# Patient Record
Sex: FEMALE | Race: BLACK OR AFRICAN AMERICAN | NOT HISPANIC OR LATINO | Employment: FULL TIME | URBAN - METROPOLITAN AREA
[De-identification: names, ages, dates, MRNs, and addresses within clinical notes are randomized per-mention and may not be internally consistent; named-entity substitution may affect disease eponyms.]

---

## 2018-02-22 ENCOUNTER — HOSPITAL ENCOUNTER (EMERGENCY)
Facility: OTHER | Age: 63
Discharge: ANOTHER HOSPITAL | End: 2018-02-23
Attending: INTERNAL MEDICINE
Payer: COMMERCIAL

## 2018-02-22 DIAGNOSIS — I48.91 ATRIAL FIBRILLATION WITH RAPID VENTRICULAR RESPONSE: Primary | ICD-10-CM

## 2018-02-22 DIAGNOSIS — R00.0 TACHYCARDIA: ICD-10-CM

## 2018-02-22 DIAGNOSIS — R06.02 SHORTNESS OF BREATH: ICD-10-CM

## 2018-02-22 LAB
ALBUMIN SERPL-MCNC: 4 G/DL (ref 3.3–5.5)
ALP SERPL-CCNC: 283 U/L (ref 42–141)
BILIRUB SERPL-MCNC: 5.2 MG/DL (ref 0.2–1.6)
BUN SERPL-MCNC: 35 MG/DL (ref 7–22)
CALCIUM SERPL-MCNC: 9.6 MG/DL (ref 8–10.3)
CHLORIDE SERPL-SCNC: 100 MMOL/L (ref 98–108)
CREAT SERPL-MCNC: 0.9 MG/DL (ref 0.6–1.2)
GLUCOSE SERPL-MCNC: 162 MG/DL (ref 73–118)
POC ALT (SGPT): 266 U/L (ref 10–47)
POC AST (SGOT): 268 U/L (ref 11–38)
POC B-TYPE NATRIURETIC PEPTIDE: 710 PG/ML (ref 0–100)
POC CARDIAC TROPONIN I: 0.02 NG/ML
POC D-DI: 1530 NG/ML (ref 0–450)
POC PTINR: 1.4 (ref 0.9–1.2)
POC PTWBT: 16.9 SEC (ref 9.7–14.3)
POC TCO2: 21 MMOL/L (ref 18–33)
POTASSIUM BLD-SCNC: 4.3 MMOL/L (ref 3.6–5.1)
PROTEIN, POC: 6.6 G/DL (ref 6.4–8.1)
SAMPLE: ABNORMAL
SAMPLE: NORMAL
SODIUM BLD-SCNC: 140 MMOL/L (ref 128–145)

## 2018-02-22 PROCEDURE — 93005 ELECTROCARDIOGRAM TRACING: CPT

## 2018-02-22 PROCEDURE — 96372 THER/PROPH/DIAG INJ SC/IM: CPT

## 2018-02-22 PROCEDURE — 93010 ELECTROCARDIOGRAM REPORT: CPT | Mod: 76,,, | Performed by: INTERNAL MEDICINE

## 2018-02-22 PROCEDURE — 85610 PROTHROMBIN TIME: CPT

## 2018-02-22 PROCEDURE — 84484 ASSAY OF TROPONIN QUANT: CPT

## 2018-02-22 PROCEDURE — 96361 HYDRATE IV INFUSION ADD-ON: CPT

## 2018-02-22 PROCEDURE — 96376 TX/PRO/DX INJ SAME DRUG ADON: CPT

## 2018-02-22 PROCEDURE — 63600175 PHARM REV CODE 636 W HCPCS: Performed by: INTERNAL MEDICINE

## 2018-02-22 PROCEDURE — 85379 FIBRIN DEGRADATION QUANT: CPT

## 2018-02-22 PROCEDURE — 96375 TX/PRO/DX INJ NEW DRUG ADDON: CPT

## 2018-02-22 PROCEDURE — 83880 ASSAY OF NATRIURETIC PEPTIDE: CPT

## 2018-02-22 PROCEDURE — 99285 EMERGENCY DEPT VISIT HI MDM: CPT | Mod: 25

## 2018-02-22 PROCEDURE — 25000003 PHARM REV CODE 250: Performed by: INTERNAL MEDICINE

## 2018-02-22 PROCEDURE — 96374 THER/PROPH/DIAG INJ IV PUSH: CPT

## 2018-02-22 PROCEDURE — 25500020 PHARM REV CODE 255: Performed by: INTERNAL MEDICINE

## 2018-02-22 PROCEDURE — 85025 COMPLETE CBC W/AUTO DIFF WBC: CPT

## 2018-02-22 PROCEDURE — 80053 COMPREHEN METABOLIC PANEL: CPT

## 2018-02-22 RX ORDER — DILTIAZEM HYDROCHLORIDE 30 MG/1
35 TABLET, FILM COATED ORAL
Status: DISCONTINUED | OUTPATIENT
Start: 2018-02-22 | End: 2018-02-22

## 2018-02-22 RX ORDER — SODIUM CHLORIDE 9 MG/ML
1000 INJECTION, SOLUTION INTRAVENOUS ONCE
Status: COMPLETED | OUTPATIENT
Start: 2018-02-22 | End: 2018-02-22

## 2018-02-22 RX ORDER — ASPIRIN 325 MG
325 TABLET ORAL
Status: COMPLETED | OUTPATIENT
Start: 2018-02-22 | End: 2018-02-22

## 2018-02-22 RX ORDER — DILTIAZEM HYDROCHLORIDE 5 MG/ML
25 INJECTION INTRAVENOUS
Status: COMPLETED | OUTPATIENT
Start: 2018-02-22 | End: 2018-02-22

## 2018-02-22 RX ORDER — DILTIAZEM HYDROCHLORIDE 5 MG/ML
INJECTION INTRAVENOUS
Status: DISCONTINUED
Start: 2018-02-22 | End: 2018-02-23 | Stop reason: HOSPADM

## 2018-02-22 RX ORDER — DILTIAZEM HCL 1 MG/ML
5 INJECTION, SOLUTION INTRAVENOUS
Status: DISCONTINUED | OUTPATIENT
Start: 2018-02-22 | End: 2018-02-23

## 2018-02-22 RX ORDER — ENOXAPARIN SODIUM 100 MG/ML
100 INJECTION SUBCUTANEOUS
Status: COMPLETED | OUTPATIENT
Start: 2018-02-22 | End: 2018-02-22

## 2018-02-22 RX ORDER — ONDANSETRON 2 MG/ML
8 INJECTION INTRAMUSCULAR; INTRAVENOUS
Status: COMPLETED | OUTPATIENT
Start: 2018-02-22 | End: 2018-02-22

## 2018-02-22 RX ORDER — DILTIAZEM HYDROCHLORIDE 5 MG/ML
0.35 INJECTION INTRAVENOUS
Status: COMPLETED | OUTPATIENT
Start: 2018-02-22 | End: 2018-02-22

## 2018-02-22 RX ADMIN — ASPIRIN 325 MG ORAL TABLET 325 MG: 325 PILL ORAL at 07:02

## 2018-02-22 RX ADMIN — DILTIAZEM HYDROCHLORIDE 34 MG: 5 INJECTION INTRAVENOUS at 09:02

## 2018-02-22 RX ADMIN — IOHEXOL 70 ML: 350 INJECTION, SOLUTION INTRAVENOUS at 08:02

## 2018-02-22 RX ADMIN — ENOXAPARIN SODIUM 100 MG: 100 INJECTION SUBCUTANEOUS at 09:02

## 2018-02-22 RX ADMIN — SODIUM CHLORIDE 1000 ML: 0.9 INJECTION, SOLUTION INTRAVENOUS at 07:02

## 2018-02-22 RX ADMIN — ONDANSETRON 8 MG: 2 INJECTION INTRAMUSCULAR; INTRAVENOUS at 07:02

## 2018-02-22 RX ADMIN — DILTIAZEM HYDROCHLORIDE 25 MG: 5 INJECTION INTRAVENOUS at 07:02

## 2018-02-23 ENCOUNTER — ANESTHESIA (OUTPATIENT)
Dept: CARDIOLOGY | Facility: HOSPITAL | Age: 63
DRG: 286 | End: 2018-02-23
Payer: COMMERCIAL

## 2018-02-23 ENCOUNTER — HOSPITAL ENCOUNTER (INPATIENT)
Facility: HOSPITAL | Age: 63
LOS: 3 days | Discharge: HOME | DRG: 286 | End: 2018-02-26
Attending: HOSPITALIST | Admitting: HOSPITALIST
Payer: COMMERCIAL

## 2018-02-23 ENCOUNTER — ANESTHESIA EVENT (OUTPATIENT)
Dept: CARDIOLOGY | Facility: HOSPITAL | Age: 63
DRG: 286 | End: 2018-02-23
Payer: COMMERCIAL

## 2018-02-23 ENCOUNTER — SURGERY (OUTPATIENT)
Age: 63
End: 2018-02-23

## 2018-02-23 VITALS
HEIGHT: 67 IN | WEIGHT: 215 LBS | SYSTOLIC BLOOD PRESSURE: 110 MMHG | HEART RATE: 130 BPM | OXYGEN SATURATION: 96 % | TEMPERATURE: 98 F | DIASTOLIC BLOOD PRESSURE: 91 MMHG | BODY MASS INDEX: 33.74 KG/M2 | RESPIRATION RATE: 16 BRPM

## 2018-02-23 DIAGNOSIS — I48.91 A-FIB: ICD-10-CM

## 2018-02-23 DIAGNOSIS — I48.91 ATRIAL FIBRILLATION WITH RAPID VENTRICULAR RESPONSE: ICD-10-CM

## 2018-02-23 DIAGNOSIS — I48.91 ATRIAL FIBRILLATION WITH RVR: ICD-10-CM

## 2018-02-23 DIAGNOSIS — I50.21 ACUTE SYSTOLIC HEART FAILURE: Primary | ICD-10-CM

## 2018-02-23 DIAGNOSIS — R07.9 CHEST PAIN: ICD-10-CM

## 2018-02-23 LAB
ALBUMIN SERPL BCP-MCNC: 3.7 G/DL
ALP SERPL-CCNC: 354 U/L
ALT SERPL W/O P-5'-P-CCNC: 308 U/L
ANION GAP SERPL CALC-SCNC: 15 MMOL/L
AORTIC VALVE REGURGITATION: ABNORMAL
AST SERPL-CCNC: 270 U/L
BACTERIA #/AREA URNS HPF: ABNORMAL /HPF
BASOPHILS # BLD AUTO: 0.02 K/UL
BASOPHILS NFR BLD: 0.2 %
BILIRUB SERPL-MCNC: 5.9 MG/DL
BILIRUB UR QL STRIP: NEGATIVE
BUN SERPL-MCNC: 38 MG/DL
CALCIUM SERPL-MCNC: 9.6 MG/DL
CHLORIDE SERPL-SCNC: 102 MMOL/L
CHOLEST SERPL-MCNC: 132 MG/DL
CHOLEST/HDLC SERPL: 3.9 {RATIO}
CLARITY UR: CLEAR
CO2 SERPL-SCNC: 19 MMOL/L
COLOR UR: ABNORMAL
CREAT SERPL-MCNC: 1.2 MG/DL
CRP SERPL-MCNC: 9.02 MG/L
DIFFERENTIAL METHOD: ABNORMAL
EOSINOPHIL # BLD AUTO: 0 K/UL
EOSINOPHIL NFR BLD: 0.1 %
ERYTHROCYTE [DISTWIDTH] IN BLOOD BY AUTOMATED COUNT: 16.9 %
ERYTHROCYTE [SEDIMENTATION RATE] IN BLOOD BY WESTERGREN METHOD: 1 MM/HR
EST. GFR  (AFRICAN AMERICAN): 56 ML/MIN/1.73 M^2
EST. GFR  (NON AFRICAN AMERICAN): 49 ML/MIN/1.73 M^2
ESTIMATED AVG GLUCOSE: 120 MG/DL
ESTIMATED PA SYSTOLIC PRESSURE: 29.42
GLOBAL PERICARDIAL EFFUSION: ABNORMAL
GLUCOSE SERPL-MCNC: 184 MG/DL
GLUCOSE UR QL STRIP: NEGATIVE
HBA1C MFR BLD HPLC: 5.8 %
HCT VFR BLD AUTO: 51.7 %
HDLC SERPL-MCNC: 34 MG/DL
HDLC SERPL: 25.8 %
HGB BLD-MCNC: 16.5 G/DL
HGB UR QL STRIP: NEGATIVE
INR PPP: 1.8
KETONES UR QL STRIP: NEGATIVE
LDLC SERPL CALC-MCNC: 84.4 MG/DL
LEUKOCYTE ESTERASE UR QL STRIP: ABNORMAL
LYMPHOCYTES # BLD AUTO: 1.6 K/UL
LYMPHOCYTES NFR BLD: 12.9 %
MAGNESIUM SERPL-MCNC: 2.5 MG/DL
MCH RBC QN AUTO: 26.1 PG
MCHC RBC AUTO-ENTMCNC: 31.9 G/DL
MCV RBC AUTO: 82 FL
MICROSCOPIC COMMENT: ABNORMAL
MITRAL VALVE REGURGITATION: ABNORMAL
MITRAL VALVE REGURGITATION: ABNORMAL
MONOCYTES # BLD AUTO: 0.9 K/UL
MONOCYTES NFR BLD: 7.7 %
NEUTROPHILS # BLD AUTO: 9.3 K/UL
NEUTROPHILS NFR BLD: 77.8 %
NITRITE UR QL STRIP: NEGATIVE
NONHDLC SERPL-MCNC: 98 MG/DL
PH UR STRIP: 5 [PH] (ref 5–8)
PHOSPHATE SERPL-MCNC: 4.9 MG/DL
PLATELET # BLD AUTO: 185 K/UL
PMV BLD AUTO: 12.9 FL
POCT GLUCOSE: 186 MG/DL (ref 70–110)
POTASSIUM SERPL-SCNC: 4.8 MMOL/L
PROT SERPL-MCNC: 6.8 G/DL
PROT UR QL STRIP: NEGATIVE
PROTHROMBIN TIME: 19 SEC
RBC # BLD AUTO: 6.33 M/UL
RETIRED EF AND QEF - SEE NOTES: 20 (ref 55–65)
RETIRED EF AND QEF - SEE NOTES: 20 (ref 55–65)
SODIUM SERPL-SCNC: 136 MMOL/L
SP GR UR STRIP: >1.06 (ref 1–1.03)
SQUAMOUS #/AREA URNS HPF: 2 /HPF
T3FREE SERPL-MCNC: 1.6 PG/ML
T4 FREE SERPL-MCNC: 1.26 NG/DL
TRICUSPID VALVE REGURGITATION: ABNORMAL
TRIGL SERPL-MCNC: 68 MG/DL
TROPONIN I SERPL DL<=0.01 NG/ML-MCNC: 0.02 NG/ML
TROPONIN I SERPL DL<=0.01 NG/ML-MCNC: 0.03 NG/ML
TROPONIN I SERPL DL<=0.01 NG/ML-MCNC: 0.04 NG/ML
TSH SERPL DL<=0.005 MIU/L-ACNC: 0.82 UIU/ML
URN SPEC COLLECT METH UR: ABNORMAL
UROBILINOGEN UR STRIP-ACNC: ABNORMAL EU/DL
WBC # BLD AUTO: 12 K/UL
WBC #/AREA URNS HPF: 7 /HPF (ref 0–5)
YEAST URNS QL MICRO: ABNORMAL

## 2018-02-23 PROCEDURE — 25000003 PHARM REV CODE 250: Performed by: INTERNAL MEDICINE

## 2018-02-23 PROCEDURE — 27000221 HC OXYGEN, UP TO 24 HOURS

## 2018-02-23 PROCEDURE — 84443 ASSAY THYROID STIM HORMONE: CPT

## 2018-02-23 PROCEDURE — 80053 COMPREHEN METABOLIC PANEL: CPT

## 2018-02-23 PROCEDURE — 99291 CRITICAL CARE FIRST HOUR: CPT | Mod: ,,, | Performed by: INTERNAL MEDICINE

## 2018-02-23 PROCEDURE — 63600175 PHARM REV CODE 636 W HCPCS: Performed by: INTERNAL MEDICINE

## 2018-02-23 PROCEDURE — 84100 ASSAY OF PHOSPHORUS: CPT

## 2018-02-23 PROCEDURE — 92960 CARDIOVERSION ELECTRIC EXT: CPT | Mod: ,,, | Performed by: INTERNAL MEDICINE

## 2018-02-23 PROCEDURE — 93312 ECHO TRANSESOPHAGEAL: CPT

## 2018-02-23 PROCEDURE — D9220A PRA ANESTHESIA: Mod: 59,CRNA,, | Performed by: NURSE ANESTHETIST, CERTIFIED REGISTERED

## 2018-02-23 PROCEDURE — 93312 ECHO TRANSESOPHAGEAL: CPT | Mod: 26,,, | Performed by: INTERNAL MEDICINE

## 2018-02-23 PROCEDURE — 83036 HEMOGLOBIN GLYCOSYLATED A1C: CPT

## 2018-02-23 PROCEDURE — 83735 ASSAY OF MAGNESIUM: CPT

## 2018-02-23 PROCEDURE — 63600175 PHARM REV CODE 636 W HCPCS: Performed by: HOSPITALIST

## 2018-02-23 PROCEDURE — 86141 C-REACTIVE PROTEIN HS: CPT

## 2018-02-23 PROCEDURE — 37000008 HC ANESTHESIA 1ST 15 MINUTES: Performed by: INTERNAL MEDICINE

## 2018-02-23 PROCEDURE — 85610 PROTHROMBIN TIME: CPT

## 2018-02-23 PROCEDURE — 93306 TTE W/DOPPLER COMPLETE: CPT | Mod: 26,,, | Performed by: INTERNAL MEDICINE

## 2018-02-23 PROCEDURE — 93005 ELECTROCARDIOGRAM TRACING: CPT

## 2018-02-23 PROCEDURE — 27200139 CARDIOVERSION (DCCV)

## 2018-02-23 PROCEDURE — 84481 FREE ASSAY (FT-3): CPT

## 2018-02-23 PROCEDURE — 20000000 HC ICU ROOM

## 2018-02-23 PROCEDURE — 80061 LIPID PANEL: CPT

## 2018-02-23 PROCEDURE — 81000 URINALYSIS NONAUTO W/SCOPE: CPT

## 2018-02-23 PROCEDURE — D9220A PRA ANESTHESIA: Mod: 59,ANES,, | Performed by: ANESTHESIOLOGY

## 2018-02-23 PROCEDURE — 93320 DOPPLER ECHO COMPLETE: CPT | Mod: 26,,, | Performed by: INTERNAL MEDICINE

## 2018-02-23 PROCEDURE — 25000003 PHARM REV CODE 250: Performed by: HOSPITALIST

## 2018-02-23 PROCEDURE — A4216 STERILE WATER/SALINE, 10 ML: HCPCS | Performed by: HOSPITALIST

## 2018-02-23 PROCEDURE — 84439 ASSAY OF FREE THYROXINE: CPT

## 2018-02-23 PROCEDURE — 84484 ASSAY OF TROPONIN QUANT: CPT | Mod: 91

## 2018-02-23 PROCEDURE — 36415 COLL VENOUS BLD VENIPUNCTURE: CPT

## 2018-02-23 PROCEDURE — 93306 TTE W/DOPPLER COMPLETE: CPT

## 2018-02-23 PROCEDURE — 85025 COMPLETE CBC W/AUTO DIFF WBC: CPT

## 2018-02-23 PROCEDURE — 37000009 HC ANESTHESIA EA ADD 15 MINS: Performed by: INTERNAL MEDICINE

## 2018-02-23 PROCEDURE — 85651 RBC SED RATE NONAUTOMATED: CPT

## 2018-02-23 RX ORDER — ACETAMINOPHEN 325 MG/1
650 TABLET ORAL EVERY 6 HOURS PRN
Status: DISCONTINUED | OUTPATIENT
Start: 2018-02-23 | End: 2018-02-26 | Stop reason: HOSPADM

## 2018-02-23 RX ORDER — ASPIRIN 325 MG
325 TABLET ORAL DAILY
Status: DISCONTINUED | OUTPATIENT
Start: 2018-02-23 | End: 2018-02-23

## 2018-02-23 RX ORDER — SODIUM CHLORIDE 0.9 % (FLUSH) 0.9 %
3 SYRINGE (ML) INJECTION EVERY 8 HOURS
Status: DISCONTINUED | OUTPATIENT
Start: 2018-02-23 | End: 2018-02-26 | Stop reason: HOSPADM

## 2018-02-23 RX ORDER — FUROSEMIDE 10 MG/ML
40 INJECTION INTRAMUSCULAR; INTRAVENOUS ONCE
Status: COMPLETED | OUTPATIENT
Start: 2018-02-23 | End: 2018-02-23

## 2018-02-23 RX ORDER — CARVEDILOL 3.12 MG/1
3.12 TABLET ORAL 2 TIMES DAILY
Status: DISCONTINUED | OUTPATIENT
Start: 2018-02-23 | End: 2018-02-26 | Stop reason: HOSPADM

## 2018-02-23 RX ORDER — RAMELTEON 8 MG/1
8 TABLET ORAL NIGHTLY PRN
Status: DISCONTINUED | OUTPATIENT
Start: 2018-02-23 | End: 2018-02-26 | Stop reason: HOSPADM

## 2018-02-23 RX ORDER — ENOXAPARIN SODIUM 100 MG/ML
1 INJECTION SUBCUTANEOUS
Status: DISCONTINUED | OUTPATIENT
Start: 2018-02-23 | End: 2018-02-24

## 2018-02-23 RX ORDER — AMOXICILLIN 250 MG
1 CAPSULE ORAL 2 TIMES DAILY
Status: DISCONTINUED | OUTPATIENT
Start: 2018-02-23 | End: 2018-02-26 | Stop reason: HOSPADM

## 2018-02-23 RX ORDER — PROMETHAZINE HYDROCHLORIDE 25 MG/1
25 SUPPOSITORY RECTAL EVERY 6 HOURS PRN
Status: DISCONTINUED | OUTPATIENT
Start: 2018-02-23 | End: 2018-02-26 | Stop reason: HOSPADM

## 2018-02-23 RX ORDER — NITROGLYCERIN 0.4 MG/1
0.4 TABLET SUBLINGUAL EVERY 5 MIN PRN
Status: DISCONTINUED | OUTPATIENT
Start: 2018-02-23 | End: 2018-02-26 | Stop reason: HOSPADM

## 2018-02-23 RX ORDER — LORAZEPAM 2 MG/ML
0.5 INJECTION INTRAMUSCULAR ONCE
Status: COMPLETED | OUTPATIENT
Start: 2018-02-23 | End: 2018-02-23

## 2018-02-23 RX ORDER — DILTIAZEM HYDROCHLORIDE 5 MG/ML
0.25 INJECTION INTRAVENOUS ONCE
Status: DISCONTINUED | OUTPATIENT
Start: 2018-02-23 | End: 2018-02-23

## 2018-02-23 RX ORDER — SODIUM CHLORIDE 9 MG/ML
INJECTION, SOLUTION INTRAVENOUS CONTINUOUS
Status: DISCONTINUED | OUTPATIENT
Start: 2018-02-23 | End: 2018-02-23

## 2018-02-23 RX ORDER — ONDANSETRON 2 MG/ML
8 INJECTION INTRAMUSCULAR; INTRAVENOUS EVERY 8 HOURS PRN
Status: DISCONTINUED | OUTPATIENT
Start: 2018-02-23 | End: 2018-02-26 | Stop reason: HOSPADM

## 2018-02-23 RX ORDER — ASPIRIN 81 MG/1
81 TABLET ORAL DAILY
Status: DISCONTINUED | OUTPATIENT
Start: 2018-02-24 | End: 2018-02-26 | Stop reason: HOSPADM

## 2018-02-23 RX ORDER — BISACODYL 10 MG
10 SUPPOSITORY, RECTAL RECTAL DAILY PRN
Status: DISCONTINUED | OUTPATIENT
Start: 2018-02-23 | End: 2018-02-26 | Stop reason: HOSPADM

## 2018-02-23 RX ORDER — POLYETHYLENE GLYCOL 3350 17 G/17G
17 POWDER, FOR SOLUTION ORAL DAILY
Status: DISCONTINUED | OUTPATIENT
Start: 2018-02-23 | End: 2018-02-26 | Stop reason: HOSPADM

## 2018-02-23 RX ORDER — DEXTROMETHORPHAN POLISTIREX 30 MG/5 ML
1 SUSPENSION, EXTENDED RELEASE 12 HR ORAL DAILY PRN
Status: DISCONTINUED | OUTPATIENT
Start: 2018-02-23 | End: 2018-02-26 | Stop reason: HOSPADM

## 2018-02-23 RX ADMIN — DOCUSATE SODIUM AND SENNOSIDES 1 TABLET: 8.6; 5 TABLET, FILM COATED ORAL at 08:02

## 2018-02-23 RX ADMIN — AMIODARONE HYDROCHLORIDE 150 MG: 1.5 INJECTION, SOLUTION INTRAVENOUS at 12:02

## 2018-02-23 RX ADMIN — ONDANSETRON HYDROCHLORIDE 8 MG: 2 INJECTION, SOLUTION INTRAMUSCULAR; INTRAVENOUS at 11:02

## 2018-02-23 RX ADMIN — AMIODARONE HYDROCHLORIDE 1 MG/MIN: 1.8 INJECTION, SOLUTION INTRAVENOUS at 12:02

## 2018-02-23 RX ADMIN — CARVEDILOL 3.12 MG: 3.12 TABLET, FILM COATED ORAL at 08:02

## 2018-02-23 RX ADMIN — Medication 3 ML: at 02:02

## 2018-02-23 RX ADMIN — FUROSEMIDE 40 MG: 10 INJECTION, SOLUTION INTRAMUSCULAR; INTRAVENOUS at 04:02

## 2018-02-23 RX ADMIN — AMIODARONE HYDROCHLORIDE 0.5 MG/MIN: 50 INJECTION, SOLUTION INTRAVENOUS at 04:02

## 2018-02-23 RX ADMIN — ENOXAPARIN SODIUM 100 MG: 100 INJECTION SUBCUTANEOUS at 05:02

## 2018-02-23 RX ADMIN — Medication 3 ML: at 06:02

## 2018-02-23 RX ADMIN — Medication 3 ML: at 10:02

## 2018-02-23 RX ADMIN — SODIUM CHLORIDE: 0.9 INJECTION, SOLUTION INTRAVENOUS at 06:02

## 2018-02-23 RX ADMIN — RAMELTEON 8 MG: 8 TABLET, FILM COATED ORAL at 10:02

## 2018-02-23 RX ADMIN — LORAZEPAM 0.5 MG: 2 INJECTION, SOLUTION INTRAMUSCULAR; INTRAVENOUS at 06:02

## 2018-02-23 RX ADMIN — AMIODARONE HYDROCHLORIDE 1 MG/MIN: 50 INJECTION, SOLUTION INTRAVENOUS at 04:02

## 2018-02-23 NOTE — H&P
[facility] Hospital Medicine  History & Physical    Patient Name: Yaneli Steele  MRN: [id number]  Admission Date: 02/23/2018  Attending Physician: Devante Kang MD, MPH      [facility]    CC:     Transfer for evaluation of atrial fibrillation with rapid ventricular response.    HISTORY OF PRESENT ILLNESS:     Yaneli Steele is a 62 y.o. female that (in part)  has no past medical history on file. Presents to [facility] as a transfer patient from the [facility] Emergency Department for evaluation of atrial fibrillation with rapid ventricular response.  Patient initially presented complaining of shortness of breath of one-week duration.  Subacute onset with progressive worsening.  Constant duration.  Daily frequency.  Located in his chest/lungs.  No radiation.  She denies history of previous arrhythmia.  No fever or chills.  No known thyroid disease.  She does not use illicit drugs or drink alcohol to excess.  Positive for palpitations but denies chest pain.    In the emergency department EKG, cardiac enzymes, chest x-ray, and routine laboratory studies were obtained.  This confirmed she had a atrial fibrillation with RVR.  She was given a bolus dose of Cardizem that did not appear to be effective and therefore she was started on a Cardizem infusion.  Thereafter she was started on an amiodarone infusion after loading dose was given.    Hospital medicine has been asked to admit for further evaluation and treatment.       REVIEW OF SYSTEMS:     -- Constitutional: No fever or chills.  -- Eyes: No visual changes, diplopia, pain, tearing, blind spots, or discharge.   -- Ears, nose, mouth, throat, and face: No congestion, sore throat, epistaxis, d/c, bleeding gums, neck stiffness masses, or dental issues.  -- Respiratory: Shortness of breath.  No cough, hemoptysis, stridor, wheezing, or night sweats.  -- Cardiovascular: As above in  history of present illness.   -- Gastrointestinal: No vomiting, abdominal pain, hematemesis, melena, dyspepsia, or change in bowel habits.  -- Genitourinary: No hematuria, dysuria, frequency, urgency, nocturia, polyuria, stones, or incontinence.  -- Integument/breast: No rash, pruritis, pigmentation changes, dryness, or changes in hair  -- Hematologic/lymphatic: No easy bruising or lymphadenopathy.   -- Musculoskeletal: No acute arthralgias, acute myalgias, joint swelling, acute limitations of ROM, or acute muscular weakness.  -- Neurological: No seizures, headaches, incoordination, paraesthesias, ataxia, vertigo, or tremors.  -- Behavioral/Psych: No auditory or visual hallucinations, depression, or suicidal/homicidal ideations.  -- Endocrine: No heat or cold intolerance, polydipsia, or unintentional weight gain / loss.  -- Allergy/Immunologic: No recurrent infections or adverse reaction to food, insects, or difficulty breathing.        PAST MEDICAL / SURGICAL HISTORY:   History reviewed. No pertinent past medical history.  History reviewed. No pertinent surgical history.      FAMILY HISTORY:     Family history cardiovascular disease.      SOCIAL HISTORY:     Social History   Substance Use Topics    Smoking status: Not on file    Smokeless tobacco: Not on file    Alcohol use Not on file     Social History     Social History    Marital status: Single     Spouse name: N/A    Number of children: N/A    Years of education: N/A     Social History Main Topics    Smoking status: None    Smokeless tobacco: None    Alcohol use None    Drug use: Unknown    Sexual activity: Not Asked     Other Topics Concern    None     Social History Narrative    None         ALLERGIES:       Review of patient's allergies indicates:  No Known Allergies      HEALTH SCREENING:     Influenza vaccine not up-to-date for this season.  Prevnar 13 pneumonia vaccine = no evidence of previous vaccination found in the medical  record      HOME MEDICATIONS:     Mucinex as needed    HOSPITAL MEDICATIONS:     Scheduled Meds:   Continuous Infusions:   PRN Meds:       PHYSICAL EXAM:     Wt Readings from Last 1 Encounters:   02/22/18 1840 97.5 kg (215 lb)     There is no height or weight on file to calculate BMI.  Vitals:    02/23/18 0200 02/23/18 0215   BP: 123/84 115/87   Pulse: (!) 135 (!) 140   Resp: (!) 32 (!) 35   Temp:  97.5 °F (36.4 °C)   TempSrc:  Oral   SpO2:  100%          -- General appearance: well developed. appears stated age   -- Head: normocephalic, atraumatic   -- Eyes: conjunctivae clear. Extraocular muscles intact  -- Nose: Nares normal. Septum midline.   -- Mouth/Throat: lips, mucosa, and tongue normal. no throat erythema.   -- Neck: supple, symmetrical, trachea midline, no JVD and thyroid not grossly enlarged, appears symmetric  -- Lungs: Increased respiratory rate.  Begin full sentences.  clear to auscultation bilaterally. normal respiratory effort. No use of accessory muscles.   -- Chest wall: no tenderness. equal bilateral chest rise   -- Heart: Rapid irregularly irregular rate and rhythm. S1, S2 normal.  no click, rub or gallop   -- Abdomen: soft, non-tender, non-distended, non-tympanic; bowel sounds normal; no masses  -- Extremities: no cyanosis, clubbing or edema.   -- Pulses: 2+ and symmetric   -- Skin: color normal, texture normal, turgor normal. No rashes or lesions.   -- Neurologic: Normal strength and tone. No focal numbness or weakness. CNII-XII intact. Aniceto coma scale: eyes open spontaneously-4, oriented & converses-5, obeys commands-6.      LABORATORY STUDIES:     Recent Results (from the past 36 hour(s))   POCT CMP    Collection Time: 02/22/18  7:35 PM   Result Value Ref Range    Albumin, POC 4.0 3.3 - 5.5 g/dL    Alkaline Phosphatase,  (H) 42 - 141 U/L    ALT (SGPT),  (H) 10 - 47 U/L    AST (SGOT),  (H) 11 - 38 U/L    POC BUN 35 (H) 7 - 22 mg/dL    Calcium, POC 9.6 8.0 - 10.3 mg/dL     POC Chloride 100 98 - 108 mmol/L    POC Creatinine 0.9 0.6 - 1.2 mg/dL    POC Glucose 162 (H) 73 - 118 mg/dL    POC Potassium 4.3 3.6 - 5.1 mmol/L    POC Sodium 140 128 - 145 mmol/L    Bilirubin 5.2 (H) 0.2 - 1.6 mg/dL    POC TCO2 21 18 - 33 mmol/L    Protein 6.6 6.4 - 8.1 g/dL   Troponin ISTAT    Collection Time: 02/22/18  7:40 PM   Result Value Ref Range    POC Cardiac Troponin I 0.02 <0.09 ng/mL    Sample DANIELA    ISTAT PROCEDURE    Collection Time: 02/22/18  7:48 PM   Result Value Ref Range    POC PTWBT 16.9 (H) 9.7 - 14.3 sec    POC PTINR 1.4 (H) 0.9 - 1.2    Sample UNK    POCT D Dimer    Collection Time: 02/22/18  8:25 PM   Result Value Ref Range    POC D-DI 1530 (H) 0.0 - 450.0 ng/mL   POCT B-type natriuretic peptide (BNP)    Collection Time: 02/22/18  8:27 PM   Result Value Ref Range    POC B-Type Natriuretic Peptide 710 (H) 0.0 - 100.0 pg/mL           IMAGING:     Chest x-ray PA and lateral  Findings suggesting mild congestive change, no convincing large focal consolidation..      CONSULTS:     IP CONSULT TO CARDIOLOGY       ASSESSMENT & PLAN:     Primary Diagnosis:  <principal problem not specified>    Active Hospital Problems    Diagnosis  POA    Atrial fibrillation with RVR [I48.91]  Yes     Priority: 1 - High      Resolved Hospital Problems    Diagnosis Date Resolved POA   No resolved problems to display.       Atrial fibrillation with rapid ventricular response; new onset  · Currently on amiodarone infusion   · Cardizem as needed for rate control  · Monitor on telemetry  · Maintain magnesium around 2.0  · Maintain potassium around 4.0  · Cardiology consult for further evaluation and recommendations  _______________________________________  · CHF - unknown  · Hypertension - no  · Age 65-74 - no  · Age >74 - no  · Diabetes - no  · Prior stroke / TIA / Thromboembolism - no  · Vascular Disease - no      [id number] Calculator    [id number] score = 0 to 1; unable to calculate until 2-D echocardiogram  is complete    Oral anticoagulation is strongly advised with a score of greater than 1.        VTE Risk Mitigation         Ordered     Medium Risk of VTE  Once      02/23/18 0239     Place ALISSA hose  Until discontinued      02/23/18 0239     Place sequential compression device  Until discontinued      02/23/18 0239            Adult PRN medications available   DVT prophylaxis given       DISPOSITION:     Will admit to the Hospital Medicine service for further evaluation and treatment.    Chart reviewed and updated where applicable.    High Risk Conditions:  Patient has a condition that poses threat to life and bodily function: Atrial fibrillation with rapid ventricular response      ===============================================================    Devante Kang MD, MPH  Department of Hospital Medicine   [facility]  [phone] pg  (7pm - 6am)          This note is dictated using Dragon Medical 360 voice recognition software.  There are word recognition mistakes that are occasionally missed on review.

## 2018-02-23 NOTE — TRANSFER OF CARE
"Anesthesia Transfer of Care Note    Patient: Yaneli Steele    Procedure(s) Performed: Procedure(s) (LRB):  Transesophageal Echocardiogram (JUNIOR), dccv (N/A)    Transport from OR: Transported from OR on 100% O2 by closed face mask with adequate spontaneous ventilation    Post pain: adequate analgesia    Post assessment: no apparent anesthetic complications    Post vital signs: stable    Level of consciousness: awake    Nausea/Vomiting: no nausea/vomiting    Complications: none    Transfer of care protocol was followed      Last vitals:   Visit Vitals  /78   Pulse (!) 122   Temp 36.7 °C (98 °F) (Axillary)   Resp (!) 23   Ht 5' 7" (1.702 m)   Wt 97.3 kg (214 lb 8.1 oz)   SpO2 (!) 93%   Breastfeeding? No   BMI 33.60 kg/m²     "

## 2018-02-23 NOTE — SUBJECTIVE & OBJECTIVE
History reviewed. No pertinent past medical history.    History reviewed. No pertinent surgical history.    Review of patient's allergies indicates:  No Known Allergies    Current Facility-Administered Medications on File Prior to Encounter   Medication    [COMPLETED] 0.9%  NaCl infusion    [COMPLETED] amiodarone in dextrose 150 mg/100 mL (1.5 mg/mL) loading dose 150 mg    [COMPLETED] aspirin tablet 325 mg    [COMPLETED] diltiaZEM injection 25 mg    [COMPLETED] diltiaZEM injection 34 mg    [COMPLETED] enoxaparin injection 100 mg    [COMPLETED] omnipaque 350 iohexol 70 mL    [COMPLETED] ondansetron injection 8 mg    [DISCONTINUED] amiodarone 360 mg/200 mL (1.8 mg/mL) infusion    [DISCONTINUED] amiodarone 360 mg/200 mL (1.8 mg/mL) infusion    [DISCONTINUED] diltiaZEM 125 mg in D5W 125 mL infusion    [DISCONTINUED] diltiaZEM tablet 30 mg     No current outpatient prescriptions on file prior to encounter.     Family History     None        Social History Main Topics    Smoking status: Former Smoker    Smokeless tobacco: Not on file      Comment: Pt states she quit more than 30 years ago    Alcohol use No    Drug use: No    Sexual activity: Not on file     Review of Systems   Constitution: Negative.   HENT: Negative.    Eyes: Negative.    Cardiovascular: Positive for chest pain, dyspnea on exertion, irregular heartbeat and palpitations. Negative for leg swelling, near-syncope, orthopnea, paroxysmal nocturnal dyspnea and syncope.   Respiratory: Positive for shortness of breath.    Skin: Negative.    Musculoskeletal: Negative.    Gastrointestinal: Negative for abdominal pain, constipation and diarrhea.   Genitourinary: Negative for dysuria.   Neurological: Negative.    Psychiatric/Behavioral: Negative.      Objective:     Vital Signs (Most Recent):  Temp: 97.5 °F (36.4 °C) (02/23/18 0215)  Pulse: (!) 125 (02/23/18 0700)  Resp: (!) 33 (02/23/18 0700)  BP: 101/75 (02/23/18 0642)  SpO2: 100 % (02/23/18 0700)  Vital Signs (24h Range):  Temp:  [97.5 °F (36.4 °C)-98.5 °F (36.9 °C)] 97.5 °F (36.4 °C)  Pulse:  [] 125  Resp:  [10-35] 33  SpO2:  [83 %-100 %] 100 %  BP: (101-173)/(69-94) 101/75     Weight: 97.3 kg (214 lb 8.1 oz)  Body mass index is 33.6 kg/m².    SpO2: 100 %  O2 Device (Oxygen Therapy): nasal cannula      Intake/Output Summary (Last 24 hours) at 02/23/18 0854  Last data filed at 02/23/18 0600   Gross per 24 hour   Intake            64.38 ml   Output              250 ml   Net          -185.62 ml       Lines/Drains/Airways     Peripheral Intravenous Line                 Peripheral IV - Single Lumen 02/22/18 1928 Right Hand less than 1 day         Peripheral IV - Single Lumen 02/23/18 0230 Left Hand less than 1 day                Physical Exam   Constitutional: She is oriented to person, place, and time. She appears well-developed and well-nourished.   HENT:   Head: Normocephalic and atraumatic.   Eyes: Conjunctivae and EOM are normal. Pupils are equal, round, and reactive to light.   Neck: Normal range of motion. Neck supple. No thyromegaly present.   Cardiovascular: An irregularly irregular rhythm present. Tachycardia present.    No murmur heard.  Pulmonary/Chest: Effort normal and breath sounds normal. No respiratory distress.   Abdominal: Soft. Bowel sounds are normal.   Musculoskeletal: She exhibits edema.   Neurological: She is alert and oriented to person, place, and time.   Skin: Skin is warm and dry.   Psychiatric: She has a normal mood and affect. Her behavior is normal.       Significant Labs:   CMP   Recent Labs  Lab 02/23/18  0322      K 4.8      CO2 19*   *   BUN 38*   CREATININE 1.2   CALCIUM 9.6   PROT 6.8   ALBUMIN 3.7   BILITOT 5.9*   ALKPHOS 354*   *   *   ANIONGAP 15   ESTGFRAFRICA 56*   EGFRNONAA 49*   , CBC No results for input(s): WBC, HGB, HCT, PLT in the last 48 hours., INR No results for input(s): INR, PROTIME in the last 48 hours., Lipid Panel    Recent Labs  Lab 02/23/18  0322   CHOL 132   HDL 34*   LDLCALC 84.4   TRIG 68   CHOLHDL 25.8    and Troponin   Recent Labs  Lab 02/23/18  0322   TROPONINI 0.017       Significant Imaging: EKG: A. fib with RVR

## 2018-02-23 NOTE — PLAN OF CARE
Problem: Patient Care Overview  Goal: Plan of Care Review  Outcome: Ongoing (interventions implemented as appropriate)  Cardioversion done today, Afib to NSR. Continues on Amiodarone drip at 0.5mg/hr. NS discontinued. No falls, injuries or skin breakdown.

## 2018-02-23 NOTE — HPI
Yaneli Steele is a 62 y.o. female that (in part)  has no past medical history on file. Presents to [facility] as a transfer patient from the [facility] Emergency Department for evaluation of atrial fibrillation with rapid ventricular response.  Patient initially presented complaining of shortness of breath of one-week duration.  Subacute onset with progressive worsening.  Constant duration.  Daily frequency.  Located in his chest/lungs.  No radiation.  She denies history of previous arrhythmia.  No fever or chills.  No known thyroid disease.  She does not use illicit drugs or drink alcohol to excess.  Positive for palpitations but denies chest pain.    In the emergency department EKG, cardiac enzymes, chest x-ray, and routine laboratory studies were obtained.  This confirmed she had a atrial fibrillation with RVR.  She was given a bolus dose of Cardizem that did not appear to be effective and therefore she was started on a Cardizem infusion.  Thereafter she was started on an amiodarone infusion after loading dose was given.    Hospital medicine has been asked to admit for further evaluation and treatment.

## 2018-02-23 NOTE — ED NOTES
Pt care assumed per [facility], pt aaox3, rr even unlabored skin wdp cap refill <2 sec. Vs stable with hr of 130 on amiodarone drip infusing to lac 18g with nrti noted to site. Pt stable for transport

## 2018-02-23 NOTE — CARE UPDATE
Interval Progress Note    I agree with my colleague's assessment and plan at time of admission. I personally saw Ms Steele today. BP stable but with AFib with RVR, somnolent, pulm edema and LE edema. Labwork concerning for congestive hepatopathy and pre-renal STU. Cardiology consulted. Failed diltiazem and amiodarone. Had cold extremities and drowsy. Cardioverted with good response. 2D Echo with severely depressed LVEF of 20% with mod valve insuff of mitral valve and tricuspid valve. Trop not significantly elevated to suspect ACS. LDL 84. No evidence of diabetes. No chest pain. I will obtain CBC and INR now to eval if anticoagulation is possible now. Diurese now and repeat CMP in AM. Appreciate further cards input. Continue ICU care.

## 2018-02-23 NOTE — ED PROVIDER NOTES
"Encounter Date: 2/22/2018       History     Chief Complaint   Patient presents with    Shortness of Breath     Pt states," I have been short of breath on and off for one week with dry cough."     62-year-old female presents to the emergency department complaining of shortness of breath ×1 week.  She denies chest pain, nausea/vomiting, fevers/chills.  She states she has never experienced this type of shortness of breath previously.      The history is provided by the patient. No  was used.   Shortness of Breath   This is a new problem. The average episode lasts 1 week. The problem occurs intermittently.The problem has not changed since onset.Pertinent negatives include no fever, no cough, no wheezing and no chest pain. It is unknown what precipitated the problem. She has had no prior hospitalizations.     Review of patient's allergies indicates:  No Known Allergies  No past medical history on file.  No past surgical history on file.  No family history on file.  Social History   Substance Use Topics    Smoking status: Not on file    Smokeless tobacco: Not on file    Alcohol use Not on file     Review of Systems   Constitutional: Negative for fever.   Respiratory: Positive for shortness of breath. Negative for apnea, cough, chest tightness, wheezing and stridor.    Cardiovascular: Negative for chest pain.   All other systems reviewed and are negative.      Physical Exam     Initial Vitals [02/22/18 1840]   BP Pulse Resp Temp SpO2   134/86 72 18 98.5 °F (36.9 °C) (!) 89 %      MAP       102         Physical Exam    Nursing note and vitals reviewed.  Constitutional: She appears well-developed and well-nourished. No distress.   HENT:   Head: Normocephalic and atraumatic.   Right Ear: External ear normal.   Eyes: EOM are normal.   Neck: Normal range of motion.   Cardiovascular: Normal rate and regular rhythm.   Pulmonary/Chest: Breath sounds normal. No respiratory distress.   Abdominal: Soft. Bowel " sounds are normal.   Musculoskeletal: Normal range of motion.   Neurological: She is alert. She has normal strength.   Psychiatric: She has a normal mood and affect.         ED Course   Critical Care  Date/Time: 2/22/2018 11:26 PM  Performed by: SHEEBA REDDING.  Authorized by: SHEEBA REDDING   Direct patient critical care time: 20 minutes  Ordering / reviewing critical care time: 10 minutes  Documentation critical care time: 15 minutes  Consulting other physicians critical care time: 10 minutes  Total critical care time (exclusive of procedural time) : 55 minutes  Critical care was necessary to treat or prevent imminent or life-threatening deterioration of the following conditions: cardiac failure and circulatory failure.  Critical care was time spent personally by me on the following activities: evaluation of patient's response to treatment, obtaining history from patient or surrogate, ordering and review of laboratory studies, re-evaluation of patient's condition, ordering and review of radiographic studies and examination of patient.  Subsequent provider of critical care: I assumed direction of critical care for this patient from another provider of my specialty.        Labs Reviewed   POCT CMP - Abnormal; Notable for the following:        Result Value    Alkaline Phosphatase,  (*)     ALT (SGPT),  (*)     AST (SGOT),  (*)     POC BUN 35 (*)     POC Glucose 162 (*)     Bilirubin 5.2 (*)     All other components within normal limits   ISTAT PROCEDURE - Abnormal; Notable for the following:     POC PTWBT 16.9 (*)     POC PTINR 1.4 (*)     All other components within normal limits   POCT D DIMER - Abnormal; Notable for the following:     POC D-DI 1530 (*)     All other components within normal limits   POCT B-TYPE NATRIURETIC PEPTIDE (BNP) - Abnormal; Notable for the following:     POC B-Type Natriuretic Peptide 710 (*)     All other components within normal limits   TROPONIN ISTAT   POCT  CBC   POCT CMP   POCT PROTIME-INR   POCT TROPONIN   POCT D DIMER   POCT B-TYPE NATRIURETIC PEPTIDE (BNP)             Medical Decision Making:   Initial Assessment:   62-year-old female presents to the emergency department complaining of shortness of breath ×1 week.  She denies chest pain, nausea/vomiting, fevers/chills.  She states she has never experienced this type of shortness of breath previously.  Differential Diagnosis:   Atrial fibrillation  Pulmonary embolus  Pneumonia  Myocardial infarction  COPD  Asthma    ED Management:  EKG revealed atrial fibrillation with rapid ventricular response.  CBC was within normal limits and CMP revealed elevated transaminases, glucose and BUN.  D-dimer was elevated as well as BNP.  CT of the chest with PE protocol was ordered and revealed no signs of pulmonary embolus.  Patient received 2 intravenous doses of Cardizem as well as normal saline bolus and rate decreased but never averaged less than 100 bpm.  Blood pressure has been stable throughout ER stay.  Transfer center was called after Cardizem drip was ordered.  Spoke with Dr. Kang accepts the patient to [facility] and recommends amiodarone.  Will start amiodarone prior to transfer and use Cardizem as needed for rate control.                      Clinical Impression:   The primary encounter diagnosis was Atrial fibrillation with rapid ventricular response. Diagnoses of Tachycardia and Shortness of breath were also pertinent to this visit.    Disposition:   Disposition: Transferred  Condition: Stable                        Mikhail Samayoa MD  02/22/18 0169

## 2018-02-23 NOTE — PROGRESS NOTES
"0200  - Pt arrived to ICU room 261 from [place] ED via EMS.  Pt transferred to ICU bed independently, placed on monitors.  Full assessment completed.  Pt AAOx4.  Denies chest pain and SOB.  Afib per monitor with -140s.  BP stable.  Amiodarone infusing at 1mg/min, started at [facility] at 0015 - to infuse at 1mg/min for 6 hours until 0630, then to infuse at 0.5mg/min.  Tolerating room air without distress.  Oriented to ICU routine, questions answered.  Dr. Kang notified of pt arrival.     0615 - Pt restless, complaining of feeling anxious "can't sleep".  Dr. Kang paged.      0630 - Dr. Kang at bedside to assess patient.  New orders received.   "

## 2018-02-23 NOTE — HPI
62-year-old female presents to the emergency department complaining of shortness of breath ×1 week.  She denies chest pain, nausea/vomiting, fevers/chills.  She states she has never experienced this type of shortness of breath previously.    Patient is had no previous cardiac issues.  She denies any previous testing.  She presented with A. fib with RVR was placed on amiodarone drip in the intensive care unit.  She currently feels a little bit better on the drip with some better rate control.  She still refractory with heart rate in the 130s.  She is still feeling a little shortness of breath and notices the palpitations.  She denies any PND, orthopnea and has had stable lower extremity edema.  She says this been going on for about 10 days now.  She feels like it was initially precipitated by upper respiratory infection.  She denies any dizziness, presyncope or syncope.

## 2018-02-23 NOTE — ASSESSMENT & PLAN NOTE
Currently refractory to Cardizem and amiodarone drip  Plan for JUNIOR/DC cardioversion  Will assess need for OAC when workup complete    **Risks/benefits of the procedure were d/w the patient including throat irritation, aspiration, etc.  Patient understands and wishes to proceed.  Consent was placed on the chart.

## 2018-02-23 NOTE — ED NOTES
Patient has verified the spelling of their name and  on armband    LOC: The patient is awake, alert, and aware of environment with an appropriate affect, the patient is oriented x 4 and speaking appropriately.     APPEARANCE: Patient appears uncomfortable     RESPIRATORY: +SOB. Airway is open and patent, respirations are spontaneous, patient has a normal effort and rate, no accessory muscle use noted, bilateral breath sounds clear.     CARDIAC:  No chest pain discomfort. Patient heart rate noted to fluctuate from 70s to 140s.      SKIN: The skin is warm and dry, color consistent with ethnicity, patient has normal skin turgor and moist mucus membranes, skin intact, no breakdown or bruising noted.     GASTROINTESTINAL: +nausea    COMPLAINT: Patient presented to the ED for intermittent shortness of breath x 1 week.

## 2018-02-23 NOTE — CONSULTS
[facility]  Cardiology  Consult Note    Patient Name: Yaneli Steele  MRN: [id number]  Admission Date: 2/23/2018  Hospital Length of Stay: 0 days  Code Status: Full Code   Attending Provider: Jennifer Casanova MD   Consulting Provider: Juan Schaefer MD  Primary Care Physician: Provider [employer]  Principal Problem:<principal problem not specified>    Patient information was obtained from patient, past medical records and ER records.     Inpatient consult to Cardiology  Consult performed by: JUAN SCHAEFER  Consult ordered by: SARA WANG  Reason for consult: A. fib with RVR        Subjective:     Chief Complaint:  Shortness of breath     HPI:   62-year-old female presents to the emergency department complaining of shortness of breath ×1 week.  She denies chest pain, nausea/vomiting, fevers/chills.  She states she has never experienced this type of shortness of breath previously.    Patient is had no previous cardiac issues.  She denies any previous testing.  She presented with A. fib with RVR was placed on amiodarone drip in the intensive care unit.  She currently feels a little bit better on the drip with some better rate control.  She still refractory with heart rate in the 130s.  She is still feeling a little shortness of breath and notices the palpitations.  She denies any PND, orthopnea and has had stable lower extremity edema.  She says this been going on for about 10 days now.  She feels like it was initially precipitated by upper respiratory infection.  She denies any dizziness, presyncope or syncope.    History reviewed. No pertinent past medical history.    History reviewed. No pertinent surgical history.    Review of patient's allergies indicates:  No Known Allergies    Current Facility-Administered Medications on File Prior to Encounter   Medication    [COMPLETED] 0.9%  NaCl infusion    [COMPLETED] amiodarone in dextrose 150 mg/100 mL (1.5 mg/mL) loading dose 150  mg    [COMPLETED] aspirin tablet 325 mg    [COMPLETED] diltiaZEM injection 25 mg    [COMPLETED] diltiaZEM injection 34 mg    [COMPLETED] enoxaparin injection 100 mg    [COMPLETED] omnipaque 350 iohexol 70 mL    [COMPLETED] ondansetron injection 8 mg    [DISCONTINUED] amiodarone 360 mg/200 mL (1.8 mg/mL) infusion    [DISCONTINUED] amiodarone 360 mg/200 mL (1.8 mg/mL) infusion    [DISCONTINUED] diltiaZEM 125 mg in D5W 125 mL infusion    [DISCONTINUED] diltiaZEM tablet 30 mg     No current outpatient prescriptions on file prior to encounter.     Family History     None        Social History Main Topics    Smoking status: Former Smoker    Smokeless tobacco: Not on file      Comment: Pt states she quit more than 30 years ago    Alcohol use No    Drug use: No    Sexual activity: Not on file     Review of Systems   Constitution: Negative.   HENT: Negative.    Eyes: Negative.    Cardiovascular: Positive for chest pain, dyspnea on exertion, irregular heartbeat and palpitations. Negative for leg swelling, near-syncope, orthopnea, paroxysmal nocturnal dyspnea and syncope.   Respiratory: Positive for shortness of breath.    Skin: Negative.    Musculoskeletal: Negative.    Gastrointestinal: Negative for abdominal pain, constipation and diarrhea.   Genitourinary: Negative for dysuria.   Neurological: Negative.    Psychiatric/Behavioral: Negative.      Objective:     Vital Signs (Most Recent):  Temp: 97.5 °F (36.4 °C) (02/23/18 0215)  Pulse: (!) 125 (02/23/18 0700)  Resp: (!) 33 (02/23/18 0700)  BP: 101/75 (02/23/18 0642)  SpO2: 100 % (02/23/18 0700) Vital Signs (24h Range):  Temp:  [97.5 °F (36.4 °C)-98.5 °F (36.9 °C)] 97.5 °F (36.4 °C)  Pulse:  [] 125  Resp:  [10-35] 33  SpO2:  [83 %-100 %] 100 %  BP: (101-173)/(69-94) 101/75     Weight: 97.3 kg (214 lb 8.1 oz)  Body mass index is 33.6 kg/m².    SpO2: 100 %  O2 Device (Oxygen Therapy): nasal cannula      Intake/Output Summary (Last 24 hours) at 02/23/18  0854  Last data filed at 02/23/18 0600   Gross per 24 hour   Intake            64.38 ml   Output              250 ml   Net          -185.62 ml       Lines/Drains/Airways     Peripheral Intravenous Line                 Peripheral IV - Single Lumen 02/22/18 1928 Right Hand less than 1 day         Peripheral IV - Single Lumen 02/23/18 0230 Left Hand less than 1 day                Physical Exam   Constitutional: She is oriented to person, place, and time. She appears well-developed and well-nourished.   HENT:   Head: Normocephalic and atraumatic.   Eyes: Conjunctivae and EOM are normal. Pupils are equal, round, and reactive to light.   Neck: Normal range of motion. Neck supple. No thyromegaly present.   Cardiovascular: An irregularly irregular rhythm present. Tachycardia present.    No murmur heard.  Pulmonary/Chest: Effort normal and breath sounds normal. No respiratory distress.   Abdominal: Soft. Bowel sounds are normal.   Musculoskeletal: She exhibits edema.   Neurological: She is alert and oriented to person, place, and time.   Skin: Skin is warm and dry.   Psychiatric: She has a normal mood and affect. Her behavior is normal.       Significant Labs:   CMP   Recent Labs  Lab 02/23/18  0322      K 4.8      CO2 19*   *   BUN 38*   CREATININE 1.2   CALCIUM 9.6   PROT 6.8   ALBUMIN 3.7   BILITOT 5.9*   ALKPHOS 354*   *   *   ANIONGAP 15   ESTGFRAFRICA 56*   EGFRNONAA 49*   , CBC No results for input(s): WBC, HGB, HCT, PLT in the last 48 hours., INR No results for input(s): INR, PROTIME in the last 48 hours., Lipid Panel   Recent Labs  Lab 02/23/18  0322   CHOL 132   HDL 34*   LDLCALC 84.4   TRIG 68   CHOLHDL 25.8    and Troponin   Recent Labs  Lab 02/23/18  0322   TROPONINI 0.017       Significant Imaging: EKG: A. fib with RVR    Assessment and Plan:     Atrial fibrillation with RVR    Currently refractory to Cardizem and amiodarone drip  Plan for JUNIOR/DC cardioversion  Will assess need  for OAC when workup complete    **Risks/benefits of the procedure were d/w the patient including throat irritation, aspiration, etc.  Patient understands and wishes to proceed.  Consent was placed on the chart.            VTE Risk Mitigation         Ordered     Medium Risk of VTE  Once      02/23/18 0239     Place ALISSA hose  Until discontinued      02/23/18 0239     Place sequential compression device  Until discontinued      02/23/18 0239        *Total ICU time spent on case 35 minutes    Thank you for your consult. I will follow-up with patient. Please contact us if you have any additional questions.    Juan Rosas MD  Cardiology   [facility]

## 2018-02-23 NOTE — PROCEDURES
JUNIOR/DC cardioversion:    Indication: Refractory A. fib with RVR    Procedure detail:  After informed consent was obtained patient was anesthetized using propofol by anesthesia.  Intubation was performed without much difficulty.  Patient however had issues with anesthesia in quickly had decrease in oxygen saturation.  Quickly images were taken verifying no left atrial appendage clot.  There was significant LV dysfunction and mitral regurgitation.  A 200 J shock was delivered with restoration of normal sinus rhythm.    Recommendations:  -Continue amiodarone infusion until a.m.  -Add carvedilol  -We'll need right and left heart catheterization once stable likely on Monday  -Continue optimize GDMT for presumed nonischemic cardiomyopathy  -We'll use Lovenox until post catheterization would likely will need to initiate OAC

## 2018-02-23 NOTE — ANESTHESIA PREPROCEDURE EVALUATION
02/23/2018  Yaneli Steele is a 62 y.o., female.    Anesthesia Evaluation    I have reviewed the Patient Summary Reports.     I have reviewed the Medications.     Review of Systems  Anesthesia Hx:  No problems with previous Anesthesia   Denies Personal Hx of Anesthesia complications.   Hematology/Oncology:  Hematology Normal   Oncology Normal     EENT/Dental:EENT/Dental Normal   Cardiovascular:  Cardiovascular Normal Exercise tolerance: good     Pulmonary:  Pulmonary Normal    Renal/:  Renal/ Normal     Hepatic/GI:  Hepatic/GI Normal    Musculoskeletal:  Musculoskeletal Normal    Neurological:  Neurology Normal    Endocrine:  Endocrine Normal    Dermatological:  Skin Normal    Psych:  Psychiatric Normal           Physical Exam  General:  Well nourished    Airway/Jaw/Neck:  Airway Findings: Mouth Opening: Normal Tongue: Normal  Mallampati: II      Dental:  Dental Findings: In tact   Chest/Lungs:  Chest/Lungs Clear    Heart/Vascular:  Heart Findings: Normal Heart murmur: negative       Mental Status:  Mental Status Findings:  Cooperative, Alert and Oriented         Anesthesia Plan  Type of Anesthesia, risks & benefits discussed:  Anesthesia Type:  general, MAC, regional  Patient's Preference:   Intra-op Monitoring Plan: standard ASA monitors  Intra-op Monitoring Plan Comments:   Post Op Pain Control Plan: multimodal analgesia  Post Op Pain Control Plan Comments:   Induction:   IV  Beta Blocker:  Patient is not currently on a Beta-Blocker (No further documentation required).       Informed Consent: Patient understands risks and agrees with Anesthesia plan.  Questions answered. Anesthesia consent signed with patient.  ASA Score: 3  emergent   Day of Surgery Review of History & Physical:            Ready For Surgery From Anesthesia Perspective.

## 2018-02-24 PROBLEM — I36.1 NON-RHEUMATIC TRICUSPID VALVE INSUFFICIENCY: Status: ACTIVE | Noted: 2018-02-24

## 2018-02-24 PROBLEM — N17.9 AKI (ACUTE KIDNEY INJURY): Status: ACTIVE | Noted: 2018-02-24

## 2018-02-24 PROBLEM — K72.00: Status: ACTIVE | Noted: 2018-02-24

## 2018-02-24 PROBLEM — I50.21 ACUTE SYSTOLIC HEART FAILURE: Status: ACTIVE | Noted: 2018-02-24

## 2018-02-24 PROBLEM — J81.0 ACUTE PULMONARY EDEMA: Status: ACTIVE | Noted: 2018-02-24

## 2018-02-24 PROBLEM — R79.1 ELEVATED INR: Status: ACTIVE | Noted: 2018-02-24

## 2018-02-24 PROBLEM — I34.0 NON-RHEUMATIC MITRAL REGURGITATION: Status: ACTIVE | Noted: 2018-02-24

## 2018-02-24 LAB
ALBUMIN SERPL BCP-MCNC: 3.4 G/DL
ALBUMIN SERPL BCP-MCNC: 3.5 G/DL
ALP SERPL-CCNC: 342 U/L
ALP SERPL-CCNC: 356 U/L
ALT SERPL W/O P-5'-P-CCNC: 902 U/L
ALT SERPL W/O P-5'-P-CCNC: 922 U/L
ANION GAP SERPL CALC-SCNC: 13 MMOL/L
ANION GAP SERPL CALC-SCNC: 9 MMOL/L
AST SERPL-CCNC: 1127 U/L
AST SERPL-CCNC: 1387 U/L
BASOPHILS # BLD AUTO: 0.01 K/UL
BASOPHILS NFR BLD: 0.1 %
BILIRUB SERPL-MCNC: 5.2 MG/DL
BILIRUB SERPL-MCNC: 5.7 MG/DL
BUN SERPL-MCNC: 52 MG/DL
BUN SERPL-MCNC: 53 MG/DL
CALCIUM SERPL-MCNC: 8.6 MG/DL
CALCIUM SERPL-MCNC: 8.9 MG/DL
CHLORIDE SERPL-SCNC: 101 MMOL/L
CHLORIDE SERPL-SCNC: 98 MMOL/L
CO2 SERPL-SCNC: 19 MMOL/L
CO2 SERPL-SCNC: 24 MMOL/L
CREAT SERPL-MCNC: 1.6 MG/DL
CREAT SERPL-MCNC: 1.8 MG/DL
DIFFERENTIAL METHOD: ABNORMAL
EOSINOPHIL # BLD AUTO: 0 K/UL
EOSINOPHIL NFR BLD: 0 %
ERYTHROCYTE [DISTWIDTH] IN BLOOD BY AUTOMATED COUNT: 15.3 %
EST. GFR  (AFRICAN AMERICAN): 34 ML/MIN/1.73 M^2
EST. GFR  (AFRICAN AMERICAN): 40 ML/MIN/1.73 M^2
EST. GFR  (NON AFRICAN AMERICAN): 30 ML/MIN/1.73 M^2
EST. GFR  (NON AFRICAN AMERICAN): 34 ML/MIN/1.73 M^2
GLUCOSE SERPL-MCNC: 173 MG/DL
GLUCOSE SERPL-MCNC: 181 MG/DL
HCT VFR BLD AUTO: 46.6 %
HGB BLD-MCNC: 15.5 G/DL
INR PPP: 1.8
LYMPHOCYTES # BLD AUTO: 2.5 K/UL
LYMPHOCYTES NFR BLD: 19.6 %
MAGNESIUM SERPL-MCNC: 2.3 MG/DL
MCH RBC QN AUTO: 27 PG
MCHC RBC AUTO-ENTMCNC: 33.3 G/DL
MCV RBC AUTO: 81 FL
MONOCYTES # BLD AUTO: 1 K/UL
MONOCYTES NFR BLD: 7.9 %
NEUTROPHILS # BLD AUTO: 9.4 K/UL
NEUTROPHILS NFR BLD: 73.3 %
PLATELET # BLD AUTO: 146 K/UL
PMV BLD AUTO: 12.7 FL
POTASSIUM SERPL-SCNC: 4.4 MMOL/L
POTASSIUM SERPL-SCNC: 4.9 MMOL/L
PROT SERPL-MCNC: 6.3 G/DL
PROT SERPL-MCNC: 6.3 G/DL
PROTHROMBIN TIME: 18.9 SEC
RBC # BLD AUTO: 5.74 M/UL
SODIUM SERPL-SCNC: 131 MMOL/L
SODIUM SERPL-SCNC: 133 MMOL/L
WBC # BLD AUTO: 12.98 K/UL

## 2018-02-24 PROCEDURE — 85025 COMPLETE CBC W/AUTO DIFF WBC: CPT

## 2018-02-24 PROCEDURE — 63600175 PHARM REV CODE 636 W HCPCS: Performed by: INTERNAL MEDICINE

## 2018-02-24 PROCEDURE — 21400001 HC TELEMETRY ROOM

## 2018-02-24 PROCEDURE — 36415 COLL VENOUS BLD VENIPUNCTURE: CPT

## 2018-02-24 PROCEDURE — 94761 N-INVAS EAR/PLS OXIMETRY MLT: CPT

## 2018-02-24 PROCEDURE — 25000003 PHARM REV CODE 250: Performed by: INTERNAL MEDICINE

## 2018-02-24 PROCEDURE — A4216 STERILE WATER/SALINE, 10 ML: HCPCS | Performed by: HOSPITALIST

## 2018-02-24 PROCEDURE — 25000003 PHARM REV CODE 250: Performed by: HOSPITALIST

## 2018-02-24 PROCEDURE — 80053 COMPREHEN METABOLIC PANEL: CPT | Mod: 91

## 2018-02-24 PROCEDURE — 83735 ASSAY OF MAGNESIUM: CPT

## 2018-02-24 PROCEDURE — 27000221 HC OXYGEN, UP TO 24 HOURS

## 2018-02-24 PROCEDURE — 99232 SBSQ HOSP IP/OBS MODERATE 35: CPT | Mod: ,,, | Performed by: INTERNAL MEDICINE

## 2018-02-24 PROCEDURE — 85610 PROTHROMBIN TIME: CPT

## 2018-02-24 RX ORDER — ENOXAPARIN SODIUM 100 MG/ML
1 INJECTION SUBCUTANEOUS
Status: DISCONTINUED | OUTPATIENT
Start: 2018-02-25 | End: 2018-02-26

## 2018-02-24 RX ORDER — AMIODARONE HYDROCHLORIDE 200 MG/1
200 TABLET ORAL 2 TIMES DAILY
Status: DISCONTINUED | OUTPATIENT
Start: 2018-02-24 | End: 2018-02-26 | Stop reason: HOSPADM

## 2018-02-24 RX ADMIN — ASPIRIN 81 MG: 81 TABLET, COATED ORAL at 08:02

## 2018-02-24 RX ADMIN — AMIODARONE HYDROCHLORIDE 200 MG: 200 TABLET ORAL at 08:02

## 2018-02-24 RX ADMIN — POLYETHYLENE GLYCOL 3350 17 G: 17 POWDER, FOR SOLUTION ORAL at 08:02

## 2018-02-24 RX ADMIN — DOCUSATE SODIUM AND SENNOSIDES 1 TABLET: 8.6; 5 TABLET, FILM COATED ORAL at 08:02

## 2018-02-24 RX ADMIN — ENOXAPARIN SODIUM 100 MG: 100 INJECTION SUBCUTANEOUS at 05:02

## 2018-02-24 RX ADMIN — Medication 3 ML: at 10:02

## 2018-02-24 RX ADMIN — CARVEDILOL 3.12 MG: 3.12 TABLET, FILM COATED ORAL at 08:02

## 2018-02-24 RX ADMIN — Medication 3 ML: at 05:02

## 2018-02-24 NOTE — PROGRESS NOTES
[facility] Hospital Medicine  Progress Note    Patient Name: Yaneli Steele  MRN: [id number]  Patient Class: IP- Inpatient   Admission Date: 2/23/2018  Length of Stay: 1 days  Attending Physician: Jennifer Casanova MD  Primary Care Provider: Provider [employer]        Subjective:     Principal Problem:<principal problem not specified>    HPI:  Yaneli Steele is a 62 y.o. female that (in part)  has no past medical history on file. Presents to [facility] as a transfer patient from the [facility] Emergency Department for evaluation of atrial fibrillation with rapid ventricular response.  Patient initially presented complaining of shortness of breath of one-week duration.  Subacute onset with progressive worsening.  Constant duration.  Daily frequency.  Located in his chest/lungs.  No radiation.  She denies history of previous arrhythmia.  No fever or chills.  No known thyroid disease.  She does not use illicit drugs or drink alcohol to excess.  Positive for palpitations but denies chest pain.    In the emergency department EKG, cardiac enzymes, chest x-ray, and routine laboratory studies were obtained.  This confirmed she had a atrial fibrillation with RVR.  She was given a bolus dose of Cardizem that did not appear to be effective and therefore she was started on a Cardizem infusion.  Thereafter she was started on an amiodarone infusion after loading dose was given.    Hospital medicine has been asked to admit for further evaluation and treatment.     Hospital Course:  Ms Steele presented with new onset atrial fibrillation with RVR. Did not respond to diltiazem or amiodarone infusion, and had physical signs of hemodynamic instability. Admitted to ICU. Cardiology consulted ASA for cardioversion given instability. Converted to NSR after cardioversion and hemodynamics improved. Instructed by cardiologist to remain on amiodarone infusion post  cardioversion. Labwork significant for elevated TBil (5.9), AP (354) and liver enzymes (AST//308) with evidence of acute dysfunction with elevated INR of 1.8. Also increased Cr of 1.2 but baseline unknown. LE edema present as well as pulm edema. These findings were concerning for heart failure. A 2D Echo confirmed this showing a LVEF 20% with mod-severe mitral and tricuspid valves regurgitation. Liver enzymes and Cr worsened despite dose of IV lasix. Amiodarone discontinued.     Interval History: AST/ALT significantly increased but TBil and INR remained unchanged. Amiodarone stopped this AM due to this. In NSR. Cr increased to 1.8 post furosemide dose yesterday. Has good UOP. Patient feels better and has no complaints.     Review of Systems   Constitutional: Negative.    Respiratory: Negative.    Cardiovascular: Positive for leg swelling. Negative for chest pain and palpitations.   Gastrointestinal: Negative.    Genitourinary: Negative.    Musculoskeletal: Negative.    Skin: Negative.    Neurological: Negative.    Hematological: Negative.    Psychiatric/Behavioral: Negative.      Objective:     Vital Signs (Most Recent):  Temp: 97.9 °F (36.6 °C) (02/24/18 0800)  Pulse: 63 (02/24/18 1300)  Resp: 18 (02/24/18 1300)  BP: 130/87 (02/24/18 1300)  SpO2: 100 % (02/24/18 1300) Vital Signs (24h Range):  Temp:  [97.5 °F (36.4 °C)-97.9 °F (36.6 °C)] 97.9 °F (36.6 °C)  Pulse:  [] 63  Resp:  [6-41] 18  SpO2:  [52 %-100 %] 100 %  BP: ()/() 130/87     Weight: 97.3 kg (214 lb 8.1 oz)  Body mass index is 33.6 kg/m².    Intake/Output Summary (Last 24 hours) at 02/24/18 1333  Last data filed at 02/24/18 0600   Gross per 24 hour   Intake            508.9 ml   Output             1050 ml   Net           -541.1 ml      Physical Exam   Constitutional: She appears well-developed. No distress.   Neck: Normal range of motion.   Cardiovascular: Normal rate, regular rhythm and intact distal pulses.    Murmur  heard.  Pulmonary/Chest: Effort normal and breath sounds normal. She has no wheezes. She has no rales.   Abdominal: Soft. Bowel sounds are normal.   Musculoskeletal: Normal range of motion. She exhibits edema (trace).   Neurological: She is alert.   Skin: Skin is warm and dry. Capillary refill takes less than 2 seconds. She is not diaphoretic.   Psychiatric: She has a normal mood and affect. Her behavior is normal. Judgment and thought content normal.   Nursing note and vitals reviewed.      Significant Labs: All pertinent labs within the past 24 hours have been reviewed.    Significant Imaging: I have reviewed all pertinent imaging results/findings within the past 24 hours.  I have reviewed and interpreted all pertinent imaging results/findings within the past 24 hours.    Assessment/Plan:      Atrial fibrillation with RVR    New onset. Initially failed diltiazem and amiodarone, and had signs of hemodynamic instability. S/p cardioversion with good response. Continued on amiodarone infusion afterwards but discontinued this AM due to worsening liver enzymes. I will discuss alternative treatments with cardiology. Is on carvedilol.           Acute systolic heart failure    This is a new diagnosis. Patient had symptoms suggestive of heart failure for about a week or so. No chest pain. Trop not significantly elevated but with T wave inversion in V5-V6, and less pronounced in II,III,AVF. No prior EKGs to compare. BP control, ASA, BB. Hold statin due to elevated liver enzymes plus LDL 84 which is not far from target. On full dose anticoagulation (renally dosed) for AFib. Hold ACE-I due to STU. Plan for [facility] once renal function better          Acute pulmonary edema    Not severe. Responded well to lasix however this caused worsening renal function. Hold off on diuresis. Will need maintenance lasix+spironolactone when renal function stable.           Acute hepatitis, noninfective    Concern for congestive hepatopathy 2/2  heart failure. With evidence of acute liver dysfunction given increased TBil and INR of 1.8. No abdominal pain and no warning signs on exam to suggest acute inflammation. Will obtain hepatitis panel. Will discuss use of amiodarone with cardiology as this can further affect liver function.           STU (acute kidney injury)    Hold diuretics. Strict I/Os. Repeat CMP          Elevated INR    2/2 liver dysfunction          Non-rheumatic tricuspid valve insufficiency    Seen on ECHO          Non-rheumatic mitral regurgitation    Seen on ECHO            VTE Risk Mitigation         Ordered     enoxaparin injection 100 mg  Every 24 hours (non-standard times)     Route:  Subcutaneous        02/24/18 1034     Medium Risk of VTE  Once      02/23/18 0239     Place ALISSA hose  Until discontinued      02/23/18 0239     Place sequential compression device  Until discontinued      02/23/18 0239        CMP this afternoon. Discuss use of amio with cardiology. Step down?    Critical care time spent on the evaluation and treatment of severe organ dysfunction, review of pertinent labs and imaging studies, discussions with consulting providers and discussions with patient/family: > 45 minutes.    Jennifer Schwartz MD  Department of Hospital Medicine   [facility]

## 2018-02-24 NOTE — ASSESSMENT & PLAN NOTE
Concern for congestive hepatopathy 2/2 heart failure. With evidence of acute liver dysfunction given increased TBil and INR of 1.8. No abdominal pain and no warning signs on exam to suggest acute inflammation. Will obtain hepatitis panel. Will discuss use of amiodarone with cardiology as this can further affect liver function.

## 2018-02-24 NOTE — ASSESSMENT & PLAN NOTE
Status post JUNIOR/DC cardioversion in normal sinus rhythm  Continue low-dose amiodarone and carvedilol  Okay to DC amiodarone if LFTs continue to worsen  Cover with Lovenox until post catheterization  We'll need to add dOAC post-catheter

## 2018-02-24 NOTE — SUBJECTIVE & OBJECTIVE
Interval History: She feels fine without any significant chest pain or shortness of breath    Telemetry: Normal sinus rhythm    Review of Systems   All other systems reviewed and are negative.    Objective:     Vital Signs (Most Recent):  Temp: 98 °F (36.7 °C) (02/24/18 1500)  Pulse: 64 (02/24/18 1545)  Resp: (!) 24 (02/24/18 1545)  BP: 106/80 (02/24/18 1545)  SpO2: 100 % (02/24/18 1545) Vital Signs (24h Range):  Temp:  [97.2 °F (36.2 °C)-98 °F (36.7 °C)] 98 °F (36.7 °C)  Pulse:  [60-73] 64  Resp:  [6-41] 24  SpO2:  [52 %-100 %] 100 %  BP: ()/() 106/80     Weight: 97.3 kg (214 lb 8.1 oz)  Body mass index is 33.6 kg/m².     SpO2: 100 %  O2 Device (Oxygen Therapy): nasal cannula      Intake/Output Summary (Last 24 hours) at 02/24/18 1714  Last data filed at 02/24/18 1500   Gross per 24 hour   Intake            217.1 ml   Output             1052 ml   Net           -834.9 ml       Lines/Drains/Airways     Peripheral Intravenous Line                 Peripheral IV - Single Lumen 02/22/18 1928 Right Hand 1 day         Peripheral IV - Single Lumen 02/23/18 0230 Left Hand 1 day                Physical Exam   Constitutional: She is oriented to person, place, and time. No distress.   Cardiovascular: Normal rate and regular rhythm.    Pulmonary/Chest: Effort normal and breath sounds normal.   Musculoskeletal: She exhibits no edema.   Neurological: She is alert and oriented to person, place, and time.       Significant Labs:   BMP:   Recent Labs  Lab 02/23/18  0322 02/24/18  0230 02/24/18  1409   * 181* 173*    133* 131*   K 4.8 4.9 4.4    101 98   CO2 19* 19* 24   BUN 38* 53* 52*   CREATININE 1.2 1.8* 1.6*   CALCIUM 9.6 8.9 8.6*   MG 2.5 2.3  --     and CBC   Recent Labs  Lab 02/23/18  1629 02/24/18  0657   WBC 12.00 12.98*   HGB 16.5* 15.5   HCT 51.7* 46.6    146*       Significant Imaging: Echocardiogram:   2D echo with color flow doppler:   Results for orders placed or performed during  the hospital encounter of 02/23/18   2D echo with color flow doppler   Result Value Ref Range    EF 20 (A) 55 - 65    Mitral Valve Regurgitation MODERATE TO SEVERE (A)     Aortic Valve Regurgitation TRIVIAL TO MILD     Est. PA Systolic Pressure 29.42     Pericardial Effusion NONE     Tricuspid Valve Regurgitation MODERATE TO SEVERE (A)

## 2018-02-24 NOTE — ASSESSMENT & PLAN NOTE
Increased LFTs likely consistent with congestive hepatopathy  Continue follow markers but currently trending down by last check  Hepatitis panel pending  Okay to continue low-dose amiodarone likely will need to wean quickly

## 2018-02-24 NOTE — HOSPITAL COURSE
2-23: Status post JUNIOR/DC cardioversion with normal sinus rhythm, echo shows severely depressed EF  2-24: no acut events, maintaining nsr transferred to tele

## 2018-02-24 NOTE — HOSPITAL COURSE
"Ms Steele presented with new onset atrial fibrillation with RVR. Did not respond to diltiazem or amiodarone infusion, and had physical signs of hemodynamic instability. Admitted to ICU. Cardiology consulted and patient underwent cardioversion given instability. Converted to NSR after cardioversion and hemodynamics improved. Instructed by cardiologist to remain on amiodarone infusion post cardioversion. Labwork significant for elevated TBil (5.9), AP (354) and liver enzymes (AST//308) with evidence of acute dysfunction with elevated INR of 1.8.  Also increased Cr of 1.2 but baseline unknown. LE edema present as well as pulm edema. These findings were concerning for heart failure. A 2D Echo confirmed this showing a LVEF 20% with mod-severe mitral and tricuspid valves regurgitation. Liver enzymes and Cr worsened probably secondary to cardiac dysfunction.  LFT's and Creat improved after better control of HR and improved hemodynamic stability.  Cardiology performed R/LHC.  This showed "no significant coronary artery disease.  Severe LV dysfunction consistent with nonischemic cardiomyopathy.  Mildly elevated left and right sided filling pressures.  There is mild pulmonary hypertension.  There is severe MR 3-4+".  Patient will be discharged on ASA, Amiodarone, B blocker and Lasix.  Will hold ACEI for now secondary to renal function, but she may be able to tolerate it in future.  She is also being started on Xarelto.  Oral anticoagulation discussed with patient.  Patient understands risks and benefits and all questions answered.  Patient will be discharged home to follow up with Cardiology.  "

## 2018-02-24 NOTE — PLAN OF CARE
Problem: Patient Care Overview  Goal: Plan of Care Review  Outcome: Ongoing (interventions implemented as appropriate)  Pt AAOx4.  VSS.  Remained in NSR throughout night.  Amiodarone discontinued this AM per MD due to elevated liver enzymes.  Pt denies chest pain, abdominal pain, SOB.  Up to bedside commode to void without difficulty.  No skin breakdown or injury observed, safety precautions maintained.

## 2018-02-24 NOTE — SUBJECTIVE & OBJECTIVE
Interval History: AST/ALT significantly increased but TBil and INR remained unchanged. Amiodarone stopped this AM due to this. In NSR. Cr increased to 1.8 post furosemide dose yesterday. Has good UOP. Patient feels better and has no complaints.     Review of Systems   Constitutional: Negative.    Respiratory: Negative.    Cardiovascular: Positive for leg swelling. Negative for chest pain and palpitations.   Gastrointestinal: Negative.    Genitourinary: Negative.    Musculoskeletal: Negative.    Skin: Negative.    Neurological: Negative.    Hematological: Negative.    Psychiatric/Behavioral: Negative.      Objective:     Vital Signs (Most Recent):  Temp: 97.9 °F (36.6 °C) (02/24/18 0800)  Pulse: 63 (02/24/18 1300)  Resp: 18 (02/24/18 1300)  BP: 130/87 (02/24/18 1300)  SpO2: 100 % (02/24/18 1300) Vital Signs (24h Range):  Temp:  [97.5 °F (36.4 °C)-97.9 °F (36.6 °C)] 97.9 °F (36.6 °C)  Pulse:  [] 63  Resp:  [6-41] 18  SpO2:  [52 %-100 %] 100 %  BP: ()/() 130/87     Weight: 97.3 kg (214 lb 8.1 oz)  Body mass index is 33.6 kg/m².    Intake/Output Summary (Last 24 hours) at 02/24/18 1333  Last data filed at 02/24/18 0600   Gross per 24 hour   Intake            508.9 ml   Output             1050 ml   Net           -541.1 ml      Physical Exam   Constitutional: She appears well-developed. No distress.   Neck: Normal range of motion.   Cardiovascular: Normal rate, regular rhythm and intact distal pulses.    Murmur heard.  Pulmonary/Chest: Effort normal and breath sounds normal. She has no wheezes. She has no rales.   Abdominal: Soft. Bowel sounds are normal.   Musculoskeletal: Normal range of motion. She exhibits edema (trace).   Neurological: She is alert.   Skin: Skin is warm and dry. Capillary refill takes less than 2 seconds. She is not diaphoretic.   Psychiatric: She has a normal mood and affect. Her behavior is normal. Judgment and thought content normal.   Nursing note and vitals  reviewed.      Significant Labs: All pertinent labs within the past 24 hours have been reviewed.    Significant Imaging: I have reviewed all pertinent imaging results/findings within the past 24 hours.  I have reviewed and interpreted all pertinent imaging results/findings within the past 24 hours.

## 2018-02-24 NOTE — PROGRESS NOTES
[facility]  Cardiology  Progress Note    Patient Name: Yaneli Steele  MRN: [id number]  Admission Date: 2/23/2018  Hospital Length of Stay: 1 days  Code Status: Full Code   Attending Physician: Jennifer Casanova MD   Primary Care Physician: Provider Notinsystem  Expected Discharge Date:   Principal Problem:Atrial fibrillation with RVR    Subjective:     Hospital Course:   2-23: Status post JUNIOR/DC cardioversion with normal sinus rhythm, echo shows severely depressed EF    Interval History: She feels fine without any significant chest pain or shortness of breath    Telemetry: Normal sinus rhythm    Review of Systems   All other systems reviewed and are negative.    Objective:     Vital Signs (Most Recent):  Temp: 98 °F (36.7 °C) (02/24/18 1500)  Pulse: 64 (02/24/18 1545)  Resp: (!) 24 (02/24/18 1545)  BP: 106/80 (02/24/18 1545)  SpO2: 100 % (02/24/18 1545) Vital Signs (24h Range):  Temp:  [97.2 °F (36.2 °C)-98 °F (36.7 °C)] 98 °F (36.7 °C)  Pulse:  [60-73] 64  Resp:  [6-41] 24  SpO2:  [52 %-100 %] 100 %  BP: ()/() 106/80     Weight: 97.3 kg (214 lb 8.1 oz)  Body mass index is 33.6 kg/m².     SpO2: 100 %  O2 Device (Oxygen Therapy): nasal cannula      Intake/Output Summary (Last 24 hours) at 02/24/18 1714  Last data filed at 02/24/18 1500   Gross per 24 hour   Intake            217.1 ml   Output             1052 ml   Net           -834.9 ml       Lines/Drains/Airways     Peripheral Intravenous Line                 Peripheral IV - Single Lumen 02/22/18 1928 Right Hand 1 day         Peripheral IV - Single Lumen 02/23/18 0230 Left Hand 1 day                Physical Exam   Constitutional: She is oriented to person, place, and time. No distress.   Cardiovascular: Normal rate and regular rhythm.    Pulmonary/Chest: Effort normal and breath sounds normal.   Musculoskeletal: She exhibits no edema.   Neurological: She is alert and oriented to person, place, and time.       Significant Labs:    BMP:   Recent Labs  Lab 02/23/18  0322 02/24/18  0230 02/24/18  1409   * 181* 173*    133* 131*   K 4.8 4.9 4.4    101 98   CO2 19* 19* 24   BUN 38* 53* 52*   CREATININE 1.2 1.8* 1.6*   CALCIUM 9.6 8.9 8.6*   MG 2.5 2.3  --     and CBC   Recent Labs  Lab 02/23/18  1629 02/24/18  0657   WBC 12.00 12.98*   HGB 16.5* 15.5   HCT 51.7* 46.6    146*       Significant Imaging: Echocardiogram:   2D echo with color flow doppler:   Results for orders placed or performed during the hospital encounter of 02/23/18   2D echo with color flow doppler   Result Value Ref Range    EF 20 (A) 55 - 65    Mitral Valve Regurgitation MODERATE TO SEVERE (A)     Aortic Valve Regurgitation TRIVIAL TO MILD     Est. PA Systolic Pressure 29.42     Pericardial Effusion NONE     Tricuspid Valve Regurgitation MODERATE TO SEVERE (A)      Assessment and Plan:     Brief HPI:    * Atrial fibrillation with RVR    Status post JUNIOR/DC cardioversion in normal sinus rhythm  Continue low-dose amiodarone and carvedilol  Okay to DC amiodarone if LFTs continue to worsen  Cover with Lovenox until post catheterization  We'll need to add dOAC post-catheter        Acute systolic heart failure    Newly diagnosed  No signs of volume overload  Adjusted medicines for GDMT  Plan for right and left heart catheterization on Monday        STU (acute kidney injury)             Elevated INR    Increased LFTs likely consistent with congestive hepatopathy  Continue follow markers but currently trending down by last check  Hepatitis panel pending  Okay to continue low-dose amiodarone likely will need to wean quickly            VTE Risk Mitigation         Ordered     enoxaparin injection 100 mg  Every 24 hours (non-standard times)     Route:  Subcutaneous        02/24/18 1034     Medium Risk of VTE  Once      02/23/18 0239     Place ALISSA hose  Until discontinued      02/23/18 0239     Place sequential compression device  Until discontinued      02/23/18  0239          Juan Rosas MD  Cardiology  [facility]

## 2018-02-24 NOTE — PLAN OF CARE
02/24/18 1651   Discharge Assessment   Assessment Type Discharge Planning Assessment   Confirmed/corrected address and phone number on facesheet? Yes   Assessment information obtained from? Patient   Expected Length of Stay (days) 3   Communicated expected length of stay with patient/caregiver yes   Prior to hospitilization cognitive status: Alert/Oriented   Prior to hospitalization functional status: Independent   Current cognitive status: Alert/Oriented   Current Functional Status: Needs Assistance   Lives With child(ginger), adult   Able to Return to Prior Arrangements yes   Is patient able to care for self after discharge? Yes   Who are your caregiver(s) and their phone number(s)? n/a son able to help at home   Patient's perception of discharge disposition home or selfcare   Readmission Within The Last 30 Days no previous admission in last 30 days   Patient currently being followed by outpatient case management? No   Patient currently receives any other outside agency services? No   Equipment Currently Used at Home none   Do you have any problems affording any of your prescribed medications? No   Is the patient taking medications as prescribed? yes   Does the patient have transportation home? Yes   Transportation Available family or friend will provide   Does the patient receive services at the [facility]? No   Discharge Plan A Home with family   Discharge Plan B (tbd)   Patient/Family In Agreement With Plan yes

## 2018-02-24 NOTE — PROGRESS NOTES
0415 - MD Kang notified of increase in liver enzymes this AM (AST 1387, ).  MD updated on pt status including successful cardioversion to NSR yesterday with amiodarone drip to continue to infuse overnight until the AM.  MD verbalizes understanding, states to continue amiodarone infusion as cardiology plans to discontinue this AM.     0615 - MD Schwartz on unit and notified of pt's increase in liver enzymes with amiodarone continued.  New orders received to discontinue amiodarone.

## 2018-02-24 NOTE — ASSESSMENT & PLAN NOTE
New onset. Initially failed diltiazem and amiodarone, and had signs of hemodynamic instability. S/p cardioversion with good response. Continued on amiodarone infusion afterwards but discontinued this AM due to worsening liver enzymes. I will discuss alternative treatments with cardiology. Is on carvedilol.

## 2018-02-24 NOTE — ASSESSMENT & PLAN NOTE
Not severe. Responded well to lasix however this caused worsening renal function. Hold off on diuresis. Will need maintenance lasix+spironolactone when renal function stable.

## 2018-02-24 NOTE — ASSESSMENT & PLAN NOTE
Newly diagnosed  No signs of volume overload  Adjusted medicines for GDMT  Plan for right and left heart catheterization on Monday

## 2018-02-24 NOTE — ASSESSMENT & PLAN NOTE
This is a new diagnosis. Patient had symptoms suggestive of heart failure for about a week or so. No chest pain. Trop not significantly elevated but with T wave inversion in V5-V6, and less pronounced in II,III,AVF. No prior EKGs to compare. BP control, ASA, BB. Hold statin due to elevated liver enzymes plus LDL 84 which is not far from target. On full dose anticoagulation (renally dosed) for AFib. Hold ACE-I due to STU. Plan for [facility] once renal function better

## 2018-02-25 LAB
ALBUMIN SERPL BCP-MCNC: 3.2 G/DL
ALP SERPL-CCNC: 328 U/L
ALT SERPL W/O P-5'-P-CCNC: 777 U/L
ANION GAP SERPL CALC-SCNC: 8 MMOL/L
ANISOCYTOSIS BLD QL SMEAR: SLIGHT
APTT BLDCRRT: 31.6 SEC
AST SERPL-CCNC: 694 U/L
BASOPHILS # BLD AUTO: 0.01 K/UL
BASOPHILS NFR BLD: 0.1 %
BILIRUB SERPL-MCNC: 4 MG/DL
BUN SERPL-MCNC: 42 MG/DL
CALCIUM SERPL-MCNC: 8.5 MG/DL
CHLORIDE SERPL-SCNC: 99 MMOL/L
CO2 SERPL-SCNC: 25 MMOL/L
CREAT SERPL-MCNC: 1.5 MG/DL
DIFFERENTIAL METHOD: ABNORMAL
EOSINOPHIL # BLD AUTO: 0 K/UL
EOSINOPHIL NFR BLD: 0.1 %
ERYTHROCYTE [DISTWIDTH] IN BLOOD BY AUTOMATED COUNT: 15.4 %
EST. GFR  (AFRICAN AMERICAN): 43 ML/MIN/1.73 M^2
EST. GFR  (NON AFRICAN AMERICAN): 37 ML/MIN/1.73 M^2
GLUCOSE SERPL-MCNC: 148 MG/DL
HCT VFR BLD AUTO: 47 %
HGB BLD-MCNC: 15.8 G/DL
INR PPP: 1.4
LYMPHOCYTES # BLD AUTO: 2.2 K/UL
LYMPHOCYTES NFR BLD: 25.2 %
MAGNESIUM SERPL-MCNC: 2.5 MG/DL
MCH RBC QN AUTO: 27.5 PG
MCHC RBC AUTO-ENTMCNC: 33.6 G/DL
MCV RBC AUTO: 82 FL
MONOCYTES # BLD AUTO: 0.8 K/UL
MONOCYTES NFR BLD: 9.4 %
NEUTROPHILS # BLD AUTO: 5.6 K/UL
NEUTROPHILS NFR BLD: 65.7 %
PLATELET # BLD AUTO: 136 K/UL
PMV BLD AUTO: 12.5 FL
POLYCHROMASIA BLD QL SMEAR: ABNORMAL
POTASSIUM SERPL-SCNC: 4.5 MMOL/L
PROT SERPL-MCNC: 5.9 G/DL
PROTHROMBIN TIME: 14.9 SEC
RBC # BLD AUTO: 5.74 M/UL
ROULEAUX BLD QL SMEAR: PRESENT
SODIUM SERPL-SCNC: 132 MMOL/L
WBC # BLD AUTO: 8.64 K/UL

## 2018-02-25 PROCEDURE — 63600175 PHARM REV CODE 636 W HCPCS: Performed by: INTERNAL MEDICINE

## 2018-02-25 PROCEDURE — 36415 COLL VENOUS BLD VENIPUNCTURE: CPT

## 2018-02-25 PROCEDURE — 85025 COMPLETE CBC W/AUTO DIFF WBC: CPT

## 2018-02-25 PROCEDURE — 80053 COMPREHEN METABOLIC PANEL: CPT

## 2018-02-25 PROCEDURE — 83735 ASSAY OF MAGNESIUM: CPT

## 2018-02-25 PROCEDURE — 80074 ACUTE HEPATITIS PANEL: CPT

## 2018-02-25 PROCEDURE — 21400001 HC TELEMETRY ROOM

## 2018-02-25 PROCEDURE — 85730 THROMBOPLASTIN TIME PARTIAL: CPT

## 2018-02-25 PROCEDURE — 99232 SBSQ HOSP IP/OBS MODERATE 35: CPT | Mod: ,,, | Performed by: INTERNAL MEDICINE

## 2018-02-25 PROCEDURE — 25000003 PHARM REV CODE 250: Performed by: HOSPITALIST

## 2018-02-25 PROCEDURE — A4216 STERILE WATER/SALINE, 10 ML: HCPCS | Performed by: HOSPITALIST

## 2018-02-25 PROCEDURE — 25000003 PHARM REV CODE 250: Performed by: INTERNAL MEDICINE

## 2018-02-25 PROCEDURE — 85610 PROTHROMBIN TIME: CPT

## 2018-02-25 RX ADMIN — ENOXAPARIN SODIUM 100 MG: 100 INJECTION SUBCUTANEOUS at 05:02

## 2018-02-25 RX ADMIN — Medication 3 ML: at 05:02

## 2018-02-25 RX ADMIN — CARVEDILOL 3.12 MG: 3.12 TABLET, FILM COATED ORAL at 09:02

## 2018-02-25 RX ADMIN — DOCUSATE SODIUM AND SENNOSIDES 1 TABLET: 8.6; 5 TABLET, FILM COATED ORAL at 09:02

## 2018-02-25 RX ADMIN — ASPIRIN 81 MG: 81 TABLET, COATED ORAL at 09:02

## 2018-02-25 RX ADMIN — POLYETHYLENE GLYCOL 3350 17 G: 17 POWDER, FOR SOLUTION ORAL at 09:02

## 2018-02-25 RX ADMIN — AMIODARONE HYDROCHLORIDE 200 MG: 200 TABLET ORAL at 09:02

## 2018-02-25 RX ADMIN — Medication 3 ML: at 09:02

## 2018-02-25 NOTE — PROGRESS NOTES
[facility] Hospital Medicine  Progress Note    Patient Name: Yaneli Steele  MRN: [id number]  Patient Class: IP- Inpatient   Admission Date: 2/23/2018  Length of Stay: 2 days  Attending Physician: Wero Aguila MD  Primary Care Provider: Provider Notinsystem        Subjective:     Principal Problem:Atrial fibrillation with RVR    HPI:  Yaneli Steele is a 62 y.o. female that (in part)  has no past medical history on file. Presents to [facility] as a transfer patient from the [facility] Emergency Department for evaluation of atrial fibrillation with rapid ventricular response.  Patient initially presented complaining of shortness of breath of one-week duration.  Subacute onset with progressive worsening.  Constant duration.  Daily frequency.  Located in his chest/lungs.  No radiation.  She denies history of previous arrhythmia.  No fever or chills.  No known thyroid disease.  She does not use illicit drugs or drink alcohol to excess.  Positive for palpitations but denies chest pain.    In the emergency department EKG, cardiac enzymes, chest x-ray, and routine laboratory studies were obtained.  This confirmed she had a atrial fibrillation with RVR.  She was given a bolus dose of Cardizem that did not appear to be effective and therefore she was started on a Cardizem infusion.  Thereafter she was started on an amiodarone infusion after loading dose was given.    Hospital medicine has been asked to admit for further evaluation and treatment.     Hospital Course:  Ms Steele presented with new onset atrial fibrillation with RVR. Did not respond to diltiazem or amiodarone infusion, and had physical signs of hemodynamic instability. Admitted to ICU. Cardiology consulted ASA for cardioversion given instability. Converted to NSR after cardioversion and hemodynamics improved. Instructed by cardiologist to remain on amiodarone infusion post cardioversion.  Labwork significant for elevated TBil (5.9), AP (354) and liver enzymes (AST//308) with evidence of acute dysfunction with elevated INR of 1.8. Also increased Cr of 1.2 but baseline unknown. LE edema present as well as pulm edema. These findings were concerning for heart failure. A 2D Echo confirmed this showing a LVEF 20% with mod-severe mitral and tricuspid valves regurgitation. Liver enzymes and Cr worsened despite dose of IV lasix.  Liver enzymes now improving.    Interval History: No new complaints.    Review of Systems   HENT: Negative for ear discharge and ear pain.    Eyes: Negative for pain and itching.   Endocrine: Negative for polyphagia and polyuria.   Neurological: Negative for seizures and syncope.     Objective:     Vital Signs (Most Recent):  Temp: 97.8 °F (36.6 °C) (02/25/18 1205)  Pulse: 61 (02/25/18 1205)  Resp: 18 (02/25/18 1205)  BP: 106/68 (02/25/18 1205)  SpO2: 98 % (02/25/18 1205) Vital Signs (24h Range):  Temp:  [97.4 °F (36.3 °C)-98.7 °F (37.1 °C)] 97.8 °F (36.6 °C)  Pulse:  [56-66] 61  Resp:  [10-37] 18  SpO2:  [96 %-100 %] 98 %  BP: (100-144)/(66-90) 106/68     Weight: 103.7 kg (228 lb 9.9 oz)  Body mass index is 35.81 kg/m².    Intake/Output Summary (Last 24 hours) at 02/25/18 1535  Last data filed at 02/25/18 0000   Gross per 24 hour   Intake                0 ml   Output              350 ml   Net             -350 ml      Physical Exam   Constitutional: She appears well-developed. No distress.   Neck: Normal range of motion.   Cardiovascular: Normal rate, regular rhythm and intact distal pulses.    Murmur heard.  Pulmonary/Chest: Effort normal and breath sounds normal. She has no wheezes. She has no rales.   Abdominal: Soft. Bowel sounds are normal.   Musculoskeletal: Normal range of motion. She exhibits edema (trace).   Neurological: She is alert.   Skin: Skin is warm and dry. Capillary refill takes less than 2 seconds. She is not diaphoretic.   Psychiatric: She has a normal mood  and affect. Her behavior is normal. Judgment and thought content normal.   Nursing note and vitals reviewed.      Significant Labs:   CBC:   Recent Labs  Lab 02/23/18  1629 02/24/18  0657 02/25/18  0450   WBC 12.00 12.98* 8.64   HGB 16.5* 15.5 15.8   HCT 51.7* 46.6 47.0    146* 136*     CMP:   Recent Labs  Lab 02/24/18  0230 02/24/18  1409 02/25/18  0449   * 131* 132*   K 4.9 4.4 4.5    98 99   CO2 19* 24 25   * 173* 148*   BUN 53* 52* 42*   CREATININE 1.8* 1.6* 1.5*   CALCIUM 8.9 8.6* 8.5*   PROT 6.3 6.3 5.9*   ALBUMIN 3.4* 3.5 3.2*   BILITOT 5.7* 5.2* 4.0*   ALKPHOS 342* 356* 328*   AST 1,387* 1,127* 694*   * 902* 777*   ANIONGAP 13 9 8   EGFRNONAA 30* 34* 37*       Significant Imaging: I have reviewed all pertinent imaging results/findings within the past 24 hours.    Assessment/Plan:      * Atrial fibrillation with RVR    New onset.   Initially failed diltiazem and amiodarone and had signs of hemodynamic instability.   S/p cardioversion with good response. Continued on amiodarone infusion afterwards but discontinued due to worsening liver enzymes.   On Coreg.  LFT's improving and now on PO Amiodarone.    Plan for R/LHC tomorrow.          Elevated INR    2/2 liver dysfunction          STU (acute kidney injury)    Hold diuretics.  Continue to monitor.          Non-rheumatic tricuspid valve insufficiency    Seen on ECHO          Non-rheumatic mitral regurgitation    Seen on ECHO          Acute hepatitis, noninfective    Concern for congestive hepatopathy 2/2 heart failure.   With evidence of acute liver dysfunction given increased TBil and INR of 1.8. No abdominal pain and no warning signs on exam to suggest acute inflammation.   Improving LFT's.  Continue to monitor.          Acute pulmonary edema    Not severe.   Responded well to lasix however this caused worsening renal function.   Hold off on diuresis. Will need maintenance lasix+spironolactone when renal function stable.            Acute systolic heart failure    This is a new diagnosis.   Patient had symptoms suggestive of heart failure for about a week or so. No chest pain. Trop not significantly elevated but with T wave inversion in V5-V6, and less pronounced in II,III,AVF. No prior EKGs to compare.   BP control, ASA, BB.  Hold statin due to elevated liver enzymes plus LDL 84 which is not far from target. On full dose anticoagulation (renally dosed) for AFib. Hold ACE-I due to STU.             VTE Risk Mitigation         Ordered     enoxaparin injection 100 mg  Every 24 hours (non-standard times)     Route:  Subcutaneous        02/24/18 1034     Medium Risk of VTE  Once      02/23/18 0239     Place ALISSA hose  Until discontinued      02/23/18 0239     Place sequential compression device  Until discontinued      02/23/18 0239              Wero Aguila MD  Department of Hospital Medicine   [facility]

## 2018-02-25 NOTE — ASSESSMENT & PLAN NOTE
This is a new diagnosis.   Patient had symptoms suggestive of heart failure for about a week or so. No chest pain. Trop not significantly elevated but with T wave inversion in V5-V6, and less pronounced in II,III,AVF. No prior EKGs to compare.   BP control, ASA, BB.  Hold statin due to elevated liver enzymes plus LDL 84 which is not far from target. On full dose anticoagulation (renally dosed) for AFib. Hold ACE-I due to STU.

## 2018-02-25 NOTE — SUBJECTIVE & OBJECTIVE
Interval History: No new complaints.    Review of Systems   HENT: Negative for ear discharge and ear pain.    Eyes: Negative for pain and itching.   Endocrine: Negative for polyphagia and polyuria.   Neurological: Negative for seizures and syncope.     Objective:     Vital Signs (Most Recent):  Temp: 97.8 °F (36.6 °C) (02/25/18 1205)  Pulse: 61 (02/25/18 1205)  Resp: 18 (02/25/18 1205)  BP: 106/68 (02/25/18 1205)  SpO2: 98 % (02/25/18 1205) Vital Signs (24h Range):  Temp:  [97.4 °F (36.3 °C)-98.7 °F (37.1 °C)] 97.8 °F (36.6 °C)  Pulse:  [56-66] 61  Resp:  [10-37] 18  SpO2:  [96 %-100 %] 98 %  BP: (100-144)/(66-90) 106/68     Weight: 103.7 kg (228 lb 9.9 oz)  Body mass index is 35.81 kg/m².    Intake/Output Summary (Last 24 hours) at 02/25/18 1535  Last data filed at 02/25/18 0000   Gross per 24 hour   Intake                0 ml   Output              350 ml   Net             -350 ml      Physical Exam   Constitutional: She appears well-developed. No distress.   Neck: Normal range of motion.   Cardiovascular: Normal rate, regular rhythm and intact distal pulses.    Murmur heard.  Pulmonary/Chest: Effort normal and breath sounds normal. She has no wheezes. She has no rales.   Abdominal: Soft. Bowel sounds are normal.   Musculoskeletal: Normal range of motion. She exhibits edema (trace).   Neurological: She is alert.   Skin: Skin is warm and dry. Capillary refill takes less than 2 seconds. She is not diaphoretic.   Psychiatric: She has a normal mood and affect. Her behavior is normal. Judgment and thought content normal.   Nursing note and vitals reviewed.      Significant Labs:   CBC:   Recent Labs  Lab 02/23/18  1629 02/24/18  0657 02/25/18  0450   WBC 12.00 12.98* 8.64   HGB 16.5* 15.5 15.8   HCT 51.7* 46.6 47.0    146* 136*     CMP:   Recent Labs  Lab 02/24/18  0230 02/24/18  1409 02/25/18  0449   * 131* 132*   K 4.9 4.4 4.5    98 99   CO2 19* 24 25   * 173* 148*   BUN 53* 52* 42*    CREATININE 1.8* 1.6* 1.5*   CALCIUM 8.9 8.6* 8.5*   PROT 6.3 6.3 5.9*   ALBUMIN 3.4* 3.5 3.2*   BILITOT 5.7* 5.2* 4.0*   ALKPHOS 342* 356* 328*   AST 1,387* 1,127* 694*   * 902* 777*   ANIONGAP 13 9 8   EGFRNONAA 30* 34* 37*       Significant Imaging: I have reviewed all pertinent imaging results/findings within the past 24 hours.

## 2018-02-25 NOTE — PROGRESS NOTES
[facility]  Cardiology  Progress Note    Patient Name: Yaneli Steele  MRN: [id number]  Admission Date: 2/23/2018  Hospital Length of Stay: 2 days  Code Status: Full Code   Attending Physician: Wero Aguila MD   Primary Care Physician: Provider Notinsystem  Expected Discharge Date:   Principal Problem:Atrial fibrillation with RVR    Subjective:     Hospital Course:   2-23: Status post JUNIOR/DC cardioversion with normal sinus rhythm, echo shows severely depressed EF  2-24: no acut events, maintaining nsr transferred to tele    Interval History: No cp or sob    Tele; nsr    Review of Systems   All other systems reviewed and are negative.    Objective:     Vital Signs (Most Recent):  Temp: 97.6 °F (36.4 °C) (02/25/18 0802)  Pulse: (!) 59 (02/25/18 0802)  Resp: 18 (02/25/18 0802)  BP: 108/66 (02/25/18 0802)  SpO2: 96 % (02/25/18 0802) Vital Signs (24h Range):  Temp:  [97.2 °F (36.2 °C)-98.7 °F (37.1 °C)] 97.6 °F (36.4 °C)  Pulse:  [56-66] 59  Resp:  [10-37] 18  SpO2:  [60 %-100 %] 96 %  BP: (100-144)/(66-90) 108/66     Weight: 103.7 kg (228 lb 9.9 oz)  Body mass index is 35.81 kg/m².     SpO2: 96 %  O2 Device (Oxygen Therapy): room air      Intake/Output Summary (Last 24 hours) at 02/25/18 1051  Last data filed at 02/25/18 0000   Gross per 24 hour   Intake                0 ml   Output              351 ml   Net             -351 ml       Lines/Drains/Airways     Peripheral Intravenous Line                 Peripheral IV - Single Lumen 02/22/18 1928 Right Hand 2 days         Peripheral IV - Single Lumen 02/23/18 0230 Left Hand 2 days                Physical Exam   Constitutional: She is oriented to person, place, and time. No distress.   Cardiovascular: Normal rate and regular rhythm.    Pulmonary/Chest: Effort normal and breath sounds normal.   Musculoskeletal: She exhibits no edema.   Neurological: She is alert and oriented to person, place, and time.       Significant Labs:   BMP:   Recent  Labs  Lab 02/24/18  0230 02/24/18  1409 02/25/18  0449   * 173* 148*   * 131* 132*   K 4.9 4.4 4.5    98 99   CO2 19* 24 25   BUN 53* 52* 42*   CREATININE 1.8* 1.6* 1.5*   CALCIUM 8.9 8.6* 8.5*   MG 2.3  --  2.5    and CBC   Recent Labs  Lab 02/23/18  1629 02/24/18  0657 02/25/18  0450   WBC 12.00 12.98* 8.64   HGB 16.5* 15.5 15.8   HCT 51.7* 46.6 47.0    146* 136*       Significant Imaging: Echocardiogram:   2D echo with color flow doppler:   Results for orders placed or performed during the hospital encounter of 02/23/18   2D echo with color flow doppler   Result Value Ref Range    EF 20 (A) 55 - 65    Mitral Valve Regurgitation MODERATE TO SEVERE (A)     Aortic Valve Regurgitation TRIVIAL TO MILD     Est. PA Systolic Pressure 29.42     Pericardial Effusion NONE     Tricuspid Valve Regurgitation MODERATE TO SEVERE (A)      Assessment and Plan:     Brief HPI:     * Atrial fibrillation with RVR    Status post JUNIOR/DC cardioversion in normal sinus rhythm  Continue low-dose amiodarone and carvedilol  Okay to DC amiodarone if LFTs continue to worsen  Cover with Lovenox until post catheterization  We'll need to add dOAC post-catheter        Acute systolic heart failure    Newly diagnosed  No signs of volume overload  Adjusted medicines for GDMT  Plan for right and left heart catheterization on Monday    **Risks/benefits of the procedure were d/w the patient including bleeding, infection, death, mi, arrhythmia, kidney failure, stroke, etc.  Patient understands and consent was placed on the chart.        STU (acute kidney injury)             Elevated INR    Increased LFTs likely consistent with congestive hepatopathy  Continue follow markers but currently trending down by last check  Hepatitis panel pending  Okay to continue low-dose amiodarone likely will need to wean quickly            VTE Risk Mitigation         Ordered     enoxaparin injection 100 mg  Every 24 hours (non-standard times)      Route:  Subcutaneous        02/24/18 1034     Medium Risk of VTE  Once      02/23/18 0239     Place ALISSA hose  Until discontinued      02/23/18 0239     Place sequential compression device  Until discontinued      02/23/18 0239          Juan Rosas MD  Cardiology  [facility]

## 2018-02-25 NOTE — PROVIDER TRANSFER
ICU transfer note    Ms Steele presented with new onset AFib and new Dx of HF. No PMHx. Did not respond to diltiazem or amio and became hemodynamically unstable, therefore cardioverted with good response. Liver enzymes up likely from congestive hepatopathy but had worsened some after amiodarone infusion. Cards ok with continuing amio PO as long as liver enzymes show downtrend, which they are although still elevated. Plan for heart cath on Monday if Cr continues to improve. Patient is stable and NC. Has good appetite and ambulates independently. Her and her family are very nice. Needs PCP and f/u with Cards as outpatient.

## 2018-02-25 NOTE — SUBJECTIVE & OBJECTIVE
Interval History: No cp or sob    Tele; nsr    Review of Systems   All other systems reviewed and are negative.    Objective:     Vital Signs (Most Recent):  Temp: 97.6 °F (36.4 °C) (02/25/18 0802)  Pulse: (!) 59 (02/25/18 0802)  Resp: 18 (02/25/18 0802)  BP: 108/66 (02/25/18 0802)  SpO2: 96 % (02/25/18 0802) Vital Signs (24h Range):  Temp:  [97.2 °F (36.2 °C)-98.7 °F (37.1 °C)] 97.6 °F (36.4 °C)  Pulse:  [56-66] 59  Resp:  [10-37] 18  SpO2:  [60 %-100 %] 96 %  BP: (100-144)/(66-90) 108/66     Weight: 103.7 kg (228 lb 9.9 oz)  Body mass index is 35.81 kg/m².     SpO2: 96 %  O2 Device (Oxygen Therapy): room air      Intake/Output Summary (Last 24 hours) at 02/25/18 1051  Last data filed at 02/25/18 0000   Gross per 24 hour   Intake                0 ml   Output              351 ml   Net             -351 ml       Lines/Drains/Airways     Peripheral Intravenous Line                 Peripheral IV - Single Lumen 02/22/18 1928 Right Hand 2 days         Peripheral IV - Single Lumen 02/23/18 0230 Left Hand 2 days                Physical Exam   Constitutional: She is oriented to person, place, and time. No distress.   Cardiovascular: Normal rate and regular rhythm.    Pulmonary/Chest: Effort normal and breath sounds normal.   Musculoskeletal: She exhibits no edema.   Neurological: She is alert and oriented to person, place, and time.       Significant Labs:   BMP:   Recent Labs  Lab 02/24/18  0230 02/24/18  1409 02/25/18  0449   * 173* 148*   * 131* 132*   K 4.9 4.4 4.5    98 99   CO2 19* 24 25   BUN 53* 52* 42*   CREATININE 1.8* 1.6* 1.5*   CALCIUM 8.9 8.6* 8.5*   MG 2.3  --  2.5    and CBC   Recent Labs  Lab 02/23/18  1629 02/24/18  0657 02/25/18  0450   WBC 12.00 12.98* 8.64   HGB 16.5* 15.5 15.8   HCT 51.7* 46.6 47.0    146* 136*       Significant Imaging: Echocardiogram:   2D echo with color flow doppler:   Results for orders placed or performed during the hospital encounter of 02/23/18   2D  echo with color flow doppler   Result Value Ref Range    EF 20 (A) 55 - 65    Mitral Valve Regurgitation MODERATE TO SEVERE (A)     Aortic Valve Regurgitation TRIVIAL TO MILD     Est. PA Systolic Pressure 29.42     Pericardial Effusion NONE     Tricuspid Valve Regurgitation MODERATE TO SEVERE (A)

## 2018-02-25 NOTE — ASSESSMENT & PLAN NOTE
Concern for congestive hepatopathy 2/2 heart failure.   With evidence of acute liver dysfunction given increased TBil and INR of 1.8. No abdominal pain and no warning signs on exam to suggest acute inflammation.   Improving LFT's.  Continue to monitor.

## 2018-02-25 NOTE — ASSESSMENT & PLAN NOTE
New onset.   Initially failed diltiazem and amiodarone and had signs of hemodynamic instability.   S/p cardioversion with good response. Continued on amiodarone infusion afterwards but discontinued due to worsening liver enzymes.   On Coreg.  LFT's improving and now on PO Amiodarone.    Plan for R/LHC tomorrow.

## 2018-02-25 NOTE — PLAN OF CARE
Problem: Patient Care Overview  Goal: Plan of Care Review  Outcome: Ongoing (interventions implemented as appropriate)  PO amiodarone started this am. Ambulated to BR x2 with assistance, tolerated well. 2LNC. VSS. No complaints of pain or sob. Visitors at bedside this pm. Possible heart cath on Monday per Dr Rosas. No falls, injuries or skin breakdown.

## 2018-02-26 VITALS
BODY MASS INDEX: 30.25 KG/M2 | HEART RATE: 63 BPM | TEMPERATURE: 98 F | RESPIRATION RATE: 18 BRPM | DIASTOLIC BLOOD PRESSURE: 82 MMHG | SYSTOLIC BLOOD PRESSURE: 112 MMHG | HEIGHT: 71 IN | OXYGEN SATURATION: 99 % | WEIGHT: 216.06 LBS

## 2018-02-26 PROBLEM — R79.1 ELEVATED INR: Status: RESOLVED | Noted: 2018-02-24 | Resolved: 2018-02-26

## 2018-02-26 PROBLEM — J81.0 ACUTE PULMONARY EDEMA: Status: RESOLVED | Noted: 2018-02-24 | Resolved: 2018-02-26

## 2018-02-26 PROBLEM — K72.00: Status: RESOLVED | Noted: 2018-02-24 | Resolved: 2018-02-26

## 2018-02-26 PROBLEM — N17.9 AKI (ACUTE KIDNEY INJURY): Status: RESOLVED | Noted: 2018-02-24 | Resolved: 2018-02-26

## 2018-02-26 LAB
ALBUMIN SERPL BCP-MCNC: 3.2 G/DL
ALP SERPL-CCNC: 328 U/L
ALT SERPL W/O P-5'-P-CCNC: 580 U/L
ANION GAP SERPL CALC-SCNC: 6 MMOL/L
AST SERPL-CCNC: 336 U/L
BILIRUB SERPL-MCNC: 3.3 MG/DL
BUN SERPL-MCNC: 26 MG/DL
CALCIUM SERPL-MCNC: 9.3 MG/DL
CHLORIDE SERPL-SCNC: 100 MMOL/L
CO2 SERPL-SCNC: 31 MMOL/L
CREAT SERPL-MCNC: 1.1 MG/DL
EST. GFR  (AFRICAN AMERICAN): >60 ML/MIN/1.73 M^2
EST. GFR  (NON AFRICAN AMERICAN): 54 ML/MIN/1.73 M^2
GLUCOSE SERPL-MCNC: 130 MG/DL
HAV IGM SERPL QL IA: NEGATIVE
HBV CORE IGM SERPL QL IA: NEGATIVE
HBV SURFACE AG SERPL QL IA: NEGATIVE
HCV AB SERPL QL IA: NEGATIVE
INR PPP: 1.2
POTASSIUM SERPL-SCNC: 4.9 MMOL/L
PROT SERPL-MCNC: 6.1 G/DL
PROTHROMBIN TIME: 12.2 SEC
SODIUM SERPL-SCNC: 137 MMOL/L

## 2018-02-26 PROCEDURE — 63600175 PHARM REV CODE 636 W HCPCS

## 2018-02-26 PROCEDURE — 63600175 PHARM REV CODE 636 W HCPCS: Performed by: HOSPITALIST

## 2018-02-26 PROCEDURE — 99152 MOD SED SAME PHYS/QHP 5/>YRS: CPT

## 2018-02-26 PROCEDURE — B2111ZZ FLUOROSCOPY OF MULTIPLE CORONARY ARTERIES USING LOW OSMOLAR CONTRAST: ICD-10-PCS | Performed by: INTERNAL MEDICINE

## 2018-02-26 PROCEDURE — G0269 OCCLUSIVE DEVICE IN VEIN ART: HCPCS

## 2018-02-26 PROCEDURE — 90686 IIV4 VACC NO PRSV 0.5 ML IM: CPT | Performed by: HOSPITALIST

## 2018-02-26 PROCEDURE — 25000003 PHARM REV CODE 250: Performed by: INTERNAL MEDICINE

## 2018-02-26 PROCEDURE — 80053 COMPREHEN METABOLIC PANEL: CPT

## 2018-02-26 PROCEDURE — 94761 N-INVAS EAR/PLS OXIMETRY MLT: CPT

## 2018-02-26 PROCEDURE — 90471 IMMUNIZATION ADMIN: CPT | Performed by: HOSPITALIST

## 2018-02-26 PROCEDURE — 4A033B3 MEASUREMENT OF ARTERIAL PRESSURE, PULMONARY, PERCUTANEOUS APPROACH: ICD-10-PCS | Performed by: INTERNAL MEDICINE

## 2018-02-26 PROCEDURE — 25000003 PHARM REV CODE 250

## 2018-02-26 PROCEDURE — 4A023N8 MEASUREMENT OF CARDIAC SAMPLING AND PRESSURE, BILATERAL, PERCUTANEOUS APPROACH: ICD-10-PCS | Performed by: INTERNAL MEDICINE

## 2018-02-26 PROCEDURE — 36415 COLL VENOUS BLD VENIPUNCTURE: CPT

## 2018-02-26 PROCEDURE — 25000003 PHARM REV CODE 250: Performed by: HOSPITALIST

## 2018-02-26 PROCEDURE — A4216 STERILE WATER/SALINE, 10 ML: HCPCS

## 2018-02-26 PROCEDURE — 3E0234Z INTRODUCTION OF SERUM, TOXOID AND VACCINE INTO MUSCLE, PERCUTANEOUS APPROACH: ICD-10-PCS | Performed by: HOSPITALIST

## 2018-02-26 PROCEDURE — 4A0239Z MEASUREMENT OF CARDIAC OUTPUT, PERCUTANEOUS APPROACH: ICD-10-PCS | Performed by: INTERNAL MEDICINE

## 2018-02-26 PROCEDURE — 85610 PROTHROMBIN TIME: CPT

## 2018-02-26 PROCEDURE — A4216 STERILE WATER/SALINE, 10 ML: HCPCS | Performed by: HOSPITALIST

## 2018-02-26 PROCEDURE — B2151ZZ FLUOROSCOPY OF LEFT HEART USING LOW OSMOLAR CONTRAST: ICD-10-PCS | Performed by: INTERNAL MEDICINE

## 2018-02-26 PROCEDURE — 93460 R&L HRT ART/VENTRICLE ANGIO: CPT | Mod: 26,,, | Performed by: INTERNAL MEDICINE

## 2018-02-26 PROCEDURE — 99152 MOD SED SAME PHYS/QHP 5/>YRS: CPT | Mod: ,,, | Performed by: INTERNAL MEDICINE

## 2018-02-26 PROCEDURE — 5A2204Z RESTORATION OF CARDIAC RHYTHM, SINGLE: ICD-10-PCS | Performed by: INTERNAL MEDICINE

## 2018-02-26 RX ORDER — CARVEDILOL 3.12 MG/1
3.12 TABLET ORAL 2 TIMES DAILY
Qty: 60 TABLET | Refills: 11 | Status: SHIPPED | OUTPATIENT
Start: 2018-02-26 | End: 2018-03-06 | Stop reason: SDUPTHER

## 2018-02-26 RX ORDER — FUROSEMIDE 40 MG/1
40 TABLET ORAL DAILY
Status: DISCONTINUED | OUTPATIENT
Start: 2018-02-26 | End: 2018-02-26 | Stop reason: HOSPADM

## 2018-02-26 RX ORDER — FUROSEMIDE 40 MG/1
40 TABLET ORAL DAILY
Qty: 30 TABLET | Refills: 11 | Status: SHIPPED | OUTPATIENT
Start: 2018-02-27 | End: 2022-08-03 | Stop reason: SDUPTHER

## 2018-02-26 RX ORDER — ASPIRIN 81 MG/1
81 TABLET ORAL DAILY
Refills: 0 | COMMUNITY
Start: 2018-02-27 | End: 2018-03-06

## 2018-02-26 RX ORDER — AMIODARONE HYDROCHLORIDE 200 MG/1
200 TABLET ORAL 2 TIMES DAILY
Qty: 60 TABLET | Refills: 11 | Status: SHIPPED | OUTPATIENT
Start: 2018-02-26 | End: 2018-07-27

## 2018-02-26 RX ADMIN — INFLUENZA A VIRUS A/MICHIGAN/45/2015 X-275 (H1N1) ANTIGEN (FORMALDEHYDE INACTIVATED), INFLUENZA A VIRUS A/HONG KONG/4801/2014 X-263B (H3N2) ANTIGEN (FORMALDEHYDE INACTIVATED), INFLUENZA B VIRUS B/PHUKET/3073/2013 ANTIGEN (FORMALDEHYDE INACTIVATED), AND INFLUENZA B VIRUS B/BRISBANE/60/2008 ANTIGEN (FORMALDEHYDE INACTIVATED) 0.5 ML: 15; 15; 15; 15 INJECTION, SUSPENSION INTRAMUSCULAR at 03:02

## 2018-02-26 RX ADMIN — AMIODARONE HYDROCHLORIDE 200 MG: 200 TABLET ORAL at 08:02

## 2018-02-26 RX ADMIN — Medication 3 ML: at 08:02

## 2018-02-26 RX ADMIN — CARVEDILOL 3.12 MG: 3.12 TABLET, FILM COATED ORAL at 08:02

## 2018-02-26 RX ADMIN — POLYETHYLENE GLYCOL 3350 17 G: 17 POWDER, FOR SOLUTION ORAL at 08:02

## 2018-02-26 RX ADMIN — ASPIRIN 81 MG: 81 TABLET, COATED ORAL at 08:02

## 2018-02-26 RX ADMIN — DOCUSATE SODIUM AND SENNOSIDES 1 TABLET: 8.6; 5 TABLET, FILM COATED ORAL at 08:02

## 2018-02-26 NOTE — PROGRESS NOTES
Note that patient awake, alert and fully oriented;  Denies pain; Declined Br;  To cath lab;  Will continue to monitor;

## 2018-02-26 NOTE — PLAN OF CARE
Problem: Fall Risk (Adult)  Goal: Identify Related Risk Factors and Signs and Symptoms  Related risk factors and signs and symptoms are identified upon initiation of Human Response Clinical Practice Guideline (CPG)    02/26/18 1213   Fall Risk   Related Risk Factors (Fall Risk) fear of falling;gait/mobility problems;history of falls;environment unfamiliar     Goal: Absence of Falls  Patient will demonstrate the desired outcomes by discharge/transition of care.    02/26/18 1213   Fall Risk (Adult)   Absence of Falls making progress toward outcome

## 2018-02-26 NOTE — PROGRESS NOTES
Note that patient awake,alert and filly oriented;   Lying flat with VS every 30 minutes at this time;   Denies pain;   Freq urination via bedpan;  Will continue to f/u;

## 2018-02-26 NOTE — PLAN OF CARE
02/26/18 1546   Final Note   Assessment Type Final Discharge Note   Discharge Disposition Home   What phone number can be called within the next 1-3 days to see how you are doing after discharge? (Listed in chart)   Hospital Follow Up  Appt(s) scheduled? Yes   Discharge plans and expectations educations in teach back method with documentation complete? Yes   Right Care Referral Info   Post Acute Recommendation No Care

## 2018-02-26 NOTE — BRIEF OP NOTE
[facility]  Brief Operative Note    SUMMARY     Surgery Date: 2/23/2018     Surgeon(s) and Role:     * Juan Rosas MD - Primary    Assisting Surgeon: None    Pre-op Diagnosis:  Atrial fibrillation with RVR [I48.91]    Post-op Diagnosis:  Post-Op Diagnosis Codes:     * Atrial fibrillation with RVR [I48.91]    Procedure(s) (LRB):  Transesophageal Echocardiogram (JUNIOR), dccv (N/A)    Anesthesia: Monitor Anesthesia Care    Description of Procedure: Right and left heart catheterization    Description of the findings of the procedure: No significant coronary artery disease.  Severe LV dysfunction consistent with nonischemic cardiomyopathy.  Mildly elevated left and right sided filling pressures.  There is mild pulmonary hypertension.  There is severe MR 3-4+    Recommendation:  -Lasix IV ×1  -Continue maintenance Lasix 40 daily  -Continue optimizing meds for GDMT  -Okay for DC later today and follow-up with me in one week  -Initiate dOAC    Estimated Blood Loss: Less than 50 cc         Specimens:   Specimen (12h ago through future)    None

## 2018-02-26 NOTE — PROGRESS NOTES
Peparation for discharge:   Note that patient is awake, alert and fully oriented;  Patient post cath lab procedure to right groin; There is no bleeding to gauze coving right groin; Patient denies pain to right groin area;  Patient up to BR x 1 to urinate;  Denied dizziness/weakness;    Note right and left IV's removed; Telemetry monitor removed as well;  No bleeding observed from IV sites;   Patient/family aware of discharge and are in agreement with same;  reviewed discharge plan with patient/family;    I discussed discharge plan including medication and appointment with patient/family;  They voiced an understanding of same;  Hospital transport will be contacted for w/c to parking garage;   Will continue to f/u until departure from hospital;

## 2018-02-26 NOTE — DISCHARGE INSTRUCTIONS
Discharge Instructions for Cardiomyopathy  Cardiomyopathy means that your heart is not working as it normally should. This condition can make it more difficult to do things that may have been easy for you in the past. But with proper treatment and some lifestyle changes, you and your healthcare provider can help your heart do its job.  Home care  Work hard to remove the salt from your diet. Here are tips:  · Limit canned, dried, packaged, and fast foods.  · Dont add salt to your food at the table.  · Season foods with herbs instead of salt when you cook.  · When you eat out, ask that the  not add any salt to your dish.  · Don't eat fried or greasy foods.  · Be careful of bottled beverages. They can contain a lot of salt.  Also check the labels of over-the-counter medicines and supplements. They may be high in sodium. Ask your pharmacist or provider if you need help finding a low-salt product.  Be as active as you can. Ask your healthcare provider how to get started:  · Simple activities such as walking or gardening can help.  · Find activities you enjoy and make them a priority.  · Cardiac rehabilitation programs can help you reach your activity goals. You exercise while staff closely watches the stress on your heart. These programs may be covered by insurance.  Other tips for home care:  · Limit how much fluid you have each day. Your healthcare provider will tell you how much is safe.  · Break the smoking habit. Enroll in a stop-smoking program to improve your chances of success. Join smoking cessation support groups or ask your healthcare provider about nicotine replacement products.  · Take your medicines exactly as directed. Dont skip doses. Dont stop taking your medicines without talking to your healthcare provider first.  · Some over-the-counter medicines and herbal supplements can increase your heart rate or blood pressure. This can put extra stress on your heart. Check with your pharmacist to see if  products are heart-safe and won't interact with other medicines you take.  · Visit your healthcare provider regularly. Mention any problems with your treatment plan. Together you can find a plan that works for you.  · Weigh yourself at the same time each day. The best time is in the morning after you wake up and after urinating. Wear the same clothing each time. Keep a written record of your daily weight.  · Limit how much alcohol you drink. Too much alcohol isn't good for the heart. Healthcare providers advise no more than 1 drink per day for women and 2 drinks per day for men.  Follow-up care  Make a follow-up appointment as directed by our staff.     When to call your healthcare provider  Call your healthcare provider right away if you have any of the following:  · You gain more than 2 pounds in 1 day, more than 5 pounds in 1 week, or whatever weight gain you were told to report by your healthcare provider  · New or increased chest pain that doesn't get better with medicine  · New or increased shortness of breath or coughing  · Weakness in the muscles of your face, arms, or legs  · Trouble speaking  · Rapid pulse or pounding heartbeat  · Fainting, or feeling dizzy or lightheaded  · New or increased swelling in your hands, feet, or ankles   Date Last Reviewed: 2/1/2017  © 3031-8684 [employer]. [address] [place] [address], [place] [address]. All rights reserved. This information is not intended as a substitute for professional medical care. Always follow your healthcare professional's instructions.      Discharge Instructions for Atrial Fibrillation  You have been diagnosed with an abnormal heart rhythm called atrial fibrillation. With this condition, your hearts 2 upper chambers quiver rather than squeeze the blood out in a normal pattern. This leads to an irregular and sometimes rapid heartbeat. Some people will develop associated symptoms such as a flip-flopping heartbeat, chest pain, lightheadedness,  or shortness of breath. Other people may have no symptoms at all. Atrial fibrillation is serious because it affects the hearts ability to fill with blood as it should. Blood clots may form. This increases the risk for stroke. Untreated atrial fibrillation can also lead to heart failure. Atrial fibrillation can be controlled. With treatment, most people with atrial fibrillation lead normal lives.  Treatment options  Recommended treatment for atrial fibrillation depends on your age, symptoms, how long you have had atrial fibrillation, and other factors. You will have a complete evaluation to find out if you have any abnormalities that caused your heart to go into atrial fibrillation. This might be blocked heart arteries or a thyroid problem. Your doctor will assess your particular case and discuss choices with you.  Treatment choices may include:  · Treating an underlying disorder that puts you at risk for atrial fibrillation. For example, correcting an abnormal thyroid or electrolyte problem, or treating a blocked heart artery.  · Restoring a normal heart rhythm with an electrical shock (cardioversion) or with an antiarrhythmic medicine (chemical cardioversion)  · Using medicine to control your heart rate in atrial fibrillation.  · Preventing the risk for blood clot and stroke using blood-thinning medicines. Your doctor will tell you what he or she recommends. Choices may include aspirin, clopidogrel, warfarin, dabigatran, rivaroxaban, apixaban, and edoxaban.  · Doing catheter ablation or a surgical maze procedure. These use different methods to destroy certain areas of heart tissue. This interrupts the electrical signals causing atrial fibrillation. One of these procedures may be a choice when medicines do not work, or as an alternative to long-term medicine.  · Other treatment choices may be recommended for you by your doctor.  Managing risk factors for stroke and preventing heart failure are important parts of  any treatment plan for atrial fibrillation.  Home care  · Take your medicines exactly as directed. Dont skip doses.  · Work with your doctor to find the right medicines and doses for you.  · Learn to take your own pulse. Keep a record of your results. Ask your doctor which pulse rates mean that you need medical attention. Slowing your pulse is often the goal of treatment. Ask your doctor if its OK for you to use an automatic machine to check your pulse at home. Sometimes these machines dont count the pulse correctly when you have atrial fibrillation.  · Limit your intake of coffee, tea, cola, and other beverages with caffeine. Talk with your doctor about whether you should eliminate caffeine.  · Avoid over-the-counter medicines that have caffeine in them.  · Let your doctor know what medicines you take, including prescription and over-the-counter medicines, as well as any supplements. They interfere with some medicines given for atrial fibrillation.  · Ask your doctor about whether you can drink alcohol. Some people need to avoid alcohol to better treat atrial fibrillation. If you are taking blood-thinner medicines, alcohol may interfere with them by increasing their effect.  · Never take stimulants such as amphetamines or cocaine. These drugs can speed up your heart rate and trigger atrial fibrillation.  Follow-up care  Follow up with your doctor, or as advised.     When should I call my healthcare provider  Call your healthcare provider right away if you have any of the following:  · Weakness  · Dizziness  · Fainting  · Fatigue  · Shortness of breath  · Chest pain with increased activity  · A change in the usual regularity of your heartbeat, or an unusually fast heartbeat   Date Last Reviewed: 4/23/2016  © 7915-4579 [employer]. [address] [place] [address], [place] [address]. All rights reserved. This information is not intended as a substitute for professional medical care. Always follow your  healthcare professional's instructions.

## 2018-02-26 NOTE — PROGRESS NOTES
"EDUCATION:  TN provided with educational information on Atrial Fibrillation.  Information reviewed and placed in :My Healthcare Packet" to be brought home for pt to use as resource after discharge.  Information included:  signs and symptoms to look for and call the doctor if experiencing, and symptoms that may indicate a medical emergency: CALL 911.    Reminded pt things she will be responsible for to manage healthcare at home: getting Rx filled, attending follow up appointments, and taking medication as prescribed were discussed.   Teach back method used.  All questions answered.  Patient verbalized understanding of all information.      TN informed floor nurse, Meghan, care management is complete and can proceed with discharge teaching.    "

## 2018-02-26 NOTE — PROGRESS NOTES
TN contacted Dr. Rosas's office at [phone]; spoke with Eugenia to schedule cardiology f/u; appt scheduled for 03/06/18 at 9:40 AM.

## 2018-02-26 NOTE — PROGRESS NOTES
Follow-up Information     Juan Rosas MD On 3/6/2018.    Specialties:  INTERVENTIONAL CARDIOLOGY, Cardiology  Why:  Outpatient Services Cardiology Follow-Up Tuesday at 9:40 AM  Contact information:  [address] [id number] [phone]                 PLEASE BRING TO ALL FOLLOW UP APPOINTMENTS:   A COPY YOUR DISCHARGE INSTRUCTIONS, Any new MEDICINES YOU ARE CURRENTLY TAKING IN THEIR ORIGINAL BOTTLES  And IDENTIFICATION AND INSURANCE CARD     **PLEASE ARRIVE 15 MINUTES AHEAD OF SCHEDULED APPOINTMENT TIME   ++PLEASE CALL 24 HOURS IN ADVANCE IF YOU MUST RESCHEDULE YOUR APPOINTMENT DAY AND/OR TIME     [facility]    WRITTEN HEALTHCARE AND DISCHARGE INFORMATION                        Help at Home           [phone]  After discharge for assistance [facility] On Call Nurse Care Line 24/7  Assistance    Things You are responsible For To Manage Your Care At Home:  1.    Getting your prescriptions filled   2.    Taking your medications as directed, DO NOT MISS ANY DOSES!  3.    Going to your follow-up doctor appointment. This is important because it  allow the doctor to monitor your progress and determine if  any changes need to made to your treatment plan.     Thank you for choosing [facility] for your care.  Please answer any calls you may receive from [facility] we want to continue to support you as you manage your healthcare needs. [facility] is happy to have the opportunity to serve you.     Sincerely,  Your [facility] Team,  Ruthie SOOD; -1992

## 2018-02-26 NOTE — DISCHARGE SUMMARY
[facility] Hospital Medicine  Discharge Summary      Patient Name: Yaneli Steele  MRN: [id number]  Admission Date: 2/23/2018  Hospital Length of Stay: 3 days  Discharge Date and Time:  02/26/2018 1:37 PM  Attending Physician: Wero Aguila MD   Discharging Provider: Wero Aguila MD  Primary Care Provider: [facility]      HPI:   Yaneli Steele is a 62 y.o. female that (in part)  has no past medical history on file. Presents to [facility] as a transfer patient from the [facility] Emergency Department for evaluation of atrial fibrillation with rapid ventricular response.  Patient initially presented complaining of shortness of breath of one-week duration.  Subacute onset with progressive worsening.  Constant duration.  Daily frequency.  Located in his chest/lungs.  No radiation.  She denies history of previous arrhythmia.  No fever or chills.  No known thyroid disease.  She does not use illicit drugs or drink alcohol to excess.  Positive for palpitations but denies chest pain.    In the emergency department EKG, cardiac enzymes, chest x-ray, and routine laboratory studies were obtained.  This confirmed she had a atrial fibrillation with RVR.  She was given a bolus dose of Cardizem that did not appear to be effective and therefore she was started on a Cardizem infusion.  Thereafter she was started on an amiodarone infusion after loading dose was given.    Hospital medicine has been asked to admit for further evaluation and treatment.     Procedure(s) (LRB):  Transesophageal Echocardiogram (JUNIOR), dccv (N/A)      Hospital Course:   Ms Steele presented with new onset atrial fibrillation with RVR. Did not respond to diltiazem or amiodarone infusion, and had physical signs of hemodynamic instability. Admitted to ICU. Cardiology consulted and patient underwent cardioversion given instability. Converted to NSR after cardioversion and  "hemodynamics improved. Instructed by cardiologist to remain on amiodarone infusion post cardioversion. Labwork significant for elevated TBil (5.9), AP (354) and liver enzymes (AST//308) with evidence of acute dysfunction with elevated INR of 1.8.  Also increased Cr of 1.2 but baseline unknown. LE edema present as well as pulm edema. These findings were concerning for heart failure. A 2D Echo confirmed this showing a LVEF 20% with mod-severe mitral and tricuspid valves regurgitation. Liver enzymes and Cr worsened probably secondary to cardiac dysfunction.  LFT's and Creat improved after better control of HR and improved hemodynamic stability.  Cardiology performed R/LHC.  This showed "no significant coronary artery disease.  Severe LV dysfunction consistent with nonischemic cardiomyopathy.  Mildly elevated left and right sided filling pressures.  There is mild pulmonary hypertension.  There is severe MR 3-4+".  Patient will be discharged on ASA, Amiodarone, B blocker and Lasix.  Will hold ACEI for now secondary to renal function, but she may be able to tolerate it in future.  She is also being started on Xarelto.  Oral anticoagulation discussed with patient.  Patient understands risks and benefits and all questions answered.  Patient will be discharged home to follow up with Cardiology.     Consults:   Consults         Status Ordering Provider     Inpatient consult to Cardiology  Once     Provider:  Sara Issa MD    Completed SARA WANG          No new Assessment & Plan notes have been filed under this hospital service since the last note was generated.  Service: Hospital Medicine    Final Active Diagnoses:    Diagnosis Date Noted POA    PRINCIPAL PROBLEM:  Atrial fibrillation with RVR [I48.91] 02/23/2018 Yes    Acute systolic heart failure [I50.21] 02/24/2018 Yes    Non-rheumatic mitral regurgitation [I34.0] 02/24/2018 Yes    Non-rheumatic tricuspid valve insufficiency [I36.1] 02/24/2018 " Yes      Problems Resolved During this Admission:    Diagnosis Date Noted Date Resolved POA    Acute pulmonary edema [J81.0] 02/24/2018 02/26/2018 Yes    Acute hepatitis, noninfective [K72.00] 02/24/2018 02/26/2018 Yes    STU (acute kidney injury) [N17.9] 02/24/2018 02/26/2018 Yes    Elevated INR [R79.1] 02/24/2018 02/26/2018 Yes       Discharged Condition: stable    Disposition: Home or Self Care    Follow Up:  Follow-up Information     Juan Rosas MD On 3/6/2018.    Specialties:  INTERVENTIONAL CARDIOLOGY, Cardiology  Why:  Outpatient Services Cardiology Follow-Up Tuesday at 9:40 AM  Contact information:  [address] [place], [place] [id number] [phone]                 Patient Instructions:     Diet Cardiac     Activity as tolerated     Notify your health care provider if you experience any of the following:  temperature >100.4     Notify your health care provider if you experience any of the following:  persistent nausea and vomiting or diarrhea     Notify your health care provider if you experience any of the following:  difficulty breathing or increased cough     Notify your health care provider if you experience any of the following:  persistent dizziness, light-headedness, or visual disturbances     Notify your health care provider if you experience any of the following:  increased confusion or weakness           Pending Diagnostic Studies:     None         Medications:  Reconciled Home Medications:   Current Discharge Medication List      START taking these medications    Details   amiodarone (PACERONE) 200 MG Tab Take 1 tablet (200 mg total) by mouth 2 (two) times daily.  Qty: 60 tablet, Refills: 11      aspirin (ECOTRIN) 81 MG EC tablet Take 1 tablet (81 mg total) by mouth once daily.  Refills: 0      carvedilol (COREG) 3.125 MG tablet Take 1 tablet (3.125 mg total) by mouth 2 (two) times daily.  Qty: 60 tablet, Refills: 11      furosemide (LASIX) 40 MG tablet Take 1 tablet (40 mg  total) by mouth once daily.  Qty: 30 tablet, Refills: 11      rivaroxaban (XARELTO) 20 mg Tab Take 1 tablet (20 mg total) by mouth daily with dinner or evening meal.  Qty: 30 tablet, Refills: 11             Indwelling Lines/Drains at time of discharge:   Lines/Drains/Airways          No matching active lines, drains, or airways          Time spent on the discharge of patient: >30 minutes  Patient was seen and examined on the date of discharge and determined to be suitable for discharge.         Wero Aguila MD  Department of Hospital Medicine  [facility]

## 2018-02-27 NOTE — PHYSICIAN QUERY
PT Name: Yaneli Steele  MR #: [id number]    Physician Query Form - Atrial Fibrillation Specificity     CDS/: Felisha Scott RN, CCDS            Contact information: [email or web address]     This form is a permanent document in the medical record.     Query Date: February 27, 2018    By submitting this query, we are merely seeking further clarification of documentation. Please utilize your independent clinical judgment when addressing the question(s) below.    The medical record contains the following:   Indicators     Supporting Clinical Findings Location in Medical Record   X Atrial Fibrillation Transfer for evaluation of atrial fibrillation with rapid ventricular response. New onset 2/23 h/p    EKG results     X Medication started on a Cardizem infusion.  Thereafter she was started on an amiodarone infusion after loading dose was given.    2/23 h/p   X Treatment cardioversion 2/23 procedure note   X Other  Afib per monitor with -140s.  2/23 nurse note       Provider, please further specify the Atrial Fibrillation diagnosis.    [  ] Paroxysmal  [  ] Other (please specify): ____________________________  [ x ] Clinically Undetermined    Please document in your progress notes daily for the duration of treatment until resolved, and include in your discharge summary.

## 2018-02-27 NOTE — ANESTHESIA POSTPROCEDURE EVALUATION
"Anesthesia Post Evaluation    Patient: Yaneli Steele    Procedure(s) Performed: Procedure(s) (LRB):  Transesophageal Echocardiogram (JUNIOR), dccv (N/A)    Final Anesthesia Type: general  Patient location during evaluation: PACU  Patient participation: Yes- Able to Participate  Level of consciousness: awake and alert and oriented  Post-procedure vital signs: reviewed and stable  Pain management: adequate  Airway patency: patent  PONV status at discharge: No PONV  Anesthetic complications: no      Cardiovascular status: blood pressure returned to baseline and hemodynamically stable  Respiratory status: unassisted and spontaneous ventilation  Hydration status: euvolemic  Follow-up not needed.        Visit Vitals  /82 (BP Location: Right arm, Patient Position: Lying)   Pulse 63   Temp 36.6 °C (97.8 °F) (Oral)   Resp 18   Ht 5' 10.55" (1.792 m)   Wt 98 kg (216 lb 0.8 oz)   SpO2 99%   Breastfeeding? No   BMI 30.52 kg/m²       Pain/Brigette Score: Pain Assessment Performed: Yes (2/26/2018  2:00 PM)  Presence of Pain: denies (2/26/2018  2:00 PM)      "

## 2018-02-27 NOTE — PHYSICIAN QUERY
"PT Name: Yaneli Steele  MR #: [id number]     Physician Query Form - Documentation Clarification      CDS/: Felisha Scott RN, CCDS             Contact information: [email or web address]    This form is a permanent document in the medical record.     Query Date: February 27, 2018    By submitting this query, we are merely seeking further clarification of documentation. Please utilize your independent clinical judgment when addressing the question(s) below.    The Medical record reflects the following:    Supporting Clinical Findings Location in Medical Record      Cardiology performed R/LHC.  This showed "no significant coronary artery disease.  Severe LV dysfunction consistent with nonischemic cardiomyopathy.  Mildly elevated left and right sided filling pressures       2/26 d/c summary    2/26 brief op note   CONCLUSIONS     1 - Severely depressed left ventricular systolic function (EF 20-25%).     2 - Moderate left atrial enlargement.     3 - Severely depressed right ventricular systolic function .     4 - Moderate to severe mitral regurgitation.       2/23 echo                                                                            Doctor, Please specify diagnosis or diagnoses associated with above clinical findings.  Please further specify cardiomyopathy.    Provider Use Only    (    )  Dilated    (    )  Congestive    (    )  Other, plesae specify_________________                                                                                                                           [ x ] Clinically undetermined            "

## 2018-02-27 NOTE — PHYSICIAN QUERY
PT Name: Yaneli Steele  MR #: [id number]     Physician Query Form - Documentation Clarification      CDS/: Felisha Scott RN, CCDS              Contact information: [email or web address]    This form is a permanent document in the medical record.     Query Date: February 27, 2018    By submitting this query, we are merely seeking further clarification of documentation. Please utilize your independent clinical judgment when addressing the question(s) below.    The Medical record reflects the following:    Supporting Clinical Findings Location in Medical Record      No significant coronary artery disease.  Severe LV dysfunction consistent with nonischemic cardiomyopathy.  Mildly elevated left and right sided filling pressures.  There is mild pulmonary hypertension.  There is severe MR 3-4+    Acute systolic HF   2/26 brief op note              2/25 prog note                                                                                      Doctor, Please specify diagnosis or diagnoses associated with above clinical findings.    Provider Use Only    Please specify the type of Pulmonary Hypertension:    [     ] Primary pulmonary hypertension (Group 1)  [     ] Other Secondary pulmonary hypertension  (Group 5)  [     ] Secondary pulmonary arterial hypertension due to: _______________  [     ] Pulmonary hypertension due to Left  heart disease (Group 2)  [  x   ] Pulmonary hypertension, unspecified   [     ] Other: ___________________                                                                                                                   [  ] Clinically undetermined

## 2018-02-28 ENCOUNTER — PATIENT OUTREACH (OUTPATIENT)
Dept: ADMINISTRATIVE | Facility: CLINIC | Age: 63
End: 2018-02-28

## 2018-02-28 NOTE — PATIENT INSTRUCTIONS
Discharge Instructions for Atrial Fibrillation  You have been diagnosed with an abnormal heart rhythm called atrial fibrillation. With this condition, your hearts 2 upper chambers quiver rather than squeeze the blood out in a normal pattern. This leads to an irregular and sometimes rapid heartbeat. Some people will develop associated symptoms such as a flip-flopping heartbeat, chest pain, lightheadedness, or shortness of breath. Other people may have no symptoms at all. Atrial fibrillation is serious because it affects the hearts ability to fill with blood as it should. Blood clots may form. This increases the risk for stroke. Untreated atrial fibrillation can also lead to heart failure. Atrial fibrillation can be controlled. With treatment, most people with atrial fibrillation lead normal lives.  Treatment options  Recommended treatment for atrial fibrillation depends on your age, symptoms, how long you have had atrial fibrillation, and other factors. You will have a complete evaluation to find out if you have any abnormalities that caused your heart to go into atrial fibrillation. This might be blocked heart arteries or a thyroid problem. Your doctor will assess your particular case and discuss choices with you.  Treatment choices may include:  · Treating an underlying disorder that puts you at risk for atrial fibrillation. For example, correcting an abnormal thyroid or electrolyte problem, or treating a blocked heart artery.  · Restoring a normal heart rhythm with an electrical shock (cardioversion) or with an antiarrhythmic medicine (chemical cardioversion)  · Using medicine to control your heart rate in atrial fibrillation.  · Preventing the risk for blood clot and stroke using blood-thinning medicines. Your doctor will tell you what he or she recommends. Choices may include aspirin, clopidogrel, warfarin, dabigatran, rivaroxaban, apixaban, and edoxaban.  · Doing catheter ablation or a surgical maze  procedure. These use different methods to destroy certain areas of heart tissue. This interrupts the electrical signals causing atrial fibrillation. One of these procedures may be a choice when medicines do not work, or as an alternative to long-term medicine.  · Other treatment choices may be recommended for you by your doctor.  Managing risk factors for stroke and preventing heart failure are important parts of any treatment plan for atrial fibrillation.  Home care  · Take your medicines exactly as directed. Dont skip doses.  · Work with your doctor to find the right medicines and doses for you.  · Learn to take your own pulse. Keep a record of your results. Ask your doctor which pulse rates mean that you need medical attention. Slowing your pulse is often the goal of treatment. Ask your doctor if its OK for you to use an automatic machine to check your pulse at home. Sometimes these machines dont count the pulse correctly when you have atrial fibrillation.  · Limit your intake of coffee, tea, cola, and other beverages with caffeine. Talk with your doctor about whether you should eliminate caffeine.  · Avoid over-the-counter medicines that have caffeine in them.  · Let your doctor know what medicines you take, including prescription and over-the-counter medicines, as well as any supplements. They interfere with some medicines given for atrial fibrillation.  · Ask your doctor about whether you can drink alcohol. Some people need to avoid alcohol to better treat atrial fibrillation. If you are taking blood-thinner medicines, alcohol may interfere with them by increasing their effect.  · Never take stimulants such as amphetamines or cocaine. These drugs can speed up your heart rate and trigger atrial fibrillation.  Follow-up care  Follow up with your doctor, or as advised.     When should I call my healthcare provider  Call your healthcare provider right away if you have any of the  following:  · Weakness  · Dizziness  · Fainting  · Fatigue  · Shortness of breath  · Chest pain with increased activity  · A change in the usual regularity of your heartbeat, or an unusually fast heartbeat   Date Last Reviewed: 4/23/2016  © 3983-0464 [employer]. [address] [place] [address], [place] [address]. All rights reserved. This information is not intended as a substitute for professional medical care. Always follow your healthcare professional's instructions.

## 2018-02-28 NOTE — PROGRESS NOTES
[phone] [facility]  [phone] Not available  [phone] [facility]  C3 nurse attempted to contact patient. No answer. The following message was left for the patient to return the call:  Good afternoon I am a nurse calling on behalf of [facility] from the [facility].  This is a Transitional Care Call for Yanelidavid Steele. When you have a moment please contact us at [phone] or [phone] Monday through Friday, between the hours of 8 am to 4 pm. We look forward to speaking with you. On behalf of [facility] have a nice day. Does not have an [facility] PCP, C3 nurse with continued efforts to contact patient.

## 2018-03-06 ENCOUNTER — OFFICE VISIT (OUTPATIENT)
Dept: CARDIOLOGY | Facility: CLINIC | Age: 63
End: 2018-03-06
Payer: COMMERCIAL

## 2018-03-06 VITALS
RESPIRATION RATE: 16 BRPM | SYSTOLIC BLOOD PRESSURE: 136 MMHG | DIASTOLIC BLOOD PRESSURE: 98 MMHG | WEIGHT: 216.69 LBS | HEART RATE: 78 BPM | OXYGEN SATURATION: 98 % | BODY MASS INDEX: 30.61 KG/M2

## 2018-03-06 DIAGNOSIS — I48.91 ATRIAL FIBRILLATION WITH RVR: ICD-10-CM

## 2018-03-06 DIAGNOSIS — I50.21 ACUTE SYSTOLIC CONGESTIVE HEART FAILURE: Primary | ICD-10-CM

## 2018-03-06 DIAGNOSIS — E78.5 DYSLIPIDEMIA: ICD-10-CM

## 2018-03-06 DIAGNOSIS — I10 ESSENTIAL HYPERTENSION: ICD-10-CM

## 2018-03-06 DIAGNOSIS — I34.0 NON-RHEUMATIC MITRAL REGURGITATION: ICD-10-CM

## 2018-03-06 PROCEDURE — 99214 OFFICE O/P EST MOD 30 MIN: CPT | Mod: S$GLB,,, | Performed by: INTERNAL MEDICINE

## 2018-03-06 PROCEDURE — 3075F SYST BP GE 130 - 139MM HG: CPT | Mod: S$GLB,,, | Performed by: INTERNAL MEDICINE

## 2018-03-06 PROCEDURE — 99999 PR PBB SHADOW E&M-EST. PATIENT-LVL III: CPT | Mod: PBBFAC,,, | Performed by: INTERNAL MEDICINE

## 2018-03-06 PROCEDURE — 3080F DIAST BP >= 90 MM HG: CPT | Mod: S$GLB,,, | Performed by: INTERNAL MEDICINE

## 2018-03-06 RX ORDER — CARVEDILOL 3.12 MG/1
6.25 TABLET ORAL 2 TIMES DAILY
Qty: 120 TABLET | Refills: 11
Start: 2018-03-06 | End: 2018-03-19 | Stop reason: SDUPTHER

## 2018-03-06 NOTE — PROGRESS NOTES
Subjective:    Patient ID:  Yaneli Steele is a 62 y.o. female who presents for follow-up of No chief complaint on file.      HPI  Patient is here for follow-up of newly diagnosed systolic heart failure.  She denies any issues since discharge.  She mainly just feels a little run down from the hospitalization as well as the adjustment of medications.  She does have a little bit of peripheral swelling but better with elevation.  She denies any chest pain but does get shortness breath on heavier activity relieved with rest.  She denies any PND, orthopnea and stable lower edema as above.  She denies any dizziness, presyncope or syncope.    Review of Systems   Constitution: Negative.   HENT: Negative.    Eyes: Negative.    Cardiovascular: Positive for dyspnea on exertion and leg swelling. Negative for chest pain, irregular heartbeat, near-syncope, orthopnea, palpitations, paroxysmal nocturnal dyspnea and syncope.   Respiratory: Negative for shortness of breath.    Skin: Negative.    Musculoskeletal: Negative.    Gastrointestinal: Negative for abdominal pain, constipation and diarrhea.   Genitourinary: Negative for dysuria.   Neurological: Negative.    Psychiatric/Behavioral: Negative.         Objective:    Physical Exam   Constitutional: She is oriented to person, place, and time. She appears well-developed and well-nourished.   HENT:   Head: Normocephalic and atraumatic.   Eyes: Conjunctivae and EOM are normal. Pupils are equal, round, and reactive to light.   Neck: Normal range of motion. Neck supple. No thyromegaly present.   Cardiovascular: Normal rate and regular rhythm.    No murmur heard.  Pulmonary/Chest: Effort normal and breath sounds normal. No respiratory distress.   Abdominal: Soft. Bowel sounds are normal.   Musculoskeletal: She exhibits edema.   Neurological: She is alert and oriented to person, place, and time.   Skin: Skin is warm and dry.   Psychiatric: She has a normal mood and affect. Her  behavior is normal.       echo:  CONCLUSIONS     1 - Severely depressed left ventricular systolic function (EF 20-25%).     2 - Moderate left atrial enlargement.     3 - Severely depressed right ventricular systolic function .     4 - Moderate to severe mitral regurgitation.     R?LHC:  B. Summary/Post-Operative Diagnosis       Normal coronary arteries.    Systolic dysfunction.    Severe mitral regurgitation.    Mildly elevated right and left Filling Pressures.    Mild Pulmonary Hypertension.      Assessment:       1. Acute systolic congestive heart failure    2. Atrial fibrillation with RVR    3. Non-rheumatic mitral regurgitation    4. Essential hypertension    5. Dyslipidemia         Plan:       -Continue med therapy, increase carvedilol to 6.25 twice a day  -Continue maintenance Lasix, sodium restriction  -No ASA, on Xarelto for OAC  -Wean amiodarone next visit    Return to clinic in one month

## 2018-03-19 RX ORDER — CARVEDILOL 3.12 MG/1
6.25 TABLET ORAL 2 TIMES DAILY
Qty: 120 TABLET | Refills: 11 | Status: SHIPPED | OUTPATIENT
Start: 2018-03-19 | End: 2019-03-06 | Stop reason: SDUPTHER

## 2018-03-19 NOTE — TELEPHONE ENCOUNTER
----- Message from Kailyn Lao sent at 3/19/2018  8:39 AM CDT -----  Contact: self  carvedilol (COREG) 3.125 MG tablet    Pt states she was advised to double the medication listed above and requests to get a new prescription sent in to her pharmacy. She can be reached at [phone]. Thank you!

## 2018-04-18 ENCOUNTER — OFFICE VISIT (OUTPATIENT)
Dept: CARDIOLOGY | Facility: CLINIC | Age: 63
End: 2018-04-18
Payer: COMMERCIAL

## 2018-04-18 VITALS
RESPIRATION RATE: 16 BRPM | DIASTOLIC BLOOD PRESSURE: 86 MMHG | HEART RATE: 58 BPM | OXYGEN SATURATION: 99 % | WEIGHT: 215.63 LBS | BODY MASS INDEX: 30.46 KG/M2 | SYSTOLIC BLOOD PRESSURE: 142 MMHG

## 2018-04-18 DIAGNOSIS — E78.5 DYSLIPIDEMIA: ICD-10-CM

## 2018-04-18 DIAGNOSIS — I48.91 ATRIAL FIBRILLATION WITH RVR: ICD-10-CM

## 2018-04-18 DIAGNOSIS — I50.21 ACUTE SYSTOLIC CONGESTIVE HEART FAILURE: Primary | ICD-10-CM

## 2018-04-18 DIAGNOSIS — I34.0 NON-RHEUMATIC MITRAL REGURGITATION: ICD-10-CM

## 2018-04-18 DIAGNOSIS — I10 ESSENTIAL HYPERTENSION: ICD-10-CM

## 2018-04-18 PROCEDURE — 3077F SYST BP >= 140 MM HG: CPT | Mod: CPTII,S$GLB,, | Performed by: INTERNAL MEDICINE

## 2018-04-18 PROCEDURE — 3079F DIAST BP 80-89 MM HG: CPT | Mod: CPTII,S$GLB,, | Performed by: INTERNAL MEDICINE

## 2018-04-18 PROCEDURE — 99999 PR PBB SHADOW E&M-EST. PATIENT-LVL III: CPT | Mod: PBBFAC,,, | Performed by: INTERNAL MEDICINE

## 2018-04-18 PROCEDURE — 99214 OFFICE O/P EST MOD 30 MIN: CPT | Mod: S$GLB,,, | Performed by: INTERNAL MEDICINE

## 2018-04-18 NOTE — PROGRESS NOTES
Subjective:    Patient ID:  Yaneli Steele is a 62 y.o. female who presents for follow-up of Follow-up      HPI   release history:  Patient is here for follow-up of newly diagnosed systolic heart failure.  She denies any issues since discharge.  She mainly just feels a little run down from the hospitalization as well as the adjustment of medications.  She does have a little bit of peripheral swelling but better with elevation.  She denies any chest pain but does get shortness breath on heavier activity relieved with rest.  She denies any PND, orthopnea and stable lower edema as above.  She denies any dizziness, presyncope or syncope.    Today:  Here for follow-up of systolic heart failure mitral regurgitation.  She looks better and feels better.  She's been exercising without any issues.  She denies any chest pain, shortness of breath or palpitations.  She denies any PND, orthopnea or lower edema.  She's not expressing dizziness, presyncope or syncope.      Review of Systems   Constitution: Negative.   HENT: Negative.    Eyes: Negative.    Cardiovascular: Negative for chest pain, dyspnea on exertion, irregular heartbeat, leg swelling, near-syncope, orthopnea, palpitations, paroxysmal nocturnal dyspnea and syncope.   Respiratory: Negative for shortness of breath.    Skin: Negative.    Musculoskeletal: Negative.    Gastrointestinal: Negative for abdominal pain, constipation and diarrhea.   Genitourinary: Negative for dysuria.   Neurological: Negative.    Psychiatric/Behavioral: Negative.         Objective:    Physical Exam   Constitutional: She is oriented to person, place, and time. She appears well-developed and well-nourished.   HENT:   Head: Normocephalic and atraumatic.   Eyes: Conjunctivae and EOM are normal. Pupils are equal, round, and reactive to light.   Neck: Normal range of motion. Neck supple. No thyromegaly present.   Cardiovascular: Normal rate and regular rhythm.    No murmur  heard.  Pulmonary/Chest: Effort normal and breath sounds normal. No respiratory distress.   Abdominal: Soft. Bowel sounds are normal.   Musculoskeletal: She exhibits no edema.   Neurological: She is alert and oriented to person, place, and time.   Skin: Skin is warm and dry.   Psychiatric: She has a normal mood and affect. Her behavior is normal.       echo:  CONCLUSIONS     1 - Severely depressed left ventricular systolic function (EF 20-25%).     2 - Moderate left atrial enlargement.     3 - Severely depressed right ventricular systolic function .     4 - Moderate to severe mitral regurgitation.     R?LHC:  B. Summary/Post-Operative Diagnosis       Normal coronary arteries.    Systolic dysfunction.    Severe mitral regurgitation.    Mildly elevated right and left Filling Pressures.    Mild Pulmonary Hypertension.      Assessment:       1. Acute systolic congestive heart failure    2. Atrial fibrillation with RVR    3. Non-rheumatic mitral regurgitation    4. Essential hypertension    5. Dyslipidemia         Plan:       -Continue med therapy  -Continue maintenance Lasix, sodium restriction  -No ASA, on Xarelto for OAC  -Wean amiodarone 200 daily than likely DC at next visit  -Check echocardiogram prior to next visit    Return to clinic in 3 mos

## 2018-07-18 ENCOUNTER — HOSPITAL ENCOUNTER (OUTPATIENT)
Dept: CARDIOLOGY | Facility: HOSPITAL | Age: 63
Discharge: HOME | End: 2018-07-18
Attending: INTERNAL MEDICINE
Payer: COMMERCIAL

## 2018-07-18 DIAGNOSIS — I50.21 ACUTE SYSTOLIC CONGESTIVE HEART FAILURE: ICD-10-CM

## 2018-07-18 LAB
DIASTOLIC DYSFUNCTION: YES
ESTIMATED PA SYSTOLIC PRESSURE: 42.34
GLOBAL PERICARDIAL EFFUSION: ABNORMAL
MITRAL VALVE MOBILITY: NORMAL
MITRAL VALVE REGURGITATION: ABNORMAL
RETIRED EF AND QEF - SEE NOTES: 55 (ref 55–65)
TRICUSPID VALVE REGURGITATION: ABNORMAL

## 2018-07-18 PROCEDURE — 93306 TTE W/DOPPLER COMPLETE: CPT | Mod: 26,,, | Performed by: INTERNAL MEDICINE

## 2018-07-18 PROCEDURE — 93306 TTE W/DOPPLER COMPLETE: CPT

## 2018-07-27 ENCOUNTER — OFFICE VISIT (OUTPATIENT)
Dept: CARDIOLOGY | Facility: CLINIC | Age: 63
End: 2018-07-27
Payer: COMMERCIAL

## 2018-07-27 VITALS
DIASTOLIC BLOOD PRESSURE: 94 MMHG | BODY MASS INDEX: 28.65 KG/M2 | RESPIRATION RATE: 20 BRPM | WEIGHT: 202.81 LBS | OXYGEN SATURATION: 96 % | HEART RATE: 61 BPM | SYSTOLIC BLOOD PRESSURE: 136 MMHG

## 2018-07-27 DIAGNOSIS — I10 ESSENTIAL HYPERTENSION: ICD-10-CM

## 2018-07-27 DIAGNOSIS — I34.0 NON-RHEUMATIC MITRAL REGURGITATION: ICD-10-CM

## 2018-07-27 DIAGNOSIS — I50.21 ACUTE SYSTOLIC CONGESTIVE HEART FAILURE: Primary | ICD-10-CM

## 2018-07-27 DIAGNOSIS — E78.5 DYSLIPIDEMIA: ICD-10-CM

## 2018-07-27 PROCEDURE — 3080F DIAST BP >= 90 MM HG: CPT | Mod: CPTII,S$GLB,, | Performed by: INTERNAL MEDICINE

## 2018-07-27 PROCEDURE — 99999 PR PBB SHADOW E&M-EST. PATIENT-LVL III: CPT | Mod: PBBFAC,,, | Performed by: INTERNAL MEDICINE

## 2018-07-27 PROCEDURE — 99214 OFFICE O/P EST MOD 30 MIN: CPT | Mod: S$GLB,,, | Performed by: INTERNAL MEDICINE

## 2018-07-27 PROCEDURE — 3075F SYST BP GE 130 - 139MM HG: CPT | Mod: CPTII,S$GLB,, | Performed by: INTERNAL MEDICINE

## 2018-07-27 PROCEDURE — 3008F BODY MASS INDEX DOCD: CPT | Mod: CPTII,S$GLB,, | Performed by: INTERNAL MEDICINE

## 2018-07-27 RX ORDER — LISINOPRIL 10 MG/1
10 TABLET ORAL DAILY
Qty: 30 TABLET | Refills: 3 | Status: SHIPPED | OUTPATIENT
Start: 2018-07-27 | End: 2018-08-13

## 2018-07-27 NOTE — PROGRESS NOTES
Subjective:    Patient ID:  Yaneli Steele is a 62 y.o. female who presents for follow-up of Follow-up      HPI   previous history:  Here for follow-up of systolic heart failure mitral regurgitation.  She looks better and feels better.  She's been exercising without any issues.  She denies any chest pain, shortness of breath or palpitations.  She denies any PND, orthopnea or lower edema.  She's not expressing dizziness, presyncope or syncope.    Today:  Here for follow-up of systolic heart failure mitral regurgitation.  She is looking good and feeling well.  She continued exercising.  She denies any chest pain, shortness breath or palpitations.  She denies any PND, orthopnea or lower edema.  She denies any dizziness, presyncope or syncope.  She had repeat echocardiogram showed normalization of LV function.      Review of Systems   Constitution: Negative.   HENT: Negative.    Eyes: Negative.    Cardiovascular: Negative for chest pain, dyspnea on exertion, irregular heartbeat, leg swelling, near-syncope, orthopnea, palpitations, paroxysmal nocturnal dyspnea and syncope.   Respiratory: Negative for shortness of breath.    Skin: Negative.    Musculoskeletal: Negative.    Gastrointestinal: Negative for abdominal pain, constipation and diarrhea.   Genitourinary: Negative for dysuria.   Neurological: Negative.    Psychiatric/Behavioral: Negative.         Objective:    Physical Exam   Constitutional: She is oriented to person, place, and time. She appears well-developed and well-nourished.   HENT:   Head: Normocephalic and atraumatic.   Eyes: Conjunctivae and EOM are normal. Pupils are equal, round, and reactive to light.   Neck: Normal range of motion. Neck supple. No thyromegaly present.   Cardiovascular: Normal rate and regular rhythm.    No murmur heard.  Pulmonary/Chest: Effort normal and breath sounds normal. No respiratory distress.   Abdominal: Soft. Bowel sounds are normal.   Musculoskeletal: She exhibits no  edema.   Neurological: She is alert and oriented to person, place, and time.   Skin: Skin is warm and dry.   Psychiatric: She has a normal mood and affect. Her behavior is normal.       echo:  CONCLUSIONS     1 - Severely depressed left ventricular systolic function (EF 20-25%).     2 - Moderate left atrial enlargement.     3 - Severely depressed right ventricular systolic function .     4 - Moderate to severe mitral regurgitation.     R/LHC:  B. Summary/Post-Operative Diagnosis       Normal coronary arteries.    Systolic dysfunction.    Severe mitral regurgitation.    Mildly elevated right and left Filling Pressures.    Mild Pulmonary Hypertension.    Echo: 7-18  CONCLUSIONS     1 - Normal left ventricular systolic function (EF 55-60%).     2 - No wall motion abnormalities.     3 - Upper limit of normal left ventricular enlargement.     4 - Impaired LV relaxation, elevated LAP (grade 2 diastolic dysfunction).     5 - Mild to moderate mitral regurgitation.     6 - Mild tricuspid regurgitation.     7 - Pulmonary hypertension. The estimated PA systolic pressure is 42 mmHg.     Assessment:       1. Acute systolic congestive heart failure    2. Non-rheumatic mitral regurgitation    3. Essential hypertension    4. Dyslipidemia         Plan:       -Continue med therapy  -EF normalized  -Add low-dose ACE inhibitor, check BMP in 2 weeks  -Continue maintenance Lasix, sodium restriction  -No ASA, on Xarelto for OAC  -DC amiodarone    Return to clinic in 3 mos

## 2018-08-08 ENCOUNTER — LAB VISIT (OUTPATIENT)
Dept: LAB | Facility: HOSPITAL | Age: 63
End: 2018-08-08
Attending: INTERNAL MEDICINE
Payer: COMMERCIAL

## 2018-08-08 DIAGNOSIS — I50.21 ACUTE SYSTOLIC CONGESTIVE HEART FAILURE: ICD-10-CM

## 2018-08-08 LAB
ANION GAP SERPL CALC-SCNC: 8 MMOL/L
BUN SERPL-MCNC: 15 MG/DL
CALCIUM SERPL-MCNC: 9.8 MG/DL
CHLORIDE SERPL-SCNC: 102 MMOL/L
CO2 SERPL-SCNC: 31 MMOL/L
CREAT SERPL-MCNC: 0.9 MG/DL
EST. GFR  (AFRICAN AMERICAN): >60 ML/MIN/1.73 M^2
EST. GFR  (NON AFRICAN AMERICAN): >60 ML/MIN/1.73 M^2
GLUCOSE SERPL-MCNC: 101 MG/DL
POTASSIUM SERPL-SCNC: 3.9 MMOL/L
SODIUM SERPL-SCNC: 141 MMOL/L

## 2018-08-08 PROCEDURE — 36415 COLL VENOUS BLD VENIPUNCTURE: CPT

## 2018-08-08 PROCEDURE — 80048 BASIC METABOLIC PNL TOTAL CA: CPT

## 2018-08-13 ENCOUNTER — TELEPHONE (OUTPATIENT)
Dept: CARDIOLOGY | Facility: CLINIC | Age: 63
End: 2018-08-13

## 2018-08-13 DIAGNOSIS — I10 ESSENTIAL HYPERTENSION: Primary | ICD-10-CM

## 2018-08-15 DIAGNOSIS — I50.21 ACUTE SYSTOLIC CONGESTIVE HEART FAILURE: Primary | ICD-10-CM

## 2018-08-15 RX ORDER — LOSARTAN POTASSIUM 25 MG/1
25 TABLET ORAL DAILY
Qty: 30 TABLET | Refills: 3 | Status: SHIPPED | OUTPATIENT
Start: 2018-08-15 | End: 2022-08-03 | Stop reason: SDUPTHER

## 2018-10-24 ENCOUNTER — OFFICE VISIT (OUTPATIENT)
Dept: CARDIOLOGY | Facility: CLINIC | Age: 63
End: 2018-10-24
Payer: COMMERCIAL

## 2018-10-24 VITALS
DIASTOLIC BLOOD PRESSURE: 80 MMHG | RESPIRATION RATE: 20 BRPM | WEIGHT: 202.81 LBS | OXYGEN SATURATION: 90 % | SYSTOLIC BLOOD PRESSURE: 146 MMHG | BODY MASS INDEX: 28.65 KG/M2 | HEART RATE: 54 BPM

## 2018-10-24 DIAGNOSIS — E78.5 DYSLIPIDEMIA: ICD-10-CM

## 2018-10-24 DIAGNOSIS — I10 ESSENTIAL HYPERTENSION: ICD-10-CM

## 2018-10-24 DIAGNOSIS — I48.0 PAROXYSMAL ATRIAL FIBRILLATION: ICD-10-CM

## 2018-10-24 DIAGNOSIS — I50.21 ACUTE SYSTOLIC CONGESTIVE HEART FAILURE: Primary | ICD-10-CM

## 2018-10-24 DIAGNOSIS — I34.0 NON-RHEUMATIC MITRAL REGURGITATION: ICD-10-CM

## 2018-10-24 DIAGNOSIS — I50.21 ACUTE SYSTOLIC CHF (CONGESTIVE HEART FAILURE): ICD-10-CM

## 2018-10-24 PROCEDURE — 93000 ELECTROCARDIOGRAM COMPLETE: CPT | Mod: S$GLB,,, | Performed by: INTERNAL MEDICINE

## 2018-10-24 PROCEDURE — 3008F BODY MASS INDEX DOCD: CPT | Mod: CPTII,S$GLB,, | Performed by: INTERNAL MEDICINE

## 2018-10-24 PROCEDURE — 99999 PR PBB SHADOW E&M-EST. PATIENT-LVL III: CPT | Mod: PBBFAC,,, | Performed by: INTERNAL MEDICINE

## 2018-10-24 PROCEDURE — 3077F SYST BP >= 140 MM HG: CPT | Mod: CPTII,S$GLB,, | Performed by: INTERNAL MEDICINE

## 2018-10-24 PROCEDURE — 3079F DIAST BP 80-89 MM HG: CPT | Mod: CPTII,S$GLB,, | Performed by: INTERNAL MEDICINE

## 2018-10-24 PROCEDURE — 99214 OFFICE O/P EST MOD 30 MIN: CPT | Mod: S$GLB,,, | Performed by: INTERNAL MEDICINE

## 2018-10-24 NOTE — PROGRESS NOTES
Subjective:    Patient ID:  Yaneli Steele is a 63 y.o. female who presents for follow-up of Follow-up (3 mo)      HPI   previous history:  Here for follow-up of systolic heart failure mitral regurgitation.  She is looking good and feeling well.  She continued exercising.  She denies any chest pain, shortness breath or palpitations.  She denies any PND, orthopnea or lower edema.  She denies any dizziness, presyncope or syncope.  She had repeat echocardiogram showed normalization of LV function.    Today:  Here for follow-up of systolic heart failure and mitral regurgitation.          Review of Systems   Constitution: Negative.   HENT: Negative.    Eyes: Negative.    Cardiovascular: Negative for chest pain, dyspnea on exertion, irregular heartbeat, leg swelling, near-syncope, orthopnea, palpitations, paroxysmal nocturnal dyspnea and syncope.   Respiratory: Negative for shortness of breath.    Skin: Negative.    Musculoskeletal: Negative.    Gastrointestinal: Negative for abdominal pain, constipation and diarrhea.   Genitourinary: Negative for dysuria.   Neurological: Negative.    Psychiatric/Behavioral: Negative.         Objective:    Physical Exam   Constitutional: She is oriented to person, place, and time. She appears well-developed and well-nourished.   HENT:   Head: Normocephalic and atraumatic.   Eyes: Conjunctivae and EOM are normal. Pupils are equal, round, and reactive to light.   Neck: Normal range of motion. Neck supple. No thyromegaly present.   Cardiovascular: Normal rate and regular rhythm.   No murmur heard.  Pulmonary/Chest: Effort normal and breath sounds normal. No respiratory distress.   Abdominal: Soft. Bowel sounds are normal.   Musculoskeletal: She exhibits no edema.   Neurological: She is alert and oriented to person, place, and time.   Skin: Skin is warm and dry.   Psychiatric: She has a normal mood and affect. Her behavior is normal.       echo:  2-18  CONCLUSIONS     1 - Severely  depressed left ventricular systolic function (EF 20-25%).     2 - Moderate left atrial enlargement.     3 - Severely depressed right ventricular systolic function .     4 - Moderate to severe mitral regurgitation.     R/LHC:  B. Summary/Post-Operative Diagnosis       Normal coronary arteries.    Systolic dysfunction.    Severe mitral regurgitation.    Mildly elevated right and left Filling Pressures.    Mild Pulmonary Hypertension.    Echo: 7-18  CONCLUSIONS     1 - Normal left ventricular systolic function (EF 55-60%).     2 - No wall motion abnormalities.     3 - Upper limit of normal left ventricular enlargement.     4 - Impaired LV relaxation, elevated LAP (grade 2 diastolic dysfunction).     5 - Mild to moderate mitral regurgitation.     6 - Mild tricuspid regurgitation.     7 - Pulmonary hypertension. The estimated PA systolic pressure is 42 mmHg.     EKG today shows     Assessment:       1. Acute systolic congestive heart failure    2. Essential hypertension    3. Non-rheumatic mitral regurgitation    4. Dyslipidemia    5. Paroxysmal atrial fibrillation         Plan:       -Continue med therapy  -follow SBP and titrate medicines as needed (* currently conservative with no symptoms and normalization of EF as above)  -Continue maintenance Lasix, sodium restriction  -consider weaning diuretic at next visit  -No ASA, on Xarelto for OAC      Return to clinic in 6 mos

## 2019-03-11 RX ORDER — CARVEDILOL 3.12 MG/1
TABLET ORAL
Qty: 120 TABLET | Refills: 0 | Status: SHIPPED | OUTPATIENT
Start: 2019-03-11 | End: 2019-04-08 | Stop reason: SDUPTHER

## 2019-04-08 RX ORDER — CARVEDILOL 3.12 MG/1
TABLET ORAL
Qty: 120 TABLET | Refills: 0 | Status: SHIPPED | OUTPATIENT
Start: 2019-04-08 | End: 2019-05-11 | Stop reason: SDUPTHER

## 2019-05-13 RX ORDER — CARVEDILOL 3.12 MG/1
TABLET ORAL
Qty: 120 TABLET | Refills: 0 | Status: SHIPPED | OUTPATIENT
Start: 2019-05-13 | End: 2019-06-10 | Stop reason: SDUPTHER

## 2019-06-10 RX ORDER — CARVEDILOL 3.12 MG/1
TABLET ORAL
Qty: 120 TABLET | Refills: 0 | Status: ON HOLD | OUTPATIENT
Start: 2019-06-10 | End: 2022-07-20 | Stop reason: HOSPADM

## 2019-07-09 RX ORDER — CARVEDILOL 3.12 MG/1
TABLET ORAL
Qty: 120 TABLET | Refills: 0 | OUTPATIENT
Start: 2019-07-09

## 2020-12-22 NOTE — ASSESSMENT & PLAN NOTE
Not severe.   Responded well to lasix however this caused worsening renal function.   Hold off on diuresis. Will need maintenance lasix+spironolactone when renal function stable.      PUSHPA KANG is a 53M who was admitted for acute hypoxic respiratory failure due to COVID-19 PNA now presents with debility.    #AHRF 2/2 COVID-19 PNA  - s/p intubation 11/15-30.  - s/p decadron x10d. Not a candidate for Remdesivir due to elevated LFTs  - s/p azithromycin, CTX   - Monitor SpO2 on RA, oxygen supplementation via nasal cannula PRN  - Start comprehensive rehab program of PT/OT/SLP - 3 hours a day, 5 days a week    #aspiration PNA  - s/p zosyn x5d (last 12/9)  - aspiration precautions    #HTN  - new onset during hospitalization  - cont amlodipine 10mg qd  - monitor BP    #Acute renal failure  - Cr 2.22 -> 4.43 on admission to [facility].   - 1/76   - Monitor CMP  - Avoid nephrotoxins  - Strict I/Os  -urine lytes     #elevated LFTs  - likely 2/2 COVID infection--resolved  - monitor CMP  - avoid hepatotoxins    Pain  - Tylenol PRN    GI / Bowel  - Senna qHS     / Bladder  - Currently patient voids:      [ ] independent      [ ] external collection device (condom cath)      [ ] Indwelling isaacs catheter      [ ] Intermittent catheterization  - Continue bladder scans Q**** hours with straight cath for >***cc.  - Toileting schedule every 4 hours    Skin / Pressure injury  - Skin assessment on admission performed [  ] :   - Monitor Incisions:    - Turn q2 hours in bed while awake, air mattress  - nursing to monitor skin qShift  - soft heel protectors  - skin barrier cream PRN    Diet/Dysphagia:  - Diet Consistency: dysphagia 2 - mech soft and thin liquids  - Aspiration Precautions  - SLP consult for swallow function evaluation and treatment  - Nutrition consult for protein calorie malnutrition    DVT prophylaxis:   - full dose lovenox 70mg subq BID      Outpatient Follow-up:  Carmen Chau)  Medicine  Dept Director  [address] [place] [address]  Phone: [phone]  Fax: [phone]      Code Status/Emergency Contact:  Yg Kang (brother) [phone]

## 2022-07-19 ENCOUNTER — HOSPITAL ENCOUNTER (OUTPATIENT)
Facility: HOSPITAL | Age: 67
Discharge: HOME | End: 2022-07-20
Attending: EMERGENCY MEDICINE | Admitting: INTERNAL MEDICINE
Payer: COMMERCIAL

## 2022-07-19 DIAGNOSIS — R07.9 CHEST PAIN: ICD-10-CM

## 2022-07-19 DIAGNOSIS — R00.2 PALPITATIONS: ICD-10-CM

## 2022-07-19 DIAGNOSIS — I50.22 CHRONIC SYSTOLIC HEART FAILURE: ICD-10-CM

## 2022-07-19 DIAGNOSIS — I48.0 PAROXYSMAL ATRIAL FIBRILLATION: Primary | Chronic | ICD-10-CM

## 2022-07-19 PROBLEM — I10 ESSENTIAL (PRIMARY) HYPERTENSION: Status: ACTIVE | Noted: 2022-07-19

## 2022-07-19 PROBLEM — I48.91 ATRIAL FIBRILLATION WITH RAPID VENTRICULAR RESPONSE: Status: RESOLVED | Noted: 2018-02-22 | Resolved: 2022-07-19

## 2022-07-19 PROBLEM — I48.91 ATRIAL FIBRILLATION WITH RVR: Status: RESOLVED | Noted: 2018-02-23 | Resolved: 2022-07-19

## 2022-07-19 LAB
ALBUMIN SERPL-MCNC: 4.5 G/DL (ref 3.3–5.5)
ALP SERPL-CCNC: 121 U/L (ref 42–141)
ASCENDING AORTA: 3.49 CM
AV INDEX (PROSTH): 0.64
AV MEAN GRADIENT: 2 MMHG
AV PEAK GRADIENT: 4 MMHG
AV VALVE AREA: 2.06 CM2
AV VELOCITY RATIO: 0.65
BILIRUB SERPL-MCNC: 1.3 MG/DL (ref 0.2–1.6)
BSA FOR ECHO PROCEDURE: 2.08 M2
BUN SERPL-MCNC: 9 MG/DL (ref 7–22)
CALCIUM SERPL-MCNC: 10.4 MG/DL (ref 8–10.3)
CHLORIDE SERPL-SCNC: 98 MMOL/L (ref 98–108)
CREAT SERPL-MCNC: 0.9 MG/DL (ref 0.6–1.2)
CTP QC/QA: YES
CV ECHO LV RWT: 0.43 CM
DOP CALC AO PEAK VEL: 0.98 M/S
DOP CALC AO VTI: 16.85 CM
DOP CALC LVOT AREA: 3.2 CM2
DOP CALC LVOT DIAMETER: 2.03 CM
DOP CALC LVOT PEAK VEL: 0.64 M/S
DOP CALC LVOT STROKE VOLUME: 34.65 CM3
DOP CALCLVOT PEAK VEL VTI: 10.71 CM
ECHO LV POSTERIOR WALL: 1.04 CM (ref 0.6–1.1)
EJECTION FRACTION: 25 %
FRACTIONAL SHORTENING: 22 % (ref 28–44)
GLUCOSE SERPL-MCNC: 130 MG/DL (ref 73–118)
INTERVENTRICULAR SEPTUM: 1.21 CM (ref 0.6–1.1)
IVRT: 94.2 MSEC
LA MAJOR: 6 CM
LA MINOR: 5.75 CM
LA WIDTH: 4.78 CM
LEFT ATRIUM SIZE: 4.41 CM
LEFT ATRIUM VOLUME INDEX: 51.8 ML/M2
LEFT ATRIUM VOLUME: 105.22 CM3
LEFT INTERNAL DIMENSION IN SYSTOLE: 3.78 CM (ref 2.1–4)
LEFT VENTRICLE DIASTOLIC VOLUME INDEX: 54.14 ML/M2
LEFT VENTRICLE DIASTOLIC VOLUME: 109.9 ML
LEFT VENTRICLE MASS INDEX: 100 G/M2
LEFT VENTRICLE SYSTOLIC VOLUME INDEX: 30 ML/M2
LEFT VENTRICLE SYSTOLIC VOLUME: 60.99 ML
LEFT VENTRICULAR INTERNAL DIMENSION IN DIASTOLE: 4.85 CM (ref 3.5–6)
LEFT VENTRICULAR MASS: 203.46 G
MV PEAK E VEL: 1.76 M/S
PISA TR MAX VEL: 2.75 M/S
POC ALT (SGPT): 19 U/L (ref 10–47)
POC AST (SGOT): 33 U/L (ref 11–38)
POC CARDIAC TROPONIN I: 0.01 NG/ML
POC PTINR: 1 (ref 0.9–1.2)
POC PTWBT: 11.8 SEC (ref 9.7–14.3)
POC TCO2: 29 MMOL/L (ref 18–33)
POTASSIUM BLD-SCNC: 3.8 MMOL/L (ref 3.6–5.1)
PROTEIN, POC: 8.6 G/DL (ref 6.4–8.1)
PV PEAK VELOCITY: 0.65 CM/S
RA MAJOR: 5.29 CM
RA PRESSURE: 15 MMHG
RA WIDTH: 4.05 CM
RIGHT VENTRICULAR END-DIASTOLIC DIMENSION: 4.73 CM
RV TISSUE DOPPLER FREE WALL SYSTOLIC VELOCITY 1 (APICAL 4 CHAMBER VIEW): 7.43 CM/S
SAMPLE: NORMAL
SAMPLE: NORMAL
SARS-COV-2 RDRP RESP QL NAA+PROBE: NEGATIVE
SODIUM BLD-SCNC: 143 MMOL/L (ref 128–145)
STJ: 2.01 CM
TR MAX PG: 30 MMHG
TRICUSPID ANNULAR PLANE SYSTOLIC EXCURSION: 1.44 CM
TV REST PULMONARY ARTERY PRESSURE: 45 MMHG

## 2022-07-19 PROCEDURE — 80053 COMPREHEN METABOLIC PANEL: CPT | Mod: ER

## 2022-07-19 PROCEDURE — 93005 ELECTROCARDIOGRAM TRACING: CPT | Mod: ER

## 2022-07-19 PROCEDURE — 99285 EMERGENCY DEPT VISIT HI MDM: CPT | Mod: 25,ER

## 2022-07-19 PROCEDURE — 93010 ELECTROCARDIOGRAM REPORT: CPT | Mod: ,,, | Performed by: INTERNAL MEDICINE

## 2022-07-19 PROCEDURE — G0378 HOSPITAL OBSERVATION PER HR: HCPCS

## 2022-07-19 PROCEDURE — 25000003 PHARM REV CODE 250: Performed by: HOSPITALIST

## 2022-07-19 PROCEDURE — U0002 COVID-19 LAB TEST NON-CDC: HCPCS | Mod: ER | Performed by: EMERGENCY MEDICINE

## 2022-07-19 PROCEDURE — 84484 ASSAY OF TROPONIN QUANT: CPT | Mod: ER

## 2022-07-19 PROCEDURE — 85610 PROTHROMBIN TIME: CPT | Mod: ER

## 2022-07-19 PROCEDURE — 25000003 PHARM REV CODE 250: Mod: ER | Performed by: EMERGENCY MEDICINE

## 2022-07-19 PROCEDURE — 93010 EKG 12-LEAD: ICD-10-PCS | Mod: ,,, | Performed by: INTERNAL MEDICINE

## 2022-07-19 PROCEDURE — 85025 COMPLETE CBC W/AUTO DIFF WBC: CPT | Mod: ER

## 2022-07-19 PROCEDURE — 96374 THER/PROPH/DIAG INJ IV PUSH: CPT | Mod: ER

## 2022-07-19 RX ORDER — IBUPROFEN 200 MG
16 TABLET ORAL
Status: DISCONTINUED | OUTPATIENT
Start: 2022-07-19 | End: 2022-07-20 | Stop reason: HOSPADM

## 2022-07-19 RX ORDER — PROCHLORPERAZINE EDISYLATE 5 MG/ML
5 INJECTION INTRAMUSCULAR; INTRAVENOUS EVERY 6 HOURS PRN
Status: DISCONTINUED | OUTPATIENT
Start: 2022-07-19 | End: 2022-07-20 | Stop reason: HOSPADM

## 2022-07-19 RX ORDER — DILTIAZEM HYDROCHLORIDE 5 MG/ML
10 INJECTION INTRAVENOUS
Status: COMPLETED | OUTPATIENT
Start: 2022-07-19 | End: 2022-07-19

## 2022-07-19 RX ORDER — POLYETHYLENE GLYCOL 3350 17 G/17G
17 POWDER, FOR SOLUTION ORAL DAILY
Status: DISCONTINUED | OUTPATIENT
Start: 2022-07-19 | End: 2022-07-20 | Stop reason: HOSPADM

## 2022-07-19 RX ORDER — TALC
6 POWDER (GRAM) TOPICAL NIGHTLY PRN
Status: DISCONTINUED | OUTPATIENT
Start: 2022-07-19 | End: 2022-07-20 | Stop reason: HOSPADM

## 2022-07-19 RX ORDER — SODIUM CHLORIDE 0.9 % (FLUSH) 0.9 %
10 SYRINGE (ML) INJECTION EVERY 8 HOURS PRN
Status: DISCONTINUED | OUTPATIENT
Start: 2022-07-19 | End: 2022-07-20 | Stop reason: HOSPADM

## 2022-07-19 RX ORDER — IBUPROFEN 200 MG
24 TABLET ORAL
Status: DISCONTINUED | OUTPATIENT
Start: 2022-07-19 | End: 2022-07-20 | Stop reason: HOSPADM

## 2022-07-19 RX ORDER — ONDANSETRON 2 MG/ML
4 INJECTION INTRAMUSCULAR; INTRAVENOUS EVERY 8 HOURS PRN
Status: DISCONTINUED | OUTPATIENT
Start: 2022-07-19 | End: 2022-07-20 | Stop reason: HOSPADM

## 2022-07-19 RX ORDER — ASPIRIN 325 MG
325 TABLET ORAL
Status: COMPLETED | OUTPATIENT
Start: 2022-07-19 | End: 2022-07-19

## 2022-07-19 RX ORDER — NALOXONE HCL 0.4 MG/ML
0.02 VIAL (ML) INJECTION
Status: DISCONTINUED | OUTPATIENT
Start: 2022-07-19 | End: 2022-07-20 | Stop reason: HOSPADM

## 2022-07-19 RX ORDER — IPRATROPIUM BROMIDE AND ALBUTEROL SULFATE 2.5; .5 MG/3ML; MG/3ML
3 SOLUTION RESPIRATORY (INHALATION) EVERY 4 HOURS PRN
Status: DISCONTINUED | OUTPATIENT
Start: 2022-07-19 | End: 2022-07-20 | Stop reason: HOSPADM

## 2022-07-19 RX ORDER — ACETAMINOPHEN 325 MG/1
650 TABLET ORAL EVERY 6 HOURS PRN
Status: DISCONTINUED | OUTPATIENT
Start: 2022-07-19 | End: 2022-07-20 | Stop reason: HOSPADM

## 2022-07-19 RX ORDER — CARVEDILOL 6.25 MG/1
6.25 TABLET ORAL 2 TIMES DAILY WITH MEALS
Status: DISCONTINUED | OUTPATIENT
Start: 2022-07-19 | End: 2022-07-20 | Stop reason: HOSPADM

## 2022-07-19 RX ORDER — SIMETHICONE 80 MG
1 TABLET,CHEWABLE ORAL 4 TIMES DAILY PRN
Status: DISCONTINUED | OUTPATIENT
Start: 2022-07-19 | End: 2022-07-20 | Stop reason: HOSPADM

## 2022-07-19 RX ORDER — MAG HYDROX/ALUMINUM HYD/SIMETH 200-200-20
30 SUSPENSION, ORAL (FINAL DOSE FORM) ORAL 4 TIMES DAILY PRN
Status: DISCONTINUED | OUTPATIENT
Start: 2022-07-19 | End: 2022-07-20 | Stop reason: HOSPADM

## 2022-07-19 RX ORDER — GLUCAGON 1 MG
1 KIT INJECTION
Status: DISCONTINUED | OUTPATIENT
Start: 2022-07-19 | End: 2022-07-20 | Stop reason: HOSPADM

## 2022-07-19 RX ORDER — LOSARTAN POTASSIUM 25 MG/1
25 TABLET ORAL DAILY
Status: DISCONTINUED | OUTPATIENT
Start: 2022-07-19 | End: 2022-07-20 | Stop reason: HOSPADM

## 2022-07-19 RX ORDER — METOPROLOL TARTRATE 25 MG/1
25 TABLET, FILM COATED ORAL
Status: COMPLETED | OUTPATIENT
Start: 2022-07-19 | End: 2022-07-19

## 2022-07-19 RX ADMIN — LOSARTAN POTASSIUM 25 MG: 25 TABLET, FILM COATED ORAL at 06:07

## 2022-07-19 RX ADMIN — CARVEDILOL 6.25 MG: 6.25 TABLET, FILM COATED ORAL at 06:07

## 2022-07-19 RX ADMIN — DILTIAZEM HYDROCHLORIDE 10 MG: 5 INJECTION INTRAVENOUS at 11:07

## 2022-07-19 RX ADMIN — ASPIRIN 325 MG ORAL TABLET 325 MG: 325 PILL ORAL at 11:07

## 2022-07-19 RX ADMIN — METOPROLOL TARTRATE 25 MG: 25 TABLET, FILM COATED ORAL at 11:07

## 2022-07-19 RX ADMIN — RIVAROXABAN 20 MG: 20 TABLET, FILM COATED ORAL at 06:07

## 2022-07-19 NOTE — ED NOTES
Handoff report given to INDIGO Guajardo at [facility] 3rd floor. Pt transferred at this time.

## 2022-07-19 NOTE — ED NOTES
Attempted handoff report to INDIGO Guajardo at 3rd floor [facility]. RN unable to take report at this time but will call this RN back.

## 2022-07-19 NOTE — ED PROVIDER NOTES
Encounter Date: 7/19/2022    SCRIBE #1 NOTE: I, Katrin Barksdale, am scribing for, and in the presence of,  Low Cruz MD. I have scribed the following portions of the note - Other sections scribed: HPI; ROS; PE.       History     Chief Complaint   Patient presents with    Palpitations     Pt was at work and started to feel light headed and dizzy with palpitations      Yaneli Steele is a 66 y.o. female with no known medical Hx who presents to the ED for chief complaint of palpitations and feeling anxious onset today. Patient reports she is not currently on any daily medications and notes she was medically cleared and taken off her medications on 2019. She denies shortness of breath, chest pain, fever, chills, or abdominal pain. No further complints at this time.      The history is provided by the patient. No  was used.     Review of patient's allergies indicates:  No Known Allergies  No past medical history on file.  No past surgical history on file.  No family history on file.  Social History     Tobacco Use    Smoking status: Former Smoker    Tobacco comment: Pt states she quit more than 30 years ago   Substance Use Topics    Alcohol use: No    Drug use: No     Review of Systems   Constitutional: Negative for chills and fever.   HENT: Negative for congestion and ear pain.    Eyes: Negative for pain and visual disturbance.   Respiratory: Negative for cough and shortness of breath.    Cardiovascular: Positive for palpitations. Negative for chest pain and leg swelling.   Gastrointestinal: Negative for abdominal pain, diarrhea, nausea and vomiting.   Genitourinary: Negative for dysuria and hematuria.   Musculoskeletal: Negative for arthralgias, back pain and myalgias.   Skin: Negative for color change, pallor, rash and wound.   Allergic/Immunologic: Negative for environmental allergies, food allergies and immunocompromised state.   Neurological: Negative for dizziness, weakness,  light-headedness and headaches.   Hematological: Negative for adenopathy. Does not bruise/bleed easily.   Psychiatric/Behavioral: Negative for agitation, behavioral problems and confusion. The patient is nervous/anxious.    All other systems reviewed and are negative.      Physical Exam     Initial Vitals [07/19/22 1106]   BP Pulse Resp Temp SpO2   (!) 154/110 (!) 150 20 98.3 °F (36.8 °C) 99 %      MAP       --         Physical Exam    Nursing note and vitals reviewed.  Constitutional: She appears well-developed and well-nourished. She is not diaphoretic. No distress.   HENT:   Head: Normocephalic and atraumatic.   Mouth/Throat: Oropharynx is clear and moist. No oropharyngeal exudate.   Eyes: Conjunctivae and EOM are normal. Pupils are equal, round, and reactive to light.   Neck: Neck supple.   Normal range of motion.  Cardiovascular: Normal rate, normal heart sounds and intact distal pulses. An irregularly irregular rhythm present.  Exam reveals no gallop and no friction rub.    No murmur heard.  Pulmonary/Chest: Breath sounds normal. No stridor. No respiratory distress. She has no wheezes. She has no rhonchi. She has no rales. She exhibits no tenderness.   Abdominal: Abdomen is soft. Bowel sounds are normal. She exhibits no distension and no mass. There is no abdominal tenderness. There is no rebound and no guarding.   Musculoskeletal:         General: No tenderness or edema. Normal range of motion.      Cervical back: Normal range of motion and neck supple.     Neurological: She is alert and oriented to person, place, and time.   No focal deficits on gross exam   Skin: Skin is warm and dry.   Psychiatric: She has a normal mood and affect. Her behavior is normal. Thought content normal.         ED Course   Procedures  Labs Reviewed   POCT CMP - Abnormal; Notable for the following components:       Result Value    Calcium, POC 10.4 (*)     POC Glucose 130 (*)     Protein, POC 8.6 (*)     All other components within  normal limits   TROPONIN ISTAT   POCT CBC   SARS-COV-2 RDRP GENE    Narrative:     This test utilizes isothermal nucleic acid amplification   technology to detect the SARS-CoV-2 RdRp nucleic acid segment.   The analytical sensitivity (limit of detection) is 125 genome   equivalents/mL.   A POSITIVE result implies infection with the SARS-CoV-2 virus;   the patient is presumed to be contagious.     A NEGATIVE result means that SARS-CoV-2 nucleic acids are not   present above the limit of detection. A NEGATIVE result should be   treated as presumptive. It does not rule out the possibility of   COVID-19 and should not be the sole basis for treatment decisions.   If COVID-19 is strongly suspected based on clinical and exposure   history, re-testing using an alternate molecular assay should be   considered.   This test is only for use under the Food and Drug   Administration s Emergency Use Authorization (EUA).   Commercial kits are provided by [employer].   Performance characteristics of the EUA have been independently   verified by [facility] Department of   Pathology and Laboratory Medicine.   _________________________________________________________________   The authorized Fact Sheet for Healthcare Providers and the authorized Fact   Sheet for Patients of the ID NOW COVID-19 are available on the FDA   website:     https://www.fda.gov/media/491769/download  https://www.fda.gov/media/156549/download          POCT CMP   POCT PROTIME-INR   POCT TROPONIN   ISTAT PROCEDURE     EKG Readings: (Independently Interpreted)   Initial Reading: No STEMI. Previous EKG Date: 10/24/2018. Rhythm: Atrial Fibrillation. Heart Rate: 158. Ectopy: No Ectopy. ST Segments: Normal ST Segments. T Waves: Normal. Axis: Normal.   Other EKG Interpretations: 2nd EKG (post rate control):  Atrial fibrillation rate 105. No STEMI.         Imaging Results          X-Ray Chest AP Portable (Final result)  Result time 07/19/22 11:36:43     Final result by Louis Barahona III, MD (07/19/22 11:36:43)                 Impression:      No acute process seen.      Electronically signed by: Louis Barahona MD  Date:    07/19/2022  Time:    11:36             Narrative:    EXAMINATION:  XR CHEST AP PORTABLE    CLINICAL HISTORY:  palpitations;    FINDINGS:  Chest one view AP portable.    Heart size is normal.  Lungs are clear.  The bones show nothing unusual.                                 Medications   aspirin tablet 325 mg (325 mg Oral Given 7/19/22 1120)   diltiaZEM injection 10 mg (10 mg Intravenous Given 7/19/22 1122)   metoprolol tartrate (LOPRESSOR) tablet 25 mg (25 mg Oral Given 7/19/22 1145)     Medical Decision Making:   History:   Old Medical Records: I decided to obtain old medical records.  Initial Assessment:   Yaneli Steele is a 66 y.o. female with no known medical Hx who presents to the ED for chief complaint of palpitations and feeling anxious onset today.  Differential Diagnosis:   Includes not limited to new onset atrial fibrillation with RVR versus ACS versus CHF versus metabolic derangement.  Independently Interpreted Test(s):   I have ordered and independently interpreted X-rays - see prior notes.  I have ordered and independently interpreted EKG Reading(s) - see prior notes  Clinical Tests:   Lab Tests: Ordered and Reviewed  Radiological Study: Ordered and Reviewed  Medical Tests: Ordered and Reviewed  ED Management:  Patient given 10 mg diltiazem with good rate control with follow-up EKG that showed 105 beats per minute with atrial fibrillation.  Patient given oral metoprolol 25 mg due to the oral medication availability at this facility.  Discussed case with Cardiology, Dr. Issa, regarding the patient presentation who states that the patient can have Cardiology consult it once admitted.  Will reach out to Hospital Medicine for admission at this time.  Patient updated on results and plan of care.  Patient verbalized  understanding agrees plan of care.    11:56 AM 7/19/2022  Amy Cruz MD    Update note:  Discussed case with Hospital Medicine, Dr. Shukri Vizcaino, regarding patient and discussion with Cardiology.  Hospital Medicine will admit this time.  Will await EMS transport.    12:04 PM 7/19/2022  Amy Cruz MD    Update note:  With reassessment, patient remains rate controlled.  Patient without any complaints.  EMS has arrived for transport.    2:13 PM 7/19/2022  Amy Cruz MD    DISCLAIMER: This note was prepared with ???? voice recognition transcription software. Garbled syntax, mangled pronouns, and other bizarre constructions may be attributed to that software system             Scribe Attestation:   Scribe #1: I performed the above scribed service and the documentation accurately describes the services I performed. I attest to the accuracy of the note.               Scribe attestation: I, Amy Cruz MD, personally performed the services described in this documentation.  All medical record entries made by the scribe were at my direction and in my presence.  I have reviewed the chart and agree that the record reflects my personal performance and is accurate and complete.    Clinical Impression:   Final diagnoses:  [R07.9] Chest pain  [R00.2] Palpitations          ED Disposition Condition    Observation               Amy Cruz MD  07/19/22 2268

## 2022-07-19 NOTE — HPI
66 y.o. female with HTN, chronic systolic heart failure, and paroxysmal a fib presents with a complaint of palpitations.  Acute onset today while at work, associated with lightheadedness, no known exacerbating or alleviating factors, no attempted self treatment.  Denies fever, chills, cough, SOB, chest pain, orthopnea, PND, syncope, n/v/d, abdominal pain, or dysuria.  In the ED, was found to be in A fib with RVR, received IV diltiazem and PO BBl with good rate control.

## 2022-07-19 NOTE — ED NOTES
Handoff report given to EMS team. Pt in NAD, respirations even and unlabored at this time, A&O x4.

## 2022-07-20 VITALS
WEIGHT: 199.94 LBS | HEART RATE: 93 BPM | SYSTOLIC BLOOD PRESSURE: 166 MMHG | OXYGEN SATURATION: 100 % | DIASTOLIC BLOOD PRESSURE: 99 MMHG | TEMPERATURE: 98 F | HEIGHT: 68 IN | BODY MASS INDEX: 30.3 KG/M2 | RESPIRATION RATE: 18 BRPM

## 2022-07-20 PROCEDURE — 25000003 PHARM REV CODE 250: Performed by: HOSPITALIST

## 2022-07-20 PROCEDURE — G0378 HOSPITAL OBSERVATION PER HR: HCPCS

## 2022-07-20 PROCEDURE — 94761 N-INVAS EAR/PLS OXIMETRY MLT: CPT

## 2022-07-20 RX ORDER — CARVEDILOL 6.25 MG/1
6.25 TABLET ORAL 2 TIMES DAILY WITH MEALS
Qty: 60 TABLET | Refills: 0 | Status: SHIPPED | OUTPATIENT
Start: 2022-07-20 | End: 2022-08-03 | Stop reason: SDUPTHER

## 2022-07-20 RX ADMIN — LOSARTAN POTASSIUM 25 MG: 25 TABLET, FILM COATED ORAL at 08:07

## 2022-07-20 RX ADMIN — CARVEDILOL 6.25 MG: 6.25 TABLET, FILM COATED ORAL at 08:07

## 2022-07-20 RX ADMIN — POLYETHYLENE GLYCOL 3350 17 G: 17 POWDER, FOR SOLUTION ORAL at 08:07

## 2022-07-20 NOTE — ASSESSMENT & PLAN NOTE
No evidence of acute decompensation or fluid overload, check echo, continue home regimen, I/O's, daily weights.

## 2022-07-20 NOTE — NURSING
Pt lying in bed awake and alert. No complaints at this time. Bed locked in lowest position. Call light within reach.

## 2022-07-20 NOTE — ASSESSMENT & PLAN NOTE
RVR on presentation, rate controlled after ED treatment, monitor on tele, check echo, resume BBl and OAC.  Patient with Paroxysmal (<7 days) atrial fibrillation which is controlled currently with Beta Blocker. Patient is currently in atrial fibrillation.[id number] Score: 2. Anticoagulation indicated. Anticoagulation done with rivaroxaban.

## 2022-07-20 NOTE — PLAN OF CARE
07/20/22 0813   Final Note   Assessment Type Final Discharge Note   Anticipated Discharge Disposition Home   Hospital Resources/Appts/Education Provided Appointments scheduled and added to AVS   Post-Acute Status   Post-Acute Authorization Other   Other Status No Post-Acute Service Needs   Pts nurse Isela notified that the pt can d/c from CM standpoint

## 2022-07-20 NOTE — SUBJECTIVE & OBJECTIVE
No past medical history on file.    No past surgical history on file.    Review of patient's allergies indicates:   Allergen Reactions    Penicillins Shortness Of Breath       No current facility-administered medications on file prior to encounter.     Current Outpatient Medications on File Prior to Encounter   Medication Sig    carvedilol (COREG) 3.125 MG tablet TAKE 2 TABLETS(6.25 MG) BY MOUTH TWICE DAILY    furosemide (LASIX) 40 MG tablet Take 1 tablet (40 mg total) by mouth once daily.    losartan (COZAAR) 25 MG tablet Take 1 tablet (25 mg total) by mouth once daily.    rivaroxaban (XARELTO) 20 mg Tab Take 1 tablet (20 mg total) by mouth daily with dinner or evening meal.     Family History    None       Tobacco Use    Smoking status: Former Smoker    Smokeless tobacco: Not on file    Tobacco comment: Pt states she quit more than 30 years ago   Substance and Sexual Activity    Alcohol use: No    Drug use: No    Sexual activity: Not on file     Review of Systems   Constitutional:  Negative for chills, fatigue and fever.   Eyes:  Negative for photophobia and visual disturbance.   Respiratory:  Negative for cough and shortness of breath.    Cardiovascular:  Positive for palpitations. Negative for chest pain and leg swelling.   Gastrointestinal:  Negative for abdominal pain, diarrhea, nausea and vomiting.   Genitourinary:  Negative for dysuria, frequency and urgency.   Skin:  Negative for pallor, rash and wound.   Neurological:  Positive for light-headedness. Negative for headaches.   Psychiatric/Behavioral:  Negative for confusion and decreased concentration.    Objective:     Vital Signs (Most Recent):  Temp: 98.4 °F (36.9 °C) (07/19/22 1550)  Pulse: 85 (07/19/22 1550)  Resp: 18 (07/19/22 1550)  BP: (!) 147/96 (07/19/22 1550)  SpO2: 100 % (07/19/22 1550)   Vital Signs (24h Range):  Temp:  [98.3 °F (36.8 °C)-98.4 °F (36.9 °C)] 98.4 °F (36.9 °C)  Pulse:  [] 85  Resp:  [14-20] 18  SpO2:  [96 %-100 %] 100 %  BP:  (147-189)/() 147/96     Weight: 90.7 kg (199 lb 15.3 oz)  Body mass index is 30.86 kg/m².    Physical Exam  Vitals and nursing note reviewed.   Constitutional:       General: She is not in acute distress.     Appearance: She is well-developed.   HENT:      Head: Normocephalic and atraumatic.      Right Ear: External ear normal.      Left Ear: External ear normal.      Nose: Nose normal.   Eyes:      Conjunctiva/sclera: Conjunctivae normal.      Pupils: Pupils are equal, round, and reactive to light.   Cardiovascular:      Rate and Rhythm: Normal rate and regular rhythm.   Pulmonary:      Effort: Pulmonary effort is normal. No respiratory distress.      Breath sounds: Normal breath sounds. No wheezing.   Abdominal:      General: Bowel sounds are normal. There is no distension.      Palpations: Abdomen is soft.      Tenderness: There is no abdominal tenderness.      Comments: No palpable hepatomegaly or splenomegaly    Musculoskeletal:         General: No tenderness. Normal range of motion.      Cervical back: Normal range of motion and neck supple.   Skin:     General: Skin is warm and dry.   Neurological:      Mental Status: She is alert and oriented to person, place, and time.   Psychiatric:         Thought Content: Thought content normal.         CRANIAL NERVES     CN III, IV, VI   Pupils are equal, round, and reactive to light.     Significant Labs: All pertinent labs within the past 24 hours have been reviewed.    Significant Imaging: I have reviewed all pertinent imaging results/findings within the past 24 hours.

## 2022-07-20 NOTE — NURSING
Report received from Bennett . Pt awake and alert . Sitting up in bed using her Lap Top. Denies pain or discomfort at this time. Bed down SR up x 2 HOB . Call bell in reach.

## 2022-07-20 NOTE — PROGRESS NOTES
WRITTEN HEALTHCARE DISCHARGE INFORMATION      Things that YOU are RESPONSIBLE for to Manage Your Care At Home:     1. Getting your prescriptions filled.  2. Taking you medications as directed. DO NOT MISS ANY DOSES!  3. Going to your follow-up doctor appointments. This is important because it allows the doctor to monitor your progress and to determine if any changes need to be made to your treatment plan.     If you are unable to make your follow up appointments, please call the number listed and reschedule this appointment.      ____________HELP AT HOME____________________     Experiencing any SIGNS or SYMPTOMS: YOU CAN     Schedule a same day appopintment with your Primary Care Doctor or  you can call [facility] On [facility] Line for 24/7 assistance at [phone]     If you are experience any signs or symptoms that have become severe, Call 911 and come to your nearest Emergency Room.     Thank you for choosing [facility] and allowing us to care for you.   From your care management team:      You should receive a call from [facility] within 48-72 hours to help manage your care after discharge. Please try to make sure that you answer your phone for this important phone call.       Follow-up Information     Devante Issa MD Follow up on 8/30/2022.    Specialties: Cardiology, Interventional Cardiology  Why: at 2:20pm-- you have been added to the waitlist should a sooner appointment become available you will be contacted  Contact information:  [address] [facility] [address]  [address]  [facility] [address]  [phone]

## 2022-07-20 NOTE — PLAN OF CARE
07/20/22 0812   Discharge Planning   Assessment Type Discharge Planning Brief Assessment   Resource/Environmental Concerns none   Support Systems Children;Family members   Equipment Currently Used at Home none   Current Living Arrangements home/apartment/condo   Patient/Family Anticipates Transition to home   Patient/Family Anticipated Services at Transition none   DME Needed Upon Discharge  none   Discharge Plan A Home  (with follow up appointment)     [employer] [facility] [id number] - [place] - [address] [place], [place] [address] [address] [address]  Phone: [phone] Fax: [phone]

## 2022-07-20 NOTE — H&P
[facility] Medicine  History & Physical    Patient Name: Yaneli Steele  MRN: [id number]  Patient Class: OP- Observation  Admission Date: 7/19/2022  Attending Physician: Fito Nicole MD   Primary Care Provider: Primary Doctor No         Patient information was obtained from patient, past medical records and ER records.     Subjective:     Principal Problem:Atrial fibrillation with rapid ventricular response    Chief Complaint:   Chief Complaint   Patient presents with    Palpitations     Pt was at work and started to feel light headed and dizzy with palpitations         HPI: 66 y.o. female with HTN, chronic systolic heart failure, and paroxysmal a fib presents with a complaint of palpitations.  Acute onset today while at work, associated with lightheadedness, no known exacerbating or alleviating factors, no attempted self treatment.  Denies fever, chills, cough, SOB, chest pain, orthopnea, PND, syncope, n/v/d, abdominal pain, or dysuria.  In the ED, was found to be in A fib with RVR, received IV diltiazem and PO BBl with good rate control.      Past medical history:  Essential Hypertension  Chronic Systolic Heart Failure  Paroxysmal Atrial Fibrillation     No past surgical history on file.    Review of patient's allergies indicates:   Allergen Reactions    Penicillins Shortness Of Breath       No current facility-administered medications on file prior to encounter.     Current Outpatient Medications on File Prior to Encounter   Medication Sig    carvedilol (COREG) 3.125 MG tablet TAKE 2 TABLETS(6.25 MG) BY MOUTH TWICE DAILY    furosemide (LASIX) 40 MG tablet Take 1 tablet (40 mg total) by mouth once daily.    losartan (COZAAR) 25 MG tablet Take 1 tablet (25 mg total) by mouth once daily.    rivaroxaban (XARELTO) 20 mg Tab Take 1 tablet (20 mg total) by mouth daily with dinner or evening meal.     Family History    Mother - No known problems  Father - No known problems       Tobacco Use     Smoking status: Former Smoker    Smokeless tobacco: Not on file    Tobacco comment: Pt states she quit more than 30 years ago   Substance and Sexual Activity    Alcohol use: No    Drug use: No    Sexual activity: Not on file     Review of Systems   Constitutional:  Negative for chills, fatigue and fever.   Eyes:  Negative for photophobia and visual disturbance.   Respiratory:  Negative for cough and shortness of breath.    Cardiovascular:  Positive for palpitations. Negative for chest pain and leg swelling.   Gastrointestinal:  Negative for abdominal pain, diarrhea, nausea and vomiting.   Genitourinary:  Negative for dysuria, frequency and urgency.   Skin:  Negative for pallor, rash and wound.   Neurological:  Positive for light-headedness. Negative for headaches.   Psychiatric/Behavioral:  Negative for confusion and decreased concentration.    Objective:     Vital Signs (Most Recent):  Temp: 98.4 °F (36.9 °C) (07/19/22 1550)  Pulse: 85 (07/19/22 1550)  Resp: 18 (07/19/22 1550)  BP: (!) 147/96 (07/19/22 1550)  SpO2: 100 % (07/19/22 1550)   Vital Signs (24h Range):  Temp:  [98.3 °F (36.8 °C)-98.4 °F (36.9 °C)] 98.4 °F (36.9 °C)  Pulse:  [] 85  Resp:  [14-20] 18  SpO2:  [96 %-100 %] 100 %  BP: (147-189)/() 147/96     Weight: 90.7 kg (199 lb 15.3 oz)  Body mass index is 30.86 kg/m².    Physical Exam  Vitals and nursing note reviewed.   Constitutional:       General: She is not in acute distress.     Appearance: She is well-developed.   HENT:      Head: Normocephalic and atraumatic.      Right Ear: External ear normal.      Left Ear: External ear normal.      Nose: Nose normal.   Eyes:      Conjunctiva/sclera: Conjunctivae normal.      Pupils: Pupils are equal, round, and reactive to light.   Cardiovascular:      Rate and Rhythm: Normal rate and regular rhythm.   Pulmonary:      Effort: Pulmonary effort is normal. No respiratory distress.      Breath sounds: Normal breath sounds. No wheezing.    Abdominal:      General: Bowel sounds are normal. There is no distension.      Palpations: Abdomen is soft.      Tenderness: There is no abdominal tenderness.      Comments: No palpable hepatomegaly or splenomegaly    Musculoskeletal:         General: No tenderness. Normal range of motion.      Cervical back: Normal range of motion and neck supple.   Skin:     General: Skin is warm and dry.   Neurological:      Mental Status: She is alert and oriented to person, place, and time.   Psychiatric:         Thought Content: Thought content normal.         CRANIAL NERVES     CN III, IV, VI   Pupils are equal, round, and reactive to light.     Significant Labs: All pertinent labs within the past 24 hours have been reviewed.    Significant Imaging: I have reviewed all pertinent imaging results/findings within the past 24 hours.    Assessment/Plan:     * Atrial fibrillation with rapid ventricular response  RVR on presentation, rate controlled after ED treatment, monitor on tele, check echo, resume BBl and OAC.  Patient with Paroxysmal (<7 days) atrial fibrillation which is controlled currently with Beta Blocker. Patient is currently in atrial fibrillation.[id number] Score: 2. Anticoagulation indicated. Anticoagulation done with rivaroxaban.    Paroxysmal atrial fibrillation  As above.      Chronic systolic heart failure  No evidence of acute decompensation or fluid overload, check echo, continue home regimen, I/O's, daily weights.      VTE Risk Mitigation (From admission, onward)         Ordered     rivaroxaban tablet 20 mg  With dinner         07/19/22 1455     IP VTE HIGH RISK PATIENT  Once         07/19/22 1455     Place sequential compression device  Until discontinued         07/19/22 1455     Reason for No Pharmacological VTE Prophylaxis  Once        Question:  Reasons:  Answer:  Already adequately anticoagulated on oral Anticoagulants    07/19/22 1455               As clarification, on 07/19/2022, patient should be  placed in hospital observation services under my care in collaboration with MD Kameron Perkins Jr., APRN, [facility]  Hospitalist - Department of Hospital Medicine  [facility] [address] [place] [id number]  Office #: [phone]; Pager #: [phone]

## 2022-07-22 NOTE — DISCHARGE SUMMARY
[facility] Medicine  Discharge Summary      Patient Name: Yaneli Steele  MRN: [id number]  Patient Class: OP- Observation  Admission Date: 7/19/2022  Hospital Length of Stay: 0 days  Discharge Date and Time: 7/20/2022 11:13 AM  Attending Physician: No att. providers found   Discharging Provider: Yanique Haywood NP  Primary Care Provider: Primary Doctor Johana      HPI:   66 y.o. female with HTN, chronic systolic heart failure, and paroxysmal a fib presents with a complaint of palpitations.  Acute onset today while at work, associated with lightheadedness, no known exacerbating or alleviating factors, no attempted self treatment.  Denies fever, chills, cough, SOB, chest pain, orthopnea, PND, syncope, n/v/d, abdominal pain, or dysuria.  In the ED, was found to be in A fib with RVR, received IV diltiazem and PO BBl with good rate control.        * No surgery found *      Hospital Course:   Patient monitored closely during observation stay. She was placed on telemetry and noted to have atrial fibrillation CVR.  She was resumed on her home beta blocker. Echo obtained. She is medically stable for DC with close cardiology follow up. Refilled home beta blocker.        Goals of Care Treatment Preferences:  Code Status: Full Code      Consults:   Consults (From admission, onward)        Status Ordering Provider     IP consult to case management  Once        Provider:  (Not yet assigned)    SHANNEN Cabrera          No new Assessment & Plan notes have been filed under this hospital service since the last note was generated.  Service: Hospital Medicine    Final Active Diagnoses:    Diagnosis Date Noted POA    PRINCIPAL PROBLEM:  Atrial fibrillation with rapid ventricular response [I48.91] 02/22/2018 Yes    Paroxysmal atrial fibrillation [I48.0] 07/19/2022 Yes     Chronic    Chronic systolic heart failure [I50.22] 02/24/2018 Yes     Chronic      Problems Resolved During this Admission:        Discharged Condition: stable    Disposition: Home or Self Care    Follow Up:   Follow-up Information     Devante Issa MD Follow up on 8/30/2022.    Specialties: Cardiology, Interventional Cardiology  Why: at 2:20pm-- you have been added to the waitlist should a sooner appointment become available you will be contacted  Contact information:  [address]  [address]  [place] [address]  [phone]                       Patient Instructions:      Diet Cardiac     Notify your health care provider if you experience any of the following:  persistent dizziness, light-headedness, or visual disturbances     Notify your health care provider if you experience any of the following:  persistent nausea and vomiting or diarrhea     Notify your health care provider if you experience any of the following:  difficulty breathing or increased cough     Activity as tolerated       Significant Diagnostic Studies: Labs: BMP: No results for input(s): GLU, NA, K, CL, CO2, BUN, CREATININE, CALCIUM, MG in the last 48 hours. and CMP No results for input(s): NA, K, CL, CO2, GLU, BUN, CREATININE, CALCIUM, PROT, ALBUMIN, BILITOT, ALKPHOS, AST, ALT, ANIONGAP, ESTGFRAFRICA, EGFRNONAA in the last 48 hours.  Cardiac Graphics: Echocardiogram:   Transthoracic echo (TTE) complete (Cupid Only):   Results for orders placed or performed during the hospital encounter of 07/19/22   Echo   Result Value Ref Range    BSA 2.08 m2    LA WIDTH 4.78 cm    PV PEAK VELOCITY 0.65 cm/s    LVIDd 4.85 3.5 - 6.0 cm    IVS 1.21 (A) 0.6 - 1.1 cm    Posterior Wall 1.04 0.6 - 1.1 cm    LVIDs 3.78 2.1 - 4.0 cm    FS 22 28 - 44 %    LA volume 105.22 cm3    STJ 2.01 cm    Ascending aorta 3.49 cm    LV mass 203.46 g    LA size 4.41 cm    RVDD 4.73 cm    TAPSE 1.44 cm    RV S' 7.43 cm/s    Left Ventricle Relative Wall Thickness 0.43 cm    AV mean gradient 2 mmHg    AV valve area 2.06 cm2    AV Velocity Ratio 0.65     AV index (prosthetic) 0.64     IVRT 94.20 msec     LVOT diameter 2.03 cm    LVOT area 3.2 cm2    LVOT peak mazin 0.64 m/s    LVOT peak VTI 10.71 cm    Ao peak mazin 0.98 m/s    Ao VTI 16.85 cm    LVOT stroke volume 34.65 cm3    AV peak gradient 4 mmHg    MV Peak E Mazin 1.76 m/s    TR Max Mazin 2.75 m/s    LV Systolic Volume 60.99 mL    LV Systolic Volume Index 30.0 mL/m2    LV Diastolic Volume 109.90 mL    LV Diastolic Volume Index 54.14 mL/m2    LA Volume Index 51.8 mL/m2    LV Mass Index 100 g/m2    RA Major Axis 5.29 cm    Left Atrium Minor Axis 5.75 cm    Left Atrium Major Axis 6.00 cm    Triscuspid Valve Regurgitation Peak Gradient 30 mmHg    RA Width 4.05 cm    Right Atrial Pressure (from IVC) 15 mmHg    EF 25 %    TV rest pulmonary artery pressure 45 mmHg    Narrative    · The left ventricle is normal in size with mild concentric hypertrophy   and severely decreased systolic function.  · The estimated ejection fraction is 25-30%.  · There is severe left ventricular global hypokinesis.  · Mild right ventricular enlargement with mildly to moderately reduced   right ventricular systolic function.  · Mild mitral regurgitation.  · Mild tricuspid regurgitation.  · The estimated PA systolic pressure is 45 mmHg.  · There is pulmonary hypertension.  · Atrial fibrillation observed.          Pending Diagnostic Studies:     None         Medications:  Reconciled Home Medications:      Medication List      CHANGE how you take these medications    carvediloL 6.25 MG tablet  Commonly known as: COREG  Take 1 tablet (6.25 mg total) by mouth 2 (two) times daily with meals.  What changed:   · medication strength  · See the new instructions.        CONTINUE taking these medications    furosemide 40 MG tablet  Commonly known as: LASIX  Take 1 tablet (40 mg total) by mouth once daily.     losartan 25 MG tablet  Commonly known as: COZAAR  Take 1 tablet (25 mg total) by mouth once daily.     rivaroxaban 20 mg Tab  Commonly known as: XARELTO  Take 1 tablet (20 mg total) by mouth daily with  dinner or evening meal.            Indwelling Lines/Drains at time of discharge:   Lines/Drains/Airways     None                 Time spent on the discharge of patient: 35 minutes         Yanique Haywood NP  Department of [facility] Medicine  [facility]

## 2022-07-22 NOTE — HOSPITAL COURSE
Patient monitored closely during observation stay. She was placed on telemetry and noted to have atrial fibrillation CVR.  She was resumed on her home beta blocker. Echo obtained. She is medically stable for DC with close cardiology follow up. Refilled home beta blocker.

## 2022-07-27 ENCOUNTER — PATIENT MESSAGE (OUTPATIENT)
Dept: CARDIOLOGY | Facility: CLINIC | Age: 67
End: 2022-07-27
Payer: COMMERCIAL

## 2022-08-03 NOTE — TELEPHONE ENCOUNTER
Patient has appointment scheduled for 8/30/2022.      Last Office Visit Info:   The patient's last visit with Primary Doctor No was on Visit date not found.    The patient's last visit in current department was on 10/24/2018.        Last CBC Results:   Lab Results   Component Value Date    WBC 8.64 02/25/2018    HGB 15.8 02/25/2018    HCT 47.0 02/25/2018     (L) 02/25/2018       Last CMP Results  Lab Results   Component Value Date     08/08/2018    K 3.9 08/08/2018     08/08/2018    CO2 31 (H) 08/08/2018    BUN 15 08/08/2018    CREATININE 0.9 08/08/2018    CALCIUM 9.8 08/08/2018    ALBUMIN 3.2 (L) 02/26/2018     (H) 02/26/2018     (H) 02/26/2018       Last Lipids  Lab Results   Component Value Date    CHOL 132 02/23/2018    TRIG 68 02/23/2018    HDL 34 (L) 02/23/2018    LDLCALC 84.4 02/23/2018       Last A1C  Lab Results   Component Value Date    HGBA1C 5.8 (H) 02/23/2018       Last TSH  Lab Results   Component Value Date    TSH 0.816 02/23/2018             Current Med Refills  Medication List with Changes/Refills   Current Medications    CARVEDILOL (COREG) 6.25 MG TABLET    Take 1 tablet (6.25 mg total) by mouth 2 (two) times daily with meals.       Start Date: 7/20/2022 End Date: 7/20/2023    FUROSEMIDE (LASIX) 40 MG TABLET    Take 1 tablet (40 mg total) by mouth once daily.       Start Date: 2/27/2018 End Date: 2/27/2019    LOSARTAN (COZAAR) 25 MG TABLET    Take 1 tablet (25 mg total) by mouth once daily.       Start Date: 8/15/2018 End Date: --    RIVAROXABAN (XARELTO) 20 MG TAB    Take 1 tablet (20 mg total) by mouth daily with dinner or evening meal.       Start Date: 7/20/2022 End Date: --       Order(s) placed per written order guidelines: none    Please advise.

## 2022-08-04 RX ORDER — LOSARTAN POTASSIUM 25 MG/1
25 TABLET ORAL DAILY
Qty: 90 TABLET | Refills: 0 | Status: SHIPPED | OUTPATIENT
Start: 2022-08-04 | End: 2022-08-30

## 2022-08-04 RX ORDER — CARVEDILOL 6.25 MG/1
6.25 TABLET ORAL 2 TIMES DAILY WITH MEALS
Qty: 180 TABLET | Refills: 0 | Status: SHIPPED | OUTPATIENT
Start: 2022-08-04 | End: 2022-08-30 | Stop reason: SDUPTHER

## 2022-08-04 RX ORDER — FUROSEMIDE 40 MG/1
40 TABLET ORAL DAILY
Qty: 90 TABLET | Refills: 0 | Status: SHIPPED | OUTPATIENT
Start: 2022-08-04 | End: 2022-08-30

## 2022-08-30 ENCOUNTER — OFFICE VISIT (OUTPATIENT)
Dept: CARDIOLOGY | Facility: CLINIC | Age: 67
End: 2022-08-30
Payer: COMMERCIAL

## 2022-08-30 VITALS
DIASTOLIC BLOOD PRESSURE: 80 MMHG | HEIGHT: 67 IN | BODY MASS INDEX: 34.03 KG/M2 | SYSTOLIC BLOOD PRESSURE: 130 MMHG | OXYGEN SATURATION: 98 % | WEIGHT: 216.81 LBS | HEART RATE: 72 BPM | RESPIRATION RATE: 18 BRPM

## 2022-08-30 DIAGNOSIS — I34.0 NON-RHEUMATIC MITRAL REGURGITATION: ICD-10-CM

## 2022-08-30 DIAGNOSIS — G47.33 OSA (OBSTRUCTIVE SLEEP APNEA): ICD-10-CM

## 2022-08-30 DIAGNOSIS — E66.9 NON MORBID OBESITY, UNSPECIFIED OBESITY TYPE: ICD-10-CM

## 2022-08-30 DIAGNOSIS — I48.0 PAROXYSMAL ATRIAL FIBRILLATION: Primary | ICD-10-CM

## 2022-08-30 DIAGNOSIS — I50.22 CHRONIC SYSTOLIC HEART FAILURE: ICD-10-CM

## 2022-08-30 DIAGNOSIS — I10 ESSENTIAL (PRIMARY) HYPERTENSION: ICD-10-CM

## 2022-08-30 PROCEDURE — 3075F SYST BP GE 130 - 139MM HG: CPT | Mod: CPTII,S$GLB,, | Performed by: INTERNAL MEDICINE

## 2022-08-30 PROCEDURE — 3075F PR MOST RECENT SYSTOLIC BLOOD PRESS GE 130-139MM HG: ICD-10-PCS | Mod: CPTII,S$GLB,, | Performed by: INTERNAL MEDICINE

## 2022-08-30 PROCEDURE — 3079F DIAST BP 80-89 MM HG: CPT | Mod: CPTII,S$GLB,, | Performed by: INTERNAL MEDICINE

## 2022-08-30 PROCEDURE — 3008F PR BODY MASS INDEX (BMI) DOCUMENTED: ICD-10-PCS | Mod: CPTII,S$GLB,, | Performed by: INTERNAL MEDICINE

## 2022-08-30 PROCEDURE — 3008F BODY MASS INDEX DOCD: CPT | Mod: CPTII,S$GLB,, | Performed by: INTERNAL MEDICINE

## 2022-08-30 PROCEDURE — 1160F PR REVIEW ALL MEDS BY PRESCRIBER/CLIN PHARMACIST DOCUMENTED: ICD-10-PCS | Mod: CPTII,S$GLB,, | Performed by: INTERNAL MEDICINE

## 2022-08-30 PROCEDURE — 1159F MED LIST DOCD IN RCRD: CPT | Mod: CPTII,S$GLB,, | Performed by: INTERNAL MEDICINE

## 2022-08-30 PROCEDURE — 99999 PR PBB SHADOW E&M-EST. PATIENT-LVL IV: CPT | Mod: PBBFAC,,, | Performed by: INTERNAL MEDICINE

## 2022-08-30 PROCEDURE — 1126F PR PAIN SEVERITY QUANTIFIED, NO PAIN PRESENT: ICD-10-PCS | Mod: CPTII,S$GLB,, | Performed by: INTERNAL MEDICINE

## 2022-08-30 PROCEDURE — 3288F FALL RISK ASSESSMENT DOCD: CPT | Mod: CPTII,S$GLB,, | Performed by: INTERNAL MEDICINE

## 2022-08-30 PROCEDURE — 1126F AMNT PAIN NOTED NONE PRSNT: CPT | Mod: CPTII,S$GLB,, | Performed by: INTERNAL MEDICINE

## 2022-08-30 PROCEDURE — 3079F PR MOST RECENT DIASTOLIC BLOOD PRESSURE 80-89 MM HG: ICD-10-PCS | Mod: CPTII,S$GLB,, | Performed by: INTERNAL MEDICINE

## 2022-08-30 PROCEDURE — 99205 OFFICE O/P NEW HI 60 MIN: CPT | Mod: S$GLB,,, | Performed by: INTERNAL MEDICINE

## 2022-08-30 PROCEDURE — 1101F PT FALLS ASSESS-DOCD LE1/YR: CPT | Mod: CPTII,S$GLB,, | Performed by: INTERNAL MEDICINE

## 2022-08-30 PROCEDURE — 99999 PR PBB SHADOW E&M-EST. PATIENT-LVL IV: ICD-10-PCS | Mod: PBBFAC,,, | Performed by: INTERNAL MEDICINE

## 2022-08-30 PROCEDURE — 3288F PR FALLS RISK ASSESSMENT DOCUMENTED: ICD-10-PCS | Mod: CPTII,S$GLB,, | Performed by: INTERNAL MEDICINE

## 2022-08-30 PROCEDURE — 1101F PR PT FALLS ASSESS DOC 0-1 FALLS W/OUT INJ PAST YR: ICD-10-PCS | Mod: CPTII,S$GLB,, | Performed by: INTERNAL MEDICINE

## 2022-08-30 PROCEDURE — 1159F PR MEDICATION LIST DOCUMENTED IN MEDICAL RECORD: ICD-10-PCS | Mod: CPTII,S$GLB,, | Performed by: INTERNAL MEDICINE

## 2022-08-30 PROCEDURE — 1160F RVW MEDS BY RX/DR IN RCRD: CPT | Mod: CPTII,S$GLB,, | Performed by: INTERNAL MEDICINE

## 2022-08-30 PROCEDURE — 99205 PR OFFICE/OUTPT VISIT, NEW, LEVL V, 60-74 MIN: ICD-10-PCS | Mod: S$GLB,,, | Performed by: INTERNAL MEDICINE

## 2022-08-30 RX ORDER — CARVEDILOL 12.5 MG/1
12.5 TABLET ORAL 2 TIMES DAILY WITH MEALS
Qty: 180 TABLET | Refills: 3 | Status: SHIPPED | OUTPATIENT
Start: 2022-08-30 | End: 2022-10-06 | Stop reason: SDUPTHER

## 2022-08-30 NOTE — H&P (VIEW-ONLY)
CARDIOVASCULAR CONSULTATION    REASON FOR CONSULT:   Yaneli Steele is a 66 y.o. female who presents for PAF, CMP.    Req: ANAMARIA Haywood  HISTORY OF PRESENT ILLNESS:   Last seen by Dr. Rosas October 2018.    The patient is seen after recent hospitalization for recurrent atrial fibrillation.  Echocardiogram reviewed severely LV dysfunction.  She was placed on rate control with improvement in symptoms and discharged.  At present, she denies any angina, dyspnea, palpitations, or syncope.  There has been no PND, orthopnea, or lower extremity edema.  She denies melena, hematuria, or claudicant symptoms.  She is on Xarelto and carvedilol.      Family history is notable for the absence of premature CAD amongst first-degree relatives.      The patient is a distant ex-smoker having quit 30 years ago.  She denies alcohol excess or illicit drug use.  She is retired.    CARDIOVASCULAR HISTORY:   NICM, normal cors (cath 2/2018)    PAF on Xarelto 20mg    PAST MEDICAL HISTORY:     Past Medical History:   Diagnosis Date    Chronic systolic heart failure     Essential (primary) hypertension     Paroxysmal atrial fibrillation        PAST SURGICAL HISTORY:   History reviewed. No pertinent surgical history.    ALLERGIES AND MEDICATION:     Review of patient's allergies indicates:   Allergen Reactions    Penicillins Shortness Of Breath        Medication List            Accurate as of August 30, 2022  2:54 PM. If you have any questions, ask your nurse or doctor.                CONTINUE taking these medications      carvediloL 6.25 MG tablet  Commonly known as: COREG  Take 1 tablet (6.25 mg total) by mouth 2 (two) times daily with meals.     rivaroxaban 20 mg Tab  Commonly known as: XARELTO  Take 1 tablet (20 mg total) by mouth daily with dinner or evening meal.            STOP taking these medications      furosemide 40 MG tablet  Commonly known as: LASIX  Stopped by: Devante Issa MD     losartan 25 MG tablet  Commonly known  as: YUMIKO  Stopped by: Devante Issa MD              SOCIAL HISTORY:     Social History     Socioeconomic History    Marital status: Single   Tobacco Use    Smoking status: Former    Tobacco comments:     Pt states she quit more than 30 years ago   Substance and Sexual Activity    Alcohol use: No    Drug use: No     Social Determinants of Health     Financial Resource Strain: Low Risk     Difficulty of Paying Living Expenses: Not hard at all   Food Insecurity: No Food Insecurity    Worried About Running Out of Food in the Last Year: Never true    Ran Out of Food in the Last Year: Never true   Transportation Needs: No Transportation Needs    Lack of Transportation (Medical): No    Lack of Transportation (Non-Medical): No   Physical Activity: Sufficiently Active    Days of Exercise per Week: 4 days    Minutes of Exercise per Session: 70 min   Stress: No Stress Concern Present    Feeling of Stress : Not at all   Social Connections: Unknown    Frequency of Communication with Friends and Family: More than three times a week    Frequency of Social Gatherings with Friends and Family: More than three times a week    Active Member of Clubs or Organizations: Yes    Attends Club or Organization Meetings: More than 4 times per year    Marital Status: Never    Housing Stability: Low Risk     Unable to Pay for Housing in the Last Year: No    Number of Places Lived in the Last Year: 1    Unstable Housing in the Last Year: No       FAMILY HISTORY:     Family History   Problem Relation Age of Onset    No Known Problems Mother     No Known Problems Father        REVIEW OF SYSTEMS:   Review of Systems   Constitutional:  Negative for chills, diaphoresis and fever.   HENT:  Negative for nosebleeds.    Eyes:  Negative for blurred vision, double vision and photophobia.   Respiratory:  Negative for hemoptysis, shortness of breath and wheezing.    Cardiovascular:  Negative for chest pain, palpitations, orthopnea, claudication,  "leg swelling and PND.   Gastrointestinal:  Negative for abdominal pain, blood in stool, heartburn, melena, nausea and vomiting.   Genitourinary:  Negative for flank pain and hematuria.   Musculoskeletal:  Negative for falls, myalgias and neck pain.   Skin:  Negative for rash.   Neurological:  Negative for dizziness, seizures, loss of consciousness, weakness and headaches.   Endo/Heme/Allergies:  Negative for polydipsia. Does not bruise/bleed easily.   Psychiatric/Behavioral:  Negative for depression and memory loss. The patient is not nervous/anxious.      PHYSICAL EXAM:     Vitals:    08/30/22 1405   BP: 130/80   Pulse: 72   Resp: 18    Body mass index is 33.96 kg/m².  Weight: 98.4 kg (216 lb 13.2 oz)   Height: 5' 7" (170.2 cm)     Physical Exam  Vitals reviewed.   Constitutional:       General: She is not in acute distress.     Appearance: She is well-developed. She is obese. She is not ill-appearing, toxic-appearing or diaphoretic.   HENT:      Head: Normocephalic and atraumatic.   Eyes:      General: No scleral icterus.     Extraocular Movements: Extraocular movements intact.      Conjunctiva/sclera: Conjunctivae normal.      Pupils: Pupils are equal, round, and reactive to light.   Neck:      Thyroid: No thyromegaly.      Vascular: Normal carotid pulses. No carotid bruit or JVD.      Trachea: Trachea normal.   Cardiovascular:      Rate and Rhythm: Tachycardia present. Rhythm regularly irregular.      Pulses:           Carotid pulses are 2+ on the right side and 2+ on the left side.     Heart sounds: S1 normal and S2 normal. No murmur heard.    No friction rub. No gallop.   Pulmonary:      Effort: Pulmonary effort is normal. No respiratory distress.      Breath sounds: Normal breath sounds. No stridor. No wheezing, rhonchi or rales.   Chest:      Chest wall: No tenderness.   Abdominal:      General: There is no distension.      Palpations: Abdomen is soft.   Musculoskeletal:         General: No swelling or " tenderness. Normal range of motion.      Cervical back: Normal range of motion and neck supple. No edema or rigidity.      Right lower leg: No edema.      Left lower leg: No edema.   Feet:      Right foot:      Skin integrity: No ulcer.      Left foot:      Skin integrity: No ulcer.   Skin:     General: Skin is warm and dry.      Coloration: Skin is not jaundiced.   Neurological:      General: No focal deficit present.      Mental Status: She is alert and oriented to person, place, and time.      Cranial Nerves: No cranial nerve deficit.   Psychiatric:         Mood and Affect: Mood normal.         Speech: Speech normal.         Behavior: Behavior normal. Behavior is cooperative.       DATA:   EKG: (personally reviewed tracing(s))  7/19/22   8/30/22     Laboratory:  CBC:        CHEMISTRIES:        CARDIAC BIOMARKERS:        COAGS:        LIPIDS/LFTS:      POC labs 07/19/2022   INR 1.0   Troponin 0.01   AST 33   ALT 19   Creatinine 0.9   Potassium 3.8   Hemoglobin 15.3   Platelets 200    Lab Results   Component Value Date    TSH 0.816 02/23/2018         Cardiovascular Testing:  Echo 7/19/22 (EF 55% on report 7/18/18)  The left ventricle is normal in size with mild concentric hypertrophy and severely decreased systolic function.  The estimated ejection fraction is 25-30%.  There is severe left ventricular global hypokinesis.  Mild right ventricular enlargement with mildly to moderately reduced right ventricular systolic function.  Mild mitral regurgitation.  Mild tricuspid regurgitation.  The estimated PA systolic pressure is 45 mmHg.  There is pulmonary hypertension.  Atrial fibrillation observed.    Cath 2/26/18  B. Summary/Post-Operative Diagnosis     Normal coronary arteries.     Systolic dysfunction.     Severe mitral regurgitation.     Mildly elevated right and left Filling Pressures.     Mild Pulmonary Hypertension.   D. Hemodynamic Results   Ejection Fraction: 25%   Global LV Function: severely  depressed   PA: 45   PW:  (26)   CO_THERM: 3.92   RV: 44   RA:  (18)   LVEDP: 13   E. Angiographic Results      - Left Main Coronary Artery:              The LM is normal. There is YULIYA 3 flow.      - Left Anterior Descending Artery:              The LAD is normal. There is YULIYA 3 flow.      - Left Circumflex Artery:              The LCX is normal. There is YULIYA 3 flow.      - Right Coronary Artery:              The RCA is normal. There is YULIYA 3 flow.      - Common Femoral Artery:              The right CFA is normal.     ASSESSMENT:   # PAF s/p JUNIOR/DCCV 2/23/18, now back in AF/RVR, asymptomatic.  # NICM, cath 2/2018 with normal cors.  EF back down to 25%, ?tachymyopathy, appears euvolemic.  # ?REZA  # BMI 34    PLAN:   Cont med rx  Cont Xarelto 20mg qd  Inc coreg 12.5mg bid  JUNIOR/DCCV 9/6/22  Refer to Dr. Polk (EPS) for possible ablation  Refer to sleep med, ?REZA  RTC 1 month, eventual addition of entresto +/- aldactone   Repeat echo in 3 months (late Nov 2022) for reassessment of LVEF    Risks, benefits and alternatives of the JUNIOR/Cardioversion procedure were discussed with the patient including throat irritation, aspiration, anesthetic complications, esophageal irritation/perforation, skin irritation, arrhythmia, etc.  Patient understands and agrees to proceed.  Permits signed and placed on chart.        Devante Issa MD, FACC

## 2022-08-31 DIAGNOSIS — I48.0 PAROXYSMAL ATRIAL FIBRILLATION: Primary | Chronic | ICD-10-CM

## 2022-09-01 ENCOUNTER — HOSPITAL ENCOUNTER (OUTPATIENT)
Dept: PREADMISSION TESTING | Facility: HOSPITAL | Age: 67
Discharge: HOME | End: 2022-09-01
Attending: INTERNAL MEDICINE
Payer: COMMERCIAL

## 2022-09-01 VITALS
TEMPERATURE: 97 F | WEIGHT: 218.06 LBS | BODY MASS INDEX: 33.05 KG/M2 | SYSTOLIC BLOOD PRESSURE: 154 MMHG | HEART RATE: 91 BPM | RESPIRATION RATE: 18 BRPM | HEIGHT: 68 IN | DIASTOLIC BLOOD PRESSURE: 98 MMHG | OXYGEN SATURATION: 100 %

## 2022-09-01 NOTE — PLAN OF CARE
Pre-operative instructions, medication directives and pain scales reviewed with patient. All questions the patient had  were answered. Re-assurance about surgical procedure and day of surgery routine given as needed. The patient verbalized understanding of the pre-op instructions.

## 2022-09-01 NOTE — DISCHARGE INSTRUCTIONS
Before 7 AM, enter through the Emergency Entrance..   After 7 AM enter through the Main Entrance.      Your procedure  is scheduled for _TUESDAY 9/6_________.    Call [phone] between 2pm and 5pm on _FRIDAY 9/2______to find out your arrival time for the day of surgery.        You may have two visitors.  Visiting hours for non-COVID-19 patients expanded to 24/7 (still restricted to one visitor)  Youth visitation changed from age 18 to age 12.      You will be going to the Same Day Surgery Unit on the 2nd floor of the hospital.    Important instructions:  Do not eat anything after midnight.  You may have plain water, non carbonated.  You may also have Gatorade or Powerade after midnight.    Stop all fluids 2 hours before your surgery.    It is okay to brush your teeth.  Do not have gum, candy or mints.    SEE MEDICATION SHEET.   TAKE MEDICATIONS AS DIRECTED WITH SIPS OF WATER.    .    STOP taking Aspirin, Ibuprofen,  Advil, Motrin, Mobic(meloxicam), Aleve (naproxen), Fish oil, and Vitamin E for at least 7 days before your surgery.     You may take Tylenol if needed which is not a blood thinner.    Please shower the night before              removable denture work may not be worn during your procedure.    You may wear deodorant only.     Do not wear powder, body lotion, perfume/cologne or make-up.    Do not wear any jewelry or have any metal on your body.        If you are going home on the same day of surgery, you must arrange for a family member or a friend to drive you home.  Public transportation is prohibited.  You will not be able to drive home if you were given anesthesia or sedation.    Patients who want to have their Post-op prescriptions filled from our in-house [facility], bring a Credit/Debit Card  or cash with you. A co-pay may be required.  The pharmacy closes at 5:30 pm.        Wear loose fitting clothes allowing for bandages.    Please leave money and valuables home.      You may bring  your cell phone.    Call the doctor if fever or illness should occur before your surgery.    Call [phone] to contact us here if needed. TUSHAR

## 2022-09-06 ENCOUNTER — HOSPITAL ENCOUNTER (OUTPATIENT)
Dept: CARDIOLOGY | Facility: HOSPITAL | Age: 67
Discharge: HOME | End: 2022-09-06
Attending: INTERNAL MEDICINE | Admitting: INTERNAL MEDICINE
Payer: COMMERCIAL

## 2022-09-06 ENCOUNTER — HOSPITAL ENCOUNTER (OUTPATIENT)
Facility: HOSPITAL | Age: 67
Discharge: HOME | End: 2022-09-06
Attending: INTERNAL MEDICINE | Admitting: INTERNAL MEDICINE
Payer: COMMERCIAL

## 2022-09-06 ENCOUNTER — ANESTHESIA EVENT (OUTPATIENT)
Dept: CARDIOLOGY | Facility: HOSPITAL | Age: 67
End: 2022-09-06
Payer: COMMERCIAL

## 2022-09-06 ENCOUNTER — PATIENT MESSAGE (OUTPATIENT)
Dept: CARDIOLOGY | Facility: HOSPITAL | Age: 67
End: 2022-09-06
Payer: COMMERCIAL

## 2022-09-06 ENCOUNTER — ANESTHESIA (OUTPATIENT)
Dept: CARDIOLOGY | Facility: HOSPITAL | Age: 67
End: 2022-09-06
Payer: COMMERCIAL

## 2022-09-06 VITALS
WEIGHT: 218 LBS | OXYGEN SATURATION: 100 % | DIASTOLIC BLOOD PRESSURE: 88 MMHG | BODY MASS INDEX: 33.64 KG/M2 | HEART RATE: 58 BPM | TEMPERATURE: 98 F | RESPIRATION RATE: 19 BRPM | SYSTOLIC BLOOD PRESSURE: 147 MMHG

## 2022-09-06 DIAGNOSIS — I48.0 PAROXYSMAL ATRIAL FIBRILLATION: ICD-10-CM

## 2022-09-06 DIAGNOSIS — I50.22 CHRONIC SYSTOLIC HEART FAILURE: ICD-10-CM

## 2022-09-06 DIAGNOSIS — I48.91 ATRIAL FIBRILLATION: ICD-10-CM

## 2022-09-06 LAB
CTP QC/QA: YES
EJECTION FRACTION: 25 %
SARS-COV-2 AG RESP QL IA.RAPID: NEGATIVE

## 2022-09-06 PROCEDURE — 92960 PR CARDIOVERSION, ELECTIVE;EXTERN: ICD-10-PCS | Mod: ,,, | Performed by: INTERNAL MEDICINE

## 2022-09-06 PROCEDURE — 93010 EKG 12-LEAD: ICD-10-PCS | Mod: 59,,, | Performed by: INTERNAL MEDICINE

## 2022-09-06 PROCEDURE — 93005 ELECTROCARDIOGRAM TRACING: CPT | Mod: 59

## 2022-09-06 PROCEDURE — 92960 CARDIOVERSION ELECTRIC EXT: CPT | Performed by: INTERNAL MEDICINE

## 2022-09-06 PROCEDURE — 93325 DOPPLER ECHO COLOR FLOW MAPG: CPT

## 2022-09-06 PROCEDURE — 93325 DOPPLER ECHO COLOR FLOW MAPG: CPT | Mod: 26,,, | Performed by: INTERNAL MEDICINE

## 2022-09-06 PROCEDURE — 93312 ECHO TRANSESOPHAGEAL: CPT | Mod: 26,,, | Performed by: INTERNAL MEDICINE

## 2022-09-06 PROCEDURE — 37000008 HC ANESTHESIA 1ST 15 MINUTES: Performed by: INTERNAL MEDICINE

## 2022-09-06 PROCEDURE — 93320 TRANSESOPHAGEAL ECHO (TEE) W/ POSSIBLE CARDIOVERSION: ICD-10-PCS | Mod: 26,,, | Performed by: INTERNAL MEDICINE

## 2022-09-06 PROCEDURE — 93325 TRANSESOPHAGEAL ECHO (TEE) W/ POSSIBLE CARDIOVERSION: ICD-10-PCS | Mod: 26,,, | Performed by: INTERNAL MEDICINE

## 2022-09-06 PROCEDURE — D9220A PRA ANESTHESIA: Mod: ANES,,, | Performed by: ANESTHESIOLOGY

## 2022-09-06 PROCEDURE — D9220A PRA ANESTHESIA: ICD-10-PCS | Mod: CRNA,,, | Performed by: NURSE ANESTHETIST, CERTIFIED REGISTERED

## 2022-09-06 PROCEDURE — 92960 CARDIOVERSION ELECTRIC EXT: CPT | Mod: ,,, | Performed by: INTERNAL MEDICINE

## 2022-09-06 PROCEDURE — 93312 TRANSESOPHAGEAL ECHO (TEE) W/ POSSIBLE CARDIOVERSION: ICD-10-PCS | Mod: 26,,, | Performed by: INTERNAL MEDICINE

## 2022-09-06 PROCEDURE — D9220A PRA ANESTHESIA: ICD-10-PCS | Mod: ANES,,, | Performed by: ANESTHESIOLOGY

## 2022-09-06 PROCEDURE — 93320 DOPPLER ECHO COMPLETE: CPT | Mod: 26,,, | Performed by: INTERNAL MEDICINE

## 2022-09-06 PROCEDURE — 93010 ELECTROCARDIOGRAM REPORT: CPT | Mod: 59,,, | Performed by: INTERNAL MEDICINE

## 2022-09-06 PROCEDURE — 37000009 HC ANESTHESIA EA ADD 15 MINS: Performed by: INTERNAL MEDICINE

## 2022-09-06 PROCEDURE — D9220A PRA ANESTHESIA: Mod: CRNA,,, | Performed by: NURSE ANESTHETIST, CERTIFIED REGISTERED

## 2022-09-06 PROCEDURE — 63600175 PHARM REV CODE 636 W HCPCS: Performed by: NURSE ANESTHETIST, CERTIFIED REGISTERED

## 2022-09-06 PROCEDURE — 25000003 PHARM REV CODE 250: Performed by: NURSE ANESTHETIST, CERTIFIED REGISTERED

## 2022-09-06 RX ORDER — PROPOFOL 10 MG/ML
VIAL (ML) INTRAVENOUS
Status: DISCONTINUED | OUTPATIENT
Start: 2022-09-06 | End: 2022-09-06

## 2022-09-06 RX ORDER — LIDOCAINE HYDROCHLORIDE 20 MG/ML
INJECTION INTRAVENOUS
Status: DISCONTINUED | OUTPATIENT
Start: 2022-09-06 | End: 2022-09-06

## 2022-09-06 RX ORDER — ETOMIDATE 2 MG/ML
INJECTION INTRAVENOUS
Status: DISCONTINUED | OUTPATIENT
Start: 2022-09-06 | End: 2022-09-06

## 2022-09-06 RX ORDER — SACUBITRIL AND VALSARTAN 24; 26 MG/1; MG/1
1 TABLET, FILM COATED ORAL 2 TIMES DAILY
Qty: 180 TABLET | Refills: 3 | Status: SHIPPED | OUTPATIENT
Start: 2022-09-06 | End: 2023-02-01

## 2022-09-06 RX ORDER — MIDAZOLAM HYDROCHLORIDE 1 MG/ML
INJECTION, SOLUTION INTRAMUSCULAR; INTRAVENOUS
Status: DISCONTINUED | OUTPATIENT
Start: 2022-09-06 | End: 2022-09-06

## 2022-09-06 RX ORDER — METOPROLOL TARTRATE 1 MG/ML
INJECTION, SOLUTION INTRAVENOUS
Status: DISCONTINUED | OUTPATIENT
Start: 2022-09-06 | End: 2022-09-06

## 2022-09-06 RX ADMIN — MIDAZOLAM 1 MG: 1 INJECTION INTRAMUSCULAR; INTRAVENOUS at 01:09

## 2022-09-06 RX ADMIN — METOPROLOL TARTRATE 2.5 MG: 5 INJECTION, SOLUTION INTRAVENOUS at 01:09

## 2022-09-06 RX ADMIN — ETOMIDATE 8 MG: 2 INJECTION, SOLUTION INTRAVENOUS at 02:09

## 2022-09-06 RX ADMIN — LIDOCAINE HYDROCHLORIDE 100 MG: 20 INJECTION, SOLUTION INTRAVENOUS at 01:09

## 2022-09-06 RX ADMIN — PROPOFOL 20 MG: 10 INJECTION, EMULSION INTRAVENOUS at 02:09

## 2022-09-06 NOTE — DISCHARGE SUMMARY
[facility] - Cath Lab  Discharge Note    SUMMARY     Admit Date: 9/6/2022    Discharge Date and Time:  09/06/2022 4PM    Hospital Course (synopsis of major diagnoses, care, treatment, and services provided during the course of the hospital stay): uneventful JUNIOR/DCCV with anesthesia    Final Diagnosis: Post-Op Diagnosis Codes:     * Paroxysmal atrial fibrillation [I48.0]     * Chronic systolic heart failure [I50.22]    Disposition: Home or Self Care    Follow Up/Patient Instructions:     Medications:  Reconciled Home Medications:      Medication List        START taking these medications      ENTRESTO 24-26 mg per tablet  Generic drug: sacubitriL-valsartan  Take 1 tablet by mouth 2 (two) times daily.            CONTINUE taking these medications      carvediloL 12.5 MG tablet  Commonly known as: COREG  Take 1 tablet (12.5 mg total) by mouth 2 (two) times daily with meals.     rivaroxaban 20 mg Tab  Commonly known as: XARELTO  Take 1 tablet (20 mg total) by mouth daily with dinner or evening meal.            No discharge procedures on file.   Follow-up Information       Devante Issa MD Follow up on 10/6/2022.    Specialties: Cardiology, Interventional Cardiology  Why: 820am, for planned follow up appointment  Contact information:  [address] [facility] [address]  [address]  [facility] [address] [phone]                               Diet: cardiac    Activity: ad neo.

## 2022-09-06 NOTE — BRIEF OP NOTE
[facility] - Cath Lab  Brief Operative Note     SUMMARY     Surgery Date: 9/6/2022     Surgeon(s) and Role:     * Devante Issa MD - Primary    Assisting Surgeon: None    Pre-op Diagnosis:  Paroxysmal atrial fibrillation [I48.0]  Chronic systolic heart failure [I50.22]    Post-op Diagnosis:  Post-Op Diagnosis Codes:     * Paroxysmal atrial fibrillation [I48.0]     * Chronic systolic heart failure [I50.22]    Procedure(s) (LRB):  TRANSESOPHAGEAL ECHOCARDIOGRAM WITH POSSIBLE CARDIOVERSION (JUNIOR W/ POSS CARDIOVERSION) (N/A)    Anesthesia: Monitor Anesthesia Care    Description of the findings of the procedure: uneventful JUNIOR/DCCV with anesthesia    Findings/Key Components:   Severe biventricular dysfxn, LVEF 25%  Global hypokinesis  Biatrial enlargement  No LA/ABBIE thrombus  Normal valves  Mod MR/TR    Successful DCCV AF->SR 65 BPM 200J x1    Plan:  Cont med rx  Cont Xarelto 20mg qhs  Start entresto 24/26mg bid  Home today  Follow up with Dr. Issa as planned  Repeat echo in 3 months for reassessment of LVEF    Estimated Blood Loss: none         Specimens: None    Diet: cardiac    Activity: ad neo.

## 2022-09-06 NOTE — ANESTHESIA PREPROCEDURE EVALUATION
09/06/2022  Yaneli Steele is a 67 y.o., female.  To undergo Procedure(s) (LRB):  TRANSESOPHAGEAL ECHOCARDIOGRAM WITH POSSIBLE CARDIOVERSION (JUNIOR W/ POSS CARDIOVERSION) (N/A)     Denies CP/SOB/GERD/MI/CVA/URI symptoms.  METS > 4  NPO > 8    Past Medical History:  Past Medical History:   Diagnosis Date    Chronic systolic heart failure     Essential (primary) hypertension     Paroxysmal atrial fibrillation        Past Surgical History:  Past Surgical History:   Procedure Laterality Date    ANGIOGRAM, CORONARY, WITH LEFT HEART CATHETERIZATION         Social History:  Social History     Socioeconomic History    Marital status: Single   Tobacco Use    Smoking status: Former    Smokeless tobacco: Former    Tobacco comments:     Pt states she quit more than 30 years ago   Substance and Sexual Activity    Alcohol use: No    Drug use: No     Social Determinants of Health     Financial Resource Strain: Low Risk     Difficulty of Paying Living Expenses: Not hard at all   Food Insecurity: No Food Insecurity    Worried About Running Out of Food in the Last Year: Never true    Ran Out of Food in the Last Year: Never true   Transportation Needs: No Transportation Needs    Lack of Transportation (Medical): No    Lack of Transportation (Non-Medical): No   Physical Activity: Sufficiently Active    Days of Exercise per Week: 4 days    Minutes of Exercise per Session: 70 min   Stress: No Stress Concern Present    Feeling of Stress : Not at all   Social Connections: Unknown    Frequency of Communication with Friends and Family: More than three times a week    Frequency of Social Gatherings with Friends and Family: More than three times a week    Active Member of Clubs or Organizations: Yes    Attends Club or Organization Meetings: More than 4 times per year    Marital Status: Never    Housing  Stability: Low Risk     Unable to Pay for Housing in the Last Year: No    Number of Places Lived in the Last Year: 1    Unstable Housing in the Last Year: No       Medications:  No current facility-administered medications on file prior to encounter.     Current Outpatient Medications on File Prior to Encounter   Medication Sig Dispense Refill    carvediloL (COREG) 12.5 MG tablet Take 1 tablet (12.5 mg total) by mouth 2 (two) times daily with meals. 180 tablet 3    rivaroxaban (XARELTO) 20 mg Tab Take 1 tablet (20 mg total) by mouth daily with dinner or evening meal. 90 tablet 3       Allergies:  Review of patient's allergies indicates:   Allergen Reactions    Penicillins Shortness Of Breath       Active Problems:  Patient Active Problem List   Diagnosis    Atrial fibrillation with rapid ventricular response    Chronic systolic heart failure    Non-rheumatic mitral regurgitation    Non-rheumatic tricuspid valve insufficiency    Paroxysmal atrial fibrillation    Essential (primary) hypertension       Diagnostic Studies:    EKG (8/30/22):  Atrial fibrillation with rapid ventricular response with premature   ventricular or aberrantly conducted complexes   Cannot rule out Anterior infarct ,age undetermined     TTE (7/19/22):   The left ventricle is normal in size with mild concentric hypertrophy and severely decreased systolic function.   The estimated ejection fraction is 25-30%.   There is severe left ventricular global hypokinesis.   Mild right ventricular enlargement with mildly to moderately reduced right ventricular systolic function.   Mild mitral regurgitation.   Mild tricuspid regurgitation.   The estimated PA systolic pressure is 45 mmHg.   There is pulmonary hypertension.   Atrial fibrillation observed.    24 Hour Vitals:  Temp:  [36.7 °C (98.1 °F)] 36.7 °C (98.1 °F)  Pulse:  [102] 102  Resp:  [18] 18  SpO2:  [97 %] 97 %  BP: (137)/(91) 137/91   See Nursing Charting For Additional  Vitals      Pre-op Assessment    I have reviewed the Patient Summary Reports.     I have reviewed the Nursing Notes.       Review of Systems  Anesthesia Hx:  No problems with previous Anesthesia   Denies Personal Hx of Anesthesia complications.   Social:  Former Smoker, No Alcohol Use    Cardiovascular:   Hypertension Dysrhythmias atrial fibrillation CHF ECG has been reviewed.    Pulmonary:  Pulmonary Normal    Hepatic/GI:  Hepatic/GI Normal    Neurological:  Neurology Normal    Endocrine:  Endocrine Normal        Physical Exam  General: Well nourished    Airway:  Mallampati: III   Mouth Opening: Normal  TM Distance: Normal      Dental:  Partial Dentures    Chest/Lungs:  Clear to auscultation, Normal Respiratory Rate    Heart:  Rhythm: Irregularly Irregular        Anesthesia Plan  Type of Anesthesia, risks & benefits discussed:    Anesthesia Type: Gen ETT, MAC  Intra-op Monitoring Plan: Standard ASA Monitors  Post Op Pain Control Plan: multimodal analgesia and IV/PO Opioids PRN  Induction:  IV  Informed Consent: Informed consent signed with the Patient and all parties understand the risks and agree with anesthesia plan.  All questions answered.   ASA Score: 3    Ready For Surgery From Anesthesia Perspective.     .       - troponin on admission 0.059-H/O CAD with Stents   - likely 2/2 demand ischemia; suspect now resolved intermitted CP 2/2 anxiety as it occurred when pt was having imaging done and pt admitted to feeling anxious at that time  - will trend troponin to peak  - EKG: HR: 74, Atrial fibrillation, Incomplete right bundle branch block, Nonspecific T wave abnormality, Prolonged QT(461) -- caution with Qtc prolonging agents   - cardio, Dr. Bliss's group, following  -On EC ASA 81 mg daily  NO BB 2/2 Bradycardia.

## 2022-09-06 NOTE — TRANSFER OF CARE
Anesthesia Transfer of Care Note    Patient: Yaneli Steele    Procedure(s) Performed: Procedure(s) (LRB):  TRANSESOPHAGEAL ECHOCARDIOGRAM WITH POSSIBLE CARDIOVERSION (JUNIOR W/ POSS CARDIOVERSION) (N/A)    Patient location: Other: OR8    Anesthesia Type: MAC    Transport from OR: Transported from OR on room air with adequate spontaneous ventilation    Post pain: adequate analgesia    Post assessment: no apparent anesthetic complications    Post vital signs: stable    Level of consciousness: awake    Nausea/Vomiting: no nausea/vomiting    Complications: none    Transfer of care protocol was followed      Last vitals:   Visit Vitals  BP (!) 178/88 (BP Location: Left arm, Patient Position: Lying)   Pulse 78   Temp 36.7 °C (98.1 °F) (Oral)   Resp 16   Wt 98.9 kg (218 lb)   SpO2 97%   Breastfeeding No   BMI 33.64 kg/m²

## 2022-09-06 NOTE — DISCHARGE INSTRUCTIONS
Fall Prevention  Millions of people fall every year and injure themselves. You may have had anesthesia or sedation which may increase your risk of falling. You may have health issues that put you at an increased risk of falling.     Here are ways to reduce your risk of falling.    Make your home safe by keeping walkways clear of objects you may trip over.  Use non-slip pads under rugs. Do not use area rugs or small throw rugs.  Use non-slip mats in bathtubs and showers.  Install handrails and lights on staircases.  Do not walk in poorly lit areas.  Do not stand on chairs or wobbly ladders.  Use caution when reaching overhead or looking upward. This position can cause a loss of balance.  Be sure your shoes fit properly, have non-slip bottoms and are in good condition.   Wear shoes both inside and out. Avoid going barefoot or wearing slippers.  Be cautious when going up and down stairs, curbs, and when walking on uneven sidewalks.  If your balance is poor, consider using a cane or walker.  If your fall was related to alcohol use, stop or limit alcohol intake.   If your fall was related to use of sleeping medicines, talk to your doctor about this. You may need to reduce your dosage at bedtime if you awaken during the night to go to the bathroom.    To reduce the need for nighttime bathroom trips:  Avoid drinking fluids for several hours before going to bed  Empty your bladder before going to bed  Men can keep a urinal at the bedside  Stay as active as you can. Balance, flexibility, strength, and endurance all come from exercise. They all play a role in preventing falls. Ask your healthcare provider which types of activity are right for you.  Get your vision checked on a regular basis.  If you have pets, know where they are before you stand up or walk so you don't trip over them.  Use night lights.

## 2022-09-07 NOTE — ANESTHESIA POSTPROCEDURE EVALUATION
Anesthesia Post Evaluation    Patient: Yaneli Steele    Procedure(s) Performed: Procedure(s) (LRB):  TRANSESOPHAGEAL ECHOCARDIOGRAM WITH POSSIBLE CARDIOVERSION (JUNIOR W/ POSS CARDIOVERSION) (N/A)    Final Anesthesia Type: MAC      Patient location during evaluation: PACU  Patient participation: Yes- Able to Participate  Level of consciousness: awake and alert and oriented  Post-procedure vital signs: reviewed and stable  Pain management: adequate  Airway patency: patent    PONV status at discharge: No PONV  Anesthetic complications: no      Cardiovascular status: hemodynamically stable and blood pressure returned to baseline  Respiratory status: spontaneous ventilation, room air and unassisted  Hydration status: euvolemic  Follow-up not needed.          Vitals Value Taken Time   /88 09/06/22 1535   Temp  09/07/22 0757   Pulse 57 09/06/22 1538   Resp 21 09/06/22 1538   SpO2 97 % 09/06/22 1538   Vitals shown include unvalidated device data.      No case tracking events are documented in the log.      Pain/Brigette Score: Brigette Score: 10 (9/6/2022  3:43 PM)

## 2022-09-14 ENCOUNTER — OFFICE VISIT (OUTPATIENT)
Dept: ELECTROPHYSIOLOGY | Facility: CLINIC | Age: 67
End: 2022-09-14
Payer: COMMERCIAL

## 2022-09-14 ENCOUNTER — HOSPITAL ENCOUNTER (OUTPATIENT)
Dept: CARDIOLOGY | Facility: CLINIC | Age: 67
Discharge: HOME | End: 2022-09-14
Payer: COMMERCIAL

## 2022-09-14 ENCOUNTER — HOSPITAL ENCOUNTER (OUTPATIENT)
Dept: CARDIOLOGY | Facility: HOSPITAL | Age: 67
Discharge: HOME | End: 2022-09-14
Attending: INTERNAL MEDICINE
Payer: COMMERCIAL

## 2022-09-14 VITALS
SYSTOLIC BLOOD PRESSURE: 130 MMHG | BODY MASS INDEX: 33.49 KG/M2 | DIASTOLIC BLOOD PRESSURE: 82 MMHG | HEART RATE: 70 BPM | WEIGHT: 221 LBS | HEIGHT: 68 IN

## 2022-09-14 VITALS
SYSTOLIC BLOOD PRESSURE: 167 MMHG | WEIGHT: 221.81 LBS | DIASTOLIC BLOOD PRESSURE: 65 MMHG | BODY MASS INDEX: 33.62 KG/M2 | HEART RATE: 47 BPM | HEIGHT: 68 IN

## 2022-09-14 DIAGNOSIS — I48.0 PAROXYSMAL ATRIAL FIBRILLATION: ICD-10-CM

## 2022-09-14 DIAGNOSIS — I50.22 CHRONIC SYSTOLIC HEART FAILURE: ICD-10-CM

## 2022-09-14 DIAGNOSIS — I48.0 PAROXYSMAL ATRIAL FIBRILLATION: Chronic | ICD-10-CM

## 2022-09-14 LAB
ASCENDING AORTA: 3.4 CM
AV INDEX (PROSTH): 0.63
AV MEAN GRADIENT: 4 MMHG
AV PEAK GRADIENT: 7 MMHG
AV VALVE AREA: 2.34 CM2
AV VELOCITY RATIO: 0.61
BSA FOR ECHO PROCEDURE: 2.18 M2
CV ECHO LV RWT: 0.32 CM
DOP CALC AO PEAK VEL: 1.34 M/S
DOP CALC AO VTI: 34.99 CM
DOP CALC LVOT AREA: 3.7 CM2
DOP CALC LVOT DIAMETER: 2.18 CM
DOP CALC LVOT PEAK VEL: 0.82 M/S
DOP CALC LVOT STROKE VOLUME: 81.92 CM3
DOP CALCLVOT PEAK VEL VTI: 21.96 CM
E WAVE DECELERATION TIME: 382.97 MSEC
E/A RATIO: 1.47
E/E' RATIO: 25.6 M/S
ECHO LV POSTERIOR WALL: 0.82 CM (ref 0.6–1.1)
EJECTION FRACTION: 55 %
FRACTIONAL SHORTENING: 21 % (ref 28–44)
INTERVENTRICULAR SEPTUM: 0.81 CM (ref 0.6–1.1)
LA MAJOR: 5.62 CM
LA MINOR: 5.59 CM
LA WIDTH: 5.13 CM
LEFT ATRIUM SIZE: 4.46 CM
LEFT ATRIUM VOLUME INDEX MOD: 44 ML/M2
LEFT ATRIUM VOLUME INDEX: 51.4 ML/M2
LEFT ATRIUM VOLUME MOD: 93.32 CM3
LEFT ATRIUM VOLUME: 109 CM3
LEFT INTERNAL DIMENSION IN SYSTOLE: 4 CM (ref 2.1–4)
LEFT VENTRICLE DIASTOLIC VOLUME INDEX: 57.12 ML/M2
LEFT VENTRICLE DIASTOLIC VOLUME: 121.09 ML
LEFT VENTRICLE MASS INDEX: 67 G/M2
LEFT VENTRICLE SYSTOLIC VOLUME INDEX: 33 ML/M2
LEFT VENTRICLE SYSTOLIC VOLUME: 70.06 ML
LEFT VENTRICULAR INTERNAL DIMENSION IN DIASTOLE: 5.05 CM (ref 3.5–6)
LEFT VENTRICULAR MASS: 141.45 G
LV LATERAL E/E' RATIO: 21.33 M/S
LV SEPTAL E/E' RATIO: 32 M/S
MV A" WAVE DURATION": 12.56 MSEC
MV PEAK A VEL: 0.87 M/S
MV PEAK E VEL: 1.28 M/S
MV STENOSIS PRESSURE HALF TIME: 111.06 MS
MV VALVE AREA P 1/2 METHOD: 1.98 CM2
PISA MRMAX VEL: 0.07 M/S
PISA RADIUS: 0.66 CM
PISA TR MAX VEL: 2.58 M/S
PISA TR VN NYQUIST: 0 M/S
PULM VEIN S/D RATIO: 0.78
PV PEAK D VEL: 0.5 M/S
PV PEAK S VEL: 0.39 M/S
RA MAJOR: 5.25 CM
RA PRESSURE: 8 MMHG
RA WIDTH: 4.51 CM
RIGHT VENTRICULAR END-DIASTOLIC DIMENSION: 4.21 CM
RV TISSUE DOPPLER FREE WALL SYSTOLIC VELOCITY 1 (APICAL 4 CHAMBER VIEW): 11.05 CM/S
SINUS: 2.91 CM
STJ: 2.46 CM
TDI LATERAL: 0.06 M/S
TDI SEPTAL: 0.04 M/S
TDI: 0.05 M/S
TR MAX PG: 27 MMHG
TRICUSPID ANNULAR PLANE SYSTOLIC EXCURSION: 3.19 CM
TV REST PULMONARY ARTERY PRESSURE: 35 MMHG

## 2022-09-14 PROCEDURE — 93005 RHYTHM STRIP: ICD-10-PCS | Mod: S$GLB,,, | Performed by: INTERNAL MEDICINE

## 2022-09-14 PROCEDURE — 93005 ELECTROCARDIOGRAM TRACING: CPT | Mod: S$GLB,,, | Performed by: INTERNAL MEDICINE

## 2022-09-14 PROCEDURE — 99205 PR OFFICE/OUTPT VISIT, NEW, LEVL V, 60-74 MIN: ICD-10-PCS | Mod: S$GLB,,, | Performed by: INTERNAL MEDICINE

## 2022-09-14 PROCEDURE — 93306 TTE W/DOPPLER COMPLETE: CPT

## 2022-09-14 PROCEDURE — 1126F AMNT PAIN NOTED NONE PRSNT: CPT | Mod: CPTII,S$GLB,, | Performed by: INTERNAL MEDICINE

## 2022-09-14 PROCEDURE — 1101F PT FALLS ASSESS-DOCD LE1/YR: CPT | Mod: CPTII,S$GLB,, | Performed by: INTERNAL MEDICINE

## 2022-09-14 PROCEDURE — 4010F ACE/ARB THERAPY RXD/TAKEN: CPT | Mod: CPTII,S$GLB,, | Performed by: INTERNAL MEDICINE

## 2022-09-14 PROCEDURE — 3288F PR FALLS RISK ASSESSMENT DOCUMENTED: ICD-10-PCS | Mod: CPTII,S$GLB,, | Performed by: INTERNAL MEDICINE

## 2022-09-14 PROCEDURE — 1159F MED LIST DOCD IN RCRD: CPT | Mod: CPTII,S$GLB,, | Performed by: INTERNAL MEDICINE

## 2022-09-14 PROCEDURE — 3078F DIAST BP <80 MM HG: CPT | Mod: CPTII,S$GLB,, | Performed by: INTERNAL MEDICINE

## 2022-09-14 PROCEDURE — 93010 ELECTROCARDIOGRAM REPORT: CPT | Mod: S$GLB,,, | Performed by: INTERNAL MEDICINE

## 2022-09-14 PROCEDURE — 1160F RVW MEDS BY RX/DR IN RCRD: CPT | Mod: CPTII,S$GLB,, | Performed by: INTERNAL MEDICINE

## 2022-09-14 PROCEDURE — 1101F PR PT FALLS ASSESS DOC 0-1 FALLS W/OUT INJ PAST YR: ICD-10-PCS | Mod: CPTII,S$GLB,, | Performed by: INTERNAL MEDICINE

## 2022-09-14 PROCEDURE — 3078F PR MOST RECENT DIASTOLIC BLOOD PRESSURE < 80 MM HG: ICD-10-PCS | Mod: CPTII,S$GLB,, | Performed by: INTERNAL MEDICINE

## 2022-09-14 PROCEDURE — 3008F PR BODY MASS INDEX (BMI) DOCUMENTED: ICD-10-PCS | Mod: CPTII,S$GLB,, | Performed by: INTERNAL MEDICINE

## 2022-09-14 PROCEDURE — 3077F SYST BP >= 140 MM HG: CPT | Mod: CPTII,S$GLB,, | Performed by: INTERNAL MEDICINE

## 2022-09-14 PROCEDURE — 3077F PR MOST RECENT SYSTOLIC BLOOD PRESSURE >= 140 MM HG: ICD-10-PCS | Mod: CPTII,S$GLB,, | Performed by: INTERNAL MEDICINE

## 2022-09-14 PROCEDURE — 93306 TTE W/DOPPLER COMPLETE: CPT | Mod: 26,,, | Performed by: INTERNAL MEDICINE

## 2022-09-14 PROCEDURE — 4010F PR ACE/ARB THEARPY RXD/TAKEN: ICD-10-PCS | Mod: CPTII,S$GLB,, | Performed by: INTERNAL MEDICINE

## 2022-09-14 PROCEDURE — 1160F PR REVIEW ALL MEDS BY PRESCRIBER/CLIN PHARMACIST DOCUMENTED: ICD-10-PCS | Mod: CPTII,S$GLB,, | Performed by: INTERNAL MEDICINE

## 2022-09-14 PROCEDURE — 99205 OFFICE O/P NEW HI 60 MIN: CPT | Mod: S$GLB,,, | Performed by: INTERNAL MEDICINE

## 2022-09-14 PROCEDURE — 99999 PR PBB SHADOW E&M-EST. PATIENT-LVL III: ICD-10-PCS | Mod: PBBFAC,,, | Performed by: INTERNAL MEDICINE

## 2022-09-14 PROCEDURE — 3288F FALL RISK ASSESSMENT DOCD: CPT | Mod: CPTII,S$GLB,, | Performed by: INTERNAL MEDICINE

## 2022-09-14 PROCEDURE — 93306 ECHO (CUPID ONLY): ICD-10-PCS | Mod: 26,,, | Performed by: INTERNAL MEDICINE

## 2022-09-14 PROCEDURE — 93010 RHYTHM STRIP: ICD-10-PCS | Mod: S$GLB,,, | Performed by: INTERNAL MEDICINE

## 2022-09-14 PROCEDURE — 1126F PR PAIN SEVERITY QUANTIFIED, NO PAIN PRESENT: ICD-10-PCS | Mod: CPTII,S$GLB,, | Performed by: INTERNAL MEDICINE

## 2022-09-14 PROCEDURE — 3008F BODY MASS INDEX DOCD: CPT | Mod: CPTII,S$GLB,, | Performed by: INTERNAL MEDICINE

## 2022-09-14 PROCEDURE — 99999 PR PBB SHADOW E&M-EST. PATIENT-LVL III: CPT | Mod: PBBFAC,,, | Performed by: INTERNAL MEDICINE

## 2022-09-14 PROCEDURE — 1159F PR MEDICATION LIST DOCUMENTED IN MEDICAL RECORD: ICD-10-PCS | Mod: CPTII,S$GLB,, | Performed by: INTERNAL MEDICINE

## 2022-09-14 NOTE — PROGRESS NOTES
Subjective:    Patient ID:  Yaneli Steele is a 67 y.o. female who presents for evaluation of No chief complaint on file.      HPI  Patient is a 68 yo F with history of non ischemic cardiomyopathy and atrial fibrillation presents for evaluation for atrial fibrillation. Patient originally diagnosed with atrial fibrillation in 2018. Patient was in RVR at the time presented to hospital. Found to have new onset HFrEF, LHC clean, cardioverted and EF improved. After that felt fine until a few months ago. Palpitations occurred again. EF dropped DCCV and referred here. Currently on Xarelto 20mg entresto and coreg.     ECG rate 51 bpm sinus bradycardia. QRS 84 ms qtc 407 pr 162ms  Review of Systems   Constitutional: Positive for malaise/fatigue.   HENT: Negative.     Eyes: Negative.    Cardiovascular:  Positive for irregular heartbeat and palpitations.   Respiratory: Negative.     Endocrine: Negative.    Hematologic/Lymphatic: Negative.    Skin: Negative.    Musculoskeletal: Negative.    Gastrointestinal: Negative.    Genitourinary: Negative.    Neurological: Negative.    Psychiatric/Behavioral: Negative.     Allergic/Immunologic: Negative.       Objective:    Physical Exam  Constitutional:       Appearance: Normal appearance.   HENT:      Head: Normocephalic and atraumatic.      Right Ear: External ear normal.      Left Ear: External ear normal.      Nose: Nose normal.      Mouth/Throat:      Mouth: Mucous membranes are moist.   Eyes:      Extraocular Movements: Extraocular movements intact.   Cardiovascular:      Rate and Rhythm: Normal rate and regular rhythm.      Pulses: Normal pulses.      Heart sounds: Normal heart sounds.   Pulmonary:      Effort: Pulmonary effort is normal.      Breath sounds: Normal breath sounds.   Abdominal:      Palpations: Abdomen is soft.   Musculoskeletal:         General: Normal range of motion.      Cervical back: Normal range of motion.   Skin:     General: Skin is warm.       Capillary Refill: Capillary refill takes less than 2 seconds.   Neurological:      General: No focal deficit present.      Mental Status: She is alert and oriented to person, place, and time.   Psychiatric:         Mood and Affect: Mood normal.         Behavior: Behavior normal.         Assessment:       1. Paroxysmal atrial fibrillation    2. Chronic systolic heart failure         Plan:       Patient is a 68 yo F with HFrEF NICM presents for AF management. Patient with CHADSVASC 4 age HTN HF and sex. Patient has had NICM secondary to AF twice so want to be aggressive about maintaining sinus rhtyhm.   Discussed ablation extensively with patient, however patient wants to try medications first if flecainide would be an option otherwise would consider ablation  -echo today, if improved then will start flecainide  If still low will schedule follow up to discuss ablation  If to proceed in future would plan cryo if anatomy allows  Cont xarelto  Cont coreg and entresto    We discussed the alternatives, benefits and risks of the procedure including pain, infection, bleeding, injury to veins and arteries including aneurysms and fistulas, injury to heart valves, puncture of the heart leading to pericardial effusion or tamponade requiring tube drainage, atrial-esophageal fistula, heart attack, stroke and death.

## 2022-09-16 ENCOUNTER — TELEPHONE (OUTPATIENT)
Dept: CARDIOLOGY | Facility: HOSPITAL | Age: 67
End: 2022-09-16
Payer: COMMERCIAL

## 2022-09-16 RX ORDER — FLECAINIDE ACETATE 100 MG/1
100 TABLET ORAL EVERY 12 HOURS
Qty: 60 TABLET | Refills: 11 | Status: ON HOLD | OUTPATIENT
Start: 2022-09-16 | End: 2023-07-06 | Stop reason: HOSPADM

## 2022-09-16 NOTE — TELEPHONE ENCOUNTER
Patient noted to have normal  EF. Plan discussed with Dr. Polk, plan to prescribe flecainide. No answer at either phone, left message that EF normalized and would be prescribing new medication and would call back to discuss with her.     Addendum: Called multiple times throughout afternoon. Unable to discuss. Left message that prescription would be sent to her pharmacy and may contact Dr. Polk's clinic at her convenience with any questions if they should occur.    Joshua Carrillo MD

## 2022-10-06 ENCOUNTER — OFFICE VISIT (OUTPATIENT)
Dept: CARDIOLOGY | Facility: CLINIC | Age: 67
End: 2022-10-06
Payer: COMMERCIAL

## 2022-10-06 VITALS
HEART RATE: 98 BPM | HEIGHT: 67 IN | DIASTOLIC BLOOD PRESSURE: 70 MMHG | RESPIRATION RATE: 18 BRPM | WEIGHT: 223.44 LBS | BODY MASS INDEX: 35.07 KG/M2 | SYSTOLIC BLOOD PRESSURE: 140 MMHG | OXYGEN SATURATION: 99 %

## 2022-10-06 DIAGNOSIS — E66.9 NON MORBID OBESITY, UNSPECIFIED OBESITY TYPE: ICD-10-CM

## 2022-10-06 DIAGNOSIS — I10 ESSENTIAL (PRIMARY) HYPERTENSION: ICD-10-CM

## 2022-10-06 DIAGNOSIS — I48.0 PAROXYSMAL ATRIAL FIBRILLATION: Primary | ICD-10-CM

## 2022-10-06 DIAGNOSIS — I34.0 NON-RHEUMATIC MITRAL REGURGITATION: ICD-10-CM

## 2022-10-06 DIAGNOSIS — Z51.81 ENCOUNTER FOR MONITORING FLECAINIDE THERAPY: ICD-10-CM

## 2022-10-06 DIAGNOSIS — E66.01 SEVERE OBESITY (BMI 35.0-39.9) WITH COMORBIDITY: ICD-10-CM

## 2022-10-06 DIAGNOSIS — G47.33 OSA (OBSTRUCTIVE SLEEP APNEA): ICD-10-CM

## 2022-10-06 DIAGNOSIS — Z79.899 ENCOUNTER FOR MONITORING FLECAINIDE THERAPY: ICD-10-CM

## 2022-10-06 PROCEDURE — 99999 PR PBB SHADOW E&M-EST. PATIENT-LVL IV: CPT | Mod: PBBFAC,,, | Performed by: INTERNAL MEDICINE

## 2022-10-06 PROCEDURE — 3008F PR BODY MASS INDEX (BMI) DOCUMENTED: ICD-10-PCS | Mod: CPTII,S$GLB,, | Performed by: INTERNAL MEDICINE

## 2022-10-06 PROCEDURE — 3008F BODY MASS INDEX DOCD: CPT | Mod: CPTII,S$GLB,, | Performed by: INTERNAL MEDICINE

## 2022-10-06 PROCEDURE — 3288F PR FALLS RISK ASSESSMENT DOCUMENTED: ICD-10-PCS | Mod: CPTII,S$GLB,, | Performed by: INTERNAL MEDICINE

## 2022-10-06 PROCEDURE — 99215 OFFICE O/P EST HI 40 MIN: CPT | Mod: 25,S$GLB,, | Performed by: INTERNAL MEDICINE

## 2022-10-06 PROCEDURE — 4010F PR ACE/ARB THEARPY RXD/TAKEN: ICD-10-PCS | Mod: CPTII,S$GLB,, | Performed by: INTERNAL MEDICINE

## 2022-10-06 PROCEDURE — 4010F ACE/ARB THERAPY RXD/TAKEN: CPT | Mod: CPTII,S$GLB,, | Performed by: INTERNAL MEDICINE

## 2022-10-06 PROCEDURE — 93000 ELECTROCARDIOGRAM COMPLETE: CPT | Mod: S$GLB,,, | Performed by: INTERNAL MEDICINE

## 2022-10-06 PROCEDURE — 1101F PT FALLS ASSESS-DOCD LE1/YR: CPT | Mod: CPTII,S$GLB,, | Performed by: INTERNAL MEDICINE

## 2022-10-06 PROCEDURE — 3288F FALL RISK ASSESSMENT DOCD: CPT | Mod: CPTII,S$GLB,, | Performed by: INTERNAL MEDICINE

## 2022-10-06 PROCEDURE — 1126F PR PAIN SEVERITY QUANTIFIED, NO PAIN PRESENT: ICD-10-PCS | Mod: CPTII,S$GLB,, | Performed by: INTERNAL MEDICINE

## 2022-10-06 PROCEDURE — 3077F SYST BP >= 140 MM HG: CPT | Mod: CPTII,S$GLB,, | Performed by: INTERNAL MEDICINE

## 2022-10-06 PROCEDURE — 3078F PR MOST RECENT DIASTOLIC BLOOD PRESSURE < 80 MM HG: ICD-10-PCS | Mod: CPTII,S$GLB,, | Performed by: INTERNAL MEDICINE

## 2022-10-06 PROCEDURE — 1160F PR REVIEW ALL MEDS BY PRESCRIBER/CLIN PHARMACIST DOCUMENTED: ICD-10-PCS | Mod: CPTII,S$GLB,, | Performed by: INTERNAL MEDICINE

## 2022-10-06 PROCEDURE — 1160F RVW MEDS BY RX/DR IN RCRD: CPT | Mod: CPTII,S$GLB,, | Performed by: INTERNAL MEDICINE

## 2022-10-06 PROCEDURE — 99215 PR OFFICE/OUTPT VISIT, EST, LEVL V, 40-54 MIN: ICD-10-PCS | Mod: 25,S$GLB,, | Performed by: INTERNAL MEDICINE

## 2022-10-06 PROCEDURE — 3078F DIAST BP <80 MM HG: CPT | Mod: CPTII,S$GLB,, | Performed by: INTERNAL MEDICINE

## 2022-10-06 PROCEDURE — 93000 EKG 12-LEAD: ICD-10-PCS | Mod: S$GLB,,, | Performed by: INTERNAL MEDICINE

## 2022-10-06 PROCEDURE — 1126F AMNT PAIN NOTED NONE PRSNT: CPT | Mod: CPTII,S$GLB,, | Performed by: INTERNAL MEDICINE

## 2022-10-06 PROCEDURE — 1159F PR MEDICATION LIST DOCUMENTED IN MEDICAL RECORD: ICD-10-PCS | Mod: CPTII,S$GLB,, | Performed by: INTERNAL MEDICINE

## 2022-10-06 PROCEDURE — 1101F PR PT FALLS ASSESS DOC 0-1 FALLS W/OUT INJ PAST YR: ICD-10-PCS | Mod: CPTII,S$GLB,, | Performed by: INTERNAL MEDICINE

## 2022-10-06 PROCEDURE — 1159F MED LIST DOCD IN RCRD: CPT | Mod: CPTII,S$GLB,, | Performed by: INTERNAL MEDICINE

## 2022-10-06 PROCEDURE — 3077F PR MOST RECENT SYSTOLIC BLOOD PRESSURE >= 140 MM HG: ICD-10-PCS | Mod: CPTII,S$GLB,, | Performed by: INTERNAL MEDICINE

## 2022-10-06 PROCEDURE — 99999 PR PBB SHADOW E&M-EST. PATIENT-LVL IV: ICD-10-PCS | Mod: PBBFAC,,, | Performed by: INTERNAL MEDICINE

## 2022-10-06 RX ORDER — CARVEDILOL 25 MG/1
25 TABLET ORAL 2 TIMES DAILY
Qty: 180 TABLET | Refills: 3 | Status: ON HOLD | OUTPATIENT
Start: 2022-10-06 | End: 2023-06-06 | Stop reason: HOSPADM

## 2022-10-06 NOTE — PROGRESS NOTES
CARDIOVASCULAR CONSULTATION    REASON FOR CONSULT:   Yaneli Steele is a 67 y.o. female who presents for PAF, CMP.    EP: Felicitas  HISTORY OF PRESENT ILLNESS:   Patient is seen for follow-up of her atrial fibrillation and tachy myopathy.  She underwent successful JUNIOR guided cardioversion on September 6th.  Subsequent echocardiogram notes normalization of LV function and she is now on flecainide after being seen by Dr. Polk.  She continues take her Xarelto.  She denies angina, dyspnea, or syncope, but does describe occasional jitteriness.  There has been no deanna palpitations.  She denies any PND, orthopnea, or lower extremity edema.  There has been no melena, hematuria, or claudicant symptoms.    CARDIOVASCULAR HISTORY:   NICM, normal cors (cath 2/2018)    PAF on Xarelto 20mg s/p JUNIOR/DCCV 2/23/18 and 9/6/22     Hx tachymyopathy with recovery of EF in SR.    PAST MEDICAL HISTORY:     Past Medical History:   Diagnosis Date    Chronic systolic heart failure     Essential (primary) hypertension     Paroxysmal atrial fibrillation        PAST SURGICAL HISTORY:     Past Surgical History:   Procedure Laterality Date    ANGIOGRAM, CORONARY, WITH LEFT HEART CATHETERIZATION         ALLERGIES AND MEDICATION:     Review of patient's allergies indicates:   Allergen Reactions    Penicillins Shortness Of Breath        Medication List            Accurate as of October 6, 2022  8:21 AM. If you have any questions, ask your nurse or doctor.                CONTINUE taking these medications      carvediloL 12.5 MG tablet  Commonly known as: COREG  Take 1 tablet (12.5 mg total) by mouth 2 (two) times daily with meals.     ENTRESTO 24-26 mg per tablet  Generic drug: sacubitriL-valsartan  Take 1 tablet by mouth 2 (two) times daily.     flecainide 100 MG Tab  Commonly known as: TAMBOCOR  Take 1 tablet (100 mg total) by mouth every 12 (twelve) hours.     rivaroxaban 20 mg Tab  Commonly known as: XARELTO  Take 1 tablet (20 mg total) by  mouth daily with dinner or evening meal.              SOCIAL HISTORY:     Social History     Socioeconomic History    Marital status: Single   Tobacco Use    Smoking status: Former    Smokeless tobacco: Former    Tobacco comments:     Pt states she quit more than 30 years ago   Substance and Sexual Activity    Alcohol use: No    Drug use: No     Social Determinants of Health     Financial Resource Strain: Low Risk     Difficulty of Paying Living Expenses: Not hard at all   Food Insecurity: No Food Insecurity    Worried About Running Out of Food in the Last Year: Never true    Ran Out of Food in the Last Year: Never true   Transportation Needs: No Transportation Needs    Lack of Transportation (Medical): No    Lack of Transportation (Non-Medical): No   Physical Activity: Sufficiently Active    Days of Exercise per Week: 4 days    Minutes of Exercise per Session: 70 min   Stress: No Stress Concern Present    Feeling of Stress : Not at all   Social Connections: Unknown    Frequency of Communication with Friends and Family: More than three times a week    Frequency of Social Gatherings with Friends and Family: More than three times a week    Active Member of Clubs or Organizations: Yes    Attends Club or Organization Meetings: More than 4 times per year    Marital Status: Never    Housing Stability: Low Risk     Unable to Pay for Housing in the Last Year: No    Number of Places Lived in the Last Year: 1    Unstable Housing in the Last Year: No       FAMILY HISTORY:     Family History   Problem Relation Age of Onset    No Known Problems Mother     No Known Problems Father        REVIEW OF SYSTEMS:   Review of Systems   Constitutional:  Negative for chills, diaphoresis and fever.   HENT:  Negative for nosebleeds.    Eyes:  Negative for blurred vision, double vision and photophobia.   Respiratory:  Negative for hemoptysis, shortness of breath and wheezing.    Cardiovascular:  Negative for chest pain, palpitations,  "orthopnea, claudication, leg swelling and PND.   Gastrointestinal:  Negative for abdominal pain, blood in stool, heartburn, melena, nausea and vomiting.   Genitourinary:  Negative for flank pain and hematuria.   Musculoskeletal:  Negative for falls, myalgias and neck pain.   Skin:  Negative for rash.   Neurological:  Negative for dizziness, seizures, loss of consciousness, weakness and headaches.   Endo/Heme/Allergies:  Negative for polydipsia. Does not bruise/bleed easily.   Psychiatric/Behavioral:  Negative for depression and memory loss. The patient is not nervous/anxious.      PHYSICAL EXAM:     Vitals:    10/06/22 0812   BP: (!) 140/70   Pulse: 98   Resp: 18    Body mass index is 35 kg/m².  Weight: 101.4 kg (223 lb 7 oz)   Height: 5' 7" (170.2 cm)     Physical Exam  Vitals reviewed.   Constitutional:       General: She is not in acute distress.     Appearance: She is well-developed. She is obese. She is not ill-appearing, toxic-appearing or diaphoretic.   HENT:      Head: Normocephalic and atraumatic.   Eyes:      General: No scleral icterus.     Extraocular Movements: Extraocular movements intact.      Conjunctiva/sclera: Conjunctivae normal.      Pupils: Pupils are equal, round, and reactive to light.   Neck:      Thyroid: No thyromegaly.      Vascular: Normal carotid pulses. No carotid bruit or JVD.      Trachea: Trachea normal.   Cardiovascular:      Rate and Rhythm: Regular rhythm. Tachycardia present.      Pulses:           Carotid pulses are 2+ on the right side and 2+ on the left side.     Heart sounds: S1 normal and S2 normal. No murmur heard.    No friction rub. No gallop.   Pulmonary:      Effort: Pulmonary effort is normal. No respiratory distress.      Breath sounds: Normal breath sounds. No stridor. No wheezing, rhonchi or rales.   Chest:      Chest wall: No tenderness.   Abdominal:      General: There is no distension.      Palpations: Abdomen is soft.   Musculoskeletal:         General: No " swelling or tenderness. Normal range of motion.      Cervical back: Normal range of motion and neck supple. No edema or rigidity.      Right lower leg: No edema.      Left lower leg: No edema.   Feet:      Right foot:      Skin integrity: No ulcer.      Left foot:      Skin integrity: No ulcer.   Skin:     General: Skin is warm and dry.      Coloration: Skin is not jaundiced.   Neurological:      General: No focal deficit present.      Mental Status: She is alert and oriented to person, place, and time.      Cranial Nerves: No cranial nerve deficit.   Psychiatric:         Mood and Affect: Mood normal.         Speech: Speech normal.         Behavior: Behavior normal. Behavior is cooperative.       DATA:   EKG: (personally reviewed tracing(s))  9/14/22 SR 51, PVC  10/6/22     Laboratory:  CBC:        CHEMISTRIES:        CARDIAC BIOMARKERS:        COAGS:        LIPIDS/LFTS:      POC labs 07/19/2022   INR 1.0   Troponin 0.01   AST 33   ALT 19   Creatinine 0.9   Potassium 3.8   Hemoglobin 15.3   Platelets 200    Lab Results   Component Value Date    TSH 0.816 02/23/2018         Cardiovascular Testing:  Echo 9/14/22 (EF 25% on JUNIOR 9/6/22)  The left ventricle is normal in size with normal systolic function. The estimated ejection fraction is 55%.  Mild right ventricular enlargement with normal right ventricular systolic function.  Grade II left ventricular diastolic dysfunction.  Biatrial enlargement.  Mild-to-moderate mitral regurgitation.  The estimated PA systolic pressure is 35 mmHg.  Intermediate central venous pressure (8 mmHg).    JUNIOR/DCCV 9/6/22  Severe biventricular dysfxn, LVEF 25%  Global hypokinesis  Biatrial enlargement  No LA/ABBIE thrombus  Normal valves  Mod MR/TR  Successful DCCV AF->SR 65 BPM 200J x1  Plan:  Cont med rx  Cont Xarelto 20mg qhs  Start entresto 24/26mg bid  Home today  Follow up with Dr. Issa as planned  Repeat echo in 3 months for reassessment of LVEF    Cath 2/26/18  BFloyd  Summary/Post-Operative Diagnosis     Normal coronary arteries.     Systolic dysfunction.     Severe mitral regurgitation.     Mildly elevated right and left Filling Pressures.     Mild Pulmonary Hypertension.   D. Hemodynamic Results   Ejection Fraction: 25%   Global LV Function: severely depressed   PA: 45   PW:  (26)   CO_THERM: 3.92   RV: 44   RA:  (18)   LVEDP: 13   E. Angiographic Results      - Left Main Coronary Artery:              The LM is normal. There is YULIYA 3 flow.      - Left Anterior Descending Artery:              The LAD is normal. There is YULIYA 3 flow.      - Left Circumflex Artery:              The LCX is normal. There is YULIYA 3 flow.      - Right Coronary Artery:              The RCA is normal. There is YULIYA 3 flow.      - Common Femoral Artery:              The right CFA is normal.     ASSESSMENT:   # PAF s/p JUNIOR/DCCV 2/23/18 and 9/6/22 now on Xarelto/flecainide.  Following with Dr. Polk.  Now appears to be back in AFL with controlled VR.  # NICM, cath 2/2018 with normal cors.  EF back down to 25%, ?tachymyopathy, appears euvolemic.  EF recovered to 55% on echo 9/2022.  # HTN, uncontrolled  # ?REZA, prev referred to sleep med.  # BMI 35, up 1 unit(s) vs last OV    PLAN:   Cont med rx  Cont Xarelto 20mg qd  Cont flecainide 100mg bid  Inc coreg 25mg bid  Refer back to Dr. Polk (EPS) for possible ablation  Re-refer to sleep med, ?REZA  RTC 3 month, eventual titration of entresto +/- addition of aldactone     Message sent to Dr. Polk and his staff as well as case discussed with nurse at EP service.      Devante Issa MD, [facility]

## 2022-10-18 ENCOUNTER — TELEPHONE (OUTPATIENT)
Dept: ELECTROPHYSIOLOGY | Facility: CLINIC | Age: 67
End: 2022-10-18
Payer: COMMERCIAL

## 2022-10-19 ENCOUNTER — HOSPITAL ENCOUNTER (OUTPATIENT)
Dept: CARDIOLOGY | Facility: HOSPITAL | Age: 67
Discharge: HOME | End: 2022-10-19
Attending: INTERNAL MEDICINE
Payer: COMMERCIAL

## 2022-10-19 ENCOUNTER — OFFICE VISIT (OUTPATIENT)
Dept: ELECTROPHYSIOLOGY | Facility: CLINIC | Age: 67
End: 2022-10-19
Payer: COMMERCIAL

## 2022-10-19 VITALS — HEART RATE: 47 BPM | SYSTOLIC BLOOD PRESSURE: 135 MMHG | DIASTOLIC BLOOD PRESSURE: 85 MMHG

## 2022-10-19 DIAGNOSIS — I50.22 CHRONIC SYSTOLIC HEART FAILURE: Chronic | ICD-10-CM

## 2022-10-19 DIAGNOSIS — E66.01 SEVERE OBESITY (BMI 35.0-39.9) WITH COMORBIDITY: ICD-10-CM

## 2022-10-19 DIAGNOSIS — I48.19 PERSISTENT ATRIAL FIBRILLATION: ICD-10-CM

## 2022-10-19 DIAGNOSIS — I48.19 PERSISTENT ATRIAL FIBRILLATION: Primary | ICD-10-CM

## 2022-10-19 DIAGNOSIS — I48.0 PAROXYSMAL ATRIAL FIBRILLATION: Chronic | ICD-10-CM

## 2022-10-19 DIAGNOSIS — I10 ESSENTIAL (PRIMARY) HYPERTENSION: ICD-10-CM

## 2022-10-19 DIAGNOSIS — I48.91 ATRIAL FIBRILLATION WITH RAPID VENTRICULAR RESPONSE: ICD-10-CM

## 2022-10-19 PROCEDURE — 99215 OFFICE O/P EST HI 40 MIN: CPT | Mod: 95,,, | Performed by: INTERNAL MEDICINE

## 2022-10-19 PROCEDURE — 99215 PR OFFICE/OUTPT VISIT, EST, LEVL V, 40-54 MIN: ICD-10-PCS | Mod: 95,,, | Performed by: INTERNAL MEDICINE

## 2022-10-19 PROCEDURE — 1159F MED LIST DOCD IN RCRD: CPT | Mod: CPTII,95,, | Performed by: INTERNAL MEDICINE

## 2022-10-19 PROCEDURE — 3075F SYST BP GE 130 - 139MM HG: CPT | Mod: CPTII,95,, | Performed by: INTERNAL MEDICINE

## 2022-10-19 PROCEDURE — 4010F ACE/ARB THERAPY RXD/TAKEN: CPT | Mod: CPTII,95,, | Performed by: INTERNAL MEDICINE

## 2022-10-19 PROCEDURE — 93005 ELECTROCARDIOGRAM TRACING: CPT

## 2022-10-19 PROCEDURE — 3079F DIAST BP 80-89 MM HG: CPT | Mod: CPTII,95,, | Performed by: INTERNAL MEDICINE

## 2022-10-19 PROCEDURE — 3079F PR MOST RECENT DIASTOLIC BLOOD PRESSURE 80-89 MM HG: ICD-10-PCS | Mod: CPTII,95,, | Performed by: INTERNAL MEDICINE

## 2022-10-19 PROCEDURE — 93010 EKG 12-LEAD: ICD-10-PCS | Mod: ,,, | Performed by: INTERNAL MEDICINE

## 2022-10-19 PROCEDURE — 1159F PR MEDICATION LIST DOCUMENTED IN MEDICAL RECORD: ICD-10-PCS | Mod: CPTII,95,, | Performed by: INTERNAL MEDICINE

## 2022-10-19 PROCEDURE — 4010F PR ACE/ARB THEARPY RXD/TAKEN: ICD-10-PCS | Mod: CPTII,95,, | Performed by: INTERNAL MEDICINE

## 2022-10-19 PROCEDURE — 93010 ELECTROCARDIOGRAM REPORT: CPT | Mod: ,,, | Performed by: INTERNAL MEDICINE

## 2022-10-19 PROCEDURE — 1160F PR REVIEW ALL MEDS BY PRESCRIBER/CLIN PHARMACIST DOCUMENTED: ICD-10-PCS | Mod: CPTII,95,, | Performed by: INTERNAL MEDICINE

## 2022-10-19 PROCEDURE — 3075F PR MOST RECENT SYSTOLIC BLOOD PRESS GE 130-139MM HG: ICD-10-PCS | Mod: CPTII,95,, | Performed by: INTERNAL MEDICINE

## 2022-10-19 PROCEDURE — 1160F RVW MEDS BY RX/DR IN RCRD: CPT | Mod: CPTII,95,, | Performed by: INTERNAL MEDICINE

## 2022-10-19 NOTE — PROGRESS NOTES
02/14/19 1100   Group 1   Start Time 0930   Stop Time 1010   Length (min) 40 Min   Group Name Check In   Focus of Group Symptoms and goals    Attendance Not present      The patient location is: home  The chief complaint leading to consultation is: home  Visit type: audiovisual  Total time spent with patient: 30 min  Each patient to whom he or she provides medical services by telemedicine is:  (1) informed of the relationship between the physician and patient and the respective role of any other health care provider with respect to management of the patient; and (2) notified that he or she may decline to receive medical services by telemedicine and may withdraw from such care at any time.      Subjective:    Patient ID:  Yaneli Steele is a 67 y.o. female who presents for follow-up of     HPI  Patient is a 68 yo F with history of non ischemic cardiomyopathy and atrial fibrillation presents for evaluation for atrial fibrillation. Patient originally diagnosed with atrial fibrillation in 2018. Patient was in RVR at the time presented to hospital. Found to have new onset HFrEF, LHC clean, cardioverted and EF improved. After that felt fine until a few months ago. Palpitations occurred again. EF dropped DCCV and referred here. Currently on Xarelto 20mg entresto and coreg.     After JUNIOR/DCCV, LVEF was better. Flecainide started.  Didn't start flecainide immediately; just started prior to seeing Dr. Issa, at which time she c/o LEs swelling. At his office Oct 6, likely AFL (typical vs. atypical?) with 2:1 conduction. . BB increased.  Since then, she feels better now. Apple Watch tells her HR is in 40s.    ECG 10/6/22: AFL 2:1, V rate 106 bpm    Review of Systems   Constitutional: Negative for malaise/fatigue.   HENT: Negative.  Negative for ear pain and tinnitus.    Eyes: Negative.  Negative for blurred vision.   Cardiovascular: Negative.  Negative for chest pain, dyspnea on exertion, irregular heartbeat, near-syncope, palpitations and syncope.   Respiratory: Negative.  Negative for shortness of breath.    Endocrine: Negative.  Negative for polyuria.   Hematologic/Lymphatic:  Negative.  Does not bruise/bleed easily.   Skin: Negative.  Negative for rash.   Musculoskeletal: Negative.  Negative for joint pain and muscle weakness.   Gastrointestinal: Negative.  Negative for abdominal pain and change in bowel habit.   Genitourinary: Negative.  Negative for frequency.   Neurological: Negative.  Negative for dizziness and weakness.   Psychiatric/Behavioral: Negative.  Negative for depression. The patient is not nervous/anxious.    Allergic/Immunologic: Negative.  Negative for environmental allergies.      Objective:      Well developed, well nourished.   No distress.  Speaks in full sentences.        Assessment:       1. Persistent atrial fibrillation    2. Paroxysmal atrial fibrillation    3. Chronic systolic heart failure    4. Atrial fibrillation with rapid ventricular response    5. Essential (primary) hypertension    6. Severe obesity (BMI 35.0-39.9) with comorbidity         Plan:       Patient is a 68 yo F with HFrEF NICM presents for AF management. Patient with CHADSVASC 4 age HTN HF and sex. Patient has had NICM secondary to AF twice so want to be aggressive about maintaining sinus rhtyhm.   Discussed ablation extensively with patient, however patient wants to try medications first if flecainide would be an option otherwise would consider ablation    Need ECG to evaluate rhythm, as she's feeling well and HR is under control per AppleWatch.  If in NSR, continue present mgmt.  If AF/AFL, likely will schedule f/u appt in person to discuss ablation.

## 2022-10-19 NOTE — LETTER
October 19, 2022        [facility] Elect[facility]iology 3rd Fl  [address]  [facility] [address]  Phone: [phone]  Fax: [phone]   Patient: Yaneli Steele   MR Number: [id number]   YOB: 1955   Date of Visit: 10/19/2022       Dear Dr. Calvillo:    Thank you for referring Yaneli Steele to me for evaluation. Below are the relevant portions of my assessment and plan of care.            If you have questions, please do not hesitate to call me. I look forward to following Yaneli along with you.    Sincerely,      Ramirez Polk MD           CC  No Recipients

## 2022-11-11 DIAGNOSIS — I48.91 ATRIAL FIBRILLATION WITH RAPID VENTRICULAR RESPONSE: ICD-10-CM

## 2022-11-11 DIAGNOSIS — I48.0 PAROXYSMAL ATRIAL FIBRILLATION: Primary | Chronic | ICD-10-CM

## 2023-02-01 ENCOUNTER — OFFICE VISIT (OUTPATIENT)
Dept: CARDIOLOGY | Facility: CLINIC | Age: 68
End: 2023-02-01
Payer: COMMERCIAL

## 2023-02-01 VITALS
WEIGHT: 221.44 LBS | SYSTOLIC BLOOD PRESSURE: 138 MMHG | RESPIRATION RATE: 18 BRPM | HEART RATE: 60 BPM | HEIGHT: 67 IN | DIASTOLIC BLOOD PRESSURE: 78 MMHG | OXYGEN SATURATION: 98 % | BODY MASS INDEX: 34.75 KG/M2

## 2023-02-01 DIAGNOSIS — Z79.899 ENCOUNTER FOR MONITORING FLECAINIDE THERAPY: ICD-10-CM

## 2023-02-01 DIAGNOSIS — I10 ESSENTIAL (PRIMARY) HYPERTENSION: ICD-10-CM

## 2023-02-01 DIAGNOSIS — E66.01 SEVERE OBESITY (BMI 35.0-39.9) WITH COMORBIDITY: ICD-10-CM

## 2023-02-01 DIAGNOSIS — I10 HYPERTENSION, UNSPECIFIED TYPE: ICD-10-CM

## 2023-02-01 DIAGNOSIS — E66.9 NON MORBID OBESITY, UNSPECIFIED OBESITY TYPE: ICD-10-CM

## 2023-02-01 DIAGNOSIS — Z79.01 CURRENT USE OF ANTICOAGULANT THERAPY: ICD-10-CM

## 2023-02-01 DIAGNOSIS — I48.0 PAROXYSMAL ATRIAL FIBRILLATION: Primary | ICD-10-CM

## 2023-02-01 DIAGNOSIS — I77.810 AORTIC ROOT DILATATION: ICD-10-CM

## 2023-02-01 DIAGNOSIS — G47.33 OSA (OBSTRUCTIVE SLEEP APNEA): ICD-10-CM

## 2023-02-01 DIAGNOSIS — Z51.81 ENCOUNTER FOR MONITORING FLECAINIDE THERAPY: ICD-10-CM

## 2023-02-01 PROBLEM — I50.22 CHRONIC SYSTOLIC HEART FAILURE: Chronic | Status: RESOLVED | Noted: 2018-02-24 | Resolved: 2023-02-01

## 2023-02-01 PROCEDURE — 1159F PR MEDICATION LIST DOCUMENTED IN MEDICAL RECORD: ICD-10-PCS | Mod: CPTII,S$GLB,, | Performed by: INTERNAL MEDICINE

## 2023-02-01 PROCEDURE — 1101F PR PT FALLS ASSESS DOC 0-1 FALLS W/OUT INJ PAST YR: ICD-10-PCS | Mod: CPTII,S$GLB,, | Performed by: INTERNAL MEDICINE

## 2023-02-01 PROCEDURE — 3008F BODY MASS INDEX DOCD: CPT | Mod: CPTII,S$GLB,, | Performed by: INTERNAL MEDICINE

## 2023-02-01 PROCEDURE — 3075F SYST BP GE 130 - 139MM HG: CPT | Mod: CPTII,S$GLB,, | Performed by: INTERNAL MEDICINE

## 2023-02-01 PROCEDURE — 1101F PT FALLS ASSESS-DOCD LE1/YR: CPT | Mod: CPTII,S$GLB,, | Performed by: INTERNAL MEDICINE

## 2023-02-01 PROCEDURE — 99999 PR PBB SHADOW E&M-EST. PATIENT-LVL IV: ICD-10-PCS | Mod: PBBFAC,,, | Performed by: INTERNAL MEDICINE

## 2023-02-01 PROCEDURE — 3288F FALL RISK ASSESSMENT DOCD: CPT | Mod: CPTII,S$GLB,, | Performed by: INTERNAL MEDICINE

## 2023-02-01 PROCEDURE — 3075F PR MOST RECENT SYSTOLIC BLOOD PRESS GE 130-139MM HG: ICD-10-PCS | Mod: CPTII,S$GLB,, | Performed by: INTERNAL MEDICINE

## 2023-02-01 PROCEDURE — 1160F RVW MEDS BY RX/DR IN RCRD: CPT | Mod: CPTII,S$GLB,, | Performed by: INTERNAL MEDICINE

## 2023-02-01 PROCEDURE — 93000 EKG 12-LEAD: ICD-10-PCS | Mod: S$GLB,,, | Performed by: INTERNAL MEDICINE

## 2023-02-01 PROCEDURE — 3008F PR BODY MASS INDEX (BMI) DOCUMENTED: ICD-10-PCS | Mod: CPTII,S$GLB,, | Performed by: INTERNAL MEDICINE

## 2023-02-01 PROCEDURE — 1159F MED LIST DOCD IN RCRD: CPT | Mod: CPTII,S$GLB,, | Performed by: INTERNAL MEDICINE

## 2023-02-01 PROCEDURE — 3078F DIAST BP <80 MM HG: CPT | Mod: CPTII,S$GLB,, | Performed by: INTERNAL MEDICINE

## 2023-02-01 PROCEDURE — 99214 PR OFFICE/OUTPT VISIT, EST, LEVL IV, 30-39 MIN: ICD-10-PCS | Mod: S$GLB,,, | Performed by: INTERNAL MEDICINE

## 2023-02-01 PROCEDURE — 3288F PR FALLS RISK ASSESSMENT DOCUMENTED: ICD-10-PCS | Mod: CPTII,S$GLB,, | Performed by: INTERNAL MEDICINE

## 2023-02-01 PROCEDURE — 1160F PR REVIEW ALL MEDS BY PRESCRIBER/CLIN PHARMACIST DOCUMENTED: ICD-10-PCS | Mod: CPTII,S$GLB,, | Performed by: INTERNAL MEDICINE

## 2023-02-01 PROCEDURE — 3078F PR MOST RECENT DIASTOLIC BLOOD PRESSURE < 80 MM HG: ICD-10-PCS | Mod: CPTII,S$GLB,, | Performed by: INTERNAL MEDICINE

## 2023-02-01 PROCEDURE — 99214 OFFICE O/P EST MOD 30 MIN: CPT | Mod: S$GLB,,, | Performed by: INTERNAL MEDICINE

## 2023-02-01 PROCEDURE — 93000 ELECTROCARDIOGRAM COMPLETE: CPT | Mod: S$GLB,,, | Performed by: INTERNAL MEDICINE

## 2023-02-01 PROCEDURE — 1126F PR PAIN SEVERITY QUANTIFIED, NO PAIN PRESENT: ICD-10-PCS | Mod: CPTII,S$GLB,, | Performed by: INTERNAL MEDICINE

## 2023-02-01 PROCEDURE — 99999 PR PBB SHADOW E&M-EST. PATIENT-LVL IV: CPT | Mod: PBBFAC,,, | Performed by: INTERNAL MEDICINE

## 2023-02-01 PROCEDURE — 1126F AMNT PAIN NOTED NONE PRSNT: CPT | Mod: CPTII,S$GLB,, | Performed by: INTERNAL MEDICINE

## 2023-02-01 RX ORDER — SACUBITRIL AND VALSARTAN 49; 51 MG/1; MG/1
1 TABLET, FILM COATED ORAL 2 TIMES DAILY
Qty: 180 TABLET | Refills: 3 | Status: SHIPPED | OUTPATIENT
Start: 2023-02-01 | End: 2023-06-10 | Stop reason: SDUPTHER

## 2023-02-01 NOTE — PROGRESS NOTES
CARDIOVASCULAR CONSULTATION    REASON FOR CONSULT:   Yaneli Steele is a 67 y.o. female who presents for PAF, CMP.    EP: Felicitas  HISTORY OF PRESENT ILLNESS:   The patient returns for follow-up.  She reports generally asymptomatic status without angina, dyspnea, palpitations, or syncope.  There has been no PND, orthopnea, or lower extremity edema.  There has been no melena, hematuria, or claudicant symptoms.  She continues take her Xarelto and flecainide without any issues.    CARDIOVASCULAR HISTORY:   NICM, normal cors (cath 2/2018)    PAF on Xarelto/flecainide s/p JUNIOR/DCCV 2/23/18 and 9/6/22     Hx tachymyopathy with recovery of EF in SR.    Asc ao dil, 4.3cm (CTA Chest 2/22/18)    PAST MEDICAL HISTORY:     Past Medical History:   Diagnosis Date    Chronic systolic heart failure     Essential (primary) hypertension     Paroxysmal atrial fibrillation        PAST SURGICAL HISTORY:     Past Surgical History:   Procedure Laterality Date    ANGIOGRAM, CORONARY, WITH LEFT HEART CATHETERIZATION         ALLERGIES AND MEDICATION:     Review of patient's allergies indicates:   Allergen Reactions    Penicillins Shortness Of Breath        Medication List            Accurate as of February 1, 2023  1:24 PM. If you have any questions, ask your nurse or doctor.                CONTINUE taking these medications      carvediloL 25 MG tablet  Commonly known as: COREG  Take 1 tablet (25 mg total) by mouth 2 (two) times daily.     ENTRESTO 24-26 mg per tablet  Generic drug: sacubitriL-valsartan  Take 1 tablet by mouth 2 (two) times daily.     flecainide 100 MG Tab  Commonly known as: TAMBOCOR  Take 1 tablet (100 mg total) by mouth every 12 (twelve) hours.     rivaroxaban 20 mg Tab  Commonly known as: XARELTO  Take 1 tablet (20 mg total) by mouth daily with dinner or evening meal.              SOCIAL HISTORY:     Social History     Socioeconomic History    Marital status: Single   Tobacco Use    Smoking status: Former    Smokeless  tobacco: Former    Tobacco comments:     Pt states she quit more than 30 years ago   Substance and Sexual Activity    Alcohol use: No    Drug use: No     Social Determinants of Health     Financial Resource Strain: Low Risk     Difficulty of Paying Living Expenses: Not hard at all   Food Insecurity: No Food Insecurity    Worried About Running Out of Food in the Last Year: Never true    Ran Out of Food in the Last Year: Never true   Transportation Needs: No Transportation Needs    Lack of Transportation (Medical): No    Lack of Transportation (Non-Medical): No   Physical Activity: Sufficiently Active    Days of Exercise per Week: 4 days    Minutes of Exercise per Session: 70 min   Stress: No Stress Concern Present    Feeling of Stress : Not at all   Social Connections: Unknown    Frequency of Communication with Friends and Family: More than three times a week    Frequency of Social Gatherings with Friends and Family: More than three times a week    Active Member of Clubs or Organizations: Yes    Attends Club or Organization Meetings: More than 4 times per year    Marital Status: Never    Housing Stability: Low Risk     Unable to Pay for Housing in the Last Year: No    Number of Places Lived in the Last Year: 1    Unstable Housing in the Last Year: No       FAMILY HISTORY:     Family History   Problem Relation Age of Onset    No Known Problems Mother     No Known Problems Father        REVIEW OF SYSTEMS:   Review of Systems   Constitutional:  Negative for chills, diaphoresis and fever.   HENT:  Negative for nosebleeds.    Eyes:  Negative for blurred vision, double vision and photophobia.   Respiratory:  Negative for hemoptysis, shortness of breath and wheezing.    Cardiovascular:  Negative for chest pain, palpitations, orthopnea, claudication, leg swelling and PND.   Gastrointestinal:  Negative for abdominal pain, blood in stool, heartburn, melena, nausea and vomiting.   Genitourinary:  Negative for flank pain  "and hematuria.   Musculoskeletal:  Negative for falls, myalgias and neck pain.   Skin:  Negative for rash.   Neurological:  Negative for dizziness, seizures, loss of consciousness, weakness and headaches.   Endo/Heme/Allergies:  Negative for polydipsia. Does not bruise/bleed easily.   Psychiatric/Behavioral:  Negative for depression and memory loss. The patient is not nervous/anxious.      PHYSICAL EXAM:     Vitals:    02/01/23 1310   BP: 138/78   Pulse: 60   Resp: 18    Body mass index is 34.68 kg/m².  Weight: 100.4 kg (221 lb 7.2 oz)   Height: 5' 7" (170.2 cm)     Physical Exam  Vitals reviewed.   Constitutional:       General: She is not in acute distress.     Appearance: She is well-developed. She is obese. She is not ill-appearing, toxic-appearing or diaphoretic.   HENT:      Head: Normocephalic and atraumatic.   Eyes:      General: No scleral icterus.     Extraocular Movements: Extraocular movements intact.      Conjunctiva/sclera: Conjunctivae normal.      Pupils: Pupils are equal, round, and reactive to light.   Neck:      Thyroid: No thyromegaly.      Vascular: Normal carotid pulses. No carotid bruit or JVD.      Trachea: Trachea normal.   Cardiovascular:      Rate and Rhythm: Normal rate and regular rhythm.      Pulses:           Carotid pulses are 2+ on the right side and 2+ on the left side.     Heart sounds: S1 normal and S2 normal. No murmur heard.    No friction rub. No gallop.   Pulmonary:      Effort: Pulmonary effort is normal. No respiratory distress.      Breath sounds: Normal breath sounds. No stridor. No wheezing, rhonchi or rales.   Chest:      Chest wall: No tenderness.   Abdominal:      General: There is no distension.      Palpations: Abdomen is soft.   Musculoskeletal:         General: No swelling or tenderness. Normal range of motion.      Cervical back: Normal range of motion and neck supple. No edema or rigidity.      Right lower leg: No edema.      Left lower leg: No edema.   Feet:     "  Right foot:      Skin integrity: No ulcer.      Left foot:      Skin integrity: No ulcer.   Skin:     General: Skin is warm and dry.      Coloration: Skin is not jaundiced.   Neurological:      General: No focal deficit present.      Mental Status: She is alert and oriented to person, place, and time.      Cranial Nerves: No cranial nerve deficit.   Psychiatric:         Mood and Affect: Mood normal.         Speech: Speech normal.         Behavior: Behavior normal. Behavior is cooperative.       DATA:   EKG: (personally reviewed tracing(s))  2/1/23 SR 60, QRS 86, QTc 430, inf TWI ?ischemia, similar to 10/19/22.    Laboratory:  CBC:        CHEMISTRIES:        CARDIAC BIOMARKERS:        COAGS:  Recent Labs   Lab 07/19/22  1119   INR 1.0         LIPIDS/LFTS:      POC labs 07/19/2022   INR 1.0   Troponin 0.01   AST 33   ALT 19   Creatinine 0.9   Potassium 3.8   Hemoglobin 15.3   Platelets 200    Lab Results   Component Value Date    TSH 0.816 02/23/2018         Cardiovascular Testing:  Echo 9/14/22 (EF 25% on JUNIOR 9/6/22)  The left ventricle is normal in size with normal systolic function. The estimated ejection fraction is 55%.  Mild right ventricular enlargement with normal right ventricular systolic function.  Grade II left ventricular diastolic dysfunction.  Biatrial enlargement.  Mild-to-moderate mitral regurgitation.  The estimated PA systolic pressure is 35 mmHg.  Intermediate central venous pressure (8 mmHg).    JUNIOR/DCCV 9/6/22  Severe biventricular dysfxn, LVEF 25%  Global hypokinesis  Biatrial enlargement  No LA/ABBIE thrombus  Normal valves  Mod MR/TR  Successful DCCV AF->SR 65 BPM 200J x1  Plan:  Cont med rx  Cont Xarelto 20mg qhs  Start entresto 24/26mg bid  Home today  Follow up with Dr. Issa as planned  Repeat echo in 3 months for reassessment of LVEF    Cath 2/26/18  B. Summary/Post-Operative Diagnosis     Normal coronary arteries.     Systolic dysfunction.     Severe mitral regurgitation.     Mildly  elevated right and left Filling Pressures.     Mild Pulmonary Hypertension.   D. Hemodynamic Results   Ejection Fraction: 25%   Global LV Function: severely depressed   PA: 45   PW:  (26)   CO_THERM: 3.92   RV: 44   RA:  (18)   LVEDP: 13   E. Angiographic Results      - Left Main Coronary Artery:              The LM is normal. There is YULIYA 3 flow.      - Left Anterior Descending Artery:              The LAD is normal. There is YULIYA 3 flow.      - Left Circumflex Artery:              The LCX is normal. There is YULIYA 3 flow.      - Right Coronary Artery:              The RCA is normal. There is YULIYA 3 flow.      - Common Femoral Artery:              The right CFA is normal.     ASSESSMENT:   # PAF s/p JUNIOR/DCCV 2/23/18 and 9/6/22 in SR, on Xarelto/flecainide.  Following with Dr. Polk.    # NICM, cath 2/2018 with normal cors.  EF back down to 25%, ?tachymyopathy, appears euvolemic.  EF recovered to 55% on echo 9/2022.  # HTN, controlled  # ?REZA, prev referred to sleep med.  # BMI 35, stable vs last OV  # Asc ao dil, 4.3cm (CTA Chest 2/22/18)    PLAN:   Cont med rx  Cont Xarelto 20mg qd  Cont flecainide 100mg bid  Inc Entresto 49/51mg bid  Check BMP 1 week  Check surveillance echo (Ao root dil)  Re-refer to sleep med, ?REZA (again)  RTC 6 months (Aug 2023), eventual titration of entresto +/- addition of aldactone       Devante Issa MD, FACC

## 2023-02-07 ENCOUNTER — HOSPITAL ENCOUNTER (OUTPATIENT)
Dept: CARDIOLOGY | Facility: HOSPITAL | Age: 68
Discharge: HOME | End: 2023-02-07
Attending: INTERNAL MEDICINE
Payer: COMMERCIAL

## 2023-02-07 DIAGNOSIS — I77.810 AORTIC ROOT DILATATION: ICD-10-CM

## 2023-02-07 LAB
AV INDEX (PROSTH): 0.81
AV MEAN GRADIENT: 5 MMHG
AV PEAK GRADIENT: 9 MMHG
AV VALVE AREA: 2.79 CM2
AV VELOCITY RATIO: 0.69
CV ECHO LV RWT: 0.29 CM
DOP CALC AO PEAK VEL: 1.5 M/S
DOP CALC AO VTI: 32.4 CM
DOP CALC LVOT AREA: 3.4 CM2
DOP CALC LVOT DIAMETER: 2.09 CM
DOP CALC LVOT PEAK VEL: 1.04 M/S
DOP CALC LVOT STROKE VOLUME: 90.52 CM3
DOP CALCLVOT PEAK VEL VTI: 26.4 CM
E WAVE DECELERATION TIME: 308.2 MSEC
E/A RATIO: 1.36
E/E' RATIO: 24.33 M/S
ECHO LV POSTERIOR WALL: 0.78 CM (ref 0.6–1.1)
EJECTION FRACTION: 55 %
FRACTIONAL SHORTENING: 22 % (ref 28–44)
INTERVENTRICULAR SEPTUM: 0.89 CM (ref 0.6–1.1)
IVC DIAMETER: 20 CM
LA MAJOR: 6.5 CM
LA MINOR: 5.63 CM
LA WIDTH: 5.6 CM
LEFT ATRIUM SIZE: 5.72 CM
LEFT ATRIUM VOLUME: 164.28 CM3
LEFT INTERNAL DIMENSION IN SYSTOLE: 4.19 CM (ref 2.1–4)
LEFT VENTRICLE DIASTOLIC VOLUME: 140.9 ML
LEFT VENTRICLE SYSTOLIC VOLUME: 78.26 ML
LEFT VENTRICULAR INTERNAL DIMENSION IN DIASTOLE: 5.39 CM (ref 3.5–6)
LEFT VENTRICULAR MASS: 163.09 G
LV LATERAL E/E' RATIO: 18.25 M/S
LV SEPTAL E/E' RATIO: 36.5 M/S
LVOT MG: 2.78 MMHG
LVOT MV: 0.82 CM/S
MV PEAK A VEL: 1.07 M/S
MV PEAK E VEL: 1.46 M/S
MV STENOSIS PRESSURE HALF TIME: 89.38 MS
MV VALVE AREA P 1/2 METHOD: 2.46 CM2
PISA TR MAX VEL: 2.66 M/S
PV PEAK VELOCITY: 0.88 CM/S
RA MAJOR: 6.46 CM
RA PRESSURE: 3 MMHG
RA WIDTH: 5.71 CM
RIGHT VENTRICULAR END-DIASTOLIC DIMENSION: 4.31 CM
SINUS: 3.04 CM
STJ: 2.52 CM
TDI LATERAL: 0.08 M/S
TDI SEPTAL: 0.04 M/S
TDI: 0.06 M/S
TR MAX PG: 28 MMHG
TRICUSPID ANNULAR PLANE SYSTOLIC EXCURSION: 2.27 CM
TV REST PULMONARY ARTERY PRESSURE: 31 MMHG

## 2023-02-07 PROCEDURE — 93306 TTE W/DOPPLER COMPLETE: CPT | Mod: 26,,, | Performed by: INTERNAL MEDICINE

## 2023-02-07 PROCEDURE — 93306 ECHO (CUPID ONLY): ICD-10-PCS | Mod: 26,,, | Performed by: INTERNAL MEDICINE

## 2023-02-07 PROCEDURE — 93306 TTE W/DOPPLER COMPLETE: CPT

## 2023-06-01 ENCOUNTER — HOSPITAL ENCOUNTER (INPATIENT)
Facility: HOSPITAL | Age: 68
LOS: 5 days | Discharge: HOME | DRG: 308 | End: 2023-06-06
Attending: EMERGENCY MEDICINE | Admitting: STUDENT IN AN ORGANIZED HEALTH CARE EDUCATION/TRAINING PROGRAM
Payer: COMMERCIAL

## 2023-06-01 DIAGNOSIS — I48.91 ATRIAL FIBRILLATION WITH RVR: Primary | ICD-10-CM

## 2023-06-01 DIAGNOSIS — I50.9 ACUTE ON CHRONIC CONGESTIVE HEART FAILURE, UNSPECIFIED HEART FAILURE TYPE: ICD-10-CM

## 2023-06-01 DIAGNOSIS — R07.9 CHEST PAIN: ICD-10-CM

## 2023-06-01 DIAGNOSIS — R00.0 TACHYCARDIA: ICD-10-CM

## 2023-06-01 DIAGNOSIS — R79.89 ELEVATED BRAIN NATRIURETIC PEPTIDE (BNP) LEVEL: ICD-10-CM

## 2023-06-01 DIAGNOSIS — I48.91 ATRIAL FIBRILLATION: ICD-10-CM

## 2023-06-01 LAB
ALBUMIN SERPL-MCNC: 3.5 G/DL (ref 3.3–5.5)
ALBUMIN SERPL-MCNC: 3.7 G/DL (ref 3.3–5.5)
ALP SERPL-CCNC: 129 U/L (ref 42–141)
ALP SERPL-CCNC: 136 U/L (ref 42–141)
BASOPHILS # BLD AUTO: 0.05 K/UL (ref 0–0.2)
BASOPHILS NFR BLD: 0.6 % (ref 0–1.9)
BILIRUB SERPL-MCNC: 1.3 MG/DL (ref 0.2–1.6)
BILIRUB SERPL-MCNC: 1.4 MG/DL (ref 0.2–1.6)
BILIRUBIN, POC UA: NEGATIVE
BLOOD, POC UA: ABNORMAL
BNP SERPL-MCNC: 745 PG/ML (ref 0–99)
BUN SERPL-MCNC: 14 MG/DL (ref 7–22)
CALCIUM SERPL-MCNC: 10 MG/DL (ref 8–10.3)
CHLORIDE SERPL-SCNC: 105 MMOL/L (ref 98–108)
CLARITY, POC UA: CLEAR
COLOR, POC UA: YELLOW
CREAT SERPL-MCNC: 1.1 MG/DL (ref 0.6–1.2)
DIFFERENTIAL METHOD: ABNORMAL
EOSINOPHIL # BLD AUTO: 0.1 K/UL (ref 0–0.5)
EOSINOPHIL NFR BLD: 0.6 % (ref 0–8)
ERYTHROCYTE [DISTWIDTH] IN BLOOD BY AUTOMATED COUNT: 13.1 % (ref 11.5–14.5)
GLUCOSE SERPL-MCNC: 164 MG/DL (ref 73–118)
GLUCOSE, POC UA: NEGATIVE
HCT VFR BLD AUTO: 50.4 % (ref 37–48.5)
HGB BLD-MCNC: 15.4 G/DL (ref 12–16)
IMM GRANULOCYTES # BLD AUTO: 0.03 K/UL (ref 0–0.04)
IMM GRANULOCYTES NFR BLD AUTO: 0.3 % (ref 0–0.5)
KETONES, POC UA: NEGATIVE
LACTATE SERPL-SCNC: 4 MMOL/L (ref 0.5–2.2)
LEUKOCYTE EST, POC UA: NEGATIVE
LYMPHOCYTES # BLD AUTO: 4.1 K/UL (ref 1–4.8)
LYMPHOCYTES NFR BLD: 45.9 % (ref 18–48)
MAGNESIUM SERPL-MCNC: 1.6 MG/DL (ref 1.6–2.6)
MCH RBC QN AUTO: 26 PG (ref 27–31)
MCHC RBC AUTO-ENTMCNC: 30.6 G/DL (ref 32–36)
MCV RBC AUTO: 85 FL (ref 82–98)
MONOCYTES # BLD AUTO: 0.5 K/UL (ref 0.3–1)
MONOCYTES NFR BLD: 5.1 % (ref 4–15)
NEUTROPHILS # BLD AUTO: 4.2 K/UL (ref 1.8–7.7)
NEUTROPHILS NFR BLD: 47.5 % (ref 38–73)
NITRITE, POC UA: NEGATIVE
NRBC BLD-RTO: 0 /100 WBC
PH UR STRIP: 5 [PH]
PLATELET # BLD AUTO: 237 K/UL (ref 150–450)
PMV BLD AUTO: 12.2 FL (ref 9.2–12.9)
POC ALT (SGPT): 52 U/L (ref 10–47)
POC ALT (SGPT): 63 U/L (ref 10–47)
POC AMYLASE: 29 U/L (ref 14–97)
POC AST (SGOT): 58 U/L (ref 11–38)
POC AST (SGOT): 61 U/L (ref 11–38)
POC B-TYPE NATRIURETIC PEPTIDE: 630 PG/ML (ref 0–100)
POC CARDIAC TROPONIN I: 0 NG/ML (ref 0–0.08)
POC GGT: 275 U/L (ref 5–65)
POC TCO2: 26 MMOL/L (ref 18–33)
POCT GLUCOSE: 170 MG/DL (ref 70–110)
POTASSIUM BLD-SCNC: 4.4 MMOL/L (ref 3.6–5.1)
PROTEIN, POC UA: ABNORMAL
PROTEIN, POC: 7.8 G/DL (ref 6.4–8.1)
PROTEIN, POC: 7.9 G/DL (ref 6.4–8.1)
RBC # BLD AUTO: 5.93 M/UL (ref 4–5.4)
SAMPLE: NORMAL
SODIUM BLD-SCNC: 139 MMOL/L (ref 128–145)
SPECIFIC GRAVITY, POC UA: 1.02
UROBILINOGEN, POC UA: 1 E.U./DL
WBC # BLD AUTO: 8.85 K/UL (ref 3.9–12.7)

## 2023-06-01 PROCEDURE — 25000003 PHARM REV CODE 250: Performed by: HOSPITALIST

## 2023-06-01 PROCEDURE — 83880 ASSAY OF NATRIURETIC PEPTIDE: CPT | Performed by: HOSPITALIST

## 2023-06-01 PROCEDURE — 84100 ASSAY OF PHOSPHORUS: CPT | Performed by: STUDENT IN AN ORGANIZED HEALTH CARE EDUCATION/TRAINING PROGRAM

## 2023-06-01 PROCEDURE — 93010 ELECTROCARDIOGRAM REPORT: CPT | Mod: ,,, | Performed by: INTERNAL MEDICINE

## 2023-06-01 PROCEDURE — 86900 BLOOD TYPING SEROLOGIC ABO: CPT | Performed by: STUDENT IN AN ORGANIZED HEALTH CARE EDUCATION/TRAINING PROGRAM

## 2023-06-01 PROCEDURE — 96376 TX/PRO/DX INJ SAME DRUG ADON: CPT | Mod: ER

## 2023-06-01 PROCEDURE — 80053 COMPREHEN METABOLIC PANEL: CPT | Mod: ER

## 2023-06-01 PROCEDURE — 83605 ASSAY OF LACTIC ACID: CPT | Performed by: STUDENT IN AN ORGANIZED HEALTH CARE EDUCATION/TRAINING PROGRAM

## 2023-06-01 PROCEDURE — 25000003 PHARM REV CODE 250

## 2023-06-01 PROCEDURE — 83036 HEMOGLOBIN GLYCOSYLATED A1C: CPT | Performed by: HOSPITALIST

## 2023-06-01 PROCEDURE — 99291 CRITICAL CARE FIRST HOUR: CPT | Mod: ER

## 2023-06-01 PROCEDURE — 96374 THER/PROPH/DIAG INJ IV PUSH: CPT | Mod: ER

## 2023-06-01 PROCEDURE — 93005 ELECTROCARDIOGRAM TRACING: CPT

## 2023-06-01 PROCEDURE — 25000003 PHARM REV CODE 250: Performed by: STUDENT IN AN ORGANIZED HEALTH CARE EDUCATION/TRAINING PROGRAM

## 2023-06-01 PROCEDURE — 85025 COMPLETE CBC W/AUTO DIFF WBC: CPT | Mod: ER

## 2023-06-01 PROCEDURE — 99291 CRITICAL CARE FIRST HOUR: CPT | Mod: ,,, | Performed by: INTERNAL MEDICINE

## 2023-06-01 PROCEDURE — 99291 PR CRITICAL CARE, E/M 30-74 MINUTES: ICD-10-PCS | Mod: ,,, | Performed by: INTERNAL MEDICINE

## 2023-06-01 PROCEDURE — 63600175 PHARM REV CODE 636 W HCPCS: Performed by: HOSPITALIST

## 2023-06-01 PROCEDURE — 80053 COMPREHEN METABOLIC PANEL: CPT | Performed by: STUDENT IN AN ORGANIZED HEALTH CARE EDUCATION/TRAINING PROGRAM

## 2023-06-01 PROCEDURE — 83735 ASSAY OF MAGNESIUM: CPT | Mod: 91 | Performed by: HOSPITALIST

## 2023-06-01 PROCEDURE — 93005 ELECTROCARDIOGRAM TRACING: CPT | Mod: ER

## 2023-06-01 PROCEDURE — 83880 ASSAY OF NATRIURETIC PEPTIDE: CPT | Mod: ER

## 2023-06-01 PROCEDURE — 85025 COMPLETE CBC W/AUTO DIFF WBC: CPT | Performed by: STUDENT IN AN ORGANIZED HEALTH CARE EDUCATION/TRAINING PROGRAM

## 2023-06-01 PROCEDURE — 63600175 PHARM REV CODE 636 W HCPCS: Performed by: STUDENT IN AN ORGANIZED HEALTH CARE EDUCATION/TRAINING PROGRAM

## 2023-06-01 PROCEDURE — 81003 URINALYSIS AUTO W/O SCOPE: CPT | Mod: ER

## 2023-06-01 PROCEDURE — 36415 COLL VENOUS BLD VENIPUNCTURE: CPT | Performed by: HOSPITALIST

## 2023-06-01 PROCEDURE — 84484 ASSAY OF TROPONIN QUANT: CPT | Mod: ER

## 2023-06-01 PROCEDURE — 25000003 PHARM REV CODE 250: Mod: ER | Performed by: EMERGENCY MEDICINE

## 2023-06-01 PROCEDURE — 51702 INSERT TEMP BLADDER CATH: CPT

## 2023-06-01 PROCEDURE — 83735 ASSAY OF MAGNESIUM: CPT | Performed by: STUDENT IN AN ORGANIZED HEALTH CARE EDUCATION/TRAINING PROGRAM

## 2023-06-01 PROCEDURE — 25000003 PHARM REV CODE 250: Mod: ER | Performed by: NURSE PRACTITIONER

## 2023-06-01 PROCEDURE — 93010 EKG 12-LEAD: ICD-10-PCS | Mod: ,,, | Performed by: INTERNAL MEDICINE

## 2023-06-01 PROCEDURE — 20000000 HC ICU ROOM

## 2023-06-01 PROCEDURE — 82150 ASSAY OF AMYLASE: CPT | Mod: ER

## 2023-06-01 RX ORDER — SODIUM CHLORIDE 0.9 % (FLUSH) 0.9 %
10 SYRINGE (ML) INJECTION
Status: DISCONTINUED | OUTPATIENT
Start: 2023-06-01 | End: 2023-06-06 | Stop reason: HOSPADM

## 2023-06-01 RX ORDER — NOREPINEPHRINE BITARTRATE/D5W 4MG/250ML
PLASTIC BAG, INJECTION (ML) INTRAVENOUS
Status: COMPLETED
Start: 2023-06-01 | End: 2023-06-01

## 2023-06-01 RX ORDER — FUROSEMIDE 10 MG/ML
20 INJECTION INTRAMUSCULAR; INTRAVENOUS 2 TIMES DAILY WITH MEALS
Status: DISCONTINUED | OUTPATIENT
Start: 2023-06-02 | End: 2023-06-02

## 2023-06-01 RX ORDER — ONDANSETRON 2 MG/ML
4 INJECTION INTRAMUSCULAR; INTRAVENOUS EVERY 8 HOURS PRN
Status: DISCONTINUED | OUTPATIENT
Start: 2023-06-01 | End: 2023-06-06 | Stop reason: HOSPADM

## 2023-06-01 RX ORDER — PROCHLORPERAZINE EDISYLATE 5 MG/ML
5 INJECTION INTRAMUSCULAR; INTRAVENOUS EVERY 6 HOURS PRN
Status: DISCONTINUED | OUTPATIENT
Start: 2023-06-01 | End: 2023-06-06 | Stop reason: HOSPADM

## 2023-06-01 RX ORDER — POLYETHYLENE GLYCOL 3350 17 G/17G
17 POWDER, FOR SOLUTION ORAL DAILY
Status: DISCONTINUED | OUTPATIENT
Start: 2023-06-02 | End: 2023-06-06 | Stop reason: HOSPADM

## 2023-06-01 RX ORDER — FUROSEMIDE 10 MG/ML
20 INJECTION INTRAMUSCULAR; INTRAVENOUS ONCE
Status: COMPLETED | OUTPATIENT
Start: 2023-06-01 | End: 2023-06-01

## 2023-06-01 RX ORDER — GUAIFENESIN/DEXTROMETHORPHAN 100-10MG/5
10 SYRUP ORAL EVERY 4 HOURS PRN
Status: DISCONTINUED | OUTPATIENT
Start: 2023-06-01 | End: 2023-06-06 | Stop reason: HOSPADM

## 2023-06-01 RX ORDER — NALOXONE HCL 0.4 MG/ML
0.02 VIAL (ML) INJECTION
Status: DISCONTINUED | OUTPATIENT
Start: 2023-06-01 | End: 2023-06-06 | Stop reason: HOSPADM

## 2023-06-01 RX ORDER — GLUCAGON 1 MG
1 KIT INJECTION
Status: DISCONTINUED | OUTPATIENT
Start: 2023-06-01 | End: 2023-06-06 | Stop reason: HOSPADM

## 2023-06-01 RX ORDER — DILTIAZEM HYDROCHLORIDE 5 MG/ML
10 INJECTION INTRAVENOUS
Status: COMPLETED | OUTPATIENT
Start: 2023-06-01 | End: 2023-06-01

## 2023-06-01 RX ORDER — ACETAMINOPHEN 325 MG/1
650 TABLET ORAL EVERY 6 HOURS PRN
Status: DISCONTINUED | OUTPATIENT
Start: 2023-06-01 | End: 2023-06-06 | Stop reason: HOSPADM

## 2023-06-01 RX ORDER — FLECAINIDE ACETATE 50 MG/1
100 TABLET ORAL EVERY 12 HOURS
Status: DISCONTINUED | OUTPATIENT
Start: 2023-06-01 | End: 2023-06-01

## 2023-06-01 RX ORDER — CARVEDILOL 12.5 MG/1
25 TABLET ORAL 2 TIMES DAILY
Status: DISCONTINUED | OUTPATIENT
Start: 2023-06-01 | End: 2023-06-05

## 2023-06-01 RX ORDER — SIMETHICONE 80 MG
1 TABLET,CHEWABLE ORAL 4 TIMES DAILY PRN
Status: DISCONTINUED | OUTPATIENT
Start: 2023-06-01 | End: 2023-06-06 | Stop reason: HOSPADM

## 2023-06-01 RX ORDER — FUROSEMIDE 10 MG/ML
20 INJECTION INTRAMUSCULAR; INTRAVENOUS
Status: DISCONTINUED | OUTPATIENT
Start: 2023-06-01 | End: 2023-06-01

## 2023-06-01 RX ORDER — NOREPINEPHRINE BITARTRATE/D5W 4MG/250ML
0-3 PLASTIC BAG, INJECTION (ML) INTRAVENOUS CONTINUOUS
Status: DISCONTINUED | OUTPATIENT
Start: 2023-06-01 | End: 2023-06-02

## 2023-06-01 RX ORDER — METOPROLOL TARTRATE 1 MG/ML
5 INJECTION, SOLUTION INTRAVENOUS EVERY 5 MIN PRN
Status: COMPLETED | OUTPATIENT
Start: 2023-06-01 | End: 2023-06-01

## 2023-06-01 RX ORDER — DEXTROSE 40 %
30 GEL (GRAM) ORAL
Status: DISCONTINUED | OUTPATIENT
Start: 2023-06-01 | End: 2023-06-06 | Stop reason: HOSPADM

## 2023-06-01 RX ORDER — MAGNESIUM SULFATE HEPTAHYDRATE 40 MG/ML
2 INJECTION, SOLUTION INTRAVENOUS ONCE
Status: COMPLETED | OUTPATIENT
Start: 2023-06-02 | End: 2023-06-02

## 2023-06-01 RX ORDER — MAG HYDROX/ALUMINUM HYD/SIMETH 200-200-20
30 SUSPENSION, ORAL (FINAL DOSE FORM) ORAL 4 TIMES DAILY PRN
Status: DISCONTINUED | OUTPATIENT
Start: 2023-06-01 | End: 2023-06-06 | Stop reason: HOSPADM

## 2023-06-01 RX ORDER — FAMOTIDINE 20 MG/1
20 TABLET, FILM COATED ORAL 2 TIMES DAILY
Status: DISCONTINUED | OUTPATIENT
Start: 2023-06-01 | End: 2023-06-06 | Stop reason: HOSPADM

## 2023-06-01 RX ORDER — SODIUM CHLORIDE 0.9 % (FLUSH) 0.9 %
10 SYRINGE (ML) INJECTION EVERY 8 HOURS PRN
Status: DISCONTINUED | OUTPATIENT
Start: 2023-06-01 | End: 2023-06-06 | Stop reason: HOSPADM

## 2023-06-01 RX ORDER — FLECAINIDE ACETATE 50 MG/1
100 TABLET ORAL EVERY 12 HOURS
Status: DISCONTINUED | OUTPATIENT
Start: 2023-06-01 | End: 2023-06-06 | Stop reason: HOSPADM

## 2023-06-01 RX ORDER — DEXTROSE 40 %
15 GEL (GRAM) ORAL
Status: DISCONTINUED | OUTPATIENT
Start: 2023-06-01 | End: 2023-06-06 | Stop reason: HOSPADM

## 2023-06-01 RX ORDER — CARVEDILOL 12.5 MG/1
25 TABLET ORAL 2 TIMES DAILY
Status: DISCONTINUED | OUTPATIENT
Start: 2023-06-01 | End: 2023-06-01

## 2023-06-01 RX ORDER — TALC
6 POWDER (GRAM) TOPICAL NIGHTLY PRN
Status: DISCONTINUED | OUTPATIENT
Start: 2023-06-01 | End: 2023-06-06 | Stop reason: HOSPADM

## 2023-06-01 RX ADMIN — RIVAROXABAN 20 MG: 20 TABLET, FILM COATED ORAL at 06:06

## 2023-06-01 RX ADMIN — AMIODARONE HYDROCHLORIDE 1 MG/MIN: 1.8 INJECTION, SOLUTION INTRAVENOUS at 10:06

## 2023-06-01 RX ADMIN — ONDANSETRON 4 MG: 2 INJECTION INTRAMUSCULAR; INTRAVENOUS at 10:06

## 2023-06-01 RX ADMIN — SODIUM CHLORIDE 2000 ML: 9 INJECTION, SOLUTION INTRAVENOUS at 04:06

## 2023-06-01 RX ADMIN — GUAIFENESIN AND DEXTROMETHORPHAN 10 ML: 100; 10 SYRUP ORAL at 09:06

## 2023-06-01 RX ADMIN — METOPROLOL TARTRATE 5 MG: 1 INJECTION, SOLUTION INTRAVENOUS at 06:06

## 2023-06-01 RX ADMIN — FLECAINIDE ACETATE 100 MG: 50 TABLET ORAL at 06:06

## 2023-06-01 RX ADMIN — METOPROLOL TARTRATE 5 MG: 1 INJECTION, SOLUTION INTRAVENOUS at 07:06

## 2023-06-01 RX ADMIN — CARVEDILOL 25 MG: 12.5 TABLET, FILM COATED ORAL at 06:06

## 2023-06-01 RX ADMIN — FUROSEMIDE 20 MG: 10 INJECTION, SOLUTION INTRAMUSCULAR; INTRAVENOUS at 10:06

## 2023-06-01 RX ADMIN — NOREPINEPHRINE BITARTRATE 0.02 MCG/KG/MIN: 4 INJECTION, SOLUTION INTRAVENOUS at 11:06

## 2023-06-01 RX ADMIN — DILTIAZEM HYDROCHLORIDE 10 MG: 5 INJECTION INTRAVENOUS at 02:06

## 2023-06-01 RX ADMIN — AMIODARONE HYDROCHLORIDE 150 MG: 1.5 INJECTION, SOLUTION INTRAVENOUS at 10:06

## 2023-06-01 RX ADMIN — DILTIAZEM HYDROCHLORIDE 10 MG: 5 INJECTION INTRAVENOUS at 03:06

## 2023-06-01 NOTE — ED PROVIDER NOTES
Encounter Date: 6/1/2023    SCRIBE #1 NOTE: I, Tara Kelley, am scribing for, and in the presence of,  SUDHA Burch. I have scribed the following portions of the note - Other sections scribed: HPI, ROS.     History     Chief Complaint   Patient presents with    Nausea     Pt c/o nausea and fatigue x 2 weeks. Pt denies CP, SOB, vomiting, diarrhea and dizziness     This 67 y.o female, with a medical history of Chronic systolic heart failure, Essential (primary) hypertension, and Paroxysmal atrial fibrillation, presents to the ED c/o nausea that began 2x weeks ago. Pt reports that she was initially experiencing a non-productive cough several weeks ago that later turned into a severe cold for 1x week. She states that the symptoms later improved, however, she subsequently began to experience fatigue as well as nausea. Pt notes that the symptoms have since persisted prompting her to come into the ED. She adds that she has also been experiencing shortness of breath intermittently for the last couple of days even at rest. Pt reports that she has a history of atrial fibrillation and is usually able to feel when she is in that rhythm. She states that she has been in Afib 2x's and has been converted back to sinus rhythm each episode. She is currently on Xarelto and Carvedilol for treatment. Pt notes her cardiologist as Dr. Issa. Of note, pt reports that she presently feels nervous. Pt denies chest pain, cough, emesis or any other associated symptoms. Compliant with meds per patient.    The history is provided by the patient.   Review of patient's allergies indicates:   Allergen Reactions    Penicillins Shortness Of Breath     Past Medical History:   Diagnosis Date    Chronic systolic heart failure     Essential (primary) hypertension     Paroxysmal atrial fibrillation      Past Surgical History:   Procedure Laterality Date    ANGIOGRAM, CORONARY, WITH LEFT HEART CATHETERIZATION       Family History   Problem  Relation Age of Onset    No Known Problems Mother     No Known Problems Father      Social History     Tobacco Use    Smoking status: Former    Smokeless tobacco: Former    Tobacco comments:     Pt states she quit more than 30 years ago   Substance Use Topics    Alcohol use: No    Drug use: No     Review of Systems   Constitutional:  Positive for fatigue. Negative for fever.   HENT:  Negative for sore throat.    Eyes:  Negative for visual disturbance.   Respiratory:  Positive for shortness of breath (presently resolved). Negative for cough.    Cardiovascular:  Negative for chest pain.   Gastrointestinal:  Positive for nausea. Negative for vomiting.   Genitourinary:  Negative for dysuria.   Musculoskeletal:  Negative for back pain.   Skin:  Negative for rash.   Neurological:  Negative for weakness.   Hematological:  Does not bruise/bleed easily.   Psychiatric/Behavioral:  The patient is nervous/anxious.    All other systems reviewed and are negative.    Physical Exam     Initial Vitals [06/01/23 1410]   BP Pulse Resp Temp SpO2   (!) 135/111 (!) 168 18 97.5 °F (36.4 °C) 95 %      MAP       --         Physical Exam    Nursing note and vitals reviewed.  Constitutional: She appears well-developed and well-nourished.   HENT:   Head: Normocephalic and atraumatic.   Eyes: Conjunctivae and EOM are normal. Pupils are equal, round, and reactive to light.   Neck:   Normal range of motion.  Cardiovascular:  Regular rhythm, normal heart sounds, intact distal pulses and normal pulses.   Tachycardia present.   Exam reveals no gallop and no friction rub.       No murmur heard.  Pulmonary/Chest: Breath sounds normal. No respiratory distress. She has no wheezes. She has no rhonchi. She has no rales. She exhibits no tenderness.   Abdominal: Abdomen is soft. Bowel sounds are normal. She exhibits no distension and no mass. There is no abdominal tenderness. There is no rebound and no guarding.   Musculoskeletal:         General: No  tenderness or edema. Normal range of motion.      Cervical back: Normal range of motion.     Neurological: She is alert and oriented to person, place, and time. She has normal strength. GCS score is 15. GCS eye subscore is 4. GCS verbal subscore is 5. GCS motor subscore is 6.   Skin: Skin is warm. Capillary refill takes less than 2 seconds. No rash noted. No erythema.   Psychiatric: She has a normal mood and affect.     ED Course   Critical Care    Date/Time: 6/1/2023 3:20 PM  Performed by: SUDHA Almodovar  Authorized by: Deidra Brown MD   Direct patient critical care time: 20 minutes  Additional history critical care time: 10 minutes  Ordering / reviewing critical care time: 10 minutes  Documentation critical care time: 5 minutes  Consulting other physicians critical care time: 5 minutes  Total critical care time (exclusive of procedural time) : 50 minutes  Critical care was necessary to treat or prevent imminent or life-threatening deterioration of the following conditions: cardiac failure.  Critical care was time spent personally by me on the following activities: blood draw for specimens, discussions with consultants, evaluation of patient's response to treatment, obtaining history from patient or surrogate, examination of patient, ordering and performing treatments and interventions, ordering and review of laboratory studies, ordering and review of radiographic studies, re-evaluation of patient's condition, pulse oximetry and review of old charts.      Labs Reviewed   POCT URINALYSIS W/O SCOPE - Abnormal; Notable for the following components:       Result Value    Blood, UA Trace-intact (*)     Protein, UA 1+ (*)     All other components within normal limits   POCT CMP - Abnormal; Notable for the following components:    ALT (SGPT), POC 63 (*)     AST (SGOT), POC 61 (*)     POC Glucose 164 (*)     All other components within normal limits   POCT LIVER PANEL - Abnormal; Notable for the  following components:    ALT (SGPT), POC 52 (*)     AST (SGOT), POC 58 (*)     POC  (*)     All other components within normal limits   POCT B-TYPE NATRIURETIC PEPTIDE (BNP) - Abnormal; Notable for the following components:    POC B-Type Natriuretic Peptide 630 (*)     All other components within normal limits   TROPONIN ISTAT   POCT CBC   POCT TROPONIN       EKG Readings: (Independently Interpreted)   Hr 168, afib rvr , nl axis, no tommie/twi, no stemi     Imaging Results              X-Ray Chest AP Portable (In process)                      Medications   sodium chloride 0.9% bolus 2,000 mL 2,000 mL (2,000 mLs Intravenous New Bag 6/1/23 1608)   diltiaZEM injection 10 mg (10 mg Intravenous Given 6/1/23 1423)   diltiaZEM injection 10 mg (10 mg Intravenous Given 6/1/23 1511)     Medical Decision Making:   History:   Old Medical Records: I decided to obtain old medical records.  Old Records Summarized: records from clinic visits.       <> Summary of Records: Dr. Issa visit Feb 2023- no events  Last Echo with an EF of 55%  Initial Assessment:   68 y/o female which presents to the ED with nausea for the past 2 weeks. Upon being triaged she was noted to be tachycardic and EKG revealed A-fib with RVR. Labs and cardizem ordered.  Differential Diagnosis:   A-fib with RVR, pneumonia, UTI, dehydration, STU, CHF exacerbation  Clinical Tests:   Lab Tests: Ordered and Reviewed  The following lab test(s) were unremarkable: CBC, CMP and Troponin       <> Summary of Lab:   Radiological Study: Ordered and Reviewed  Medical Tests: Ordered and Reviewed  ED Management:  Pt examined and was given Cardizem to help control the RVR. She improved with the initial dose but was given a second dose as she was not consistently staying in the 90s/100s. Pt given a liter of fluid along with lasix 20 mg once her BNP was noted to be 630. She become slightly hypotensive and the lasix was held and a 2nd liter of fluid was ordered. Her  heart rate appears to be volume dependent as she had improved HR upon resting in the bed. Upon moving or sitting up she become tachycardic. She remained in a-fib with rate control. Pt updated on admission and is agreeable to admission. Pt will be admitted to Dr. Lawson @ [facility].  Other:   I have discussed this case with another health care provider.       <> Summary of the Discussion: Kameron Ca, NP for admission to Dr. Hemanth Sen Attestation:   Scribe #1: I performed the above scribed service and the documentation accurately describes the services I performed. I attest to the accuracy of the note.    Attending Attestation:   Physician Attestation Statement for Resident:  As the supervising MD   Physician Attestation Statement: I have personally seen and examined this patient.   I agree with the above history.  -: Hx afib, on xarelto, notes mild nausea/fatigue x 2 weeks. No cp/sob or other c/o   As the supervising MD I agree with the above PE.   -: Tachy, afib rvr  Cta b/l  No diaphoresis or resp distress, no acc muscle use  comfortable   As the supervising MD I agree with the above treatment, course, plan, and disposition.   -: I have staffed the patient with the midlevel provider and was available for consultation in the department. I have guided the treatment plan and agree with the care provided.             Attending Critical Care:   Critical Care Times:   Direct Patient Care (initial evaluation, reassessments, and time considering the case)................................................................20 minutes.   Additional History from reviewing old medical records or taking additional history from the family, EMS, PCP, etc.......................10 minutes.   Ordering, Reviewing, and Interpreting Diagnostic Studies...............................................................................................................10 minutes.    Documentation..................................................................................................................................................................................10 minutes.   Consultation with other Physicians. .................................................................................................................................................10 minutes.   ==============================================================  Total Critical Care Time - exclusive of procedural time: 60 minutes.  ==============================================================        ED Course as of 06/01/23 1611   Thu Jun 01, 2023   1414 BP(!): 135/111 [AT]   1414 Temp: 97.5 °F (36.4 °C) [AT]   1414 Temp Source: Oral [AT]   1414 Pulse(!): 168 [AT]   1414 Resp: 18 [AT]   1414 SpO2: 95 % [AT]   1454 BP: 117/85 [AT]   1454 Pulse: 93 [AT]   1454 Resp(!): 25 [AT]   1454 SpO2: 98 % [AT]   1454 POC Cardiac Troponin I: 0.00 [AT]   1457 Nausea resolved once heart rate decreased [AT]   1518 POC Potassium: 4.4 [AT]   1518 POC Creatinine: 1.1 [AT]   1536 POC B-Type Natriuretic Peptide(!): 630 [AT]   1542 BP: 113/86 [AT]   1542 Pulse: 84 [AT]   1543 SpO2: 97 % [AT]   1604 BP: 96/80  2nd liter of fluid ordered and lasix d/c'd [AT]   1604 Pulse: 96 [AT]   1605 SpO2: 96 % [AT]      ED Course User Index  [AT] SUDHA Almodovar               I, SUDHA Almodovar, personally performed the services described in this documentation. All medical record entries made by the scribe were at my direction and in my presence. I have reviewed the chart and agree that the record reflects my personal performance and is accurate and complete.   Clinical Impression:   Final diagnoses:  [R00.0] Tachycardia  [I48.91] Atrial fibrillation with RVR (Primary)  [R79.89] Elevated brain natriuretic peptide (BNP) level  [I50.9] Acute on chronic congestive heart failure, unspecified heart failure type        ED Disposition Condition     Observation Stable                Jennifer Dale, [facility]  06/01/23 6219

## 2023-06-01 NOTE — ED NOTES
Attempted to call report.  States nurse not assigned to patient yet. Requesting call back in 10 minutes.

## 2023-06-01 NOTE — PLAN OF CARE
Artificial Intelligence Notification      Admit Date: 2023  LOS: 0  Code Status: Prior   Date of Consult: 2023  : 1955  Age: 67 y.o.  Weight:   Wt Readings from Last 1 Encounters:   23 95.3 kg (210 lb)     Sex: female  [facility]:   MRN: [id number]  Attending Physician: Hemanth Lawson MD  Primary Service: Networked reference to record PCT   Time AI Alert Received 17:02  Time at Bedside:patient is not in [facility] yet           Patient found Patient with Afib with RVR       Vital Signs (Most Recent):  Temp: 97.5 °F (36.4 °C) (23 1410)  Pulse: (!) 114 (23 1642)  Resp: (!) 23 (23 1642)  BP: 109/78 (23 1642)  SpO2: 98 % (23 1642) Vital Signs (24h Range):  Temp:  [97.5 °F (36.4 °C)] 97.5 °F (36.4 °C)  Pulse:  [] 114  Resp:  [18-33] 23  SpO2:  [93 %-98 %] 98 %  BP: ()/() 109/78         This encounter was triggered by an Artificial Intelligence Notification.     Artificial Intelligence alert discussed with Primary team:  Name Azim

## 2023-06-01 NOTE — ED NOTES
Remains a-fib with RVR, rate 100-120's.  States nausea has subsided.  Continues to deny CP, SOB at this time.

## 2023-06-01 NOTE — NURSING
Arrived to floor via stretcher AAO x4, can make her needs know, transferred to bed w/o incident, denies any pain at this time, safety maintained.[facility] [place]  Nurses Note -- 4 Eyes      6/1/2023       Skin assessed on: Admit      [x] No Pressure Injuries Present    []Prevention Measures Documented    [] Yes LDA  for Pressure Injury Previously documented     [] Yes New Pressure Injury Discovered   [] LDA for New Pressure Injury Added      Attending RN:  Berna Henriquez RN     Second RN:  Ankur Egan LPN    .

## 2023-06-01 NOTE — HPI
67 y.o. female with PAF, HTN presents with a complaint of nausea.  Acute onset, duration 2 weeks, associated with cough, congestion, and URI symptoms.  No known exacerbating or alleviating factors.  Reports adherence to home medication regimen.  Denies fever, chills, chest pain, palpitations, dizziness, syncope, diarrhea, or abdominal pain.  In the ED she was found to be in atrial fibrillation with RVR.  Rate controlled after 2 doses of IV diltiazem.  Placed in observation.

## 2023-06-01 NOTE — ED NOTES
Attempted to call report again.  Unknown which nurse is receiving patient.  Transferred to charge nurse phone.  No answer.  [facility] here for transport.

## 2023-06-01 NOTE — ED NOTES
Trending BP discussed with ANAMARIA Rodriguez.  States will cancel furosemide and adding new order for fluids.  Will continue to monitor.  Remains A-Flutter on CM.

## 2023-06-02 LAB
ABO + RH BLD: NORMAL
ALBUMIN SERPL BCP-MCNC: 3.5 G/DL (ref 3.5–5.2)
ALP SERPL-CCNC: 155 U/L (ref 55–135)
ALT SERPL W/O P-5'-P-CCNC: 66 U/L (ref 10–44)
ANION GAP SERPL CALC-SCNC: 15 MMOL/L (ref 8–16)
ANION GAP SERPL CALC-SCNC: 16 MMOL/L (ref 8–16)
AST SERPL-CCNC: 54 U/L (ref 10–40)
BASOPHILS # BLD AUTO: 0.02 K/UL (ref 0–0.2)
BASOPHILS NFR BLD: 0.2 % (ref 0–1.9)
BILIRUB SERPL-MCNC: 1.6 MG/DL (ref 0.1–1)
BLD GP AB SCN CELLS X3 SERPL QL: NORMAL
BUN SERPL-MCNC: 15 MG/DL (ref 8–23)
BUN SERPL-MCNC: 17 MG/DL (ref 8–23)
CALCIUM SERPL-MCNC: 9.6 MG/DL (ref 8.7–10.5)
CALCIUM SERPL-MCNC: 9.6 MG/DL (ref 8.7–10.5)
CHLORIDE SERPL-SCNC: 108 MMOL/L (ref 95–110)
CHLORIDE SERPL-SCNC: 110 MMOL/L (ref 95–110)
CO2 SERPL-SCNC: 18 MMOL/L (ref 23–29)
CO2 SERPL-SCNC: 18 MMOL/L (ref 23–29)
CREAT SERPL-MCNC: 1.1 MG/DL (ref 0.5–1.4)
CREAT SERPL-MCNC: 1.2 MG/DL (ref 0.5–1.4)
DIFFERENTIAL METHOD: ABNORMAL
EOSINOPHIL # BLD AUTO: 0 K/UL (ref 0–0.5)
EOSINOPHIL NFR BLD: 0.1 % (ref 0–8)
ERYTHROCYTE [DISTWIDTH] IN BLOOD BY AUTOMATED COUNT: 13.2 % (ref 11.5–14.5)
EST. GFR  (NO RACE VARIABLE): 50 ML/MIN/1.73 M^2
EST. GFR  (NO RACE VARIABLE): 55 ML/MIN/1.73 M^2
ESTIMATED AVG GLUCOSE: 131 MG/DL (ref 68–131)
GLUCOSE SERPL-MCNC: 153 MG/DL (ref 70–110)
GLUCOSE SERPL-MCNC: 186 MG/DL (ref 70–110)
HBA1C MFR BLD: 6.2 % (ref 4–5.6)
HCT VFR BLD AUTO: 52 % (ref 37–48.5)
HGB BLD-MCNC: 16 G/DL (ref 12–16)
IMM GRANULOCYTES # BLD AUTO: 0.04 K/UL (ref 0–0.04)
IMM GRANULOCYTES NFR BLD AUTO: 0.5 % (ref 0–0.5)
LACTATE SERPL-SCNC: 4 MMOL/L (ref 0.5–2.2)
LYMPHOCYTES # BLD AUTO: 2 K/UL (ref 1–4.8)
LYMPHOCYTES NFR BLD: 25.1 % (ref 18–48)
MAGNESIUM SERPL-MCNC: 1.7 MG/DL (ref 1.6–2.6)
MAGNESIUM SERPL-MCNC: 2.2 MG/DL (ref 1.6–2.6)
MCH RBC QN AUTO: 26.3 PG (ref 27–31)
MCHC RBC AUTO-ENTMCNC: 30.8 G/DL (ref 32–36)
MCV RBC AUTO: 86 FL (ref 82–98)
MONOCYTES # BLD AUTO: 0.4 K/UL (ref 0.3–1)
MONOCYTES NFR BLD: 4.8 % (ref 4–15)
NEUTROPHILS # BLD AUTO: 5.6 K/UL (ref 1.8–7.7)
NEUTROPHILS NFR BLD: 69.3 % (ref 38–73)
NRBC BLD-RTO: 0 /100 WBC
PHOSPHATE SERPL-MCNC: 4 MG/DL (ref 2.7–4.5)
PHOSPHATE SERPL-MCNC: 4.6 MG/DL (ref 2.7–4.5)
PLATELET # BLD AUTO: 197 K/UL (ref 150–450)
PMV BLD AUTO: 11.9 FL (ref 9.2–12.9)
POTASSIUM SERPL-SCNC: 4.4 MMOL/L (ref 3.5–5.1)
POTASSIUM SERPL-SCNC: 4.5 MMOL/L (ref 3.5–5.1)
PROT SERPL-MCNC: 7.3 G/DL (ref 6–8.4)
RBC # BLD AUTO: 6.08 M/UL (ref 4–5.4)
SODIUM SERPL-SCNC: 142 MMOL/L (ref 136–145)
SODIUM SERPL-SCNC: 143 MMOL/L (ref 136–145)
SPECIMEN OUTDATE: NORMAL
WBC # BLD AUTO: 8.09 K/UL (ref 3.9–12.7)

## 2023-06-02 PROCEDURE — 25000003 PHARM REV CODE 250: Performed by: STUDENT IN AN ORGANIZED HEALTH CARE EDUCATION/TRAINING PROGRAM

## 2023-06-02 PROCEDURE — 36415 COLL VENOUS BLD VENIPUNCTURE: CPT | Performed by: STUDENT IN AN ORGANIZED HEALTH CARE EDUCATION/TRAINING PROGRAM

## 2023-06-02 PROCEDURE — 21400001 HC TELEMETRY ROOM

## 2023-06-02 PROCEDURE — 25000003 PHARM REV CODE 250: Performed by: HOSPITALIST

## 2023-06-02 PROCEDURE — 83605 ASSAY OF LACTIC ACID: CPT | Performed by: STUDENT IN AN ORGANIZED HEALTH CARE EDUCATION/TRAINING PROGRAM

## 2023-06-02 PROCEDURE — 63600175 PHARM REV CODE 636 W HCPCS: Performed by: HOSPITALIST

## 2023-06-02 PROCEDURE — 84100 ASSAY OF PHOSPHORUS: CPT | Performed by: STUDENT IN AN ORGANIZED HEALTH CARE EDUCATION/TRAINING PROGRAM

## 2023-06-02 PROCEDURE — 80048 BASIC METABOLIC PNL TOTAL CA: CPT | Performed by: STUDENT IN AN ORGANIZED HEALTH CARE EDUCATION/TRAINING PROGRAM

## 2023-06-02 PROCEDURE — 99291 CRITICAL CARE FIRST HOUR: CPT | Mod: GT,,, | Performed by: INTERNAL MEDICINE

## 2023-06-02 PROCEDURE — 85025 COMPLETE CBC W/AUTO DIFF WBC: CPT | Performed by: STUDENT IN AN ORGANIZED HEALTH CARE EDUCATION/TRAINING PROGRAM

## 2023-06-02 PROCEDURE — 63600175 PHARM REV CODE 636 W HCPCS: Performed by: STUDENT IN AN ORGANIZED HEALTH CARE EDUCATION/TRAINING PROGRAM

## 2023-06-02 PROCEDURE — 83735 ASSAY OF MAGNESIUM: CPT | Performed by: STUDENT IN AN ORGANIZED HEALTH CARE EDUCATION/TRAINING PROGRAM

## 2023-06-02 PROCEDURE — 99291 PR CRITICAL CARE, E/M 30-74 MINUTES: ICD-10-PCS | Mod: GT,,, | Performed by: INTERNAL MEDICINE

## 2023-06-02 PROCEDURE — 25000003 PHARM REV CODE 250: Performed by: INTERNAL MEDICINE

## 2023-06-02 RX ORDER — FUROSEMIDE 10 MG/ML
40 INJECTION INTRAMUSCULAR; INTRAVENOUS 2 TIMES DAILY WITH MEALS
Status: DISCONTINUED | OUTPATIENT
Start: 2023-06-02 | End: 2023-06-03

## 2023-06-02 RX ORDER — SODIUM CHLORIDE 9 MG/ML
INJECTION, SOLUTION INTRAVENOUS CONTINUOUS
Status: ACTIVE | OUTPATIENT
Start: 2023-06-02 | End: 2023-06-02

## 2023-06-02 RX ORDER — HYDRALAZINE HYDROCHLORIDE 20 MG/ML
INJECTION INTRAMUSCULAR; INTRAVENOUS
Status: COMPLETED
Start: 2023-06-02 | End: 2023-06-02

## 2023-06-02 RX ADMIN — FAMOTIDINE 20 MG: 20 TABLET, FILM COATED ORAL at 08:06

## 2023-06-02 RX ADMIN — FUROSEMIDE 40 MG: 10 INJECTION, SOLUTION INTRAMUSCULAR; INTRAVENOUS at 04:06

## 2023-06-02 RX ADMIN — RIVAROXABAN 20 MG: 20 TABLET, FILM COATED ORAL at 04:06

## 2023-06-02 RX ADMIN — SODIUM CHLORIDE: 9 INJECTION, SOLUTION INTRAVENOUS at 12:06

## 2023-06-02 RX ADMIN — FLECAINIDE ACETATE 100 MG: 50 TABLET ORAL at 08:06

## 2023-06-02 RX ADMIN — SACUBITRIL AND VALSARTAN 1 TABLET: 24; 26 TABLET, FILM COATED ORAL at 08:06

## 2023-06-02 RX ADMIN — FUROSEMIDE 20 MG: 10 INJECTION, SOLUTION INTRAMUSCULAR; INTRAVENOUS at 08:06

## 2023-06-02 RX ADMIN — CARVEDILOL 25 MG: 12.5 TABLET, FILM COATED ORAL at 08:06

## 2023-06-02 RX ADMIN — MAGNESIUM SULFATE HEPTAHYDRATE 2 G: 40 INJECTION, SOLUTION INTRAVENOUS at 12:06

## 2023-06-02 NOTE — NURSING
[facility]  Nurses Note -- 4 Eyes      6/2/2023       Skin assessed on: Q Shift      [x] No Pressure Injuries Present    []Prevention Measures Documented    [] Yes LDA  for Pressure Injury Previously documented     [] Yes New Pressure Injury Discovered   [] LDA for New Pressure Injury Added      Attending RN:  Michelle Vallecillo LPN     Second RN:  Stan Rausch RN

## 2023-06-02 NOTE — ASSESSMENT & PLAN NOTE
Patient is identified as having Combined Systolic and Diastolic heart failure that is Acute on chronic. CHF is currently uncontrolled due to Pulmonary edema/pleural effusion on CXR. Latest ECHO performed and demonstrates- Results for orders placed during the hospital encounter of 02/07/23    Echo    Interpretation Summary  · The left ventricle is normal in size with normal systolic function.  · The estimated ejection fraction is 55%.  · Indeterminate left ventricular diastolic function.  · Normal right ventricular size with normal right ventricular systolic function.  · Moderate left atrial enlargement.  · Mild right atrial enlargement.  · Mild-to-moderate mitral regurgitation.  · Normal central venous pressure (3 mmHg).  · The estimated PA systolic pressure is 31 mmHg.  . Continue Beta Blocker and monitor clinical status closely. Monitor on telemetry. Patient is on CHF pathway.  Monitor strict Is&Os and daily weights.  Place on fluid restriction of 1.5 L. Continue to stress to patient importance of self efficacy and  on diet for CHF. Last BNP reviewed- and noted 630.

## 2023-06-02 NOTE — NURSING
[facility]  Nurses Note -- 4 Eyes      6/2/2023       Skin assessed on: Q Shift      [x] No Pressure Injuries Present    [x]Prevention Measures Documented    [] Yes LDA  for Pressure Injury Previously documented     [] Yes New Pressure Injury Discovered   [] LDA for New Pressure Injury Added      Attending RN:  Michelle Foster, RN     Second RN:  ALEX Saha RN

## 2023-06-02 NOTE — SUBJECTIVE & OBJECTIVE
Interval History: Telemetry shows atrial flutter with RVR    Review of Systems   Constitutional: Negative for decreased appetite, diaphoresis, malaise/fatigue, weight gain and weight loss.   HENT:  Negative for congestion, hearing loss, hoarse voice, nosebleeds, odynophagia, stridor and tinnitus.    Eyes:  Negative for blurred vision, double vision, photophobia, visual disturbance and visual halos.   Cardiovascular:  Negative for chest pain, claudication, cyanosis, dyspnea on exertion, irregular heartbeat, leg swelling, near-syncope, orthopnea, palpitations, paroxysmal nocturnal dyspnea and syncope.   Respiratory:  Negative for cough, hemoptysis, shortness of breath, sleep disturbances due to breathing, snoring, sputum production and wheezing.    Endocrine: Negative for cold intolerance, heat intolerance, polydipsia, polyphagia and polyuria.   Skin:  Negative for color change, poor wound healing, suspicious lesions and unusual hair distribution.   Musculoskeletal:  Negative for falls, joint pain, muscle cramps, muscle weakness, myalgias, neck pain and stiffness.   Gastrointestinal:  Negative for bloating, abdominal pain, constipation, diarrhea, dysphagia, heartburn, hematemesis, jaundice, melena, nausea and vomiting.   Neurological:  Negative for disturbances in coordination, excessive daytime sleepiness, dizziness, focal weakness, headaches, light-headedness, loss of balance, numbness, paresthesias, seizures, sensory change, tremors, vertigo and weakness.   Psychiatric/Behavioral:  Negative for altered mental status, depression, hallucinations and memory loss. The patient does not have insomnia and is not nervous/anxious.    Objective:     Vital Signs (Most Recent):  Temp: 97.4 °F (36.3 °C) (06/02/23 0700)  Pulse: 100 (06/02/23 0700)  Resp: (!) 29 (06/02/23 0700)  BP: (!) 142/97 (06/02/23 0700)  SpO2: 99 % (06/02/23 0700) Vital Signs (24h Range):  Temp:  [97.3 °F (36.3 °C)-97.7 °F (36.5 °C)] 97.4 °F (36.3  °C)  Pulse:  [] 100  Resp:  [9-45] 29  SpO2:  [81 %-100 %] 99 %  BP: ()/() 142/97     Weight: 100.7 kg (222 lb 0.1 oz)  Body mass index is 34.77 kg/m².     SpO2: 99 %         Intake/Output Summary (Last 24 hours) at 6/2/2023 0846  Last data filed at 6/2/2023 0550  Gross per 24 hour   Intake --   Output 510 ml   Net -510 ml       Lines/Drains/Airways       Drain  Duration                  Urethral Catheter 06/01/23 2300 Silicone 16 Fr. <1 day              Peripheral Intravenous Line  Duration                  Peripheral IV - Single Lumen 06/01/23 1424 20 G Right Antecubital <1 day         Peripheral IV - Single Lumen 06/01/23 1425 20 G Left Antecubital <1 day                       Physical Exam  Vitals reviewed.   Constitutional:       General: She is not in acute distress.     Appearance: Normal appearance. She is well-developed. She is obese. She is not ill-appearing, toxic-appearing or diaphoretic.   HENT:      Head: Normocephalic.   Neck:      Vascular: No carotid bruit or JVD.   Cardiovascular:      Rate and Rhythm: Regular rhythm. Tachycardia present.      Pulses: Normal pulses.   Pulmonary:      Effort: Pulmonary effort is normal.      Breath sounds: Normal breath sounds.   Abdominal:      General: Bowel sounds are normal.      Palpations: Abdomen is soft.      Tenderness: There is no abdominal tenderness.   Musculoskeletal:      Right lower leg: No edema.      Left lower leg: No edema.   Neurological:      Mental Status: She is alert and oriented to person, place, and time.   Psychiatric:         Mood and Affect: Mood normal.         Speech: Speech normal.         Behavior: Behavior normal.         Thought Content: Thought content normal.          Significant Labs: CMP   Recent Labs   Lab 06/01/23  2335 06/02/23  0445    142   K 4.4 4.5    108   CO2 18* 18*   * 153*   BUN 15 17   CREATININE 1.2 1.1   CALCIUM 9.6 9.6   PROT 7.3  --    ALBUMIN 3.5  --    BILITOT 1.6*  --     ALKPHOS 155*  --    AST 54*  --    ALT 66*  --    ANIONGAP 15 16   , CBC   Recent Labs   Lab 06/01/23  2335 06/02/23  0445   WBC 8.85 8.09   HGB 15.4 16.0   HCT 50.4* 52.0*    197   , Lipid Panel No results for input(s): CHOL, HDL, LDLCALC, TRIG, CHOLHDL in the last 48 hours., and Troponin No results for input(s): TROPONINI in the last 48 hours.    Significant Imaging: Echocardiogram: Transthoracic echo (TTE) complete (Cupid Only):   Results for orders placed or performed during the hospital encounter of 02/07/23   Echo   Result Value Ref Range    TDI SEPTAL 0.04 m/s    LV LATERAL E/E' RATIO 18.25 m/s    LV SEPTAL E/E' RATIO 36.50 m/s    LA WIDTH 5.60 cm    IVC diameter 20 cm    Left Ventricular Outflow Tract Mean Velocity 0.82 cm/s    Left Ventricular Outflow Tract Mean Gradient 2.78 mmHg    TDI LATERAL 0.08 m/s    PV PEAK VELOCITY 0.88 cm/s    LVIDd 5.39 3.5 - 6.0 cm    IVS 0.89 0.6 - 1.1 cm    Posterior Wall 0.78 0.6 - 1.1 cm    LVIDs 4.19 (A) 2.1 - 4.0 cm    FS 22 28 - 44 %    LA volume 164.28 cm3    Sinus 3.04 cm    STJ 2.52 cm    LV mass 163.09 g    LA size 5.72 cm    RVDD 4.31 cm    TAPSE 2.27 cm    Left Ventricle Relative Wall Thickness 0.29 cm    AV mean gradient 5 mmHg    AV valve area 2.79 cm2    AV Velocity Ratio 0.69     AV index (prosthetic) 0.81     MV valve area p 1/2 method 2.46 cm2    E/A ratio 1.36     Mean e' 0.06 m/s    E wave deceleration time 308.20 msec    LVOT diameter 2.09 cm    LVOT area 3.4 cm2    LVOT peak mazin 1.04 m/s    LVOT peak VTI 26.40 cm    Ao peak mazin 1.50 m/s    Ao VTI 32.4 cm    LVOT stroke volume 90.52 cm3    AV peak gradient 9 mmHg    E/E' ratio 24.33 m/s    MV Peak E Mazin 1.46 m/s    TR Max Mazin 2.66 m/s    MV stenosis pressure 1/2 time 89.38 ms    MV Peak A Mazin 1.07 m/s    LV Systolic Volume 78.26 mL    LV Diastolic Volume 140.90 mL    RA Major Axis 6.46 cm    Left Atrium Minor Axis 5.63 cm    Left Atrium Major Axis 6.50 cm    Triscuspid Valve Regurgitation Peak  Gradient 28 mmHg    RA Width 5.71 cm    Right Atrial Pressure (from IVC) 3 mmHg    EF 55 %    TV rest pulmonary artery pressure 31 mmHg    Narrative    · The left ventricle is normal in size with normal systolic function.  · The estimated ejection fraction is 55%.  · Indeterminate left ventricular diastolic function.  · Normal right ventricular size with normal right ventricular systolic   function.  · Moderate left atrial enlargement.  · Mild right atrial enlargement.  · Mild-to-moderate mitral regurgitation.  · Normal central venous pressure (3 mmHg).  · The estimated PA systolic pressure is 31 mmHg.

## 2023-06-02 NOTE — PROGRESS NOTES
[facility]  Cardiology  Progress Note    Patient Name: Yaneli Setele  MRN: [id number]  Admission Date: 6/1/2023  Hospital Length of Stay: 1 days  Code Status: Full Code   Attending Physician: Hemanth Lawson MD   Primary Care Physician: Primary Doctor No  Expected Discharge Date:   Principal Problem:Atrial fibrillation with rapid ventricular response    Subjective:     Hospital Course:   No notes on file    Interval History: Telemetry shows atrial flutter with RVR    Review of Systems   Constitutional: Negative for decreased appetite, diaphoresis, malaise/fatigue, weight gain and weight loss.   HENT:  Negative for congestion, hearing loss, hoarse voice, nosebleeds, odynophagia, stridor and tinnitus.    Eyes:  Negative for blurred vision, double vision, photophobia, visual disturbance and visual halos.   Cardiovascular:  Negative for chest pain, claudication, cyanosis, dyspnea on exertion, irregular heartbeat, leg swelling, near-syncope, orthopnea, palpitations, paroxysmal nocturnal dyspnea and syncope.   Respiratory:  Negative for cough, hemoptysis, shortness of breath, sleep disturbances due to breathing, snoring, sputum production and wheezing.    Endocrine: Negative for cold intolerance, heat intolerance, polydipsia, polyphagia and polyuria.   Skin:  Negative for color change, poor wound healing, suspicious lesions and unusual hair distribution.   Musculoskeletal:  Negative for falls, joint pain, muscle cramps, muscle weakness, myalgias, neck pain and stiffness.   Gastrointestinal:  Negative for bloating, abdominal pain, constipation, diarrhea, dysphagia, heartburn, hematemesis, jaundice, melena, nausea and vomiting.   Neurological:  Negative for disturbances in coordination, excessive daytime sleepiness, dizziness, focal weakness, headaches, light-headedness, loss of balance, numbness, paresthesias, seizures, sensory change, tremors, vertigo and weakness.   Psychiatric/Behavioral:  Negative  for altered mental status, depression, hallucinations and memory loss. The patient does not have insomnia and is not nervous/anxious.    Objective:     Vital Signs (Most Recent):  Temp: 97.4 °F (36.3 °C) (06/02/23 0700)  Pulse: 100 (06/02/23 0700)  Resp: (!) 29 (06/02/23 0700)  BP: (!) 142/97 (06/02/23 0700)  SpO2: 99 % (06/02/23 0700) Vital Signs (24h Range):  Temp:  [97.3 °F (36.3 °C)-97.7 °F (36.5 °C)] 97.4 °F (36.3 °C)  Pulse:  [] 100  Resp:  [9-45] 29  SpO2:  [81 %-100 %] 99 %  BP: ()/() 142/97     Weight: 100.7 kg (222 lb 0.1 oz)  Body mass index is 34.77 kg/m².     SpO2: 99 %         Intake/Output Summary (Last 24 hours) at 6/2/2023 0846  Last data filed at 6/2/2023 0550  Gross per 24 hour   Intake --   Output 510 ml   Net -510 ml       Lines/Drains/Airways       Drain  Duration                  Urethral Catheter 06/01/23 2300 Silicone 16 Fr. <1 day              Peripheral Intravenous Line  Duration                  Peripheral IV - Single Lumen 06/01/23 1424 20 G Right Antecubital <1 day         Peripheral IV - Single Lumen 06/01/23 1425 20 G Left Antecubital <1 day                       Physical Exam  Vitals reviewed.   Constitutional:       General: She is not in acute distress.     Appearance: Normal appearance. She is well-developed. She is obese. She is not ill-appearing, toxic-appearing or diaphoretic.   HENT:      Head: Normocephalic.   Neck:      Vascular: No carotid bruit or JVD.   Cardiovascular:      Rate and Rhythm: Regular rhythm. Tachycardia present.      Pulses: Normal pulses.   Pulmonary:      Effort: Pulmonary effort is normal.      Breath sounds: Normal breath sounds.   Abdominal:      General: Bowel sounds are normal.      Palpations: Abdomen is soft.      Tenderness: There is no abdominal tenderness.   Musculoskeletal:      Right lower leg: No edema.      Left lower leg: No edema.   Neurological:      Mental Status: She is alert and oriented to person, place, and time.    Psychiatric:         Mood and Affect: Mood normal.         Speech: Speech normal.         Behavior: Behavior normal.         Thought Content: Thought content normal.          Significant Labs: CMP   Recent Labs   Lab 06/01/23  2335 06/02/23  0445    142   K 4.4 4.5    108   CO2 18* 18*   * 153*   BUN 15 17   CREATININE 1.2 1.1   CALCIUM 9.6 9.6   PROT 7.3  --    ALBUMIN 3.5  --    BILITOT 1.6*  --    ALKPHOS 155*  --    AST 54*  --    ALT 66*  --    ANIONGAP 15 16   , CBC   Recent Labs   Lab 06/01/23  2335 06/02/23  0445   WBC 8.85 8.09   HGB 15.4 16.0   HCT 50.4* 52.0*    197   , Lipid Panel No results for input(s): CHOL, HDL, LDLCALC, TRIG, CHOLHDL in the last 48 hours., and Troponin No results for input(s): TROPONINI in the last 48 hours.    Significant Imaging: Echocardiogram: Transthoracic echo (TTE) complete (Cupid Only):   Results for orders placed or performed during the hospital encounter of 02/07/23   Echo   Result Value Ref Range    TDI SEPTAL 0.04 m/s    LV LATERAL E/E' RATIO 18.25 m/s    LV SEPTAL E/E' RATIO 36.50 m/s    LA WIDTH 5.60 cm    IVC diameter 20 cm    Left Ventricular Outflow Tract Mean Velocity 0.82 cm/s    Left Ventricular Outflow Tract Mean Gradient 2.78 mmHg    TDI LATERAL 0.08 m/s    PV PEAK VELOCITY 0.88 cm/s    LVIDd 5.39 3.5 - 6.0 cm    IVS 0.89 0.6 - 1.1 cm    Posterior Wall 0.78 0.6 - 1.1 cm    LVIDs 4.19 (A) 2.1 - 4.0 cm    FS 22 28 - 44 %    LA volume 164.28 cm3    Sinus 3.04 cm    STJ 2.52 cm    LV mass 163.09 g    LA size 5.72 cm    RVDD 4.31 cm    TAPSE 2.27 cm    Left Ventricle Relative Wall Thickness 0.29 cm    AV mean gradient 5 mmHg    AV valve area 2.79 cm2    AV Velocity Ratio 0.69     AV index (prosthetic) 0.81     MV valve area p 1/2 method 2.46 cm2    E/A ratio 1.36     Mean e' 0.06 m/s    E wave deceleration time 308.20 msec    LVOT diameter 2.09 cm    LVOT area 3.4 cm2    LVOT peak sarah 1.04 m/s    LVOT peak VTI 26.40 cm    Ao peak sarah 1.50  m/s    Ao VTI 32.4 cm    LVOT stroke volume 90.52 cm3    AV peak gradient 9 mmHg    E/E' ratio 24.33 m/s    MV Peak E Mazin 1.46 m/s    TR Max Mazin 2.66 m/s    MV stenosis pressure 1/2 time 89.38 ms    MV Peak A Mazin 1.07 m/s    LV Systolic Volume 78.26 mL    LV Diastolic Volume 140.90 mL    RA Major Axis 6.46 cm    Left Atrium Minor Axis 5.63 cm    Left Atrium Major Axis 6.50 cm    Triscuspid Valve Regurgitation Peak Gradient 28 mmHg    RA Width 5.71 cm    Right Atrial Pressure (from IVC) 3 mmHg    EF 55 %    TV rest pulmonary artery pressure 31 mmHg    Narrative    · The left ventricle is normal in size with normal systolic function.  · The estimated ejection fraction is 55%.  · Indeterminate left ventricular diastolic function.  · Normal right ventricular size with normal right ventricular systolic   function.  · Moderate left atrial enlargement.  · Mild right atrial enlargement.  · Mild-to-moderate mitral regurgitation.  · Normal central venous pressure (3 mmHg).  · The estimated PA systolic pressure is 31 mmHg.        Assessment and Plan:     Brief HPI:     * Atrial fibrillation with rapid ventricular response  Patient in AFib with RVR.  Was initiated on amiodarone drip in the ICU, but became hypotensive and it was stopped.  If rate continues to be high, may give IV digoxin.  Continue Xarelto.  Hold flecainide till EF has been determined.    Patient states that she is been compliant with Xarelto.  If continues to be in AFib in a.m., consider cardioversion    06/02/2023 - Appears to be in AFL. Rate improved. Give morning meds - see if she converts.    Acute on chronic congestive heart failure  Patient is identified as having  heart failure that is Acute on chronic. CHF is currently uncontrolled . Latest ECHO performed and demonstrates- Results for orders placed during the hospital encounter of 02/07/23    Echo    Interpretation Summary  · The left ventricle is normal in size with normal systolic function.  · The  estimated ejection fraction is 55%.  · Indeterminate left ventricular diastolic function.  · Normal right ventricular size with normal right ventricular systolic function.  · Moderate left atrial enlargement.  · Mild right atrial enlargement.  · Mild-to-moderate mitral regurgitation.  · Normal central venous pressure (3 mmHg).  · The estimated PA systolic pressure is 31 mmHg.  . Continue Furosemide and monitor clinical status closely. Monitor on telemetry. Patient is on CHF pathway.  Monitor strict Is&Os and daily weights.  Place on fluid restriction of 1.5 L. Continue to stress to patient importance of self efficacy and  on diet for CHF. Last BNP reviewed- and noted below   Recent Labs   Lab 06/01/23 2024   *   .    IV Lasix as needed and tolerated.  Has history of tachycardia induced cardiomyopathy with normal coronary arteries in the past.  Check 2D echo in a.m..  Guideline directed medical therapy for heart failure    06/02/2023 - appears compensated. Blood pressure control. Rate control. Continue lasix.      Elevated brain natriuretic peptide (BNP) level  Secondary to decompensated heart    Essential (primary) hypertension  Currently hypotensive.  Hold antihypertensive agents    06/02/2023 - meds initially held for hypotension. Blood pressure elevated this morning.        VTE Risk Mitigation (From admission, onward)         Ordered     rivaroxaban tablet 20 mg  With dinner         06/01/23 1711     Reason for No Pharmacological VTE Prophylaxis  Once        Question:  Reasons:  Answer:  Already adequately anticoagulated on oral Anticoagulants    06/01/23 1711     IP VTE HIGH RISK PATIENT  Once         06/01/23 1711     Place sequential compression device  Until discontinued         06/01/23 1711              Critical Care Time: 30 minutes     Critical care was time spent personally by me on the following activities: development of treatment plan with patient or surrogate and bedside caregivers,  discussions with consultants, evaluation of patient's response to treatment, examination of patient, ordering and performing treatments and interventions, ordering and review of laboratory studies, ordering and review of radiographic studies, pulse oximetry, re-evaluation of patient's condition. This critical care time did not overlap with that of any other provider or involve time for any procedures.    Tee Beth MD  Cardiology  [facility] - [place] - Intensive Care

## 2023-06-02 NOTE — NURSING
[facility] - [place] Intensive Care  ICU Shift Summary  Date: 6/2/2023      Prehospitalization: Home  Admit Date / LOS : 6/1/2023/ 1 days    Diagnosis: Atrial fibrillation with rapid ventricular response    Consults:        Active: Cardio       Needed: N/A     Code Status: Full Code   Advanced Directive: <no information>    LDA:  Lines/Drains/Airways       Drain  Duration                  Urethral Catheter 06/01/23 2300 Silicone 16 Fr. <1 day              Peripheral Intravenous Line  Duration                  Peripheral IV - Single Lumen 06/01/23 1424 20 G Right Antecubital <1 day         Peripheral IV - Single Lumen 06/01/23 1425 20 G Left Antecubital <1 day                  Central Lines/Site/Justification:Patient Does Not Have Central Line  Urinary Cath/Order/Justification:Critically Ill in the ICU and requiring intensive monitoring    Vasopressors/Infusions:    sodium chloride 0.9% 75 mL/hr at 06/02/23 0023    amiodarone in dextrose 5% Stopped (06/01/23 2231)    amiodarone in dextrose 5% Stopped (06/02/23 0315)    NORepinephrine bitartrate-D5W 0.02 mcg/kg/min (06/02/23 0138)          GOALS: Volume/ Hemodynamic: MAP >65                     RASS: 1 Anxious at times    Pain Management: none       Pain Controlled: not applicable     Rhythm: A-Fib    Respiratory Device: Nasal Cannula                      Most Recent SBT/ SAT: N/A       MOVE Screen: PASS  ICU Liberation: not applicable    VTE Prophylaxis: Pharm  Mobility: Bedrest  Stress Ulcer Prophylaxis: Yes    Isolation: No active isolations    Dietary:   Current Diet Order   Procedures    Diet NPO      Tolerance: no  Advancement: no    I & O (24h):    Intake/Output Summary (Last 24 hours) at 6/2/2023 0356  Last data filed at 6/2/2023 0000  Gross per 24 hour   Intake --   Output 45 ml   Net -45 ml        Restraints: No    Significant Dates:  Post Op Date: N/A  Rescue Date: 06/01/2023  Imaging/ Diagnostics: N/A    Noteworthy Labs:  Lactic - 4    COVID Test: (--)  CBC/Anemia  Labs: Coags:    Recent Labs   Lab 06/01/23  2335   WBC 8.85   HGB 15.4   HCT 50.4*      MCV 85   RDW 13.1    No results for input(s): PT, INR, APTT in the last 168 hours.     Chemistries:   Recent Labs   Lab 06/01/23 2024 06/01/23  2335   NA  --  143   K  --  4.4   CL  --  110   CO2  --  18*   BUN  --  15   CREATININE  --  1.2   CALCIUM  --  9.6   PROT  --  7.3   BILITOT  --  1.6*   ALKPHOS  --  155*   ALT  --  66*   AST  --  54*   MG 1.6 1.7   PHOS  --  4.0        Cardiac Enzymes: Ejection Fractions:    No results for input(s): CPK, CPKMB, MB, TROPONINI in the last 72 hours. EF   Date Value Ref Range Status   02/07/2023 55 % Final        POCT Glucose: HbA1c:    Recent Labs   Lab 06/01/23 2233   POCTGLUCOSE 170*    Hemoglobin A1C   Date Value Ref Range Status   02/23/2018 5.8 (H) 4.0 - 5.6 % Final     Comment:     According to ADA guidelines, hemoglobin A1c <7.0% represents  optimal control in non-pregnant diabetic patients. Different  metrics may apply to specific patient populations.   Standards of Medical Care in Diabetes-2016.  For the purpose of screening for the presence of diabetes:  <5.7%     Consistent with the absence of diabetes  5.7-6.4%  Consistent with increasing risk for diabetes   (prediabetes)  >or=6.5%  Consistent with diabetes  Currently, no consensus exists for use of hemoglobin A1c  for diagnosis of diabetes for children.  This Hemoglobin A1c assay has significant interference with fetal   hemoglobin   (HbF). The results are invalid for patients with abnormal amounts of   HbF,   including those with known Hereditary Persistence   of Fetal Hemoglobin. Heterozygous hemoglobin variants (HbAS, HbAC,   HbAD, HbAE, HbA2) do not significantly interfere with this assay;   however, presence of multiple variants in a sample may impact the %   interference.             ICU LOS 6h  Level of Care: Critical Care    Chart Check: 24 HR Done  Shift Summary/Plan for the shift: Hold Amiodarone drip and  discontinue Levophed drip. Pt converted to NSR.

## 2023-06-02 NOTE — ASSESSMENT & PLAN NOTE
- most likely CHF exacerbation due to poor controlled rate.  - continue with IV lasix and work on rate/rhythm control

## 2023-06-02 NOTE — PLAN OF CARE
"Case Management Assessment     PCP:   Pharmacy: [facility] [place]    Patient Arrived From: home  Existing Help at Home: Sister and 2 sons available to assist as needed    Barriers to Discharge: none    Discharge Plan:    A. Home with family support   B. Home with family support      Patient's choice of someone to speak on her behalf if unable if no designated MPOA:  Patient stated either of her sons:  Yamile Washington [phone] or Lit Washington [phone]      CM provided Ms Steele with contact info and encouraged her to call for any discharge needs.  CM will continue to follow while in the ICU and finalize DC plan.         06/02/23 1201   Discharge Assessment   Assessment Type Discharge Planning Assessment   Confirmed/corrected address, phone number and insurance Yes   Confirmed Demographics Correct on Facesheet   Source of Information patient   Communicated DREW with patient/caregiver Yes   People in Home alone   Do you expect to return to your current living situation? Yes   Do you have help at home or someone to help you manage your care at home? Yes   Prior to hospitilization cognitive status: Alert/Oriented   Current cognitive status: Alert/Oriented   Equipment Currently Used at Home none   Readmission within 30 days? No   Patient currently being followed by outpatient case management? No   Do you currently have service(s) that help you manage your care at home? No   Do you take prescription medications? Yes   Do you have prescription coverage? Yes   Coverage BCBS and Medicare   Do you have any problems affording any of your prescribed medications? No   Is the patient taking medications as prescribed? yes   Who is going to help you get home at discharge? "One of my sons"   How do you get to doctors appointments? car, drives self   Are you on dialysis? No   Do you take coumadin? No   Discharge Plan B Home   DME Needed Upon Discharge  none   Discharge Plan discussed with: Patient   Transition of " Care Barriers None

## 2023-06-02 NOTE — NURSING TRANSFER
Nursing Transfer Note      6/2/2023     Reason patient is being transferred: Stable for unit    Transfer To: Room 340 from ICU    Transfer via wheelchair    Transfer with cardiac monitoring    Transported by ICU nurse Michelle    Telemetry: Box Number      Medicines sent: n/a    Any special needs or follow-up needed: n/a    Chart send with patient: Yes    Notified: Patient notified son    Patient reassessed at: 06/02/2023 1340    Upon arrival to floor: cardiac monitor applied, patient oriented to room, call bell in reach, and bed in lowest position

## 2023-06-02 NOTE — ASSESSMENT & PLAN NOTE
Patient is identified as having  heart failure that is Acute on chronic. CHF is currently uncontrolled . Latest ECHO performed and demonstrates- Results for orders placed during the hospital encounter of 02/07/23    Echo    Interpretation Summary  · The left ventricle is normal in size with normal systolic function.  · The estimated ejection fraction is 55%.  · Indeterminate left ventricular diastolic function.  · Normal right ventricular size with normal right ventricular systolic function.  · Moderate left atrial enlargement.  · Mild right atrial enlargement.  · Mild-to-moderate mitral regurgitation.  · Normal central venous pressure (3 mmHg).  · The estimated PA systolic pressure is 31 mmHg.  . Continue Furosemide and monitor clinical status closely. Monitor on telemetry. Patient is on CHF pathway.  Monitor strict Is&Os and daily weights.  Place on fluid restriction of 1.5 L. Continue to stress to patient importance of self efficacy and  on diet for CHF. Last BNP reviewed- and noted below   Recent Labs   Lab 06/01/23 2024   *   .    IV Lasix as needed and tolerated.  Has history of tachycardia induced cardiomyopathy with normal coronary arteries in the past.  Check 2D echo in a.m..  Guideline directed medical therapy for heart failure

## 2023-06-02 NOTE — CONSULTS
[facility] - Intensive Care  Cardiology  Consult Note    Patient Name: Yaneli Steele  MRN: [id number]  Admission Date: 6/1/2023  Hospital Length of Stay: 0 days  Code Status: Full Code   Attending Provider: Hemanth Lawson MD   Consulting Provider: Radha Rutledge MD  Primary Care Physician: Primary Doctor No  Principal Problem:Atrial fibrillation with rapid ventricular response    Patient information was obtained from patient and ER records.     Inpatient consult to Cardiology  Consult performed by: Radha Rutledge MD  Consult ordered by: Kameron Ca Jr., NP        Subjective:     Chief Complaint:  sob     HPI:   Patient is a pleasant 67-year-old woman.  Came in with AFib.  Patient of Dr. Issa.  Was on the floor initially and was transferred to ICU because was not feeling well nauseated and short of breath.  In the ICU was initiated on amiodarone drip, but her blood pressure fell and amiodarone drip was stopped.  Patient states that over the past 3 days she is been feeling more shortness of breath and orthopnea as well as swelling of feet.  She states that she has to get up when she is laying down because of shortness of breath.  Has a history of paroxysmal AFib and is on Xarelto and flecainide at home.      Echo 9/14/22 (EF 25% on JUNIOR 9/6/22)   The left ventricle is normal in size with normal systolic function. The estimated ejection fraction is 55%.   Mild right ventricular enlargement with normal right ventricular systolic function.   Grade II left ventricular diastolic dysfunction.   Biatrial enlargement.   Mild-to-moderate mitral regurgitation.   The estimated PA systolic pressure is 35 mmHg.   Intermediate central venous pressure (8 mmHg).     JUNIOR/DCCV 9/6/22  Severe biventricular dysfxn, LVEF 25%  Global hypokinesis  Biatrial enlargement  No LA/ABBIE thrombus  Normal valves  Mod MR/TR  Successful DCCV AF->SR 65 BPM 200J x1  Plan:  Cont med rx  Cont Xarelto 20mg qhs  Start entresto 24/26mg  bid  Home today  Follow up with Dr. Issa as planned  Repeat echo in 3 months for reassessment of LVEF     Cath 2/26/18  B. Summary/Post-Operative Diagnosis     Normal coronary arteries.     Systolic dysfunction.     Severe mitral regurgitation.     Mildly elevated right and left Filling Pressures.     Mild Pulmonary Hypertension.   D. Hemodynamic Results   Ejection Fraction: 25%   Global LV Function: severely depressed   PA: 45   PW:  (26)   CO_THERM: 3.92   RV: 44   RA:  (18)   LVEDP: 13   E. Angiographic Results      - Left Main Coronary Artery:              The LM is normal. There is YULIYA 3 flow.      - Left Anterior Descending Artery:              The LAD is normal. There is YULIYA 3 flow.      - Left Circumflex Artery:              The LCX is normal. There is YULIYA 3 flow.      - Right Coronary Artery:              The RCA is normal. There is YULIYA 3 flow.      - Common Femoral Artery:              The right CFA is normal.           Past Medical History:   Diagnosis Date    Chronic systolic heart failure     Essential (primary) hypertension     Paroxysmal atrial fibrillation        Past Surgical History:   Procedure Laterality Date    ANGIOGRAM, CORONARY, WITH LEFT HEART CATHETERIZATION         Review of patient's allergies indicates:   Allergen Reactions    Penicillins Shortness Of Breath       No current facility-administered medications on file prior to encounter.     Current Outpatient Medications on File Prior to Encounter   Medication Sig    carvediloL (COREG) 25 MG tablet Take 1 tablet (25 mg total) by mouth 2 (two) times daily.    flecainide (TAMBOCOR) 100 MG Tab Take 1 tablet (100 mg total) by mouth every 12 (twelve) hours.    rivaroxaban (XARELTO) 20 mg Tab Take 1 tablet (20 mg total) by mouth daily with dinner or evening meal.    sacubitriL-valsartan (ENTRESTO) 49-51 mg per tablet Take 1 tablet by mouth 2 (two) times daily.     Family History       Problem Relation (Age of Onset)    No Known  Problems Mother, Father          Tobacco Use    Smoking status: Former    Smokeless tobacco: Former    Tobacco comments:     Pt states she quit more than 30 years ago   Substance and Sexual Activity    Alcohol use: No    Drug use: No    Sexual activity: Not on file     Review of Systems   Constitutional: Negative.   HENT: Negative.     Eyes: Negative.    Cardiovascular:  Positive for dyspnea on exertion and orthopnea.   Respiratory:  Positive for shortness of breath.    Endocrine: Negative.    Hematologic/Lymphatic: Negative.    Skin: Negative.    Musculoskeletal: Negative.    Gastrointestinal: Negative.    Genitourinary: Negative.    Neurological: Negative.    Psychiatric/Behavioral: Negative.     Allergic/Immunologic: Negative.    Objective:     Vital Signs (Most Recent):  Temp: 97.5 °F (36.4 °C) (06/01/23 1946)  Pulse: (!) 116 (06/01/23 2200)  Resp: 16 (06/01/23 2200)  BP: 101/62 (06/01/23 2200)  SpO2: 96 % (06/01/23 2200) Vital Signs (24h Range):  Temp:  [97.5 °F (36.4 °C)-97.7 °F (36.5 °C)] 97.5 °F (36.4 °C)  Pulse:  [] 116  Resp:  [15-40] 16  SpO2:  [93 %-98 %] 96 %  BP: ()/() 101/62     Weight: 100.7 kg (222 lb 0.1 oz)  Body mass index is 34.77 kg/m².    SpO2: 96 %       No intake or output data in the 24 hours ending 06/01/23 2346    Lines/Drains/Airways       Peripheral Intravenous Line  Duration                  Peripheral IV - Single Lumen 06/01/23 1424 20 G Right Antecubital <1 day         Peripheral IV - Single Lumen 06/01/23 1425 20 G Left Antecubital <1 day                     Physical Exam  Constitutional:       Appearance: Normal appearance. She is well-developed.   HENT:      Head: Normocephalic.   Eyes:      Pupils: Pupils are equal, round, and reactive to light.   Cardiovascular:      Rate and Rhythm: Tachycardia present. Rhythm irregular.   Pulmonary:      Effort: Pulmonary effort is normal.      Breath sounds: Normal breath sounds.   Abdominal:      General: Bowel sounds  are normal.      Palpations: Abdomen is soft.      Tenderness: There is no abdominal tenderness.   Musculoskeletal:         General: Normal range of motion.      Cervical back: Normal range of motion and neck supple.   Skin:     General: Skin is warm.   Neurological:      Mental Status: She is alert and oriented to person, place, and time.        Significant Labs: BMP:   Recent Labs   Lab 06/01/23 2024   MG 1.6   , CMP No results for input(s): NA, K, CL, CO2, GLU, BUN, CREATININE, CALCIUM, PROT, ALBUMIN, BILITOT, ALKPHOS, AST, ALT, ANIONGAP, ESTGFRAFRICA, EGFRNONAA in the last 48 hours., CBC No results for input(s): WBC, HGB, HCT, PLT in the last 48 hours., INR No results for input(s): INR, PROTIME in the last 48 hours., Lipid Panel No results for input(s): CHOL, HDL, LDLCALC, TRIG, CHOLHDL in the last 48 hours., Troponin No results for input(s): TROPONINI in the last 48 hours., and All pertinent lab results from the last 24 hours have been reviewed.    Significant Imaging: Echocardiogram: Transthoracic echo (TTE) complete (Cupid Only):   Results for orders placed or performed during the hospital encounter of 02/07/23   Echo   Result Value Ref Range    TDI SEPTAL 0.04 m/s    LV LATERAL E/E' RATIO 18.25 m/s    LV SEPTAL E/E' RATIO 36.50 m/s    LA WIDTH 5.60 cm    IVC diameter 20 cm    Left Ventricular Outflow Tract Mean Velocity 0.82 cm/s    Left Ventricular Outflow Tract Mean Gradient 2.78 mmHg    TDI LATERAL 0.08 m/s    PV PEAK VELOCITY 0.88 cm/s    LVIDd 5.39 3.5 - 6.0 cm    IVS 0.89 0.6 - 1.1 cm    Posterior Wall 0.78 0.6 - 1.1 cm    LVIDs 4.19 (A) 2.1 - 4.0 cm    FS 22 28 - 44 %    LA volume 164.28 cm3    Sinus 3.04 cm    STJ 2.52 cm    LV mass 163.09 g    LA size 5.72 cm    RVDD 4.31 cm    TAPSE 2.27 cm    Left Ventricle Relative Wall Thickness 0.29 cm    AV mean gradient 5 mmHg    AV valve area 2.79 cm2    AV Velocity Ratio 0.69     AV index (prosthetic) 0.81     MV valve area p 1/2 method 2.46 cm2    E/A  ratio 1.36     Mean e' 0.06 m/s    E wave deceleration time 308.20 msec    LVOT diameter 2.09 cm    LVOT area 3.4 cm2    LVOT peak mazin 1.04 m/s    LVOT peak VTI 26.40 cm    Ao peak mazin 1.50 m/s    Ao VTI 32.4 cm    LVOT stroke volume 90.52 cm3    AV peak gradient 9 mmHg    E/E' ratio 24.33 m/s    MV Peak E Mazin 1.46 m/s    TR Max Mazin 2.66 m/s    MV stenosis pressure 1/2 time 89.38 ms    MV Peak A Mazin 1.07 m/s    LV Systolic Volume 78.26 mL    LV Diastolic Volume 140.90 mL    RA Major Axis 6.46 cm    Left Atrium Minor Axis 5.63 cm    Left Atrium Major Axis 6.50 cm    Triscuspid Valve Regurgitation Peak Gradient 28 mmHg    RA Width 5.71 cm    Right Atrial Pressure (from IVC) 3 mmHg    EF 55 %    TV rest pulmonary artery pressure 31 mmHg    Narrative    · The left ventricle is normal in size with normal systolic function.  · The estimated ejection fraction is 55%.  · Indeterminate left ventricular diastolic function.  · Normal right ventricular size with normal right ventricular systolic   function.  · Moderate left atrial enlargement.  · Mild right atrial enlargement.  · Mild-to-moderate mitral regurgitation.  · Normal central venous pressure (3 mmHg).  · The estimated PA systolic pressure is 31 mmHg.        Assessment and Plan:     * Atrial fibrillation with rapid ventricular response  Patient in AFib with RVR.  Was initiated on amiodarone drip in the ICU, but became hypotensive and it was stopped.  If rate continues to be high, may give IV digoxin.  Continue Xarelto.  Hold flecainide till EF has been determined.    Patient states that she is been compliant with Xarelto.  If continues to be in AFib in a.m., consider cardioversion    Acute on chronic congestive heart failure  Patient is identified as having  heart failure that is Acute on chronic. CHF is currently uncontrolled . Latest ECHO performed and demonstrates- Results for orders placed during the hospital encounter of 02/07/23    Echo    Interpretation  Summary  · The left ventricle is normal in size with normal systolic function.  · The estimated ejection fraction is 55%.  · Indeterminate left ventricular diastolic function.  · Normal right ventricular size with normal right ventricular systolic function.  · Moderate left atrial enlargement.  · Mild right atrial enlargement.  · Mild-to-moderate mitral regurgitation.  · Normal central venous pressure (3 mmHg).  · The estimated PA systolic pressure is 31 mmHg.  . Continue Furosemide and monitor clinical status closely. Monitor on telemetry. Patient is on CHF pathway.  Monitor strict Is&Os and daily weights.  Place on fluid restriction of 1.5 L. Continue to stress to patient importance of self efficacy and  on diet for CHF. Last BNP reviewed- and noted below   Recent Labs   Lab 06/01/23 2024   *   .    IV Lasix as needed and tolerated.  Has history of tachycardia induced cardiomyopathy with normal coronary arteries in the past.  Check 2D echo in a.m..  Guideline directed medical therapy for heart failure      Elevated brain natriuretic peptide (BNP) level  Secondary to decompensated heart    Essential (primary) hypertension  Currently hypotensive.  Hold antihypertensive agents        VTE Risk Mitigation (From admission, onward)         Ordered     rivaroxaban tablet 20 mg  With dinner         06/01/23 1711     Reason for No Pharmacological VTE Prophylaxis  Once        Question:  Reasons:  Answer:  Already adequately anticoagulated on oral Anticoagulants    06/01/23 1711     IP VTE HIGH RISK PATIENT  Once         06/01/23 1711     Place sequential compression device  Until discontinued         06/01/23 1711                Thank you for your consult. I will follow-up with patient. Please contact us if you have any additional questions.    Radha Rutledge MD  Cardiology   [facility] - Intensive Care    Critical Care Time: 45 minutes     Critical care was time spent personally by me on the following  activities: development of treatment plan with patient or surrogate and bedside caregivers, discussions with consultants, evaluation of patient's response to treatment, examination of patient, ordering and performing treatments and interventions, ordering and review of laboratory studies, ordering and review of radiographic studies, pulse oximetry, re-evaluation of patient's condition. This critical care time did not overlap with that of any other provider or involve time for any procedures.

## 2023-06-02 NOTE — H&P
[facility]  History & Physical    Patient Name: Yaneli Steele  MRN: [id number]  Patient Class: OP- Observation  Admission Date: 6/1/2023  Attending Physician: Hemanth Lawson MD   Primary Care Provider: Primary Doctor No         Patient information was obtained from patient, past medical records and ER records.     Subjective:     Principal Problem:Atrial fibrillation with rapid ventricular response    Chief Complaint:   Chief Complaint   Patient presents with    Nausea     Pt c/o nausea and fatigue x 2 weeks. Pt denies CP, SOB, vomiting, diarrhea and dizziness        HPI: 67 y.o. female with PAF, HTN presents with a complaint of nausea.  Acute onset, duration 2 weeks, associated with cough, congestion, and URI symptoms.  No known exacerbating or alleviating factors.  Reports adherence to home medication regimen.  Denies fever, chills, chest pain, palpitations, dizziness, syncope, diarrhea, or abdominal pain.  In the ED she was found to be in atrial fibrillation with RVR.  Rate controlled after 2 doses of IV diltiazem.  Placed in observation.      Past Medical History:   Diagnosis Date    Chronic systolic heart failure     Essential (primary) hypertension     Paroxysmal atrial fibrillation        Past Surgical History:   Procedure Laterality Date    ANGIOGRAM, CORONARY, WITH LEFT HEART CATHETERIZATION         Review of patient's allergies indicates:   Allergen Reactions    Penicillins Shortness Of Breath       No current facility-administered medications on file prior to encounter.     Current Outpatient Medications on File Prior to Encounter   Medication Sig    carvediloL (COREG) 25 MG tablet Take 1 tablet (25 mg total) by mouth 2 (two) times daily.    flecainide (TAMBOCOR) 100 MG Tab Take 1 tablet (100 mg total) by mouth every 12 (twelve) hours.    rivaroxaban (XARELTO) 20 mg Tab Take 1 tablet (20 mg total) by mouth daily with dinner or evening meal.     PEDIATRIC HOSPITALIST HIGH RISK DELIVERY ATTENDANCE NOTE    Attending Provider: Marlo Horta MD  Primary Hospital Service: Pediatrics   YOB: 2022    Chief Complaint: High Risk Delivery    Pediatric Hospitalist/Pediatric Team was called to the Delivery Room for the birth of baby Carlos Menendez  Pediatric Hospitalist presence requested due to: meconium  Pediatrician arrived at delivery: prior to the birth    Carlos Menendez is a 0 day old male   infant delivered at Gestational Age: 38w0d on 2022    MATERNAL INFORMATION:     Age, /Para, YONATHAN:   Information for the patient's mother:  ShonLatonia [[id number]]   27 year old          2022, by Last Menstrual Period       steroids during pregnancy:       Information for the patient's mother:  Latonia Menendez [[id number]]     Social History     Tobacco Use   • Smoking status: Never Smoker   • Smokeless tobacco: Never Used   Substance Use Topics   • Alcohol use: Never      Information for the patient's mother:  Latonia Menendez [[id number]]     Past Medical History:   Diagnosis Date   • Anxiety    • Depression    • History of placental abruption    • Injury    • Postpartum depression    • Syphilis affecting pregnancy 2022        Prenatal Labs:  Information for the patient's mother:  ShonLatonia [[id number]]     Recent Labs   Lab 22  0000 22  0509 22  1054 22  0000   HIV Antigen/Antibody Screen non reactive  --   --  non reactive  non reactive   HEP B Surface AG non reactive  --   --  negative   RPR  --   --    < >  --    RPR Screen reactive  --   --  reactive   Neisseria gonorrhoeae by Nucleic Acid Amplification  --  Negative  --   --    Chlamydia trachomatis by Nucleic Acid Amplification  --  Negative  --   --    Rubella Antibody IgG Immune  --   --  immune   ABO/RH(D)  --   --   --  O Positive    < > = values in this interval not displayed.      Information for the patient's  mother:  Latonia Menendez [[id number]]   No results for input(s): CULT, SDES in the last 8765 hours.     GBS: Negative No antibiotics needed.    BIRTH HISTORY:     Delivery Method:     Rupture date & time: 2022 2:20 AM   Date & time of birth 2022 4:30 AM   Induction:       Complications/ Risks       Placenta appearance: Intact   Cord info/complications: 3 Vessels Nuchal Nuchal Cord:Loose 2   Delayed cord clamping: No   Indications for :     Presentation/position: Vertex   Occiput Posterior   Forceps attempted? Yes     Vacuum attempted? No     Shoulder dystocia? No                          INFORMATION     Resuscitation:     Resuscitation: Peds called to delivery due to meconium and forcep delivery. Baby cried and vigorous at birth. Delayed cord clamping performed. Brought to warmer ~1 minute of life. Warmed/dried/stimulated per routine. Remained vigorous, HR > 100, poor respiratory effort. Pulse ox applied and CPAP started by 2 MOL.  Continued till 6 MOL with improvement in respiratory effort. Saturations appropriate throughout. Continued to transition appropriately. Pale color centrally and on extremities. 1 minute apgar 6,  5 minute apgar 8. Dispo to general care nursery.          APGARS  One minute Five minutes   Skin color: 0  0    Heart rate: 2  2     Reflex: 2  2    Muscle tone: 1  2    Breathin  2    Totals:   6  8      Cord Blood/[place] Evaluation (CRD)  No results found  Blood gases sent? Yes   Cord pH No results found    PHYSICAL EXAM     VITALS:   Visit Vitals  Ht 20\" (50.8 cm) Comment: Filed from Delivery Summary   Wt 3035 g Comment: Filed from Delivery Summary   HC 35 cm (13.78\") Comment: Filed from Delivery Summary   BMI 11.76 kg/m²     Intake/Output  Report    None          Birth Measurements:        Weight: 6 lb 11.1 oz (3035 g)        Length: 20\"        Head circumference: 35 cm    GENERAL:Baby Boy is an alert, vigorous male with appropriate behavior. He is in no acute  sacubitriL-valsartan (ENTRESTO) 49-51 mg per tablet Take 1 tablet by mouth 2 (two) times daily.     Family History       Problem Relation (Age of Onset)    No Known Problems Mother, Father          Tobacco Use    Smoking status: Former    Smokeless tobacco: Former    Tobacco comments:     Pt states she quit more than 30 years ago   Substance and Sexual Activity    Alcohol use: No    Drug use: No    Sexual activity: Not on file     Review of Systems   Constitutional:  Negative for chills, fatigue and fever.   HENT:  Positive for congestion. Negative for rhinorrhea.    Eyes:  Negative for photophobia and visual disturbance.   Respiratory:  Positive for cough. Negative for shortness of breath.    Cardiovascular:  Negative for chest pain, palpitations and leg swelling.   Gastrointestinal:  Positive for nausea. Negative for abdominal pain, diarrhea and vomiting.   Genitourinary:  Negative for dysuria, frequency and urgency.   Skin:  Negative for pallor, rash and wound.   Neurological:  Negative for light-headedness and headaches.   Psychiatric/Behavioral:  Negative for confusion and decreased concentration.    Objective:     Vital Signs (Most Recent):  Temp: 97.7 °F (36.5 °C) (06/01/23 1736)  Pulse: (!) 130 (06/01/23 1736)  Resp: 15 (06/01/23 1736)  BP: (!) 120/91 (06/01/23 1821)  SpO2: 96 % (06/01/23 1736) Vital Signs (24h Range):  Temp:  [97.5 °F (36.4 °C)-97.7 °F (36.5 °C)] 97.7 °F (36.5 °C)  Pulse:  [] 130  Resp:  [15-33] 15  SpO2:  [93 %-98 %] 96 %  BP: ()/() 120/91     Weight: 100.7 kg (222 lb 0.1 oz)  Body mass index is 34.77 kg/m².     Physical Exam  Vitals and nursing note reviewed.   Constitutional:       General: She is not in acute distress.     Appearance: She is well-developed.   HENT:      Head: Normocephalic and atraumatic.      Right Ear: External ear normal.      Left Ear: External ear normal.      Nose: Nose normal.   Eyes:      Conjunctiva/sclera: Conjunctivae normal.    Cardiovascular:      Rate and Rhythm: Normal rate. Rhythm irregularly irregular.   Pulmonary:      Effort: Pulmonary effort is normal. No respiratory distress.   Abdominal:      General: There is no distension.      Palpations: Abdomen is soft.   Musculoskeletal:         General: Normal range of motion.   Skin:     General: Skin is warm and dry.   Neurological:      Mental Status: She is alert and oriented to person, place, and time.   Psychiatric:         Thought Content: Thought content normal.              Significant Labs: All pertinent labs within the past 24 hours have been reviewed.    Significant Imaging: I have reviewed all pertinent imaging results/findings within the past 24 hours.    Assessment/Plan:     * Atrial fibrillation with rapid ventricular response  Patient with Paroxysmal (<7 days) atrial fibrillation which is uncontrolled currently with Beta Blocker and flecainide. Patient is currently in atrial fibrillation.[id number] Score: 2.  Anticoagulation indicated. Anticoagulation done with rivaroxaban.    Acute on chronic congestive heart failure  Patient is identified as having Combined Systolic and Diastolic heart failure that is Acute on chronic. CHF is currently uncontrolled due to Pulmonary edema/pleural effusion on CXR. Latest ECHO performed and demonstrates- Results for orders placed during the hospital encounter of 02/07/23    Echo    Interpretation Summary  · The left ventricle is normal in size with normal systolic function.  · The estimated ejection fraction is 55%.  · Indeterminate left ventricular diastolic function.  · Normal right ventricular size with normal right ventricular systolic function.  · Moderate left atrial enlargement.  · Mild right atrial enlargement.  · Mild-to-moderate mitral regurgitation.  · Normal central venous pressure (3 mmHg).  · The estimated PA systolic pressure is 31 mmHg.  . Continue Beta Blocker and monitor clinical status closely. Monitor on telemetry.  distress.  SKIN: His skin is warm with normal turgor. The color of the skin is pink. There is no rash. +bruising on R side of face and L earlobe. +pale  HEAD: The head is atraumatic and normocephalic. The anterior fontanel is open and flat.  EARS: Pinnae normal.  NOSE: There is no nasal flaring, nares patent bilaterally.  THROAT:  The oropharynx is normal.  There is no cleft of the palate.  NECK: Clavicles without crepitus.  TRUNK AND THORAX: There are no lesions on the trunk; there is no dimple over the presacral area. There are no retractions.  LUNGS: The lung fields are clear to auscultation.  HEART: The precordium is quiet. The heart rhythm is grossly regular. S1 and S2 are normal. There are no murmurs. Normal femoral pulses.  ABDOMEN: The umbilical cord stump is normal. There is not an umbilical hernia. The abdomen is flat and soft.   GENITALIA: normal male genitalia with bilateral descended testes  RECTAL: anus patent  EXTREMITIES: Moving all 4 extremities. The hip exam is normal  . There are no hip clicks or clunks.    NEUROLOGIC: He displays normal tone throughout. He is not jittery.    ASSESSMENT     FT baby born via VD complicated by mec and forceps to a mother with treatment for neurosyphilis (repeat titers sent today) and previously on lithium (fetal ECHO normal), neg GBS, doing well    PLAN     Admit to Mother Baby Unit. Please see H&P for further details.  Will recommend to primary Pediatrician to obtain CBC along with RPR.   Repeat ECHO prior to discharge    Shanda Lainez  Pediatric Hospitalist                     Patient is on CHF pathway.  Monitor strict Is&Os and daily weights.  Place on fluid restriction of 1.5 L. Continue to stress to patient importance of self efficacy and  on diet for CHF. Last BNP reviewed- and noted 630.    Essential (primary) hypertension  Well controlled, continue home medications and monitor blood pressure, adjust as needed.       VTE Risk Mitigation (From admission, onward)         Ordered     rivaroxaban tablet 20 mg  With dinner         06/01/23 1711     Reason for No Pharmacological VTE Prophylaxis  Once        Question:  Reasons:  Answer:  Already adequately anticoagulated on oral Anticoagulants    06/01/23 1711     IP VTE HIGH RISK PATIENT  Once         06/01/23 1711     Place sequential compression device  Until discontinued         06/01/23 1711                     On 06/01/2023, patient should be placed in hospital observation services under my care in collaboration with Hemanth Lawson MD.    Kameron Ca Jr., APRN, AGACN-BC  Hospitalist - Department of Hospital Medicine  [facility] [address] [place] [address]  Office #: [phone]; Pager #: [phone]

## 2023-06-02 NOTE — NURSING
[facility]  Nurses Note -- 4 Eyes      6/2/2023       Skin assessed on: Transfer      [x] No Pressure Injuries Present    [x]Prevention Measures Documented    [] Yes LDA  for Pressure Injury Previously documented     [] Yes New Pressure Injury Discovered   [] LDA for New Pressure Injury Added      Attending RN:  Peri Saha, RN     Second RN:  THEO Goodwin RN

## 2023-06-02 NOTE — ASSESSMENT & PLAN NOTE
Currently hypotensive.  Hold antihypertensive agents    06/02/2023 - meds initially held for hypotension. Blood pressure elevated this morning.

## 2023-06-02 NOTE — HPI
Patient is a pleasant 67-year-old woman.  Came in with AFib.  Patient of Dr. Issa.  Was on the floor initially and was transferred to ICU because was not feeling well nauseated and short of breath.  In the ICU was initiated on amiodarone drip, but her blood pressure fell and amiodarone drip was stopped.  Patient states that over the past 3 days she is been feeling more shortness of breath and orthopnea as well as swelling of feet.  She states that she has to get up when she is laying down because of shortness of breath.  Has a history of paroxysmal AFib and is on Xarelto and flecainide at home.      Echo 9/14/22 (EF 25% on JUNIOR 9/6/22)  The left ventricle is normal in size with normal systolic function. The estimated ejection fraction is 55%.  Mild right ventricular enlargement with normal right ventricular systolic function.  Grade II left ventricular diastolic dysfunction.  Biatrial enlargement.  Mild-to-moderate mitral regurgitation.  The estimated PA systolic pressure is 35 mmHg.  Intermediate central venous pressure (8 mmHg).     JUNIOR/DCCV 9/6/22  Severe biventricular dysfxn, LVEF 25%  Global hypokinesis  Biatrial enlargement  No LA/ABBIE thrombus  Normal valves  Mod MR/TR  Successful DCCV AF->SR 65 BPM 200J x1  Plan:  Cont med rx  Cont Xarelto 20mg qhs  Start entresto 24/26mg bid  Home today  Follow up with Dr. Issa as planned  Repeat echo in 3 months for reassessment of LVEF     Cath 2/26/18  B. Summary/Post-Operative Diagnosis     Normal coronary arteries.     Systolic dysfunction.     Severe mitral regurgitation.     Mildly elevated right and left Filling Pressures.     Mild Pulmonary Hypertension.   D. Hemodynamic Results   Ejection Fraction: 25%   Global LV Function: severely depressed   PA: 45   PW:  (26)   CO_THERM: 3.92   RV: 44   RA:  (18)   LVEDP: 13   E. Angiographic Results      - Left Main Coronary Artery:              The LM is normal. There is YULIYA 3 flow.      - Left Anterior Descending  Artery:              The LAD is normal. There is YULIYA 3 flow.      - Left Circumflex Artery:              The LCX is normal. There is YULIYA 3 flow.      - Right Coronary Artery:              The RCA is normal. There is YULIYA 3 flow.      - Common Femoral Artery:              The right CFA is normal.

## 2023-06-02 NOTE — ASSESSMENT & PLAN NOTE
Patient is identified as having Combined Systolic and Diastolic heart failure that is Acute on chronic. CHF is currently uncontrolled due to Pulmonary edema/pleural effusion on CXR. Latest ECHO performed and demonstrates- Results for orders placed during the hospital encounter of 02/07/23    Echo    Interpretation Summary  · The left ventricle is normal in size with normal systolic function.  · The estimated ejection fraction is 55%.  · Indeterminate left ventricular diastolic function.  · Normal right ventricular size with normal right ventricular systolic function.  · Moderate left atrial enlargement.  · Mild right atrial enlargement.  · Mild-to-moderate mitral regurgitation.  · Normal central venous pressure (3 mmHg).  · The estimated PA systolic pressure is 31 mmHg.  . Continue Beta Blocker and monitor clinical status closely. Monitor on telemetry. Patient is on CHF pathway.  Monitor strict Is&Os and daily weights.  Place on fluid restriction of 1.5 L. Continue to stress to patient importance of self efficacy and  on diet for CHF. Last BNP reviewed- and noted 630.    - continue with IV lasix for diuresis

## 2023-06-02 NOTE — NURSING
Received from [facility] via stretcher and transferred to room 280. Connected to cardiac monitor and oriented to room. Right and Left peripheral IV's flushed with NS and sites clear.  2201: Amiodarone 150 mg bolus given over 10 min as ordered. Pt remains in Afib with RVR at present time. Will con't to monitor for now.  2215; Amiodarone drip started at 1mg/min for afib with RVR. Pt suddenly became nauseated and states I feel terrible. HOB elevated. BP reading 85 systolic and Dr. Rutledge notified and coming to see pt. Will con't with Amiodarone for now. 2300: Pt still complaining of nasea and is diaphoretic.Dr. Dawn here and Amiordarone drip discontinued for now BP 70's palpable . Pt placed on Levophed drip at low dose for low BP and jarvis ctheter inserted per BRI GoodwinRN. See flow sheet for titration.

## 2023-06-02 NOTE — ASSESSMENT & PLAN NOTE
Patient is identified as having  heart failure that is Acute on chronic. CHF is currently uncontrolled . Latest ECHO performed and demonstrates- Results for orders placed during the hospital encounter of 02/07/23    Echo    Interpretation Summary  · The left ventricle is normal in size with normal systolic function.  · The estimated ejection fraction is 55%.  · Indeterminate left ventricular diastolic function.  · Normal right ventricular size with normal right ventricular systolic function.  · Moderate left atrial enlargement.  · Mild right atrial enlargement.  · Mild-to-moderate mitral regurgitation.  · Normal central venous pressure (3 mmHg).  · The estimated PA systolic pressure is 31 mmHg.  . Continue Furosemide and monitor clinical status closely. Monitor on telemetry. Patient is on CHF pathway.  Monitor strict Is&Os and daily weights.  Place on fluid restriction of 1.5 L. Continue to stress to patient importance of self efficacy and  on diet for CHF. Last BNP reviewed- and noted below   Recent Labs   Lab 06/01/23 2024   *   .    IV Lasix as needed and tolerated.  Has history of tachycardia induced cardiomyopathy with normal coronary arteries in the past.  Check 2D echo in a.m..  Guideline directed medical therapy for heart failure    06/02/2023 - appears compensated. Blood pressure control. Rate control. Continue lasix.

## 2023-06-02 NOTE — ASSESSMENT & PLAN NOTE
Patient with Paroxysmal (<7 days) atrial fibrillation which is uncontrolled currently with Beta Blocker and flecainide. Patient is currently in atrial fibrillation.[id number] Score: 2.  Anticoagulation indicated. Anticoagulation done with rivaroxaban.

## 2023-06-02 NOTE — NURSING
RAPID RESPONSE NURSE PROACTIVE ROUNDING NOTE       Time of Visit:  And     Admit Date: 2023  LOS: 0  Code Status: Full Code   Date of Visit: 2023  : 1955  Age: 67 y.o.  Sex: female  Race: Black or   Bed: W437/W43 A:   MRN: [id number]  Was the patient discharged from an ICU this admission? No   Was the patient discharged from a PACU within last 24 hours? No   Did the patient receive conscious sedation/general anesthesia in last 24 hours? No   Was the patient in the ED within the past 24 hours? Yes   Was the patient on NIPPV within the past 24 hours? No   Attending Physician: Hemanth Lawson MD  Primary Service: Hospitalist   Time spent at the bedside: 15 -30 min    SITUATION    Notified by  MEWS score  Reason for alert: HR- Afib with RVR    Diagnosis: Atrial fibrillation with rapid ventricular response   has a past medical history of Chronic systolic heart failure, Essential (primary) hypertension, and Paroxysmal atrial fibrillation.    Last Vitals:  Temp: 97.5 °F (36.4 °C) (1946)  Pulse: 121 (2046)  Resp: 15 (1736)  BP: 108/65 (2046)  SpO2: 96 % (1736)    24 Hour Vitals Range:  Temp:  [97.5 °F (36.4 °C)-97.7 °F (36.5 °C)]   Pulse:  []   Resp:  [15-33]   BP: ()/()   SpO2:  [93 %-98 %]     Clinical Issues: Dysrhythmia    ASSESSMENT/INTERVENTIONS    Pt new admit with afib with RVR. Asymptomatic. BP meds given in outside facilty and to be given here.  Followup, pt BP soft. HR still >120. MD wants transfer and amio gtt.    RECOMMENDATIONS  Tx to ICU for Amio    Discussed plan of care with charge RNAreli and Bedside RNChantel.    PROVIDER ESCALATION    Physician escalation: Yes    Orders received and case discussed with  ANAMARIA Ca.    Disposition:Tx in ICU bed 280 .    FOLLOW UP    Call back the Rapid Response Nurse for additional questions or concerns.

## 2023-06-02 NOTE — CARE UPDATE
I was called to evaluate patient at bedside for hypotension/acute distress.     This is a 67 year old female with a past medical history of Afib (on Xarelto), HTN, HFpEF (EF: 50%) who is admitted for Afib w/ RVR & HF exacerbation. She was started on amiodarone bolus followed by a drip. Shortly after the amiodarone bolus, patient became hypotensive (80/50s) and became diaphoretic, nauseated and endorsed worsening shortness of breath. She was saturating well on room air but was placed on supplemental oxygen for comfort. Lasix was given, per cardiology's recommendations. Zofran was administered as well.     Patient remained restless and hypotensive (70/50). A low dose Levophed was started with improvement of BP (100s/60s).     Presentation is likely due to amiodarone bolus causing hypotension. Will hold amiodarone for now, wean levophed as tolerated and hold antihypertensives for the time being. Care ongoing.     Critical care time spent on the evaluation and treatment of severe organ dysfunction, review of pertinent labs and imaging studies, discussions with consulting providers and discussions with patient/family: 45 minutes.

## 2023-06-02 NOTE — SUBJECTIVE & OBJECTIVE
Past Medical History:   Diagnosis Date    Chronic systolic heart failure     Essential (primary) hypertension     Paroxysmal atrial fibrillation        Past Surgical History:   Procedure Laterality Date    ANGIOGRAM, CORONARY, WITH LEFT HEART CATHETERIZATION         Review of patient's allergies indicates:   Allergen Reactions    Penicillins Shortness Of Breath       No current facility-administered medications on file prior to encounter.     Current Outpatient Medications on File Prior to Encounter   Medication Sig    carvediloL (COREG) 25 MG tablet Take 1 tablet (25 mg total) by mouth 2 (two) times daily.    flecainide (TAMBOCOR) 100 MG Tab Take 1 tablet (100 mg total) by mouth every 12 (twelve) hours.    rivaroxaban (XARELTO) 20 mg Tab Take 1 tablet (20 mg total) by mouth daily with dinner or evening meal.    sacubitriL-valsartan (ENTRESTO) 49-51 mg per tablet Take 1 tablet by mouth 2 (two) times daily.     Family History       Problem Relation (Age of Onset)    No Known Problems Mother, Father          Tobacco Use    Smoking status: Former    Smokeless tobacco: Former    Tobacco comments:     Pt states she quit more than 30 years ago   Substance and Sexual Activity    Alcohol use: No    Drug use: No    Sexual activity: Not on file     Review of Systems   Constitutional: Negative.   HENT: Negative.     Eyes: Negative.    Cardiovascular:  Positive for dyspnea on exertion and orthopnea.   Respiratory:  Positive for shortness of breath.    Endocrine: Negative.    Hematologic/Lymphatic: Negative.    Skin: Negative.    Musculoskeletal: Negative.    Gastrointestinal: Negative.    Genitourinary: Negative.    Neurological: Negative.    Psychiatric/Behavioral: Negative.     Allergic/Immunologic: Negative.    Objective:     Vital Signs (Most Recent):  Temp: 97.5 °F (36.4 °C) (06/01/23 1946)  Pulse: (!) 116 (06/01/23 2200)  Resp: 16 (06/01/23 2200)  BP: 101/62 (06/01/23 2200)  SpO2: 96 % (06/01/23 2200) Vital Signs (24h  Range):  Temp:  [97.5 °F (36.4 °C)-97.7 °F (36.5 °C)] 97.5 °F (36.4 °C)  Pulse:  [] 116  Resp:  [15-40] 16  SpO2:  [93 %-98 %] 96 %  BP: ()/() 101/62     Weight: 100.7 kg (222 lb 0.1 oz)  Body mass index is 34.77 kg/m².    SpO2: 96 %       No intake or output data in the 24 hours ending 06/01/23 2346    Lines/Drains/Airways       Peripheral Intravenous Line  Duration                  Peripheral IV - Single Lumen 06/01/23 1424 20 G Right Antecubital <1 day         Peripheral IV - Single Lumen 06/01/23 1425 20 G Left Antecubital <1 day                     Physical Exam  Constitutional:       Appearance: Normal appearance. She is well-developed.   HENT:      Head: Normocephalic.   Eyes:      Pupils: Pupils are equal, round, and reactive to light.   Cardiovascular:      Rate and Rhythm: Tachycardia present. Rhythm irregular.   Pulmonary:      Effort: Pulmonary effort is normal.      Breath sounds: Normal breath sounds.   Abdominal:      General: Bowel sounds are normal.      Palpations: Abdomen is soft.      Tenderness: There is no abdominal tenderness.   Musculoskeletal:         General: Normal range of motion.      Cervical back: Normal range of motion and neck supple.   Skin:     General: Skin is warm.   Neurological:      Mental Status: She is alert and oriented to person, place, and time.        Significant Labs: BMP:   Recent Labs   Lab 06/01/23 2024   MG 1.6   , CMP No results for input(s): NA, K, CL, CO2, GLU, BUN, CREATININE, CALCIUM, PROT, ALBUMIN, BILITOT, ALKPHOS, AST, ALT, ANIONGAP, ESTGFRAFRICA, EGFRNONAA in the last 48 hours., CBC No results for input(s): WBC, HGB, HCT, PLT in the last 48 hours., INR No results for input(s): INR, PROTIME in the last 48 hours., Lipid Panel No results for input(s): CHOL, HDL, LDLCALC, TRIG, CHOLHDL in the last 48 hours., Troponin No results for input(s): TROPONINI in the last 48 hours., and All pertinent lab results from the last 24 hours have been  reviewed.    Significant Imaging: Echocardiogram: Transthoracic echo (TTE) complete (Cupid Only):   Results for orders placed or performed during the hospital encounter of 02/07/23   Echo   Result Value Ref Range    TDI SEPTAL 0.04 m/s    LV LATERAL E/E' RATIO 18.25 m/s    LV SEPTAL E/E' RATIO 36.50 m/s    LA WIDTH 5.60 cm    IVC diameter 20 cm    Left Ventricular Outflow Tract Mean Velocity 0.82 cm/s    Left Ventricular Outflow Tract Mean Gradient 2.78 mmHg    TDI LATERAL 0.08 m/s    PV PEAK VELOCITY 0.88 cm/s    LVIDd 5.39 3.5 - 6.0 cm    IVS 0.89 0.6 - 1.1 cm    Posterior Wall 0.78 0.6 - 1.1 cm    LVIDs 4.19 (A) 2.1 - 4.0 cm    FS 22 28 - 44 %    LA volume 164.28 cm3    Sinus 3.04 cm    STJ 2.52 cm    LV mass 163.09 g    LA size 5.72 cm    RVDD 4.31 cm    TAPSE 2.27 cm    Left Ventricle Relative Wall Thickness 0.29 cm    AV mean gradient 5 mmHg    AV valve area 2.79 cm2    AV Velocity Ratio 0.69     AV index (prosthetic) 0.81     MV valve area p 1/2 method 2.46 cm2    E/A ratio 1.36     Mean e' 0.06 m/s    E wave deceleration time 308.20 msec    LVOT diameter 2.09 cm    LVOT area 3.4 cm2    LVOT peak mazin 1.04 m/s    LVOT peak VTI 26.40 cm    Ao peak mazin 1.50 m/s    Ao VTI 32.4 cm    LVOT stroke volume 90.52 cm3    AV peak gradient 9 mmHg    E/E' ratio 24.33 m/s    MV Peak E Mazin 1.46 m/s    TR Max Mazin 2.66 m/s    MV stenosis pressure 1/2 time 89.38 ms    MV Peak A Mazin 1.07 m/s    LV Systolic Volume 78.26 mL    LV Diastolic Volume 140.90 mL    RA Major Axis 6.46 cm    Left Atrium Minor Axis 5.63 cm    Left Atrium Major Axis 6.50 cm    Triscuspid Valve Regurgitation Peak Gradient 28 mmHg    RA Width 5.71 cm    Right Atrial Pressure (from IVC) 3 mmHg    EF 55 %    TV rest pulmonary artery pressure 31 mmHg    Narrative    · The left ventricle is normal in size with normal systolic function.  · The estimated ejection fraction is 55%.  · Indeterminate left ventricular diastolic function.  · Normal right ventricular size  with normal right ventricular systolic   function.  · Moderate left atrial enlargement.  · Mild right atrial enlargement.  · Mild-to-moderate mitral regurgitation.  · Normal central venous pressure (3 mmHg).  · The estimated PA systolic pressure is 31 mmHg.

## 2023-06-02 NOTE — ASSESSMENT & PLAN NOTE
Patient in AFib with RVR.  Was initiated on amiodarone drip in the ICU, but became hypotensive and it was stopped.  If rate continues to be high, may give IV digoxin.  Continue Xarelto.  Hold flecainide till EF has been determined.    Patient states that she is been compliant with Xarelto.  If continues to be in AFib in a.m., consider cardioversion    06/02/2023 - Appears to be in AFL. Rate improved. Give morning meds - see if she converts.

## 2023-06-02 NOTE — SUBJECTIVE & OBJECTIVE
Interval History: admitted with afib with RVR. Difficult to control and started on IV amio and placed in ICU. Stopped amio overnight due to concerns for reaction. Currently rate controlled around 100. Off IV meds, ok to transfer to floor.    Review of Systems  Objective:     Vital Signs (Most Recent):  Temp: 97.4 °F (36.3 °C) (06/02/23 0700)  Pulse: 100 (06/02/23 0700)  Resp: (!) 29 (06/02/23 0700)  BP: (!) 142/97 (06/02/23 0700)  SpO2: 99 % (06/02/23 0700) Vital Signs (24h Range):  Temp:  [97.3 °F (36.3 °C)-97.7 °F (36.5 °C)] 97.4 °F (36.3 °C)  Pulse:  [] 100  Resp:  [9-45] 29  SpO2:  [81 %-100 %] 99 %  BP: ()/() 142/97     Weight: 100.7 kg (222 lb 0.1 oz)  Body mass index is 34.77 kg/m².    Intake/Output Summary (Last 24 hours) at 6/2/2023 0956  Last data filed at 6/2/2023 0550  Gross per 24 hour   Intake --   Output 510 ml   Net -510 ml         Physical Exam  Vitals and nursing note reviewed.   Constitutional:       Appearance: Normal appearance. She is not ill-appearing.   HENT:      Mouth/Throat:      Mouth: Mucous membranes are dry.   Cardiovascular:      Rate and Rhythm: Tachycardia present. Rhythm irregular.      Pulses: Normal pulses.   Pulmonary:      Effort: Pulmonary effort is normal.   Abdominal:      General: Bowel sounds are normal. There is no distension.      Tenderness: There is no abdominal tenderness.   Musculoskeletal:         General: No swelling. Normal range of motion.   Skin:     General: Skin is warm.   Neurological:      General: No focal deficit present.      Mental Status: She is alert and oriented to person, place, and time.   Psychiatric:         Mood and Affect: Mood normal.         Thought Content: Thought content normal.           Significant Labs: All pertinent labs within the past 24 hours have been reviewed.    Significant Imaging: I have reviewed all pertinent imaging results/findings within the past 24 hours.

## 2023-06-02 NOTE — ASSESSMENT & PLAN NOTE
Patient in AFib with RVR.  Was initiated on amiodarone drip in the ICU, but became hypotensive and it was stopped.  If rate continues to be high, may give IV digoxin.  Continue Xarelto.  Hold flecainide till EF has been determined.    Patient states that she is been compliant with Xarelto.  If continues to be in AFib in a.m., consider cardioversion

## 2023-06-02 NOTE — ASSESSMENT & PLAN NOTE
Patient with Paroxysmal (<7 days) atrial fibrillation which is uncontrolled currently with Beta Blocker and flecainide. Patient is currently in atrial fibrillation.[id number] Score: 2.  Anticoagulation indicated. Anticoagulation done with rivaroxaban.    - Did not do well on IV Amiodarone, currently better rate controlled and now in a flutter  - continue oral medicaitons, stop IV amiodarone  - If does not convert, may need cardioversion - per Cardiology

## 2023-06-02 NOTE — SUBJECTIVE & OBJECTIVE
Past Medical History:   Diagnosis Date    Chronic systolic heart failure     Essential (primary) hypertension     Paroxysmal atrial fibrillation        Past Surgical History:   Procedure Laterality Date    ANGIOGRAM, CORONARY, WITH LEFT HEART CATHETERIZATION         Review of patient's allergies indicates:   Allergen Reactions    Penicillins Shortness Of Breath       No current facility-administered medications on file prior to encounter.     Current Outpatient Medications on File Prior to Encounter   Medication Sig    carvediloL (COREG) 25 MG tablet Take 1 tablet (25 mg total) by mouth 2 (two) times daily.    flecainide (TAMBOCOR) 100 MG Tab Take 1 tablet (100 mg total) by mouth every 12 (twelve) hours.    rivaroxaban (XARELTO) 20 mg Tab Take 1 tablet (20 mg total) by mouth daily with dinner or evening meal.    sacubitriL-valsartan (ENTRESTO) 49-51 mg per tablet Take 1 tablet by mouth 2 (two) times daily.     Family History       Problem Relation (Age of Onset)    No Known Problems Mother, Father          Tobacco Use    Smoking status: Former    Smokeless tobacco: Former    Tobacco comments:     Pt states she quit more than 30 years ago   Substance and Sexual Activity    Alcohol use: No    Drug use: No    Sexual activity: Not on file     Review of Systems   Constitutional:  Negative for chills, fatigue and fever.   HENT:  Positive for congestion. Negative for rhinorrhea.    Eyes:  Negative for photophobia and visual disturbance.   Respiratory:  Positive for cough. Negative for shortness of breath.    Cardiovascular:  Negative for chest pain, palpitations and leg swelling.   Gastrointestinal:  Positive for nausea. Negative for abdominal pain, diarrhea and vomiting.   Genitourinary:  Negative for dysuria, frequency and urgency.   Skin:  Negative for pallor, rash and wound.   Neurological:  Negative for light-headedness and headaches.   Psychiatric/Behavioral:  Negative for confusion and decreased concentration.     Objective:     Vital Signs (Most Recent):  Temp: 97.7 °F (36.5 °C) (06/01/23 1736)  Pulse: (!) 130 (06/01/23 1736)  Resp: 15 (06/01/23 1736)  BP: (!) 120/91 (06/01/23 1821)  SpO2: 96 % (06/01/23 1736) Vital Signs (24h Range):  Temp:  [97.5 °F (36.4 °C)-97.7 °F (36.5 °C)] 97.7 °F (36.5 °C)  Pulse:  [] 130  Resp:  [15-33] 15  SpO2:  [93 %-98 %] 96 %  BP: ()/() 120/91     Weight: 100.7 kg (222 lb 0.1 oz)  Body mass index is 34.77 kg/m².     Physical Exam  Vitals and nursing note reviewed.   Constitutional:       General: She is not in acute distress.     Appearance: She is well-developed.   HENT:      Head: Normocephalic and atraumatic.      Right Ear: External ear normal.      Left Ear: External ear normal.      Nose: Nose normal.   Eyes:      Conjunctiva/sclera: Conjunctivae normal.   Cardiovascular:      Rate and Rhythm: Normal rate. Rhythm irregularly irregular.   Pulmonary:      Effort: Pulmonary effort is normal. No respiratory distress.   Abdominal:      General: There is no distension.      Palpations: Abdomen is soft.   Musculoskeletal:         General: Normal range of motion.   Skin:     General: Skin is warm and dry.   Neurological:      Mental Status: She is alert and oriented to person, place, and time.   Psychiatric:         Thought Content: Thought content normal.              Significant Labs: All pertinent labs within the past 24 hours have been reviewed.    Significant Imaging: I have reviewed all pertinent imaging results/findings within the past 24 hours.

## 2023-06-02 NOTE — NURSING
Reason patient is being transferred: transfer to floor.     Transfer To: Room 340 from ICU     Transfer via wheelchair     Transfer with cardiac monitoring and belonings     Transported by PEDRO Foster RN     Telemetry: tele connected     Chart send with patient: Yes     Notified: pt notified family     Patient reassessed at: 06/02/2023      Upon arrival to floor: cardiac monitor applied, patient oriented to room, call bell in reach, and bed in lowest position, all questions answered.

## 2023-06-02 NOTE — CONSULTS
Food & Nutrition  Education    Diet Education: Fluid restriction/ low salt diet  Time Spent: 15 min  Learners: Yaneli Steele      Nutrition Education provided with handouts: Fluid Restriction /Low Salt Diet      Comments: RD consult 2/2 education on fluid restrictive low salt diet. Pt was responsive to information on sodium and fluid restriction. Pt did however have questions about fluid restriction, I offered information on how to avoid dry mouth and counting fluid containing foods. Pt significant other was also responsive to education. Educations attached to patient chart and left with patient in room.       All questions and concerns answered. Dietitian's contact information provided.       Please Re-consult as needed      Thanks!

## 2023-06-02 NOTE — EICU
EICU BRIEF ADMIT NOTE:    HISTORY:  Atrial fibrillation with RVR Please refer to H/P and ER notes for detail    CAMERA ASSESSMENT: Two way audiovisual assessment was done: Yes    Telemetry was reviewed. Medical records including notes, labs and imaging were reviewed.Yes    DISCUSSED with bedside nurse.No    ASSESSMENT AND PLAN:    # Atrial fibrillation with RVR: Rate controlled with Diltiazem, Rivaroxaban added. Continue home meds. On Amiodorone infusion  # CHF: IV Lasix, Enteresto  [facility] REVIEW:    INTUBATED: No  GLYCEMIN CONTROL:  Diabetes:No  STRESS ULCER PROPHYLAXIS: H2 antagonist   DVT PROPHYLAXIS:  Pharmacological; Already on therapeutic anticoagulation    Thank You for allowing EICU to participate in the care of the patient. Please call as needed      Aiden Laird MD  EICU  Critical Care Medicine

## 2023-06-03 PROBLEM — E66.9 OBESITY (BMI 30-39.9): Status: ACTIVE | Noted: 2023-06-03

## 2023-06-03 LAB
ANION GAP SERPL CALC-SCNC: 10 MMOL/L (ref 8–16)
BASOPHILS # BLD AUTO: 0.02 K/UL (ref 0–0.2)
BASOPHILS NFR BLD: 0.2 % (ref 0–1.9)
BUN SERPL-MCNC: 19 MG/DL (ref 8–23)
CALCIUM SERPL-MCNC: 9.2 MG/DL (ref 8.7–10.5)
CHLORIDE SERPL-SCNC: 103 MMOL/L (ref 95–110)
CO2 SERPL-SCNC: 26 MMOL/L (ref 23–29)
CREAT SERPL-MCNC: 1.1 MG/DL (ref 0.5–1.4)
DIFFERENTIAL METHOD: ABNORMAL
EOSINOPHIL # BLD AUTO: 0 K/UL (ref 0–0.5)
EOSINOPHIL NFR BLD: 0.4 % (ref 0–8)
ERYTHROCYTE [DISTWIDTH] IN BLOOD BY AUTOMATED COUNT: 13 % (ref 11.5–14.5)
EST. GFR  (NO RACE VARIABLE): 55 ML/MIN/1.73 M^2
GLUCOSE SERPL-MCNC: 170 MG/DL (ref 70–110)
HCT VFR BLD AUTO: 50.1 % (ref 37–48.5)
HGB BLD-MCNC: 15.6 G/DL (ref 12–16)
IMM GRANULOCYTES # BLD AUTO: 0.02 K/UL (ref 0–0.04)
IMM GRANULOCYTES NFR BLD AUTO: 0.2 % (ref 0–0.5)
LACTATE SERPL-SCNC: 1.9 MMOL/L (ref 0.5–2.2)
LYMPHOCYTES # BLD AUTO: 2 K/UL (ref 1–4.8)
LYMPHOCYTES NFR BLD: 25 % (ref 18–48)
MCH RBC QN AUTO: 26.5 PG (ref 27–31)
MCHC RBC AUTO-ENTMCNC: 31.1 G/DL (ref 32–36)
MCV RBC AUTO: 85 FL (ref 82–98)
MONOCYTES # BLD AUTO: 0.4 K/UL (ref 0.3–1)
MONOCYTES NFR BLD: 4.7 % (ref 4–15)
NEUTROPHILS # BLD AUTO: 5.6 K/UL (ref 1.8–7.7)
NEUTROPHILS NFR BLD: 69.5 % (ref 38–73)
NRBC BLD-RTO: 0 /100 WBC
PLATELET # BLD AUTO: 213 K/UL (ref 150–450)
PMV BLD AUTO: 11.6 FL (ref 9.2–12.9)
POTASSIUM SERPL-SCNC: 3.8 MMOL/L (ref 3.5–5.1)
RBC # BLD AUTO: 5.88 M/UL (ref 4–5.4)
SODIUM SERPL-SCNC: 139 MMOL/L (ref 136–145)
WBC # BLD AUTO: 8.09 K/UL (ref 3.9–12.7)

## 2023-06-03 PROCEDURE — 85025 COMPLETE CBC W/AUTO DIFF WBC: CPT | Performed by: STUDENT IN AN ORGANIZED HEALTH CARE EDUCATION/TRAINING PROGRAM

## 2023-06-03 PROCEDURE — 36415 COLL VENOUS BLD VENIPUNCTURE: CPT | Performed by: STUDENT IN AN ORGANIZED HEALTH CARE EDUCATION/TRAINING PROGRAM

## 2023-06-03 PROCEDURE — 21400001 HC TELEMETRY ROOM

## 2023-06-03 PROCEDURE — 83605 ASSAY OF LACTIC ACID: CPT | Performed by: STUDENT IN AN ORGANIZED HEALTH CARE EDUCATION/TRAINING PROGRAM

## 2023-06-03 PROCEDURE — 94760 N-INVAS EAR/PLS OXIMETRY 1: CPT

## 2023-06-03 PROCEDURE — 25000003 PHARM REV CODE 250: Performed by: STUDENT IN AN ORGANIZED HEALTH CARE EDUCATION/TRAINING PROGRAM

## 2023-06-03 PROCEDURE — 99232 PR SUBSEQUENT HOSPITAL CARE,LEVL II: ICD-10-PCS | Mod: ,,, | Performed by: INTERNAL MEDICINE

## 2023-06-03 PROCEDURE — 99232 SBSQ HOSP IP/OBS MODERATE 35: CPT | Mod: ,,, | Performed by: INTERNAL MEDICINE

## 2023-06-03 PROCEDURE — 80048 BASIC METABOLIC PNL TOTAL CA: CPT | Performed by: STUDENT IN AN ORGANIZED HEALTH CARE EDUCATION/TRAINING PROGRAM

## 2023-06-03 PROCEDURE — 25000003 PHARM REV CODE 250: Performed by: HOSPITALIST

## 2023-06-03 RX ADMIN — FAMOTIDINE 20 MG: 20 TABLET, FILM COATED ORAL at 09:06

## 2023-06-03 RX ADMIN — FLECAINIDE ACETATE 100 MG: 50 TABLET ORAL at 08:06

## 2023-06-03 RX ADMIN — FLECAINIDE ACETATE 100 MG: 50 TABLET ORAL at 09:06

## 2023-06-03 RX ADMIN — FAMOTIDINE 20 MG: 20 TABLET, FILM COATED ORAL at 08:06

## 2023-06-03 RX ADMIN — CARVEDILOL 25 MG: 12.5 TABLET, FILM COATED ORAL at 09:06

## 2023-06-03 RX ADMIN — RIVAROXABAN 20 MG: 20 TABLET, FILM COATED ORAL at 04:06

## 2023-06-03 NOTE — PROGRESS NOTES
[facility] Medicine  Progress Note    Patient Name: Yaneli Steele  MRN: [id number]  Patient Class: IP- Inpatient   Admission Date: 6/1/2023  Length of Stay: 2 days  Attending Physician: Kevin Lucero III, MD  Primary Care Provider: Primary Doctor No        Subjective:     Principal Problem:Atrial fibrillation with rapid ventricular response        HPI:  67 y.o. female with PAF, HTN presents with a complaint of nausea.  Acute onset, duration 2 weeks, associated with cough, congestion, and URI symptoms.  No known exacerbating or alleviating factors.  Reports adherence to home medication regimen.  Denies fever, chills, chest pain, palpitations, dizziness, syncope, diarrhea, or abdominal pain.  In the ED she was found to be in atrial fibrillation with RVR.  Rate controlled after 2 doses of IV diltiazem.  Placed in observation.      Overview/Hospital Course:  No notes on file    Interval History: Pt states she is feeling better. No issues overnight wondering when she can go home.     Review of Systems  Objective:     Vital Signs (Most Recent):  Temp: 98 °F (36.7 °C) (06/03/23 1108)  Pulse: 102 (06/03/23 1108)  Resp: 18 (06/03/23 1108)  BP: (!) 103/58 (06/03/23 1108)  SpO2: (!) 94 % (06/03/23 1108) Vital Signs (24h Range):  Temp:  [97.5 °F (36.4 °C)-98.3 °F (36.8 °C)] 98 °F (36.7 °C)  Pulse:  [] 102  Resp:  [16-20] 18  SpO2:  [94 %-98 %] 94 %  BP: ()/(54-82) 103/58     Weight: 98.7 kg (217 lb 9.5 oz)  Body mass index is 34.08 kg/m².    Intake/Output Summary (Last 24 hours) at 6/3/2023 1202  Last data filed at 6/3/2023 0742  Gross per 24 hour   Intake 240 ml   Output 2275 ml   Net -2035 ml         Physical Exam  Eyes:      General: No scleral icterus.     Extraocular Movements: Extraocular movements intact.   Cardiovascular:      Rate and Rhythm: Tachycardia present.   Pulmonary:      Effort: Pulmonary effort is normal. No respiratory distress.   Abdominal:      General: There is no  distension.      Palpations: Abdomen is soft.      Tenderness: There is no abdominal tenderness. There is no guarding or rebound.   Musculoskeletal:         General: No swelling or tenderness.      Cervical back: Neck supple. No rigidity.   Skin:     General: Skin is warm and dry.   Neurological:      General: No focal deficit present.      Mental Status: She is oriented to person, place, and time.   Psychiatric:         Mood and Affect: Mood normal.         Behavior: Behavior normal.           Significant Labs: All pertinent labs within the past 24 hours have been reviewed.    Significant Imaging: I have reviewed all pertinent imaging results/findings within the past 24 hours.      Assessment/Plan:      * Atrial fibrillation with rapid ventricular response  Patient with Paroxysmal (<7 days) atrial fibrillation which is uncontrolled currently with Beta Blocker and flecainide. Patient is currently in atrial fibrillation.[id number] Score: 2.  Anticoagulation indicated. Anticoagulation done with rivaroxaban.    - Did not do well on IV Amiodarone, currently better rate controlled and now in a flutter  - continue oral medicaitons  - If does not convert, may need cardioversion - per Cardiology     Obesity (BMI 30-39.9)  Body mass index is 34.08 kg/m². Morbid obesity complicates all aspects of disease management from diagnostic modalities to treatment. Weight loss encouraged and health benefits explained to patient.         Acute on chronic congestive heart failure  Patient is identified as having Combined Systolic and Diastolic heart failure that is Acute on chronic. CHF is currently uncontrolled due to Pulmonary edema/pleural effusion on CXR. Latest ECHO performed and demonstrates- Results for orders placed during the hospital encounter of 02/07/23    Echo    Interpretation Summary  · The left ventricle is normal in size with normal systolic function.  · The estimated ejection fraction is 55%.  · Indeterminate left  ventricular diastolic function.  · Normal right ventricular size with normal right ventricular systolic function.  · Moderate left atrial enlargement.  · Mild right atrial enlargement.  · Mild-to-moderate mitral regurgitation.  · Normal central venous pressure (3 mmHg).  · The estimated PA systolic pressure is 31 mmHg.  . Continue Beta Blocker and monitor clinical status closely. Monitor on telemetry. Patient is on CHF pathway.  Monitor strict Is&Os and daily weights.  Place on fluid restriction of 1.5 L. Continue to stress to patient importance of self efficacy and  on diet for CHF. Last BNP reviewed- and noted 630.  -appears adequately diuresed at this time and soft bp this morning    -hold off on further diuresis. Monitor bp      Elevated brain natriuretic peptide (BNP) level  - most likely CHF exacerbation due to poor controlled rate.  - continue with IV lasix and work on rate/rhythm control      Essential (primary) hypertension  Well controlled, continue home medications and monitor blood pressure, adjust as needed.       VTE Risk Mitigation (From admission, onward)         Ordered     rivaroxaban tablet 20 mg  With dinner         06/01/23 1711     Reason for No Pharmacological VTE Prophylaxis  Once        Question:  Reasons:  Answer:  Already adequately anticoagulated on oral Anticoagulants    06/01/23 1711     IP VTE HIGH RISK PATIENT  Once         06/01/23 1711     Place sequential compression device  Until discontinued         06/01/23 1711                Discharge Planning   DREW: 6/3/2023     Code Status: Full Code   Is the patient medically ready for discharge?:     Reason for patient still in hospital (select all that apply): Laboratory test, Treatment and Consult recommendations                     Kevin Lucero III, MD  Department of Hospital Medicine   [facility]

## 2023-06-03 NOTE — ASSESSMENT & PLAN NOTE
Currently hypotensive.  Hold antihypertensive agents    06/02/2023 - meds initially held for hypotension. Blood pressure elevated this morning.    06/03/2023-adjust medications.  Report of hypotension.

## 2023-06-03 NOTE — NURSING
Note that jarvis removed as it was no longer indicated. Also note that urine blood-tinged and 200 cc in drainage bag at removal.     Dr. Lucero notified about urine color.    Will continue to f/u.

## 2023-06-03 NOTE — ASSESSMENT & PLAN NOTE
Patient in AFib with RVR.  Was initiated on amiodarone drip in the ICU, but became hypotensive and it was stopped.  If rate continues to be high, may give IV digoxin.  Continue Xarelto.  Hold flecainide till EF has been determined.    Patient states that she is been compliant with Xarelto.  If continues to be in AFib in a.m., consider cardioversion    06/02/2023 - Appears to be in AFL. Rate improved. Give morning meds - see if she converts.    06/03/2023-continue current management.  Would like her to receive her beta-blocker today.  Recheck EKG.

## 2023-06-03 NOTE — ASSESSMENT & PLAN NOTE
Patient is identified as having Combined Systolic and Diastolic heart failure that is Acute on chronic. CHF is currently uncontrolled due to Pulmonary edema/pleural effusion on CXR. Latest ECHO performed and demonstrates- Results for orders placed during the hospital encounter of 02/07/23    Echo    Interpretation Summary  · The left ventricle is normal in size with normal systolic function.  · The estimated ejection fraction is 55%.  · Indeterminate left ventricular diastolic function.  · Normal right ventricular size with normal right ventricular systolic function.  · Moderate left atrial enlargement.  · Mild right atrial enlargement.  · Mild-to-moderate mitral regurgitation.  · Normal central venous pressure (3 mmHg).  · The estimated PA systolic pressure is 31 mmHg.  . Continue Beta Blocker and monitor clinical status closely. Monitor on telemetry. Patient is on CHF pathway.  Monitor strict Is&Os and daily weights.  Place on fluid restriction of 1.5 L. Continue to stress to patient importance of self efficacy and  on diet for CHF. Last BNP reviewed- and noted 630.  -appears adequately diuresed at this time and soft bp this morning    -hold off on further diuresis. Monitor bp

## 2023-06-03 NOTE — SUBJECTIVE & OBJECTIVE
Interval History: Pt states she is feeling better. No issues overnight wondering when she can go home.     Review of Systems  Objective:     Vital Signs (Most Recent):  Temp: 98 °F (36.7 °C) (06/03/23 1108)  Pulse: 102 (06/03/23 1108)  Resp: 18 (06/03/23 1108)  BP: (!) 103/58 (06/03/23 1108)  SpO2: (!) 94 % (06/03/23 1108) Vital Signs (24h Range):  Temp:  [97.5 °F (36.4 °C)-98.3 °F (36.8 °C)] 98 °F (36.7 °C)  Pulse:  [] 102  Resp:  [16-20] 18  SpO2:  [94 %-98 %] 94 %  BP: ()/(54-82) 103/58     Weight: 98.7 kg (217 lb 9.5 oz)  Body mass index is 34.08 kg/m².    Intake/Output Summary (Last 24 hours) at 6/3/2023 1202  Last data filed at 6/3/2023 0742  Gross per 24 hour   Intake 240 ml   Output 2275 ml   Net -2035 ml         Physical Exam  Eyes:      General: No scleral icterus.     Extraocular Movements: Extraocular movements intact.   Cardiovascular:      Rate and Rhythm: Tachycardia present.   Pulmonary:      Effort: Pulmonary effort is normal. No respiratory distress.   Abdominal:      General: There is no distension.      Palpations: Abdomen is soft.      Tenderness: There is no abdominal tenderness. There is no guarding or rebound.   Musculoskeletal:         General: No swelling or tenderness.      Cervical back: Neck supple. No rigidity.   Skin:     General: Skin is warm and dry.   Neurological:      General: No focal deficit present.      Mental Status: She is oriented to person, place, and time.   Psychiatric:         Mood and Affect: Mood normal.         Behavior: Behavior normal.           Significant Labs: All pertinent labs within the past 24 hours have been reviewed.    Significant Imaging: I have reviewed all pertinent imaging results/findings within the past 24 hours.

## 2023-06-03 NOTE — NURSING
[facility] [place]  Nurses Note -- 4 Eyes      6/3/2023       Skin assessed on: Q Shift      [x] No Pressure Injuries Present    [x]Prevention Measures Documented    [] Yes LDA  for Pressure Injury Previously documented     [] Yes New Pressure Injury Discovered   [] LDA for New Pressure Injury Added      Attending RN:  Meghan Hansen RN     Second RN:  INDIGO Huntley

## 2023-06-03 NOTE — ASSESSMENT & PLAN NOTE
Body mass index is 34.08 kg/m². Morbid obesity complicates all aspects of disease management from diagnostic modalities to treatment. Weight loss encouraged and health benefits explained to patient.

## 2023-06-03 NOTE — ASSESSMENT & PLAN NOTE
Patient with Paroxysmal (<7 days) atrial fibrillation which is uncontrolled currently with Beta Blocker and flecainide. Patient is currently in atrial fibrillation.[id number] Score: 2.  Anticoagulation indicated. Anticoagulation done with rivaroxaban.    - Did not do well on IV Amiodarone, currently better rate controlled and now in a flutter  - continue oral medicaitons  - If does not convert, may need cardioversion - per Cardiology

## 2023-06-03 NOTE — PROGRESS NOTES
[facility] [place] Telemetry  Cardiology  Progress Note    Patient Name: Yaneli Steele  MRN: [id number]  Admission Date: 6/1/2023  Hospital Length of Stay: 2 days  Code Status: Full Code   Attending Physician: Kevin Lucero III, MD   Primary Care Physician: Primary Doctor No  Expected Discharge Date: 6/3/2023  Principal Problem:Atrial fibrillation with rapid ventricular response    Subjective:     Hospital Course:   No notes on file    Interval History: Episode of hypotension. Morning meds held. .    Review of Systems   Constitutional: Negative for decreased appetite, diaphoresis, malaise/fatigue, weight gain and weight loss.   HENT:  Negative for congestion, hearing loss, hoarse voice, nosebleeds, odynophagia, stridor and tinnitus.    Eyes:  Negative for blurred vision, double vision, photophobia, visual disturbance and visual halos.   Cardiovascular:  Negative for chest pain, claudication, cyanosis, dyspnea on exertion, irregular heartbeat, leg swelling, near-syncope, orthopnea, palpitations, paroxysmal nocturnal dyspnea and syncope.   Respiratory:  Negative for cough, hemoptysis, shortness of breath, sleep disturbances due to breathing, snoring, sputum production and wheezing.    Endocrine: Negative for cold intolerance, heat intolerance, polydipsia, polyphagia and polyuria.   Skin:  Negative for color change, poor wound healing, suspicious lesions and unusual hair distribution.   Musculoskeletal:  Negative for falls, joint pain, muscle cramps, muscle weakness, myalgias, neck pain and stiffness.   Gastrointestinal:  Negative for bloating, abdominal pain, constipation, diarrhea, dysphagia, heartburn, hematemesis, jaundice, melena, nausea and vomiting.   Neurological:  Negative for disturbances in coordination, excessive daytime sleepiness, dizziness, focal weakness, headaches, light-headedness, loss of balance, numbness, paresthesias, seizures, sensory change, tremors, vertigo and weakness.    Psychiatric/Behavioral:  Negative for altered mental status, depression, hallucinations and memory loss. The patient does not have insomnia and is not nervous/anxious.    Objective:     Vital Signs (Most Recent):  Temp: 98.3 °F (36.8 °C) (06/03/23 0750)  Pulse: 108 (06/03/23 0750)  Resp: 18 (06/03/23 0750)  BP: 107/68 (06/03/23 0954)  SpO2: (!) 94 % (06/03/23 0750) Vital Signs (24h Range):  Temp:  [97.5 °F (36.4 °C)-98.3 °F (36.8 °C)] 98.3 °F (36.8 °C)  Pulse:  [] 108  Resp:  [16-33] 18  SpO2:  [93 %-98 %] 94 %  BP: ()/(54-84) 107/68     Weight: 98.7 kg (217 lb 9.5 oz)  Body mass index is 34.08 kg/m².     SpO2: (!) 94 %         Intake/Output Summary (Last 24 hours) at 6/3/2023 1037  Last data filed at 6/3/2023 0742  Gross per 24 hour   Intake 240 ml   Output 2725 ml   Net -2485 ml       Lines/Drains/Airways       Peripheral Intravenous Line  Duration                  Peripheral IV - Single Lumen 06/01/23 1424 20 G Right Antecubital 1 day         Peripheral IV - Single Lumen 06/01/23 1425 20 G Left Antecubital 1 day                       Physical Exam  Vitals reviewed.   Constitutional:       General: She is not in acute distress.     Appearance: Normal appearance. She is well-developed. She is obese. She is not ill-appearing, toxic-appearing or diaphoretic.   HENT:      Head: Normocephalic.   Neck:      Vascular: No carotid bruit or JVD.   Cardiovascular:      Rate and Rhythm: Regular rhythm. Tachycardia present.      Pulses: Normal pulses.   Pulmonary:      Effort: Pulmonary effort is normal.      Breath sounds: Normal breath sounds.   Abdominal:      General: Bowel sounds are normal.      Palpations: Abdomen is soft.      Tenderness: There is no abdominal tenderness.   Musculoskeletal:      Right lower leg: No edema.      Left lower leg: No edema.   Neurological:      Mental Status: She is alert and oriented to person, place, and time.   Psychiatric:         Mood and Affect: Mood normal.          Speech: Speech normal.         Behavior: Behavior normal.         Thought Content: Thought content normal.          Significant Labs: CMP   Recent Labs   Lab 06/01/23  2335 06/02/23  0445    142   K 4.4 4.5    108   CO2 18* 18*   * 153*   BUN 15 17   CREATININE 1.2 1.1   CALCIUM 9.6 9.6   PROT 7.3  --    ALBUMIN 3.5  --    BILITOT 1.6*  --    ALKPHOS 155*  --    AST 54*  --    ALT 66*  --    ANIONGAP 15 16   , CBC   Recent Labs   Lab 06/01/23  2335 06/02/23  0445   WBC 8.85 8.09   HGB 15.4 16.0   HCT 50.4* 52.0*    197   , and Troponin No results for input(s): TROPONINI in the last 48 hours.    Significant Imaging: Echocardiogram: Transthoracic echo (TTE) complete (Cupid Only):   Results for orders placed or performed during the hospital encounter of 02/07/23   Echo   Result Value Ref Range    TDI SEPTAL 0.04 m/s    LV LATERAL E/E' RATIO 18.25 m/s    LV SEPTAL E/E' RATIO 36.50 m/s    LA WIDTH 5.60 cm    IVC diameter 20 cm    Left Ventricular Outflow Tract Mean Velocity 0.82 cm/s    Left Ventricular Outflow Tract Mean Gradient 2.78 mmHg    TDI LATERAL 0.08 m/s    PV PEAK VELOCITY 0.88 cm/s    LVIDd 5.39 3.5 - 6.0 cm    IVS 0.89 0.6 - 1.1 cm    Posterior Wall 0.78 0.6 - 1.1 cm    LVIDs 4.19 (A) 2.1 - 4.0 cm    FS 22 28 - 44 %    LA volume 164.28 cm3    Sinus 3.04 cm    STJ 2.52 cm    LV mass 163.09 g    LA size 5.72 cm    RVDD 4.31 cm    TAPSE 2.27 cm    Left Ventricle Relative Wall Thickness 0.29 cm    AV mean gradient 5 mmHg    AV valve area 2.79 cm2    AV Velocity Ratio 0.69     AV index (prosthetic) 0.81     MV valve area p 1/2 method 2.46 cm2    E/A ratio 1.36     Mean e' 0.06 m/s    E wave deceleration time 308.20 msec    LVOT diameter 2.09 cm    LVOT area 3.4 cm2    LVOT peak mazin 1.04 m/s    LVOT peak VTI 26.40 cm    Ao peak mazin 1.50 m/s    Ao VTI 32.4 cm    LVOT stroke volume 90.52 cm3    AV peak gradient 9 mmHg    E/E' ratio 24.33 m/s    MV Peak E Mazin 1.46 m/s    TR Max Mazin 2.66 m/s     MV stenosis pressure 1/2 time 89.38 ms    MV Peak A Mazin 1.07 m/s    LV Systolic Volume 78.26 mL    LV Diastolic Volume 140.90 mL    RA Major Axis 6.46 cm    Left Atrium Minor Axis 5.63 cm    Left Atrium Major Axis 6.50 cm    Triscuspid Valve Regurgitation Peak Gradient 28 mmHg    RA Width 5.71 cm    Right Atrial Pressure (from IVC) 3 mmHg    EF 55 %    TV rest pulmonary artery pressure 31 mmHg    Narrative    · The left ventricle is normal in size with normal systolic function.  · The estimated ejection fraction is 55%.  · Indeterminate left ventricular diastolic function.  · Normal right ventricular size with normal right ventricular systolic   function.  · Moderate left atrial enlargement.  · Mild right atrial enlargement.  · Mild-to-moderate mitral regurgitation.  · Normal central venous pressure (3 mmHg).  · The estimated PA systolic pressure is 31 mmHg.        Assessment and Plan:     Brief HPI:     * Atrial fibrillation with rapid ventricular response  Patient in AFib with RVR.  Was initiated on amiodarone drip in the ICU, but became hypotensive and it was stopped.  If rate continues to be high, may give IV digoxin.  Continue Xarelto.  Hold flecainide till EF has been determined.    Patient states that she is been compliant with Xarelto.  If continues to be in AFib in a.m., consider cardioversion    06/02/2023 - Appears to be in AFL. Rate improved. Give morning meds - see if she converts.    06/03/2023-continue current management.  Would like her to receive her beta-blocker today.  Recheck EKG.    Acute on chronic congestive heart failure  Patient is identified as having  heart failure that is Acute on chronic. CHF is currently uncontrolled . Latest ECHO performed and demonstrates- Results for orders placed during the hospital encounter of 02/07/23    Echo    Interpretation Summary  · The left ventricle is normal in size with normal systolic function.  · The estimated ejection fraction is 55%.  ·  Indeterminate left ventricular diastolic function.  · Normal right ventricular size with normal right ventricular systolic function.  · Moderate left atrial enlargement.  · Mild right atrial enlargement.  · Mild-to-moderate mitral regurgitation.  · Normal central venous pressure (3 mmHg).  · The estimated PA systolic pressure is 31 mmHg.  . Continue Furosemide and monitor clinical status closely. Monitor on telemetry. Patient is on CHF pathway.  Monitor strict Is&Os and daily weights.  Place on fluid restriction of 1.5 L. Continue to stress to patient importance of self efficacy and  on diet for CHF. Last BNP reviewed- and noted below   Recent Labs   Lab 06/01/23 2024   *   .    IV Lasix as needed and tolerated.  Has history of tachycardia induced cardiomyopathy with normal coronary arteries in the past.  Check 2D echo in a.m..  Guideline directed medical therapy for heart failure    06/02/2023 - appears compensated. Blood pressure control. Rate control. Continue lasix.      Elevated brain natriuretic peptide (BNP) level  Secondary to decompensated heart    Essential (primary) hypertension  Currently hypotensive.  Hold antihypertensive agents    06/02/2023 - meds initially held for hypotension. Blood pressure elevated this morning.    06/03/2023-adjust medications.  Report of hypotension.        VTE Risk Mitigation (From admission, onward)         Ordered     rivaroxaban tablet 20 mg  With dinner         06/01/23 1711     Reason for No Pharmacological VTE Prophylaxis  Once        Question:  Reasons:  Answer:  Already adequately anticoagulated on oral Anticoagulants    06/01/23 1711     IP VTE HIGH RISK PATIENT  Once         06/01/23 1711     Place sequential compression device  Until discontinued         06/01/23 1711                Tee Beth MD  Cardiology  [facility] [place] - Telemetry

## 2023-06-03 NOTE — SUBJECTIVE & OBJECTIVE
Interval History: Episode of hypotension. Morning meds held. .    Review of Systems   Constitutional: Negative for decreased appetite, diaphoresis, malaise/fatigue, weight gain and weight loss.   HENT:  Negative for congestion, hearing loss, hoarse voice, nosebleeds, odynophagia, stridor and tinnitus.    Eyes:  Negative for blurred vision, double vision, photophobia, visual disturbance and visual halos.   Cardiovascular:  Negative for chest pain, claudication, cyanosis, dyspnea on exertion, irregular heartbeat, leg swelling, near-syncope, orthopnea, palpitations, paroxysmal nocturnal dyspnea and syncope.   Respiratory:  Negative for cough, hemoptysis, shortness of breath, sleep disturbances due to breathing, snoring, sputum production and wheezing.    Endocrine: Negative for cold intolerance, heat intolerance, polydipsia, polyphagia and polyuria.   Skin:  Negative for color change, poor wound healing, suspicious lesions and unusual hair distribution.   Musculoskeletal:  Negative for falls, joint pain, muscle cramps, muscle weakness, myalgias, neck pain and stiffness.   Gastrointestinal:  Negative for bloating, abdominal pain, constipation, diarrhea, dysphagia, heartburn, hematemesis, jaundice, melena, nausea and vomiting.   Neurological:  Negative for disturbances in coordination, excessive daytime sleepiness, dizziness, focal weakness, headaches, light-headedness, loss of balance, numbness, paresthesias, seizures, sensory change, tremors, vertigo and weakness.   Psychiatric/Behavioral:  Negative for altered mental status, depression, hallucinations and memory loss. The patient does not have insomnia and is not nervous/anxious.    Objective:     Vital Signs (Most Recent):  Temp: 98.3 °F (36.8 °C) (06/03/23 0750)  Pulse: 108 (06/03/23 0750)  Resp: 18 (06/03/23 0750)  BP: 107/68 (06/03/23 0954)  SpO2: (!) 94 % (06/03/23 0750) Vital Signs (24h Range):  Temp:  [97.5 °F (36.4 °C)-98.3 °F (36.8 °C)] 98.3 °F (36.8  °C)  Pulse:  [] 108  Resp:  [16-33] 18  SpO2:  [93 %-98 %] 94 %  BP: ()/(54-84) 107/68     Weight: 98.7 kg (217 lb 9.5 oz)  Body mass index is 34.08 kg/m².     SpO2: (!) 94 %         Intake/Output Summary (Last 24 hours) at 6/3/2023 1037  Last data filed at 6/3/2023 0742  Gross per 24 hour   Intake 240 ml   Output 2725 ml   Net -2485 ml       Lines/Drains/Airways       Peripheral Intravenous Line  Duration                  Peripheral IV - Single Lumen 06/01/23 1424 20 G Right Antecubital 1 day         Peripheral IV - Single Lumen 06/01/23 1425 20 G Left Antecubital 1 day                       Physical Exam  Vitals reviewed.   Constitutional:       General: She is not in acute distress.     Appearance: Normal appearance. She is well-developed. She is obese. She is not ill-appearing, toxic-appearing or diaphoretic.   HENT:      Head: Normocephalic.   Neck:      Vascular: No carotid bruit or JVD.   Cardiovascular:      Rate and Rhythm: Regular rhythm. Tachycardia present.      Pulses: Normal pulses.   Pulmonary:      Effort: Pulmonary effort is normal.      Breath sounds: Normal breath sounds.   Abdominal:      General: Bowel sounds are normal.      Palpations: Abdomen is soft.      Tenderness: There is no abdominal tenderness.   Musculoskeletal:      Right lower leg: No edema.      Left lower leg: No edema.   Neurological:      Mental Status: She is alert and oriented to person, place, and time.   Psychiatric:         Mood and Affect: Mood normal.         Speech: Speech normal.         Behavior: Behavior normal.         Thought Content: Thought content normal.          Significant Labs: CMP   Recent Labs   Lab 06/01/23  2335 06/02/23  0445    142   K 4.4 4.5    108   CO2 18* 18*   * 153*   BUN 15 17   CREATININE 1.2 1.1   CALCIUM 9.6 9.6   PROT 7.3  --    ALBUMIN 3.5  --    BILITOT 1.6*  --    ALKPHOS 155*  --    AST 54*  --    ALT 66*  --    ANIONGAP 15 16   , CBC   Recent Labs   Lab  06/01/23  2335 06/02/23  0445   WBC 8.85 8.09   HGB 15.4 16.0   HCT 50.4* 52.0*    197   , and Troponin No results for input(s): TROPONINI in the last 48 hours.    Significant Imaging: Echocardiogram: Transthoracic echo (TTE) complete (Cupid Only):   Results for orders placed or performed during the hospital encounter of 02/07/23   Echo   Result Value Ref Range    TDI SEPTAL 0.04 m/s    LV LATERAL E/E' RATIO 18.25 m/s    LV SEPTAL E/E' RATIO 36.50 m/s    LA WIDTH 5.60 cm    IVC diameter 20 cm    Left Ventricular Outflow Tract Mean Velocity 0.82 cm/s    Left Ventricular Outflow Tract Mean Gradient 2.78 mmHg    TDI LATERAL 0.08 m/s    PV PEAK VELOCITY 0.88 cm/s    LVIDd 5.39 3.5 - 6.0 cm    IVS 0.89 0.6 - 1.1 cm    Posterior Wall 0.78 0.6 - 1.1 cm    LVIDs 4.19 (A) 2.1 - 4.0 cm    FS 22 28 - 44 %    LA volume 164.28 cm3    Sinus 3.04 cm    STJ 2.52 cm    LV mass 163.09 g    LA size 5.72 cm    RVDD 4.31 cm    TAPSE 2.27 cm    Left Ventricle Relative Wall Thickness 0.29 cm    AV mean gradient 5 mmHg    AV valve area 2.79 cm2    AV Velocity Ratio 0.69     AV index (prosthetic) 0.81     MV valve area p 1/2 method 2.46 cm2    E/A ratio 1.36     Mean e' 0.06 m/s    E wave deceleration time 308.20 msec    LVOT diameter 2.09 cm    LVOT area 3.4 cm2    LVOT peak mazin 1.04 m/s    LVOT peak VTI 26.40 cm    Ao peak mazin 1.50 m/s    Ao VTI 32.4 cm    LVOT stroke volume 90.52 cm3    AV peak gradient 9 mmHg    E/E' ratio 24.33 m/s    MV Peak E Mazin 1.46 m/s    TR Max Mazin 2.66 m/s    MV stenosis pressure 1/2 time 89.38 ms    MV Peak A Mazin 1.07 m/s    LV Systolic Volume 78.26 mL    LV Diastolic Volume 140.90 mL    RA Major Axis 6.46 cm    Left Atrium Minor Axis 5.63 cm    Left Atrium Major Axis 6.50 cm    Triscuspid Valve Regurgitation Peak Gradient 28 mmHg    RA Width 5.71 cm    Right Atrial Pressure (from IVC) 3 mmHg    EF 55 %    TV rest pulmonary artery pressure 31 mmHg    Narrative    · The left ventricle is normal in size  with normal systolic function.  · The estimated ejection fraction is 55%.  · Indeterminate left ventricular diastolic function.  · Normal right ventricular size with normal right ventricular systolic   function.  · Moderate left atrial enlargement.  · Mild right atrial enlargement.  · Mild-to-moderate mitral regurgitation.  · Normal central venous pressure (3 mmHg).  · The estimated PA systolic pressure is 31 mmHg.

## 2023-06-04 LAB
ANION GAP SERPL CALC-SCNC: 11 MMOL/L (ref 8–16)
BUN SERPL-MCNC: 16 MG/DL (ref 8–23)
CALCIUM SERPL-MCNC: 8.9 MG/DL (ref 8.7–10.5)
CHLORIDE SERPL-SCNC: 104 MMOL/L (ref 95–110)
CO2 SERPL-SCNC: 26 MMOL/L (ref 23–29)
CREAT SERPL-MCNC: 0.9 MG/DL (ref 0.5–1.4)
EST. GFR  (NO RACE VARIABLE): >60 ML/MIN/1.73 M^2
GLUCOSE SERPL-MCNC: 124 MG/DL (ref 70–110)
MAGNESIUM SERPL-MCNC: 1.6 MG/DL (ref 1.6–2.6)
POTASSIUM SERPL-SCNC: 3.7 MMOL/L (ref 3.5–5.1)
SODIUM SERPL-SCNC: 141 MMOL/L (ref 136–145)

## 2023-06-04 PROCEDURE — 99232 SBSQ HOSP IP/OBS MODERATE 35: CPT | Mod: ,,, | Performed by: INTERNAL MEDICINE

## 2023-06-04 PROCEDURE — 83735 ASSAY OF MAGNESIUM: CPT | Performed by: STUDENT IN AN ORGANIZED HEALTH CARE EDUCATION/TRAINING PROGRAM

## 2023-06-04 PROCEDURE — 99232 PR SUBSEQUENT HOSPITAL CARE,LEVL II: ICD-10-PCS | Mod: ,,, | Performed by: INTERNAL MEDICINE

## 2023-06-04 PROCEDURE — 36415 COLL VENOUS BLD VENIPUNCTURE: CPT | Performed by: STUDENT IN AN ORGANIZED HEALTH CARE EDUCATION/TRAINING PROGRAM

## 2023-06-04 PROCEDURE — 25000003 PHARM REV CODE 250: Performed by: STUDENT IN AN ORGANIZED HEALTH CARE EDUCATION/TRAINING PROGRAM

## 2023-06-04 PROCEDURE — 80048 BASIC METABOLIC PNL TOTAL CA: CPT | Performed by: STUDENT IN AN ORGANIZED HEALTH CARE EDUCATION/TRAINING PROGRAM

## 2023-06-04 PROCEDURE — 21400001 HC TELEMETRY ROOM

## 2023-06-04 RX ORDER — METOPROLOL TARTRATE 1 MG/ML
5 INJECTION, SOLUTION INTRAVENOUS EVERY 6 HOURS PRN
Status: DISCONTINUED | OUTPATIENT
Start: 2023-06-04 | End: 2023-06-06 | Stop reason: HOSPADM

## 2023-06-04 RX ADMIN — FLECAINIDE ACETATE 100 MG: 50 TABLET ORAL at 09:06

## 2023-06-04 RX ADMIN — POLYETHYLENE GLYCOL 3350 17 G: 17 POWDER, FOR SOLUTION ORAL at 09:06

## 2023-06-04 RX ADMIN — FAMOTIDINE 20 MG: 20 TABLET, FILM COATED ORAL at 09:06

## 2023-06-04 RX ADMIN — RIVAROXABAN 20 MG: 20 TABLET, FILM COATED ORAL at 05:06

## 2023-06-04 NOTE — SUBJECTIVE & OBJECTIVE
Interval History: HR increase -> atrialfibrillation    Review of Systems   Constitutional: Negative for decreased appetite, diaphoresis, malaise/fatigue, weight gain and weight loss.   HENT:  Negative for congestion, hearing loss, hoarse voice, nosebleeds, odynophagia, stridor and tinnitus.    Eyes:  Negative for blurred vision, double vision, photophobia, visual disturbance and visual halos.   Cardiovascular:  Positive for irregular heartbeat and palpitations. Negative for chest pain, claudication, cyanosis, dyspnea on exertion, leg swelling, near-syncope, orthopnea, paroxysmal nocturnal dyspnea and syncope.   Respiratory:  Negative for cough, hemoptysis, shortness of breath, sleep disturbances due to breathing, snoring, sputum production and wheezing.    Endocrine: Negative for cold intolerance, heat intolerance, polydipsia, polyphagia and polyuria.   Skin:  Negative for color change, poor wound healing, suspicious lesions and unusual hair distribution.   Musculoskeletal:  Negative for falls, joint pain, muscle cramps, muscle weakness, myalgias, neck pain and stiffness.   Gastrointestinal:  Negative for bloating, abdominal pain, constipation, diarrhea, dysphagia, heartburn, hematemesis, jaundice, melena, nausea and vomiting.   Neurological:  Negative for disturbances in coordination, excessive daytime sleepiness, dizziness, focal weakness, headaches, light-headedness, loss of balance, numbness, paresthesias, seizures, sensory change, tremors, vertigo and weakness.   Psychiatric/Behavioral:  Negative for altered mental status, depression, hallucinations and memory loss. The patient does not have insomnia and is not nervous/anxious.    Objective:     Vital Signs (Most Recent):  Temp: 97.8 °F (36.6 °C) (06/04/23 1054)  Pulse: 91 (06/04/23 1054)  Resp: 16 (06/04/23 1054)  BP: 106/80 (06/04/23 1054)  SpO2: 100 % (06/04/23 1054) Vital Signs (24h Range):  Temp:  [97.5 °F (36.4 °C)-98.4 °F (36.9 °C)] 97.8 °F (36.6  °C)  Pulse:  [] 91  Resp:  [16-20] 16  SpO2:  [91 %-100 %] 100 %  BP: ()/(58-80) 106/80     Weight: 100.4 kg (221 lb 5.5 oz)  Body mass index is 34.67 kg/m².     SpO2: 100 %         Intake/Output Summary (Last 24 hours) at 6/4/2023 1153  Last data filed at 6/3/2023 1825  Gross per 24 hour   Intake 240 ml   Output --   Net 240 ml       Lines/Drains/Airways       Peripheral Intravenous Line  Duration                  Peripheral IV - Single Lumen 06/01/23 1424 20 G Right Antecubital 2 days         Peripheral IV - Single Lumen 06/01/23 1425 20 G Left Antecubital 2 days                       Physical Exam  Vitals reviewed.   Constitutional:       General: She is not in acute distress.     Appearance: Normal appearance. She is well-developed. She is obese. She is not ill-appearing, toxic-appearing or diaphoretic.   HENT:      Head: Normocephalic.   Neck:      Vascular: No carotid bruit or JVD.   Cardiovascular:      Rate and Rhythm: Tachycardia present. Rhythm irregularly irregular.      Pulses: Normal pulses.   Pulmonary:      Effort: Pulmonary effort is normal.      Breath sounds: Normal breath sounds.   Abdominal:      General: Bowel sounds are normal.      Palpations: Abdomen is soft.      Tenderness: There is no abdominal tenderness.   Musculoskeletal:      Right lower leg: No edema.      Left lower leg: No edema.   Neurological:      Mental Status: She is alert and oriented to person, place, and time.   Psychiatric:         Mood and Affect: Mood normal.         Speech: Speech normal.         Behavior: Behavior normal.         Thought Content: Thought content normal.          Significant Labs: CMP   Recent Labs   Lab 06/03/23  1118 06/04/23  0648    141   K 3.8 3.7    104   CO2 26 26   * 124*   BUN 19 16   CREATININE 1.1 0.9   CALCIUM 9.2 8.9   ANIONGAP 10 11   , CBC   Recent Labs   Lab 06/03/23  1118   WBC 8.09   HGB 15.6   HCT 50.1*      , Lipid Panel No results for input(s):  CHOL, HDL, LDLCALC, TRIG, CHOLHDL in the last 48 hours., and Troponin No results for input(s): TROPONINI in the last 48 hours.    Significant Imaging: Echocardiogram: Transthoracic echo (TTE) complete (Cupid Only):   Results for orders placed or performed during the hospital encounter of 02/07/23   Echo   Result Value Ref Range    TDI SEPTAL 0.04 m/s    LV LATERAL E/E' RATIO 18.25 m/s    LV SEPTAL E/E' RATIO 36.50 m/s    LA WIDTH 5.60 cm    IVC diameter 20 cm    Left Ventricular Outflow Tract Mean Velocity 0.82 cm/s    Left Ventricular Outflow Tract Mean Gradient 2.78 mmHg    TDI LATERAL 0.08 m/s    PV PEAK VELOCITY 0.88 cm/s    LVIDd 5.39 3.5 - 6.0 cm    IVS 0.89 0.6 - 1.1 cm    Posterior Wall 0.78 0.6 - 1.1 cm    LVIDs 4.19 (A) 2.1 - 4.0 cm    FS 22 28 - 44 %    LA volume 164.28 cm3    Sinus 3.04 cm    STJ 2.52 cm    LV mass 163.09 g    LA size 5.72 cm    RVDD 4.31 cm    TAPSE 2.27 cm    Left Ventricle Relative Wall Thickness 0.29 cm    AV mean gradient 5 mmHg    AV valve area 2.79 cm2    AV Velocity Ratio 0.69     AV index (prosthetic) 0.81     MV valve area p 1/2 method 2.46 cm2    E/A ratio 1.36     Mean e' 0.06 m/s    E wave deceleration time 308.20 msec    LVOT diameter 2.09 cm    LVOT area 3.4 cm2    LVOT peak mazin 1.04 m/s    LVOT peak VTI 26.40 cm    Ao peak mazin 1.50 m/s    Ao VTI 32.4 cm    LVOT stroke volume 90.52 cm3    AV peak gradient 9 mmHg    E/E' ratio 24.33 m/s    MV Peak E Mazin 1.46 m/s    TR Max Mazin 2.66 m/s    MV stenosis pressure 1/2 time 89.38 ms    MV Peak A Mazin 1.07 m/s    LV Systolic Volume 78.26 mL    LV Diastolic Volume 140.90 mL    RA Major Axis 6.46 cm    Left Atrium Minor Axis 5.63 cm    Left Atrium Major Axis 6.50 cm    Triscuspid Valve Regurgitation Peak Gradient 28 mmHg    RA Width 5.71 cm    Right Atrial Pressure (from IVC) 3 mmHg    EF 55 %    TV rest pulmonary artery pressure 31 mmHg    Narrative    · The left ventricle is normal in size with normal systolic function.  · The  estimated ejection fraction is 55%.  · Indeterminate left ventricular diastolic function.  · Normal right ventricular size with normal right ventricular systolic   function.  · Moderate left atrial enlargement.  · Mild right atrial enlargement.  · Mild-to-moderate mitral regurgitation.  · Normal central venous pressure (3 mmHg).  · The estimated PA systolic pressure is 31 mmHg.

## 2023-06-04 NOTE — PROGRESS NOTES
[facility] [place] [facility]  Cardiology  Progress Note    Patient Name: Yaneli Steele  MRN: [id number]  Admission Date: 6/1/2023  Hospital Length of Stay: 3 days  Code Status: Full Code   Attending Physician: Kevin Lucero III, MD   Primary Care Physician: Primary Doctor No  Expected Discharge Date: 6/3/2023  Principal Problem:Atrial fibrillation with rapid ventricular response    Subjective:     Hospital Course:   No notes on file    Interval History: HR increase -> atrialfibrillation    Review of Systems   Constitutional: Negative for decreased appetite, diaphoresis, malaise/fatigue, weight gain and weight loss.   HENT:  Negative for congestion, hearing loss, hoarse voice, nosebleeds, odynophagia, stridor and tinnitus.    Eyes:  Negative for blurred vision, double vision, photophobia, visual disturbance and visual halos.   Cardiovascular:  Positive for irregular heartbeat and palpitations. Negative for chest pain, claudication, cyanosis, dyspnea on exertion, leg swelling, near-syncope, orthopnea, paroxysmal nocturnal dyspnea and syncope.   Respiratory:  Negative for cough, hemoptysis, shortness of breath, sleep disturbances due to breathing, snoring, sputum production and wheezing.    Endocrine: Negative for cold intolerance, heat intolerance, polydipsia, polyphagia and polyuria.   Skin:  Negative for color change, poor wound healing, suspicious lesions and unusual hair distribution.   Musculoskeletal:  Negative for falls, joint pain, muscle cramps, muscle weakness, myalgias, neck pain and stiffness.   Gastrointestinal:  Negative for bloating, abdominal pain, constipation, diarrhea, dysphagia, heartburn, hematemesis, jaundice, melena, nausea and vomiting.   Neurological:  Negative for disturbances in coordination, excessive daytime sleepiness, dizziness, focal weakness, headaches, light-headedness, loss of balance, numbness, paresthesias, seizures, sensory change, tremors, vertigo and weakness.    Psychiatric/Behavioral:  Negative for altered mental status, depression, hallucinations and memory loss. The patient does not have insomnia and is not nervous/anxious.    Objective:     Vital Signs (Most Recent):  Temp: 97.8 °F (36.6 °C) (06/04/23 1054)  Pulse: 91 (06/04/23 1054)  Resp: 16 (06/04/23 1054)  BP: 106/80 (06/04/23 1054)  SpO2: 100 % (06/04/23 1054) Vital Signs (24h Range):  Temp:  [97.5 °F (36.4 °C)-98.4 °F (36.9 °C)] 97.8 °F (36.6 °C)  Pulse:  [] 91  Resp:  [16-20] 16  SpO2:  [91 %-100 %] 100 %  BP: ()/(58-80) 106/80     Weight: 100.4 kg (221 lb 5.5 oz)  Body mass index is 34.67 kg/m².     SpO2: 100 %         Intake/Output Summary (Last 24 hours) at 6/4/2023 1153  Last data filed at 6/3/2023 1825  Gross per 24 hour   Intake 240 ml   Output --   Net 240 ml       Lines/Drains/Airways       Peripheral Intravenous Line  Duration                  Peripheral IV - Single Lumen 06/01/23 1424 20 G Right Antecubital 2 days         Peripheral IV - Single Lumen 06/01/23 1425 20 G Left Antecubital 2 days                       Physical Exam  Vitals reviewed.   Constitutional:       General: She is not in acute distress.     Appearance: Normal appearance. She is well-developed. She is obese. She is not ill-appearing, toxic-appearing or diaphoretic.   HENT:      Head: Normocephalic.   Neck:      Vascular: No carotid bruit or JVD.   Cardiovascular:      Rate and Rhythm: Tachycardia present. Rhythm irregularly irregular.      Pulses: Normal pulses.   Pulmonary:      Effort: Pulmonary effort is normal.      Breath sounds: Normal breath sounds.   Abdominal:      General: Bowel sounds are normal.      Palpations: Abdomen is soft.      Tenderness: There is no abdominal tenderness.   Musculoskeletal:      Right lower leg: No edema.      Left lower leg: No edema.   Neurological:      Mental Status: She is alert and oriented to person, place, and time.   Psychiatric:         Mood and Affect: Mood normal.          Speech: Speech normal.         Behavior: Behavior normal.         Thought Content: Thought content normal.          Significant Labs: CMP   Recent Labs   Lab 06/03/23  1118 06/04/23  0648    141   K 3.8 3.7    104   CO2 26 26   * 124*   BUN 19 16   CREATININE 1.1 0.9   CALCIUM 9.2 8.9   ANIONGAP 10 11   , CBC   Recent Labs   Lab 06/03/23  1118   WBC 8.09   HGB 15.6   HCT 50.1*      , Lipid Panel No results for input(s): CHOL, HDL, LDLCALC, TRIG, CHOLHDL in the last 48 hours., and Troponin No results for input(s): TROPONINI in the last 48 hours.    Significant Imaging: Echocardiogram: Transthoracic echo (TTE) complete (Cupid Only):   Results for orders placed or performed during the hospital encounter of 02/07/23   Echo   Result Value Ref Range    TDI SEPTAL 0.04 m/s    LV LATERAL E/E' RATIO 18.25 m/s    LV SEPTAL E/E' RATIO 36.50 m/s    LA WIDTH 5.60 cm    IVC diameter 20 cm    Left Ventricular Outflow Tract Mean Velocity 0.82 cm/s    Left Ventricular Outflow Tract Mean Gradient 2.78 mmHg    TDI LATERAL 0.08 m/s    PV PEAK VELOCITY 0.88 cm/s    LVIDd 5.39 3.5 - 6.0 cm    IVS 0.89 0.6 - 1.1 cm    Posterior Wall 0.78 0.6 - 1.1 cm    LVIDs 4.19 (A) 2.1 - 4.0 cm    FS 22 28 - 44 %    LA volume 164.28 cm3    Sinus 3.04 cm    STJ 2.52 cm    LV mass 163.09 g    LA size 5.72 cm    RVDD 4.31 cm    TAPSE 2.27 cm    Left Ventricle Relative Wall Thickness 0.29 cm    AV mean gradient 5 mmHg    AV valve area 2.79 cm2    AV Velocity Ratio 0.69     AV index (prosthetic) 0.81     MV valve area p 1/2 method 2.46 cm2    E/A ratio 1.36     Mean e' 0.06 m/s    E wave deceleration time 308.20 msec    LVOT diameter 2.09 cm    LVOT area 3.4 cm2    LVOT peak mazin 1.04 m/s    LVOT peak VTI 26.40 cm    Ao peak mazin 1.50 m/s    Ao VTI 32.4 cm    LVOT stroke volume 90.52 cm3    AV peak gradient 9 mmHg    E/E' ratio 24.33 m/s    MV Peak E Mazin 1.46 m/s    TR Max Mazin 2.66 m/s    MV stenosis pressure 1/2 time 89.38 ms    MV  Peak A Mazin 1.07 m/s    LV Systolic Volume 78.26 mL    LV Diastolic Volume 140.90 mL    RA Major Axis 6.46 cm    Left Atrium Minor Axis 5.63 cm    Left Atrium Major Axis 6.50 cm    Triscuspid Valve Regurgitation Peak Gradient 28 mmHg    RA Width 5.71 cm    Right Atrial Pressure (from IVC) 3 mmHg    EF 55 %    TV rest pulmonary artery pressure 31 mmHg    Narrative    · The left ventricle is normal in size with normal systolic function.  · The estimated ejection fraction is 55%.  · Indeterminate left ventricular diastolic function.  · Normal right ventricular size with normal right ventricular systolic   function.  · Moderate left atrial enlargement.  · Mild right atrial enlargement.  · Mild-to-moderate mitral regurgitation.  · Normal central venous pressure (3 mmHg).  · The estimated PA systolic pressure is 31 mmHg.        Assessment and Plan:     Brief HPI:     * Atrial fibrillation with rapid ventricular response  Patient in AFib with RVR.  Was initiated on amiodarone drip in the ICU, but became hypotensive and it was stopped.  If rate continues to be high, may give IV digoxin.  Continue Xarelto.  Hold flecainide till EF has been determined.    Patient states that she is been compliant with Xarelto.  If continues to be in AFib in a.m., consider cardioversion    06/02/2023 - Appears to be in AFL. Rate improved. Give morning meds - see if she converts.    06/03/2023-continue current management.  Would like her to receive her beta-blocker today.  Recheck EKG.    06/04/2023-recurrent atrial fibrillation.  Continue current management.  Heart rate has decreased.  NPO midnight for cardioversion.    Acute on chronic congestive heart failure  Patient is identified as having  heart failure that is Acute on chronic. CHF is currently uncontrolled . Latest ECHO performed and demonstrates- Results for orders placed during the hospital encounter of 02/07/23    Echo    Interpretation Summary  · The left ventricle is normal in size  with normal systolic function.  · The estimated ejection fraction is 55%.  · Indeterminate left ventricular diastolic function.  · Normal right ventricular size with normal right ventricular systolic function.  · Moderate left atrial enlargement.  · Mild right atrial enlargement.  · Mild-to-moderate mitral regurgitation.  · Normal central venous pressure (3 mmHg).  · The estimated PA systolic pressure is 31 mmHg.  . Continue Furosemide and monitor clinical status closely. Monitor on telemetry. Patient is on CHF pathway.  Monitor strict Is&Os and daily weights.  Place on fluid restriction of 1.5 L. Continue to stress to patient importance of self efficacy and  on diet for CHF. Last BNP reviewed- and noted below   Recent Labs   Lab 06/01/23 2024   *   .    IV Lasix as needed and tolerated.  Has history of tachycardia induced cardiomyopathy with normal coronary arteries in the past.  Check 2D echo in a.m..  Guideline directed medical therapy for heart failure    06/02/2023 - appears compensated. Blood pressure control. Rate control. Continue lasix.      Elevated brain natriuretic peptide (BNP) level  Secondary to decompensated heart    Essential (primary) hypertension  Currently hypotensive.  Hold antihypertensive agents    06/02/2023 - meds initially held for hypotension. Blood pressure elevated this morning.    06/03/2023-adjust medications.  Report of hypotension.        VTE Risk Mitigation (From admission, onward)         Ordered     rivaroxaban tablet 20 mg  With dinner         06/01/23 1711     Reason for No Pharmacological VTE Prophylaxis  Once        Question:  Reasons:  Answer:  Already adequately anticoagulated on oral Anticoagulants    06/01/23 1711     IP VTE HIGH RISK PATIENT  Once         06/01/23 1711     Place sequential compression device  Until discontinued         06/01/23 1711                Tee Beth MD  Cardiology  [facility] - Telemetry

## 2023-06-04 NOTE — NURSING
Patient's HR got up to 180. Dr. Lucero notified and new order was placed. Current HR 75. Will continue to monitor.

## 2023-06-04 NOTE — NURSING
Report received from night nurse INDIGO Kelly. Visualized patient and assessed patient's overall condition and appearance. No acute distress noted. Will continue to monitor

## 2023-06-04 NOTE — NURSING
[facility]  Nurses Note -- 4 Eyes      6/4/2023       Skin assessed on: Q Shift      [x] No Pressure Injuries Present    []Prevention Measures Documented    [] Yes LDA  for Pressure Injury Previously documented     [] Yes New Pressure Injury Discovered   [] LDA for New Pressure Injury Added      Attending RN:  Cheryl Davila RN     Second RN:  Meghan Hansen RN

## 2023-06-04 NOTE — ASSESSMENT & PLAN NOTE
- most likely CHF exacerbation due to poor controlled rate.  - decreased diuretics at this time in setting of softer bp.

## 2023-06-04 NOTE — PROGRESS NOTES
[facility] Medicine  Progress Note    Patient Name: Yaneli Steele  MRN: [id number]  Patient Class: IP- Inpatient   Admission Date: 6/1/2023  Length of Stay: 3 days  Attending Physician: Kevin Lucero III, MD  Primary Care Provider: Primary Doctor No        Subjective:     Principal Problem:Atrial fibrillation with rapid ventricular response        HPI:  67 y.o. female with PAF, HTN presents with a complaint of nausea.  Acute onset, duration 2 weeks, associated with cough, congestion, and URI symptoms.  No known exacerbating or alleviating factors.  Reports adherence to home medication regimen.  Denies fever, chills, chest pain, palpitations, dizziness, syncope, diarrhea, or abdominal pain.  In the ED she was found to be in atrial fibrillation with RVR.  Rate controlled after 2 doses of IV diltiazem.  Placed in observation.      Overview/Hospital Course:  No notes on file    Interval History: PT feeling fine, but had episode of hr going into the 180s when trying to open the blinds earlier in the day. Still irregular. Cardiology plan for cardioversion tomorrow. Pt understands plan    Review of Systems  Objective:     Vital Signs (Most Recent):  Temp: 97.8 °F (36.6 °C) (06/04/23 1054)  Pulse: 91 (06/04/23 1054)  Resp: 16 (06/04/23 1054)  BP: 106/80 (06/04/23 1054)  SpO2: 100 % (06/04/23 1054) Vital Signs (24h Range):  Temp:  [97.5 °F (36.4 °C)-98.4 °F (36.9 °C)] 97.8 °F (36.6 °C)  Pulse:  [] 91  Resp:  [16-20] 16  SpO2:  [91 %-100 %] 100 %  BP: ()/(58-80) 106/80     Weight: 100.4 kg (221 lb 5.5 oz)  Body mass index is 34.67 kg/m².    Intake/Output Summary (Last 24 hours) at 6/4/2023 1357  Last data filed at 6/3/2023 1825  Gross per 24 hour   Intake 120 ml   Output --   Net 120 ml         Physical Exam  Vitals reviewed.   Constitutional:       General: She is not in acute distress.     Appearance: She is not toxic-appearing.   HENT:      Mouth/Throat:      Mouth: Mucous membranes  are moist.      Pharynx: Oropharynx is clear.   Cardiovascular:      Rate and Rhythm: Normal rate. Rhythm irregular.   Pulmonary:      Effort: Pulmonary effort is normal. No respiratory distress.   Abdominal:      General: There is no distension.      Palpations: Abdomen is soft.      Tenderness: There is no abdominal tenderness.   Skin:     General: Skin is warm and dry.   Neurological:      General: No focal deficit present.      Mental Status: She is alert and oriented to person, place, and time.   Psychiatric:         Mood and Affect: Mood normal.         Behavior: Behavior normal.           Significant Labs: All pertinent labs within the past 24 hours have been reviewed.    Significant Imaging: I have reviewed all pertinent imaging results/findings within the past 24 hours.      Assessment/Plan:      * Atrial fibrillation with rapid ventricular response  Patient with Paroxysmal (<7 days) atrial fibrillation which is uncontrolled currently with Beta Blocker and flecainide. Patient is currently in atrial fibrillation.[id number] Score: 2.  Anticoagulation indicated. Anticoagulation done with rivaroxaban.  -had episode of hr up to 180s with minimal activity and remains irregular at this time    - Did not do well on IV Amiodarone, currently better rate controlled and now in a flutter  - continue oral medicaitons  - npo at midnight for cardioversion per cardiology    Obesity (BMI 30-39.9)  Body mass index is 34.67 kg/m². Morbid obesity complicates all aspects of disease management from diagnostic modalities to treatment. Weight loss encouraged and health benefits explained to patient.         Acute on chronic congestive heart failure  Patient is identified as having Combined Systolic and Diastolic heart failure that is Acute on chronic. CHF is currently uncontrolled due to Pulmonary edema/pleural effusion on CXR. Latest ECHO performed and demonstrates- Results for orders placed during the hospital encounter of  02/07/23    Echo    Interpretation Summary  · The left ventricle is normal in size with normal systolic function.  · The estimated ejection fraction is 55%.  · Indeterminate left ventricular diastolic function.  · Normal right ventricular size with normal right ventricular systolic function.  · Moderate left atrial enlargement.  · Mild right atrial enlargement.  · Mild-to-moderate mitral regurgitation.  · Normal central venous pressure (3 mmHg).  · The estimated PA systolic pressure is 31 mmHg.  . Continue Beta Blocker and monitor clinical status closely. Monitor on telemetry. Patient is on CHF pathway.  Monitor strict Is&Os and daily weights.  Place on fluid restriction of 1.5 L. Continue to stress to patient importance of self efficacy and  on diet for CHF. Last BNP reviewed- and noted 630.  -appears adequately diuresed at this time and soft bp this morning    -hold off on further diuresis. Monitor bp      Elevated brain natriuretic peptide (BNP) level  - most likely CHF exacerbation due to poor controlled rate.  - decreased diuretics at this time in setting of softer bp.      Essential (primary) hypertension  Well controlled, continue home medications and monitor blood pressure, adjust as needed.       VTE Risk Mitigation (From admission, onward)         Ordered     rivaroxaban tablet 20 mg  With dinner         06/01/23 1711     Reason for No Pharmacological VTE Prophylaxis  Once        Question:  Reasons:  Answer:  Already adequately anticoagulated on oral Anticoagulants    06/01/23 1711     IP VTE HIGH RISK PATIENT  Once         06/01/23 1711     Place sequential compression device  Until discontinued         06/01/23 1711                Discharge Planning   DREW: 6/3/2023     Code Status: Full Code   Is the patient medically ready for discharge?:     Reason for patient still in hospital (select all that apply): Treatment and Consult recommendations                     Kevin Lucero III, MD  Department  of [facility] Medicine   [facility] [place] - Telemetry

## 2023-06-04 NOTE — ASSESSMENT & PLAN NOTE
Patient with Paroxysmal (<7 days) atrial fibrillation which is uncontrolled currently with Beta Blocker and flecainide. Patient is currently in atrial fibrillation.[id number] Score: 2.  Anticoagulation indicated. Anticoagulation done with rivaroxaban.  -had episode of hr up to 180s with minimal activity and remains irregular at this time    - Did not do well on IV Amiodarone, currently better rate controlled and now in a flutter  - continue oral medicaitons  - npo at midnight for cardioversion per cardiology

## 2023-06-04 NOTE — NURSING
[facility]  Nurses Note -- 4 Eyes      6/4/2023       Skin assessed on: Q Shift      [x] No Pressure Injuries Present    []Prevention Measures Documented    [] Yes LDA  for Pressure Injury Previously documented     [] Yes New Pressure Injury Discovered   [] LDA for New Pressure Injury Added      Attending RN:  Angie Lockhart LPN     Second RN:  INDIGO Kelly

## 2023-06-04 NOTE — ASSESSMENT & PLAN NOTE
Patient in AFib with RVR.  Was initiated on amiodarone drip in the ICU, but became hypotensive and it was stopped.  If rate continues to be high, may give IV digoxin.  Continue Xarelto.  Hold flecainide till EF has been determined.    Patient states that she is been compliant with Xarelto.  If continues to be in AFib in a.m., consider cardioversion    06/02/2023 - Appears to be in AFL. Rate improved. Give morning meds - see if she converts.    06/03/2023-continue current management.  Would like her to receive her beta-blocker today.  Recheck EKG.    06/04/2023-recurrent atrial fibrillation.  Continue current management.  Heart rate has decreased.  NPO midnight for cardioversion.

## 2023-06-04 NOTE — SUBJECTIVE & OBJECTIVE
Interval History: PT feeling fine, but had episode of hr going into the 180s when trying to open the blinds earlier in the day. Still irregular. Cardiology plan for cardioversion tomorrow. Pt understands plan    Review of Systems  Objective:     Vital Signs (Most Recent):  Temp: 97.8 °F (36.6 °C) (06/04/23 1054)  Pulse: 91 (06/04/23 1054)  Resp: 16 (06/04/23 1054)  BP: 106/80 (06/04/23 1054)  SpO2: 100 % (06/04/23 1054) Vital Signs (24h Range):  Temp:  [97.5 °F (36.4 °C)-98.4 °F (36.9 °C)] 97.8 °F (36.6 °C)  Pulse:  [] 91  Resp:  [16-20] 16  SpO2:  [91 %-100 %] 100 %  BP: ()/(58-80) 106/80     Weight: 100.4 kg (221 lb 5.5 oz)  Body mass index is 34.67 kg/m².    Intake/Output Summary (Last 24 hours) at 6/4/2023 1357  Last data filed at 6/3/2023 1825  Gross per 24 hour   Intake 120 ml   Output --   Net 120 ml         Physical Exam  Vitals reviewed.   Constitutional:       General: She is not in acute distress.     Appearance: She is not toxic-appearing.   HENT:      Mouth/Throat:      Mouth: Mucous membranes are moist.      Pharynx: Oropharynx is clear.   Cardiovascular:      Rate and Rhythm: Normal rate. Rhythm irregular.   Pulmonary:      Effort: Pulmonary effort is normal. No respiratory distress.   Abdominal:      General: There is no distension.      Palpations: Abdomen is soft.      Tenderness: There is no abdominal tenderness.   Skin:     General: Skin is warm and dry.   Neurological:      General: No focal deficit present.      Mental Status: She is alert and oriented to person, place, and time.   Psychiatric:         Mood and Affect: Mood normal.         Behavior: Behavior normal.           Significant Labs: All pertinent labs within the past 24 hours have been reviewed.    Significant Imaging: I have reviewed all pertinent imaging results/findings within the past 24 hours.

## 2023-06-05 LAB
ANION GAP SERPL CALC-SCNC: 9 MMOL/L (ref 8–16)
BUN SERPL-MCNC: 16 MG/DL (ref 8–23)
CALCIUM SERPL-MCNC: 9.4 MG/DL (ref 8.7–10.5)
CHLORIDE SERPL-SCNC: 104 MMOL/L (ref 95–110)
CO2 SERPL-SCNC: 26 MMOL/L (ref 23–29)
CREAT SERPL-MCNC: 0.9 MG/DL (ref 0.5–1.4)
EST. GFR  (NO RACE VARIABLE): >60 ML/MIN/1.73 M^2
GLUCOSE SERPL-MCNC: 164 MG/DL (ref 70–110)
MAGNESIUM SERPL-MCNC: 1.9 MG/DL (ref 1.6–2.6)
POTASSIUM SERPL-SCNC: 4.2 MMOL/L (ref 3.5–5.1)
SODIUM SERPL-SCNC: 139 MMOL/L (ref 136–145)

## 2023-06-05 PROCEDURE — 25000003 PHARM REV CODE 250: Performed by: INTERNAL MEDICINE

## 2023-06-05 PROCEDURE — 99232 SBSQ HOSP IP/OBS MODERATE 35: CPT | Mod: ,,, | Performed by: INTERNAL MEDICINE

## 2023-06-05 PROCEDURE — 21400001 HC TELEMETRY ROOM

## 2023-06-05 PROCEDURE — 80048 BASIC METABOLIC PNL TOTAL CA: CPT | Performed by: STUDENT IN AN ORGANIZED HEALTH CARE EDUCATION/TRAINING PROGRAM

## 2023-06-05 PROCEDURE — 25000003 PHARM REV CODE 250: Performed by: STUDENT IN AN ORGANIZED HEALTH CARE EDUCATION/TRAINING PROGRAM

## 2023-06-05 PROCEDURE — 93010 EKG 12-LEAD: ICD-10-PCS | Mod: ,,, | Performed by: INTERNAL MEDICINE

## 2023-06-05 PROCEDURE — 93005 ELECTROCARDIOGRAM TRACING: CPT

## 2023-06-05 PROCEDURE — 93010 ELECTROCARDIOGRAM REPORT: CPT | Mod: ,,, | Performed by: INTERNAL MEDICINE

## 2023-06-05 PROCEDURE — 36415 COLL VENOUS BLD VENIPUNCTURE: CPT | Performed by: STUDENT IN AN ORGANIZED HEALTH CARE EDUCATION/TRAINING PROGRAM

## 2023-06-05 PROCEDURE — 99232 PR SUBSEQUENT HOSPITAL CARE,LEVL II: ICD-10-PCS | Mod: ,,, | Performed by: INTERNAL MEDICINE

## 2023-06-05 PROCEDURE — 25000003 PHARM REV CODE 250: Performed by: HOSPITALIST

## 2023-06-05 PROCEDURE — 83735 ASSAY OF MAGNESIUM: CPT | Performed by: STUDENT IN AN ORGANIZED HEALTH CARE EDUCATION/TRAINING PROGRAM

## 2023-06-05 RX ORDER — METOPROLOL TARTRATE 1 MG/ML
5 INJECTION, SOLUTION INTRAVENOUS EVERY 6 HOURS PRN
Status: DISCONTINUED | OUTPATIENT
Start: 2023-06-05 | End: 2023-06-06 | Stop reason: HOSPADM

## 2023-06-05 RX ORDER — METOPROLOL SUCCINATE 25 MG/1
25 TABLET, EXTENDED RELEASE ORAL 2 TIMES DAILY
Status: DISCONTINUED | OUTPATIENT
Start: 2023-06-05 | End: 2023-06-05

## 2023-06-05 RX ORDER — METOPROLOL TARTRATE 1 MG/ML
5 INJECTION, SOLUTION INTRAVENOUS ONCE
Status: COMPLETED | OUTPATIENT
Start: 2023-06-05 | End: 2023-06-05

## 2023-06-05 RX ORDER — METOPROLOL TARTRATE 25 MG/1
25 TABLET, FILM COATED ORAL ONCE
Status: COMPLETED | OUTPATIENT
Start: 2023-06-05 | End: 2023-06-05

## 2023-06-05 RX ORDER — METOPROLOL SUCCINATE 50 MG/1
50 TABLET, EXTENDED RELEASE ORAL 2 TIMES DAILY
Status: DISCONTINUED | OUTPATIENT
Start: 2023-06-05 | End: 2023-06-06 | Stop reason: HOSPADM

## 2023-06-05 RX ADMIN — FAMOTIDINE 20 MG: 20 TABLET, FILM COATED ORAL at 09:06

## 2023-06-05 RX ADMIN — RIVAROXABAN 20 MG: 20 TABLET, FILM COATED ORAL at 04:06

## 2023-06-05 RX ADMIN — METOROPROLOL TARTRATE 5 MG: 5 INJECTION, SOLUTION INTRAVENOUS at 07:06

## 2023-06-05 RX ADMIN — METOPROLOL TARTRATE 25 MG: 25 TABLET, FILM COATED ORAL at 12:06

## 2023-06-05 RX ADMIN — METOPROLOL SUCCINATE 50 MG: 50 TABLET, EXTENDED RELEASE ORAL at 09:06

## 2023-06-05 RX ADMIN — FLECAINIDE ACETATE 100 MG: 50 TABLET ORAL at 09:06

## 2023-06-05 RX ADMIN — METOPROLOL SUCCINATE 25 MG: 25 TABLET, EXTENDED RELEASE ORAL at 09:06

## 2023-06-05 RX ADMIN — SACUBITRIL AND VALSARTAN 1 TABLET: 24; 26 TABLET, FILM COATED ORAL at 09:06

## 2023-06-05 NOTE — NURSING
RAPID RESPONSE NURSE PROACTIVE ROUNDING NOTE       Time of Visit: 1325    Admit Date: 2023  LOS: 4  Code Status: Full Code   Date of Visit: 2023  : 1955  Age: 67 y.o.  Sex: female  Race: Black or   Bed: W340/W0 A:   MRN: [id number]  Was the patient discharged from an ICU this admission? Yes   Was the patient discharged from a PACU within last 24 hours? No   Did the patient receive conscious sedation/general anesthesia in last 24 hours? No   Was the patient in the ED within the past 24 hours? No   Was the patient on NIPPV within the past 24 hours? No   Attending Physician: Kevin Lucero III, MD  Primary Service: Hospitalist   Time spent at the bedside: < 15 min    SITUATION    Notified by Epic patient alert  Reason for alert: MEWS 3,     Diagnosis: Atrial fibrillation with rapid ventricular response   has a past medical history of Chronic systolic heart failure, Essential (primary) hypertension, and Paroxysmal atrial fibrillation.    Last Vitals:  Temp: 98.1 °F (36.7 °C) ( 1157)  Pulse: 120 ( 1159)  Resp: 18 ( 1157)  BP: 124/88 ( 1200)  SpO2: 99 % ( 1157)    24 Hour Vitals Range:  Temp:  [97.9 °F (36.6 °C)-98.5 °F (36.9 °C)]   Pulse:  []   Resp:  [14-20]   BP: (101-124)/(70-88)   SpO2:  [93 %-99 %]     Clinical Issues: Circulatory    ASSESSMENT/INTERVENTIONS    Scheduled lopressor ordered.  PRN available if needed. Pt denies symptoms; AAO X 4.      RECOMMENDATIONS  Monitor for HR to lower post oral admin.  If not effective, give IV dose.     Discussed plan of care with bedside RNStan    PROVIDER ESCALATION    Physician escalation: Yes    Orders received and case discussed with Dr. Lucero .    Disposition:Remain in room 340    FOLLOW UP    Call back the Rapid Response NurseMarie at [phone] for additional questions or concerns.

## 2023-06-05 NOTE — NURSING
Notified MD, Pt's HR  in the 180's     EKG ordered    EKG completed by night nurse    Metoprolol IV administered by night nurse, pt converted to NSR.     Will continue to monitor.

## 2023-06-05 NOTE — ASSESSMENT & PLAN NOTE
Patient with Paroxysmal (<7 days) atrial fibrillation which is uncontrolled currently with Beta Blocker and flecainide. Patient is currently in atrial fibrillation.[id number] Score: 2.  Anticoagulation indicated. Anticoagulation done with rivaroxaban.  - Did not do well on IV Amiodarone  - carvedilol converted to metoprolol with pt converting to NSR, but later back in a fib rvr    -repeat bmp and mag pending  -titrating metoprolol dose for better rate control with ivp metoprolol prn  -cardiology consulted  -needs EP consulted on d/c.

## 2023-06-05 NOTE — PROGRESS NOTES
[facility] Medicine  Progress Note    Patient Name: Yaneil Steele  MRN: [id number]  Patient Class: IP- Inpatient   Admission Date: 6/1/2023  Length of Stay: 4 days  Attending Physician: Kevin Lucero III, MD  Primary Care Provider: Primary Doctor No        Subjective:     Principal Problem:Atrial fibrillation with rapid ventricular response        HPI:  67 y.o. female with PAF, HTN presents with a complaint of nausea.  Acute onset, duration 2 weeks, associated with cough, congestion, and URI symptoms.  No known exacerbating or alleviating factors.  Reports adherence to home medication regimen.  Denies fever, chills, chest pain, palpitations, dizziness, syncope, diarrhea, or abdominal pain.  In the ED she was found to be in atrial fibrillation with RVR.  Rate controlled after 2 doses of IV diltiazem.  Placed in observation.      Overview/Hospital Course:  No notes on file    Interval History: Pt had converted to sinus rhythm this morning and cardioversion cancelled, but later went back in rvr with rates of 130s.. Pt asymptomatic throughout.     Review of Systems  Objective:     Vital Signs (Most Recent):  Temp: 98.1 °F (36.7 °C) (06/05/23 1157)  Pulse: (!) 120 (06/05/23 1159)  Resp: 18 (06/05/23 1157)  BP: 124/88 (06/05/23 1200)  SpO2: 99 % (06/05/23 1157) Vital Signs (24h Range):  Temp:  [97.9 °F (36.6 °C)-98.5 °F (36.9 °C)] 98.1 °F (36.7 °C)  Pulse:  [] 120  Resp:  [14-20] 18  SpO2:  [93 %-99 %] 99 %  BP: (101-124)/(70-88) 124/88     Weight: 100.4 kg (221 lb 5.5 oz)  Body mass index is 34.67 kg/m².  No intake or output data in the 24 hours ending 06/05/23 1343      Physical Exam      Vitals reviewed.   Constitutional:       General: She is not in acute distress.     Appearance: She is not toxic-appearing.   HENT:      Mouth/Throat:      Mouth: Mucous membranes are moist.      Pharynx: Oropharynx is clear.   Cardiovascular:      Rate and Rhythm: Normal rate and rhythm.  Pulmonary:       Effort: Pulmonary effort is normal. No respiratory distress.   Abdominal:      General: There is no distension.      Palpations: Abdomen is soft.      Tenderness: There is no abdominal tenderness.   Skin:     General: Skin is warm and dry.   Neurological:      General: No focal deficit present.      Mental Status: She is alert and oriented to person, place, and time.   Psychiatric:         Mood and Affect: Mood normal.         Behavior: Behavior normal.   Significant Labs: All pertinent labs within the past 24 hours have been reviewed.    Significant Imaging: I have reviewed all pertinent imaging results/findings within the past 24 hours.      Assessment/Plan:      * Atrial fibrillation with rapid ventricular response  Patient with Paroxysmal (<7 days) atrial fibrillation which is uncontrolled currently with Beta Blocker and flecainide. Patient is currently in atrial fibrillation.[id number] Score: 2.  Anticoagulation indicated. Anticoagulation done with rivaroxaban.  - Did not do well on IV Amiodarone  - carvedilol converted to metoprolol with pt converting to NSR, but later back in a fib rvr    -repeat bmp and mag pending  -titrating metoprolol dose for better rate control with ivp metoprolol prn  -cardiology consulted  -needs EP consulted on d/c.    Obesity (BMI 30-39.9)  Body mass index is 34.67 kg/m². Morbid obesity complicates all aspects of disease management from diagnostic modalities to treatment. Weight loss encouraged and health benefits explained to patient.         Acute on chronic congestive heart failure  Patient is identified as having Combined Systolic and Diastolic heart failure that is Acute on chronic. CHF is currently uncontrolled due to Pulmonary edema/pleural effusion on CXR. Latest ECHO performed and demonstrates- Results for orders placed during the hospital encounter of 02/07/23    Echo    Interpretation Summary  · The left ventricle is normal in size with normal systolic function.  ·  The estimated ejection fraction is 55%.  · Indeterminate left ventricular diastolic function.  · Normal right ventricular size with normal right ventricular systolic function.  · Moderate left atrial enlargement.  · Mild right atrial enlargement.  · Mild-to-moderate mitral regurgitation.  · Normal central venous pressure (3 mmHg).  · The estimated PA systolic pressure is 31 mmHg.  . Continue Beta Blocker and monitor clinical status closely. Monitor on telemetry. Patient is on CHF pathway.  Monitor strict Is&Os and daily weights.  Place on fluid restriction of 1.5 L. Continue to stress to patient importance of self efficacy and  on diet for CHF. Last BNP reviewed- and noted 630.  -appears adequately diuresed at this time and soft bp this morning    -hold off on further diuresis. Monitor bp      Elevated brain natriuretic peptide (BNP) level  - most likely CHF exacerbation due to poor controlled rate.  - decreased diuretics at this time in setting of softer bp.      Essential (primary) hypertension  Well controlled, continue home medications and monitor blood pressure, adjust as needed.     VTE Risk Mitigation (From admission, onward)         Ordered     rivaroxaban tablet 20 mg  With dinner         06/01/23 1711     Reason for No Pharmacological VTE Prophylaxis  Once        Question:  Reasons:  Answer:  Already adequately anticoagulated on oral Anticoagulants    06/01/23 1711     IP VTE HIGH RISK PATIENT  Once         06/01/23 1711     Place sequential compression device  Until discontinued         06/01/23 1711                Discharge Planning   DREW: 6/3/2023     Code Status: Full Code   Is the patient medically ready for discharge?:     Reason for patient still in hospital (select all that apply): Treatment and Consult recommendations                     Kevin Lucero III, MD  Department of Hospital Medicine   [facility] - Telemetry

## 2023-06-05 NOTE — SUBJECTIVE & OBJECTIVE
Interval History: HR in 180s this am. Gave lopressor IV -> sinus rhythm with first degree AVB    Review of Systems   Constitutional: Negative for decreased appetite, diaphoresis, malaise/fatigue, weight gain and weight loss.   HENT:  Negative for congestion, hearing loss, hoarse voice, nosebleeds, odynophagia, stridor and tinnitus.    Eyes:  Negative for blurred vision, double vision, photophobia, visual disturbance and visual halos.   Cardiovascular:  Positive for palpitations. Negative for chest pain, claudication, cyanosis, dyspnea on exertion, irregular heartbeat, leg swelling, near-syncope, orthopnea, paroxysmal nocturnal dyspnea and syncope.   Respiratory:  Negative for cough, hemoptysis, shortness of breath, sleep disturbances due to breathing, snoring, sputum production and wheezing.    Endocrine: Negative for cold intolerance, heat intolerance, polydipsia, polyphagia and polyuria.   Skin:  Negative for color change, poor wound healing, suspicious lesions and unusual hair distribution.   Musculoskeletal:  Negative for falls, joint pain, muscle cramps, muscle weakness, myalgias, neck pain and stiffness.   Gastrointestinal:  Negative for bloating, abdominal pain, constipation, diarrhea, dysphagia, heartburn, hematemesis, jaundice, melena, nausea and vomiting.   Neurological:  Negative for disturbances in coordination, excessive daytime sleepiness, dizziness, focal weakness, headaches, light-headedness, loss of balance, numbness, paresthesias, seizures, sensory change, tremors, vertigo and weakness.   Psychiatric/Behavioral:  Negative for altered mental status, depression, hallucinations and memory loss. The patient does not have insomnia and is not nervous/anxious.    Objective:     Vital Signs (Most Recent):  Temp: 98.1 °F (36.7 °C) (06/05/23 0802)  Pulse: 84 (06/05/23 0802)  Resp: 18 (06/05/23 0802)  BP: 109/80 (06/05/23 0802)  SpO2: 98 % (06/05/23 0825) Vital Signs (24h Range):  Temp:  [97.8 °F (36.6  °C)-98.5 °F (36.9 °C)] 98.1 °F (36.7 °C)  Pulse:  [84-97] 84  Resp:  [14-20] 18  SpO2:  [93 %-100 %] 98 %  BP: (101-121)/(70-86) 109/80     Weight: 100.4 kg (221 lb 5.5 oz)  Body mass index is 34.67 kg/m².     SpO2: 98 %       No intake or output data in the 24 hours ending 06/05/23 0845    Lines/Drains/Airways       Peripheral Intravenous Line  Duration                  Peripheral IV - Single Lumen 06/01/23 1424 20 G Right Antecubital 3 days         Peripheral IV - Single Lumen 06/01/23 1425 20 G Left Antecubital 3 days                       Physical Exam  Vitals reviewed.   Constitutional:       General: She is not in acute distress.     Appearance: Normal appearance. She is well-developed. She is obese. She is not ill-appearing, toxic-appearing or diaphoretic.   HENT:      Head: Normocephalic.   Neck:      Vascular: No carotid bruit or JVD.   Cardiovascular:      Rate and Rhythm: Normal rate and regular rhythm.      Pulses: Normal pulses.   Pulmonary:      Effort: Pulmonary effort is normal.      Breath sounds: Normal breath sounds.   Abdominal:      General: Bowel sounds are normal.      Palpations: Abdomen is soft.      Tenderness: There is no abdominal tenderness.   Musculoskeletal:      Right lower leg: No edema.      Left lower leg: No edema.   Neurological:      Mental Status: She is alert and oriented to person, place, and time.   Psychiatric:         Mood and Affect: Mood normal.         Speech: Speech normal.         Behavior: Behavior normal.         Thought Content: Thought content normal.          Significant Labs: CMP   Recent Labs   Lab 06/03/23  1118 06/04/23  0648    141   K 3.8 3.7    104   CO2 26 26   * 124*   BUN 19 16   CREATININE 1.1 0.9   CALCIUM 9.2 8.9   ANIONGAP 10 11   , CBC   Recent Labs   Lab 06/03/23  1118   WBC 8.09   HGB 15.6   HCT 50.1*      , Lipid Panel No results for input(s): CHOL, HDL, LDLCALC, TRIG, CHOLHDL in the last 48 hours., and Troponin No  results for input(s): TROPONINI in the last 48 hours.    Significant Imaging: Echocardiogram: Transthoracic echo (TTE) complete (Cupid Only):   Results for orders placed or performed during the hospital encounter of 02/07/23   Echo   Result Value Ref Range    TDI SEPTAL 0.04 m/s    LV LATERAL E/E' RATIO 18.25 m/s    LV SEPTAL E/E' RATIO 36.50 m/s    LA WIDTH 5.60 cm    IVC diameter 20 cm    Left Ventricular Outflow Tract Mean Velocity 0.82 cm/s    Left Ventricular Outflow Tract Mean Gradient 2.78 mmHg    TDI LATERAL 0.08 m/s    PV PEAK VELOCITY 0.88 cm/s    LVIDd 5.39 3.5 - 6.0 cm    IVS 0.89 0.6 - 1.1 cm    Posterior Wall 0.78 0.6 - 1.1 cm    LVIDs 4.19 (A) 2.1 - 4.0 cm    FS 22 28 - 44 %    LA volume 164.28 cm3    Sinus 3.04 cm    STJ 2.52 cm    LV mass 163.09 g    LA size 5.72 cm    RVDD 4.31 cm    TAPSE 2.27 cm    Left Ventricle Relative Wall Thickness 0.29 cm    AV mean gradient 5 mmHg    AV valve area 2.79 cm2    AV Velocity Ratio 0.69     AV index (prosthetic) 0.81     MV valve area p 1/2 method 2.46 cm2    E/A ratio 1.36     Mean e' 0.06 m/s    E wave deceleration time 308.20 msec    LVOT diameter 2.09 cm    LVOT area 3.4 cm2    LVOT peak mazin 1.04 m/s    LVOT peak VTI 26.40 cm    Ao peak mazin 1.50 m/s    Ao VTI 32.4 cm    LVOT stroke volume 90.52 cm3    AV peak gradient 9 mmHg    E/E' ratio 24.33 m/s    MV Peak E Mazin 1.46 m/s    TR Max Mazin 2.66 m/s    MV stenosis pressure 1/2 time 89.38 ms    MV Peak A Mazin 1.07 m/s    LV Systolic Volume 78.26 mL    LV Diastolic Volume 140.90 mL    RA Major Axis 6.46 cm    Left Atrium Minor Axis 5.63 cm    Left Atrium Major Axis 6.50 cm    Triscuspid Valve Regurgitation Peak Gradient 28 mmHg    RA Width 5.71 cm    Right Atrial Pressure (from IVC) 3 mmHg    EF 55 %    TV rest pulmonary artery pressure 31 mmHg    Narrative    · The left ventricle is normal in size with normal systolic function.  · The estimated ejection fraction is 55%.  · Indeterminate left ventricular  diastolic function.  · Normal right ventricular size with normal right ventricular systolic   function.  · Moderate left atrial enlargement.  · Mild right atrial enlargement.  · Mild-to-moderate mitral regurgitation.  · Normal central venous pressure (3 mmHg).  · The estimated PA systolic pressure is 31 mmHg.

## 2023-06-05 NOTE — PROGRESS NOTES
[facility] - [place], [place] Telemetry  Cardiology  Progress Note    Patient Name: Yaneli Steele  MRN: [id number]  Admission Date: 6/1/2023  Hospital Length of Stay: 4 days  Code Status: Full Code   Attending Physician: Kevin Lucero III, MD   Primary Care Physician: Primary Doctor No  Expected Discharge Date: 6/3/2023  Principal Problem:Atrial fibrillation with rapid ventricular response    Subjective:     Hospital Course:   No notes on file    Interval History: HR in 180s this am. Gave lopressor IV -> sinus rhythm with first degree AVB    Review of Systems   Constitutional: Negative for decreased appetite, diaphoresis, malaise/fatigue, weight gain and weight loss.   HENT:  Negative for congestion, hearing loss, hoarse voice, nosebleeds, odynophagia, stridor and tinnitus.    Eyes:  Negative for blurred vision, double vision, photophobia, visual disturbance and visual halos.   Cardiovascular:  Positive for palpitations. Negative for chest pain, claudication, cyanosis, dyspnea on exertion, irregular heartbeat, leg swelling, near-syncope, orthopnea, paroxysmal nocturnal dyspnea and syncope.   Respiratory:  Negative for cough, hemoptysis, shortness of breath, sleep disturbances due to breathing, snoring, sputum production and wheezing.    Endocrine: Negative for cold intolerance, heat intolerance, polydipsia, polyphagia and polyuria.   Skin:  Negative for color change, poor wound healing, suspicious lesions and unusual hair distribution.   Musculoskeletal:  Negative for falls, joint pain, muscle cramps, muscle weakness, myalgias, neck pain and stiffness.   Gastrointestinal:  Negative for bloating, abdominal pain, constipation, diarrhea, dysphagia, heartburn, hematemesis, jaundice, melena, nausea and vomiting.   Neurological:  Negative for disturbances in coordination, excessive daytime sleepiness, dizziness, focal weakness, headaches, light-headedness, loss of balance, numbness, paresthesias, seizures, sensory change, tremors,  vertigo and weakness.   Psychiatric/Behavioral:  Negative for altered mental status, depression, hallucinations and memory loss. The patient does not have insomnia and is not nervous/anxious.    Objective:     Vital Signs (Most Recent):  Temp: 98.1 °F (36.7 °C) (06/05/23 0802)  Pulse: 84 (06/05/23 0802)  Resp: 18 (06/05/23 0802)  BP: 109/80 (06/05/23 0802)  SpO2: 98 % (06/05/23 0825) Vital Signs (24h Range):  Temp:  [97.8 °F (36.6 °C)-98.5 °F (36.9 °C)] 98.1 °F (36.7 °C)  Pulse:  [84-97] 84  Resp:  [14-20] 18  SpO2:  [93 %-100 %] 98 %  BP: (101-121)/(70-86) 109/80     Weight: 100.4 kg (221 lb 5.5 oz)  Body mass index is 34.67 kg/m².     SpO2: 98 %       No intake or output data in the 24 hours ending 06/05/23 0845    Lines/Drains/Airways       Peripheral Intravenous Line  Duration                  Peripheral IV - Single Lumen 06/01/23 1424 20 G Right Antecubital 3 days         Peripheral IV - Single Lumen 06/01/23 1425 20 G Left Antecubital 3 days                       Physical Exam  Vitals reviewed.   Constitutional:       General: She is not in acute distress.     Appearance: Normal appearance. She is well-developed. She is obese. She is not ill-appearing, toxic-appearing or diaphoretic.   HENT:      Head: Normocephalic.   Neck:      Vascular: No carotid bruit or JVD.   Cardiovascular:      Rate and Rhythm: Normal rate and regular rhythm.      Pulses: Normal pulses.   Pulmonary:      Effort: Pulmonary effort is normal.      Breath sounds: Normal breath sounds.   Abdominal:      General: Bowel sounds are normal.      Palpations: Abdomen is soft.      Tenderness: There is no abdominal tenderness.   Musculoskeletal:      Right lower leg: No edema.      Left lower leg: No edema.   Neurological:      Mental Status: She is alert and oriented to person, place, and time.   Psychiatric:         Mood and Affect: Mood normal.         Speech: Speech normal.         Behavior: Behavior normal.         Thought Content: Thought  content normal.          Significant Labs: CMP   Recent Labs   Lab 06/03/23  1118 06/04/23  0648    141   K 3.8 3.7    104   CO2 26 26   * 124*   BUN 19 16   CREATININE 1.1 0.9   CALCIUM 9.2 8.9   ANIONGAP 10 11   , CBC   Recent Labs   Lab 06/03/23  1118   WBC 8.09   HGB 15.6   HCT 50.1*      , Lipid Panel No results for input(s): CHOL, HDL, LDLCALC, TRIG, CHOLHDL in the last 48 hours., and Troponin No results for input(s): TROPONINI in the last 48 hours.    Significant Imaging: Echocardiogram: Transthoracic echo (TTE) complete (Cupid Only):   Results for orders placed or performed during the hospital encounter of 02/07/23   Echo   Result Value Ref Range    TDI SEPTAL 0.04 m/s    LV LATERAL E/E' RATIO 18.25 m/s    LV SEPTAL E/E' RATIO 36.50 m/s    LA WIDTH 5.60 cm    IVC diameter 20 cm    Left Ventricular Outflow Tract Mean Velocity 0.82 cm/s    Left Ventricular Outflow Tract Mean Gradient 2.78 mmHg    TDI LATERAL 0.08 m/s    PV PEAK VELOCITY 0.88 cm/s    LVIDd 5.39 3.5 - 6.0 cm    IVS 0.89 0.6 - 1.1 cm    Posterior Wall 0.78 0.6 - 1.1 cm    LVIDs 4.19 (A) 2.1 - 4.0 cm    FS 22 28 - 44 %    LA volume 164.28 cm3    Sinus 3.04 cm    STJ 2.52 cm    LV mass 163.09 g    LA size 5.72 cm    RVDD 4.31 cm    TAPSE 2.27 cm    Left Ventricle Relative Wall Thickness 0.29 cm    AV mean gradient 5 mmHg    AV valve area 2.79 cm2    AV Velocity Ratio 0.69     AV index (prosthetic) 0.81     MV valve area p 1/2 method 2.46 cm2    E/A ratio 1.36     Mean e' 0.06 m/s    E wave deceleration time 308.20 msec    LVOT diameter 2.09 cm    LVOT area 3.4 cm2    LVOT peak mazin 1.04 m/s    LVOT peak VTI 26.40 cm    Ao peak mazin 1.50 m/s    Ao VTI 32.4 cm    LVOT stroke volume 90.52 cm3    AV peak gradient 9 mmHg    E/E' ratio 24.33 m/s    MV Peak E Mazin 1.46 m/s    TR Max Mazin 2.66 m/s    MV stenosis pressure 1/2 time 89.38 ms    MV Peak A Mazin 1.07 m/s    LV Systolic Volume 78.26 mL    LV Diastolic Volume 140.90 mL    RA  Major Axis 6.46 cm    Left Atrium Minor Axis 5.63 cm    Left Atrium Major Axis 6.50 cm    Triscuspid Valve Regurgitation Peak Gradient 28 mmHg    RA Width 5.71 cm    Right Atrial Pressure (from IVC) 3 mmHg    EF 55 %    TV rest pulmonary artery pressure 31 mmHg    Narrative    · The left ventricle is normal in size with normal systolic function.  · The estimated ejection fraction is 55%.  · Indeterminate left ventricular diastolic function.  · Normal right ventricular size with normal right ventricular systolic   function.  · Moderate left atrial enlargement.  · Mild right atrial enlargement.  · Mild-to-moderate mitral regurgitation.  · Normal central venous pressure (3 mmHg).  · The estimated PA systolic pressure is 31 mmHg.        Assessment and Plan:     Brief HPI:     * Atrial fibrillation with rapid ventricular response  Patient in AFib with RVR.  Was initiated on amiodarone drip in the ICU, but became hypotensive and it was stopped.  If rate continues to be high, may give IV digoxin.  Continue Xarelto.  Hold flecainide till EF has been determined.    Patient states that she is been compliant with Xarelto.  If continues to be in AFib in a.m., consider cardioversion    06/02/2023 - Appears to be in AFL. Rate improved. Give morning meds - see if she converts.    06/03/2023-continue current management.  Would like her to receive her beta-blocker today.  Recheck EKG.    06/04/2023-recurrent atrial fibrillation.  Continue current management.  Heart rate has decreased.  NPO midnight for cardioversion.    06/05/2023-converted to sinus rhythm with Lopressor IV.  Change carvedilol to Toprol.  Continue flecainide.    Acute on chronic congestive heart failure  Patient is identified as having  heart failure that is Acute on chronic. CHF is currently uncontrolled . Latest ECHO performed and demonstrates- Results for orders placed during the hospital encounter of 02/07/23    Echo    Interpretation Summary  · The left  ventricle is normal in size with normal systolic function.  · The estimated ejection fraction is 55%.  · Indeterminate left ventricular diastolic function.  · Normal right ventricular size with normal right ventricular systolic function.  · Moderate left atrial enlargement.  · Mild right atrial enlargement.  · Mild-to-moderate mitral regurgitation.  · Normal central venous pressure (3 mmHg).  · The estimated PA systolic pressure is 31 mmHg.  . Continue Furosemide and monitor clinical status closely. Monitor on telemetry. Patient is on CHF pathway.  Monitor strict Is&Os and daily weights.  Place on fluid restriction of 1.5 L. Continue to stress to patient importance of self efficacy and  on diet for CHF. Last BNP reviewed- and noted below   Recent Labs   Lab 06/01/23 2024   *   .    IV Lasix as needed and tolerated.  Has history of tachycardia induced cardiomyopathy with normal coronary arteries in the past.  Check 2D echo in a.m..  Guideline directed medical therapy for heart failure    06/02/2023 - appears compensated. Blood pressure control. Rate control. Continue lasix.    06/05/2023-appears compensated.      Elevated brain natriuretic peptide (BNP) level  Secondary to decompensated heart    Essential (primary) hypertension  Currently hypotensive.  Hold antihypertensive agents    06/02/2023 - meds initially held for hypotension. Blood pressure elevated this morning.    06/03/2023-adjust medications.  Report of hypotension.        VTE Risk Mitigation (From admission, onward)         Ordered     rivaroxaban tablet 20 mg  With dinner         06/01/23 1711     Reason for No Pharmacological VTE Prophylaxis  Once        Question:  Reasons:  Answer:  Already adequately anticoagulated on oral Anticoagulants    06/01/23 1711     IP VTE HIGH RISK PATIENT  Once         06/01/23 1711     Place sequential compression device  Until discontinued         06/01/23 1711                Tee Beth,  MD  Cardiology  [facility] [place] - Telemetry

## 2023-06-05 NOTE — NURSING
[facility] - [place]  Nurses Note -- 4 Eyes      6/5/2023       Skin assessed on: Q Shift      [x] No Pressure Injuries Present    [x]Prevention Measures Documented    [] Yes LDA  for Pressure Injury Previously documented     [] Yes New Pressure Injury Discovered   [] LDA for New Pressure Injury Added      Attending RN:  Stan Rausch RN     Second RN:  Leticia YODER RN

## 2023-06-05 NOTE — NURSING
Notified MD, Pt's     [id number] - MD ordered a dose of the metoprolol and he will f/u w/ cardiologist.

## 2023-06-05 NOTE — ASSESSMENT & PLAN NOTE
Patient is identified as having  heart failure that is Acute on chronic. CHF is currently uncontrolled . Latest ECHO performed and demonstrates- Results for orders placed during the hospital encounter of 02/07/23    Echo    Interpretation Summary  · The left ventricle is normal in size with normal systolic function.  · The estimated ejection fraction is 55%.  · Indeterminate left ventricular diastolic function.  · Normal right ventricular size with normal right ventricular systolic function.  · Moderate left atrial enlargement.  · Mild right atrial enlargement.  · Mild-to-moderate mitral regurgitation.  · Normal central venous pressure (3 mmHg).  · The estimated PA systolic pressure is 31 mmHg.  . Continue Furosemide and monitor clinical status closely. Monitor on telemetry. Patient is on CHF pathway.  Monitor strict Is&Os and daily weights.  Place on fluid restriction of 1.5 L. Continue to stress to patient importance of self efficacy and  on diet for CHF. Last BNP reviewed- and noted below   Recent Labs   Lab 06/01/23 2024   *   .    IV Lasix as needed and tolerated.  Has history of tachycardia induced cardiomyopathy with normal coronary arteries in the past.  Check 2D echo in a.m..  Guideline directed medical therapy for heart failure    06/02/2023 - appears compensated. Blood pressure control. Rate control. Continue lasix.    06/05/2023-appears compensated.

## 2023-06-05 NOTE — NURSING
Bedside report given to night nurse INDIGO Kelly. Walking rounds completed. Visualized and assessed patient. NAD noted. Safety precautions maintained and call light within reach.    Chart check completed.

## 2023-06-05 NOTE — ASSESSMENT & PLAN NOTE
Patient in AFib with RVR.  Was initiated on amiodarone drip in the ICU, but became hypotensive and it was stopped.  If rate continues to be high, may give IV digoxin.  Continue Xarelto.  Hold flecainide till EF has been determined.    Patient states that she is been compliant with Xarelto.  If continues to be in AFib in a.m., consider cardioversion    06/02/2023 - Appears to be in AFL. Rate improved. Give morning meds - see if she converts.    06/03/2023-continue current management.  Would like her to receive her beta-blocker today.  Recheck EKG.    06/04/2023-recurrent atrial fibrillation.  Continue current management.  Heart rate has decreased.  NPO midnight for cardioversion.    06/05/2023-converted to sinus rhythm with Lopressor IV.  Change carvedilol to Toprol.  Continue flecainide.

## 2023-06-05 NOTE — SUBJECTIVE & OBJECTIVE
Interval History: Pt had converted to sinus rhythm this morning and cardioversion cancelled, but later went back in rvr with rates of 130s.. Pt asymptomatic throughout.     Review of Systems  Objective:     Vital Signs (Most Recent):  Temp: 98.1 °F (36.7 °C) (06/05/23 1157)  Pulse: (!) 120 (06/05/23 1159)  Resp: 18 (06/05/23 1157)  BP: 124/88 (06/05/23 1200)  SpO2: 99 % (06/05/23 1157) Vital Signs (24h Range):  Temp:  [97.9 °F (36.6 °C)-98.5 °F (36.9 °C)] 98.1 °F (36.7 °C)  Pulse:  [] 120  Resp:  [14-20] 18  SpO2:  [93 %-99 %] 99 %  BP: (101-124)/(70-88) 124/88     Weight: 100.4 kg (221 lb 5.5 oz)  Body mass index is 34.67 kg/m².  No intake or output data in the 24 hours ending 06/05/23 1343      Physical Exam      Vitals reviewed.   Constitutional:       General: She is not in acute distress.     Appearance: She is not toxic-appearing.   HENT:      Mouth/Throat:      Mouth: Mucous membranes are moist.      Pharynx: Oropharynx is clear.   Cardiovascular:      Rate and Rhythm: Normal rate and rhythm.  Pulmonary:      Effort: Pulmonary effort is normal. No respiratory distress.   Abdominal:      General: There is no distension.      Palpations: Abdomen is soft.      Tenderness: There is no abdominal tenderness.   Skin:     General: Skin is warm and dry.   Neurological:      General: No focal deficit present.      Mental Status: She is alert and oriented to person, place, and time.   Psychiatric:         Mood and Affect: Mood normal.         Behavior: Behavior normal.   Significant Labs: All pertinent labs within the past 24 hours have been reviewed.    Significant Imaging: I have reviewed all pertinent imaging results/findings within the past 24 hours.

## 2023-06-06 VITALS
SYSTOLIC BLOOD PRESSURE: 121 MMHG | HEART RATE: 81 BPM | RESPIRATION RATE: 18 BRPM | HEIGHT: 67 IN | BODY MASS INDEX: 34.73 KG/M2 | WEIGHT: 221.31 LBS | TEMPERATURE: 98 F | DIASTOLIC BLOOD PRESSURE: 77 MMHG | OXYGEN SATURATION: 97 %

## 2023-06-06 LAB
ANION GAP SERPL CALC-SCNC: 11 MMOL/L (ref 8–16)
BUN SERPL-MCNC: 15 MG/DL (ref 8–23)
CALCIUM SERPL-MCNC: 9.2 MG/DL (ref 8.7–10.5)
CHLORIDE SERPL-SCNC: 107 MMOL/L (ref 95–110)
CO2 SERPL-SCNC: 20 MMOL/L (ref 23–29)
CREAT SERPL-MCNC: 0.8 MG/DL (ref 0.5–1.4)
EST. GFR  (NO RACE VARIABLE): >60 ML/MIN/1.73 M^2
GLUCOSE SERPL-MCNC: 118 MG/DL (ref 70–110)
MAGNESIUM SERPL-MCNC: 1.7 MG/DL (ref 1.6–2.6)
POTASSIUM SERPL-SCNC: 4.3 MMOL/L (ref 3.5–5.1)
SODIUM SERPL-SCNC: 138 MMOL/L (ref 136–145)

## 2023-06-06 PROCEDURE — 36415 COLL VENOUS BLD VENIPUNCTURE: CPT | Performed by: STUDENT IN AN ORGANIZED HEALTH CARE EDUCATION/TRAINING PROGRAM

## 2023-06-06 PROCEDURE — 25000003 PHARM REV CODE 250: Performed by: STUDENT IN AN ORGANIZED HEALTH CARE EDUCATION/TRAINING PROGRAM

## 2023-06-06 PROCEDURE — 80048 BASIC METABOLIC PNL TOTAL CA: CPT | Performed by: STUDENT IN AN ORGANIZED HEALTH CARE EDUCATION/TRAINING PROGRAM

## 2023-06-06 PROCEDURE — 25000003 PHARM REV CODE 250: Performed by: HOSPITALIST

## 2023-06-06 PROCEDURE — 99232 PR SUBSEQUENT HOSPITAL CARE,LEVL II: ICD-10-PCS | Mod: ,,, | Performed by: INTERNAL MEDICINE

## 2023-06-06 PROCEDURE — 83735 ASSAY OF MAGNESIUM: CPT | Performed by: STUDENT IN AN ORGANIZED HEALTH CARE EDUCATION/TRAINING PROGRAM

## 2023-06-06 PROCEDURE — 99232 SBSQ HOSP IP/OBS MODERATE 35: CPT | Mod: ,,, | Performed by: INTERNAL MEDICINE

## 2023-06-06 RX ORDER — METOPROLOL SUCCINATE 50 MG/1
50 TABLET, EXTENDED RELEASE ORAL 2 TIMES DAILY
Qty: 60 TABLET | Refills: 1 | Status: ON HOLD | OUTPATIENT
Start: 2023-06-06 | End: 2023-07-06 | Stop reason: HOSPADM

## 2023-06-06 RX ADMIN — SACUBITRIL AND VALSARTAN 1 TABLET: 24; 26 TABLET, FILM COATED ORAL at 09:06

## 2023-06-06 RX ADMIN — FAMOTIDINE 20 MG: 20 TABLET, FILM COATED ORAL at 09:06

## 2023-06-06 RX ADMIN — METOPROLOL SUCCINATE 50 MG: 50 TABLET, EXTENDED RELEASE ORAL at 09:06

## 2023-06-06 RX ADMIN — FLECAINIDE ACETATE 100 MG: 50 TABLET ORAL at 09:06

## 2023-06-06 NOTE — SUBJECTIVE & OBJECTIVE
Interval History: Episode of Afib.    Review of Systems   Constitutional: Negative for decreased appetite, diaphoresis, malaise/fatigue, weight gain and weight loss.   HENT:  Negative for congestion, hearing loss, hoarse voice, nosebleeds, odynophagia, stridor and tinnitus.    Eyes:  Negative for blurred vision, double vision, photophobia, visual disturbance and visual halos.   Cardiovascular:  Positive for palpitations. Negative for chest pain, claudication, cyanosis, dyspnea on exertion, irregular heartbeat, leg swelling, near-syncope, orthopnea, paroxysmal nocturnal dyspnea and syncope.   Respiratory:  Negative for cough, hemoptysis, shortness of breath, sleep disturbances due to breathing, snoring, sputum production and wheezing.    Endocrine: Negative for cold intolerance, heat intolerance, polydipsia, polyphagia and polyuria.   Skin:  Negative for color change, poor wound healing, suspicious lesions and unusual hair distribution.   Musculoskeletal:  Negative for falls, joint pain, muscle cramps, muscle weakness, myalgias, neck pain and stiffness.   Gastrointestinal:  Negative for bloating, abdominal pain, constipation, diarrhea, dysphagia, heartburn, hematemesis, jaundice, melena, nausea and vomiting.   Neurological:  Negative for disturbances in coordination, excessive daytime sleepiness, dizziness, focal weakness, headaches, light-headedness, loss of balance, numbness, paresthesias, seizures, sensory change, tremors, vertigo and weakness.   Psychiatric/Behavioral:  Negative for altered mental status, depression, hallucinations and memory loss. The patient does not have insomnia and is not nervous/anxious.    Objective:     Vital Signs (Most Recent):  Temp: 97.8 °F (36.6 °C) (06/06/23 0753)  Pulse: 97 (06/06/23 0753)  Resp: 18 (06/06/23 0753)  BP: (!) 120/93 (06/06/23 0753)  SpO2: (!) 94 % (06/06/23 0753) Vital Signs (24h Range):  Temp:  [97.8 °F (36.6 °C)-98.1 °F (36.7 °C)] 97.8 °F (36.6 °C)  Pulse:   [] 97  Resp:  [18] 18  SpO2:  [94 %-100 %] 94 %  BP: (109-130)/(82-93) 120/93     Weight: 100.4 kg (221 lb 5.5 oz)  Body mass index is 34.67 kg/m².     SpO2: (!) 94 %         Intake/Output Summary (Last 24 hours) at 6/6/2023 0847  Last data filed at 6/5/2023 2138  Gross per 24 hour   Intake 480 ml   Output --   Net 480 ml       Lines/Drains/Airways       Peripheral Intravenous Line  Duration                  Peripheral IV - Single Lumen 06/01/23 1424 20 G Right Antecubital 4 days         Peripheral IV - Single Lumen 06/01/23 1425 20 G Left Antecubital 4 days                       Physical Exam  Vitals reviewed.   Constitutional:       General: She is not in acute distress.     Appearance: Normal appearance. She is well-developed. She is obese. She is not ill-appearing, toxic-appearing or diaphoretic.   HENT:      Head: Normocephalic.   Neck:      Vascular: No carotid bruit or JVD.   Cardiovascular:      Rate and Rhythm: Normal rate and regular rhythm.      Pulses: Normal pulses.   Pulmonary:      Effort: Pulmonary effort is normal.      Breath sounds: Normal breath sounds.   Abdominal:      General: Bowel sounds are normal.      Palpations: Abdomen is soft.      Tenderness: There is no abdominal tenderness.   Musculoskeletal:      Right lower leg: No edema.      Left lower leg: No edema.   Neurological:      Mental Status: She is alert and oriented to person, place, and time.   Psychiatric:         Mood and Affect: Mood normal.         Speech: Speech normal.         Behavior: Behavior normal.         Thought Content: Thought content normal.          Significant Labs: CMP   Recent Labs   Lab 06/05/23  1357 06/06/23  0358    138   K 4.2 4.3    107   CO2 26 20*   * 118*   BUN 16 15   CREATININE 0.9 0.8   CALCIUM 9.4 9.2   ANIONGAP 9 11   , CBC No results for input(s): WBC, HGB, HCT, PLT in the last 48 hours., Lipid Panel No results for input(s): CHOL, HDL, LDLCALC, TRIG, CHOLHDL in the last 48  hours., and Troponin No results for input(s): TROPONINI in the last 48 hours.    Significant Imaging: Echocardiogram: Transthoracic echo (TTE) complete (Cupid Only):   Results for orders placed or performed during the hospital encounter of 02/07/23   Echo   Result Value Ref Range    TDI SEPTAL 0.04 m/s    LV LATERAL E/E' RATIO 18.25 m/s    LV SEPTAL E/E' RATIO 36.50 m/s    LA WIDTH 5.60 cm    IVC diameter 20 cm    Left Ventricular Outflow Tract Mean Velocity 0.82 cm/s    Left Ventricular Outflow Tract Mean Gradient 2.78 mmHg    TDI LATERAL 0.08 m/s    PV PEAK VELOCITY 0.88 cm/s    LVIDd 5.39 3.5 - 6.0 cm    IVS 0.89 0.6 - 1.1 cm    Posterior Wall 0.78 0.6 - 1.1 cm    LVIDs 4.19 (A) 2.1 - 4.0 cm    FS 22 28 - 44 %    LA volume 164.28 cm3    Sinus 3.04 cm    STJ 2.52 cm    LV mass 163.09 g    LA size 5.72 cm    RVDD 4.31 cm    TAPSE 2.27 cm    Left Ventricle Relative Wall Thickness 0.29 cm    AV mean gradient 5 mmHg    AV valve area 2.79 cm2    AV Velocity Ratio 0.69     AV index (prosthetic) 0.81     MV valve area p 1/2 method 2.46 cm2    E/A ratio 1.36     Mean e' 0.06 m/s    E wave deceleration time 308.20 msec    LVOT diameter 2.09 cm    LVOT area 3.4 cm2    LVOT peak mazin 1.04 m/s    LVOT peak VTI 26.40 cm    Ao peak mazin 1.50 m/s    Ao VTI 32.4 cm    LVOT stroke volume 90.52 cm3    AV peak gradient 9 mmHg    E/E' ratio 24.33 m/s    MV Peak E Mazin 1.46 m/s    TR Max Mazin 2.66 m/s    MV stenosis pressure 1/2 time 89.38 ms    MV Peak A Mazin 1.07 m/s    LV Systolic Volume 78.26 mL    LV Diastolic Volume 140.90 mL    RA Major Axis 6.46 cm    Left Atrium Minor Axis 5.63 cm    Left Atrium Major Axis 6.50 cm    Triscuspid Valve Regurgitation Peak Gradient 28 mmHg    RA Width 5.71 cm    Right Atrial Pressure (from IVC) 3 mmHg    EF 55 %    TV rest pulmonary artery pressure 31 mmHg    Narrative    · The left ventricle is normal in size with normal systolic function.  · The estimated ejection fraction is 55%.  · Indeterminate  left ventricular diastolic function.  · Normal right ventricular size with normal right ventricular systolic   function.  · Moderate left atrial enlargement.  · Mild right atrial enlargement.  · Mild-to-moderate mitral regurgitation.  · Normal central venous pressure (3 mmHg).  · The estimated PA systolic pressure is 31 mmHg.

## 2023-06-06 NOTE — PLAN OF CARE
CM notified nurseStan that pt is ready for discharge from CM standpoint. All CM needs met.     06/06/23 1103   Final Note   Assessment Type Final Discharge Note   Anticipated Discharge Disposition Home   What phone number can be called within the next 1-3 days to see how you are doing after discharge? [phone]   Hospital Resources/Appts/Education Provided Appointments scheduled and added to AVS;Appointments scheduled by Navigator/Coordinator   Post-Acute Status   Coverage [employer] and PHN   Discharge Delays None known at this time

## 2023-06-06 NOTE — PROGRESS NOTES
[facility] [place] Telemetry  Cardiology  Progress Note    Patient Name: Yaneli Steele  MRN: [id number]  Admission Date: 6/1/2023  Hospital Length of Stay: 5 days  Code Status: Full Code   Attending Physician: Herberth Engel DO   Primary Care Physician: Primary Doctor No  Expected Discharge Date: 6/3/2023  Principal Problem:Atrial fibrillation with rapid ventricular response    Subjective:     Hospital Course:   No notes on file    Interval History: Episode of Afib.    Review of Systems   Constitutional: Negative for decreased appetite, diaphoresis, malaise/fatigue, weight gain and weight loss.   HENT:  Negative for congestion, hearing loss, hoarse voice, nosebleeds, odynophagia, stridor and tinnitus.    Eyes:  Negative for blurred vision, double vision, photophobia, visual disturbance and visual halos.   Cardiovascular:  Positive for palpitations. Negative for chest pain, claudication, cyanosis, dyspnea on exertion, irregular heartbeat, leg swelling, near-syncope, orthopnea, paroxysmal nocturnal dyspnea and syncope.   Respiratory:  Negative for cough, hemoptysis, shortness of breath, sleep disturbances due to breathing, snoring, sputum production and wheezing.    Endocrine: Negative for cold intolerance, heat intolerance, polydipsia, polyphagia and polyuria.   Skin:  Negative for color change, poor wound healing, suspicious lesions and unusual hair distribution.   Musculoskeletal:  Negative for falls, joint pain, muscle cramps, muscle weakness, myalgias, neck pain and stiffness.   Gastrointestinal:  Negative for bloating, abdominal pain, constipation, diarrhea, dysphagia, heartburn, hematemesis, jaundice, melena, nausea and vomiting.   Neurological:  Negative for disturbances in coordination, excessive daytime sleepiness, dizziness, focal weakness, headaches, light-headedness, loss of balance, numbness, paresthesias, seizures, sensory change, tremors, vertigo and weakness.   Psychiatric/Behavioral:  Negative for  altered mental status, depression, hallucinations and memory loss. The patient does not have insomnia and is not nervous/anxious.    Objective:     Vital Signs (Most Recent):  Temp: 97.8 °F (36.6 °C) (06/06/23 0753)  Pulse: 97 (06/06/23 0753)  Resp: 18 (06/06/23 0753)  BP: (!) 120/93 (06/06/23 0753)  SpO2: (!) 94 % (06/06/23 0753) Vital Signs (24h Range):  Temp:  [97.8 °F (36.6 °C)-98.1 °F (36.7 °C)] 97.8 °F (36.6 °C)  Pulse:  [] 97  Resp:  [18] 18  SpO2:  [94 %-100 %] 94 %  BP: (109-130)/(82-93) 120/93     Weight: 100.4 kg (221 lb 5.5 oz)  Body mass index is 34.67 kg/m².     SpO2: (!) 94 %         Intake/Output Summary (Last 24 hours) at 6/6/2023 0847  Last data filed at 6/5/2023 2138  Gross per 24 hour   Intake 480 ml   Output --   Net 480 ml       Lines/Drains/Airways       Peripheral Intravenous Line  Duration                  Peripheral IV - Single Lumen 06/01/23 1424 20 G Right Antecubital 4 days         Peripheral IV - Single Lumen 06/01/23 1425 20 G Left Antecubital 4 days                       Physical Exam  Vitals reviewed.   Constitutional:       General: She is not in acute distress.     Appearance: Normal appearance. She is well-developed. She is obese. She is not ill-appearing, toxic-appearing or diaphoretic.   HENT:      Head: Normocephalic.   Neck:      Vascular: No carotid bruit or JVD.   Cardiovascular:      Rate and Rhythm: Normal rate and regular rhythm.      Pulses: Normal pulses.   Pulmonary:      Effort: Pulmonary effort is normal.      Breath sounds: Normal breath sounds.   Abdominal:      General: Bowel sounds are normal.      Palpations: Abdomen is soft.      Tenderness: There is no abdominal tenderness.   Musculoskeletal:      Right lower leg: No edema.      Left lower leg: No edema.   Neurological:      Mental Status: She is alert and oriented to person, place, and time.   Psychiatric:         Mood and Affect: Mood normal.         Speech: Speech normal.         Behavior: Behavior  normal.         Thought Content: Thought content normal.          Significant Labs: CMP   Recent Labs   Lab 06/05/23  1357 06/06/23  0358    138   K 4.2 4.3    107   CO2 26 20*   * 118*   BUN 16 15   CREATININE 0.9 0.8   CALCIUM 9.4 9.2   ANIONGAP 9 11   , CBC No results for input(s): WBC, HGB, HCT, PLT in the last 48 hours., Lipid Panel No results for input(s): CHOL, HDL, LDLCALC, TRIG, CHOLHDL in the last 48 hours., and Troponin No results for input(s): TROPONINI in the last 48 hours.    Significant Imaging: Echocardiogram: Transthoracic echo (TTE) complete (Cupid Only):   Results for orders placed or performed during the hospital encounter of 02/07/23   Echo   Result Value Ref Range    TDI SEPTAL 0.04 m/s    LV LATERAL E/E' RATIO 18.25 m/s    LV SEPTAL E/E' RATIO 36.50 m/s    LA WIDTH 5.60 cm    IVC diameter 20 cm    Left Ventricular Outflow Tract Mean Velocity 0.82 cm/s    Left Ventricular Outflow Tract Mean Gradient 2.78 mmHg    TDI LATERAL 0.08 m/s    PV PEAK VELOCITY 0.88 cm/s    LVIDd 5.39 3.5 - 6.0 cm    IVS 0.89 0.6 - 1.1 cm    Posterior Wall 0.78 0.6 - 1.1 cm    LVIDs 4.19 (A) 2.1 - 4.0 cm    FS 22 28 - 44 %    LA volume 164.28 cm3    Sinus 3.04 cm    STJ 2.52 cm    LV mass 163.09 g    LA size 5.72 cm    RVDD 4.31 cm    TAPSE 2.27 cm    Left Ventricle Relative Wall Thickness 0.29 cm    AV mean gradient 5 mmHg    AV valve area 2.79 cm2    AV Velocity Ratio 0.69     AV index (prosthetic) 0.81     MV valve area p 1/2 method 2.46 cm2    E/A ratio 1.36     Mean e' 0.06 m/s    E wave deceleration time 308.20 msec    LVOT diameter 2.09 cm    LVOT area 3.4 cm2    LVOT peak mazin 1.04 m/s    LVOT peak VTI 26.40 cm    Ao peak mazin 1.50 m/s    Ao VTI 32.4 cm    LVOT stroke volume 90.52 cm3    AV peak gradient 9 mmHg    E/E' ratio 24.33 m/s    MV Peak E Mazin 1.46 m/s    TR Max Mazin 2.66 m/s    MV stenosis pressure 1/2 time 89.38 ms    MV Peak A Mazin 1.07 m/s    LV Systolic Volume 78.26 mL    LV Diastolic  Volume 140.90 mL    RA Major Axis 6.46 cm    Left Atrium Minor Axis 5.63 cm    Left Atrium Major Axis 6.50 cm    Triscuspid Valve Regurgitation Peak Gradient 28 mmHg    RA Width 5.71 cm    Right Atrial Pressure (from IVC) 3 mmHg    EF 55 %    TV rest pulmonary artery pressure 31 mmHg    Narrative    · The left ventricle is normal in size with normal systolic function.  · The estimated ejection fraction is 55%.  · Indeterminate left ventricular diastolic function.  · Normal right ventricular size with normal right ventricular systolic   function.  · Moderate left atrial enlargement.  · Mild right atrial enlargement.  · Mild-to-moderate mitral regurgitation.  · Normal central venous pressure (3 mmHg).  · The estimated PA systolic pressure is 31 mmHg.        Assessment and Plan:     Brief HPI:     * Atrial fibrillation with rapid ventricular response  Patient in AFib with RVR.  Was initiated on amiodarone drip in the ICU, but became hypotensive and it was stopped.  If rate continues to be high, may give IV digoxin.  Continue Xarelto.  Hold flecainide till EF has been determined.    Patient states that she is been compliant with Xarelto.  If continues to be in AFib in a.m., consider cardioversion    06/02/2023 - Appears to be in AFL. Rate improved. Give morning meds - see if she converts.    06/03/2023-continue current management.  Would like her to receive her beta-blocker today.  Recheck EKG.    06/04/2023-recurrent atrial fibrillation.  Continue current management.  Heart rate has decreased.  NPO midnight for cardioversion.    06/05/2023-converted to sinus rhythm with Lopressor IV.  Change carvedilol to Toprol.  Continue flecainide.    06/06/2023- back in sinus. Continue current regimen.    Acute on chronic congestive heart failure  Patient is identified as having  heart failure that is Acute on chronic. CHF is currently uncontrolled . Latest ECHO performed and demonstrates- Results for orders placed during the  hospital encounter of 02/07/23    Echo    Interpretation Summary  · The left ventricle is normal in size with normal systolic function.  · The estimated ejection fraction is 55%.  · Indeterminate left ventricular diastolic function.  · Normal right ventricular size with normal right ventricular systolic function.  · Moderate left atrial enlargement.  · Mild right atrial enlargement.  · Mild-to-moderate mitral regurgitation.  · Normal central venous pressure (3 mmHg).  · The estimated PA systolic pressure is 31 mmHg.  . Continue Furosemide and monitor clinical status closely. Monitor on telemetry. Patient is on CHF pathway.  Monitor strict Is&Os and daily weights.  Place on fluid restriction of 1.5 L. Continue to stress to patient importance of self efficacy and  on diet for CHF. Last BNP reviewed- and noted below   Recent Labs   Lab 06/01/23 2024   *   .    IV Lasix as needed and tolerated.  Has history of tachycardia induced cardiomyopathy with normal coronary arteries in the past.  Check 2D echo in a.m..  Guideline directed medical therapy for heart failure    06/02/2023 - appears compensated. Blood pressure control. Rate control. Continue lasix.    06/05/2023-appears compensated.      Elevated brain natriuretic peptide (BNP) level  Secondary to decompensated heart    Essential (primary) hypertension  Currently hypotensive.  Hold antihypertensive agents    06/02/2023 - meds initially held for hypotension. Blood pressure elevated this morning.    06/03/2023-adjust medications.  Report of hypotension.        VTE Risk Mitigation (From admission, onward)         Ordered     rivaroxaban tablet 20 mg  With dinner         06/01/23 1711     Reason for No Pharmacological VTE Prophylaxis  Once        Question:  Reasons:  Answer:  Already adequately anticoagulated on oral Anticoagulants    06/01/23 1711     IP VTE HIGH RISK PATIENT  Once         06/01/23 1711     Place sequential compression device  Until  discontinued         06/01/23 1711                Tee Beth MD  Cardiology  [facility] - Telemetry

## 2023-06-06 NOTE — PLAN OF CARE
06/06/23 1033   Medicare Message   Important Message from Medicare regarding Discharge Appeal Rights Given to patient/caregiver;Explained to patient/caregiver;Signed/date by patient/caregiver   Date IMM was signed 06/06/23   Time IMM was signed 1030

## 2023-06-06 NOTE — NURSING
[facility] [place]  Nurses Note -- 4 Eyes      6/6/2023       Skin assessed on: Q Shift      [x] No Pressure Injuries Present    [x]Prevention Measures Documented    [] Yes LDA  for Pressure Injury Previously documented     [] Yes New Pressure Injury Discovered   [] LDA for New Pressure Injury Added      Attending RN:  Stan Rausch RN     Second RN:  Leticia YODER RN

## 2023-06-06 NOTE — HOSPITAL COURSE
67 y.o. female with PAF, HTN admitted with atrial fibrillation with RVR on 06/01/2023.  Given IV amiodarone however blood pressure dropped.  Amiodarone was stopped and she was still in flutter but better rate controlled.  Cardiology consulted for possible cardioversion. Pt converted to NSR on morning of 6/5 on metoprolol, but then later back in a flutter rate of 120s-130s on 06/05.  Increased metoprolol po to 50mg BID with improvement. Patient now in NSR. Cardiology recommends to continue current regimen with metoprolol 50 mg b.i.d. and flecainide. Referral to EPS orderd.    Patient admits feeling significantly better.  No longer having any nausea, vomiting, palpitations. Pt denies any fever, headaches, vision changes, chest pain, shortness of breath, palpitations, abdominal pain, nausea, vomiting, or any new weaknesses. Feels ready to go home. Patient's exam on discharge was as follow: Patient is alert and oriented, appears in no acute distress, heart with regular rate and rhythm, lungs clear to asculation with non-labored breathing, abdomen soft, and no new weaknesses or focal deficits seen. Bilateral lower extremities without any edema or calf tenderness.     Patient was counseled regarding any abnormal labs, differential diagnosis, treatment options, risk-benefit, lifestyle changes, current condition, and medications. Patient was interactive and attentive.  Patient's questions were answered in a respectful and timely manner. Patient was instructed to follow-up with PCP within 1 week and to continue taking medications as prescribed.  Instructed to also follow up with her cardiologist and EPS. Referral for EPS ordered. Also, extensively discussed the risks, benefits, and side effects of patient's medications. Discussed with patient about any medication changes. Patient verbalized understanding and agrees to treatment plan.  Patient is stable for discharge.  Patient has no other questions or concerns at this time.   ED precautions discussed with the patient.    Vital signs are stable. Ambulating without any difficulty. Tolerating p.o. intake without any nausea or vomiting. Afebrile for over 24 hours. Patient is in stable condition and has no questions or concerns. Patient will be discharge to home once transportation secured. Prescriptions sent to pharmacy.  CM/SW to assist with discharge planning.     Vitals:    06/06/23 0349 06/06/23 0400 06/06/23 0753 06/06/23 1145   BP: 116/86  (!) 120/93 121/77   BP Location: Right arm  Left arm Left arm   Patient Position: Lying  Sitting Sitting   Pulse: 81  97 81   Resp: 18  18 18   Temp: 97.9 °F (36.6 °C)  97.8 °F (36.6 °C) 97.8 °F (36.6 °C)   TempSrc: Oral  Oral Oral   SpO2: 96%  (!) 94% 97%   Weight:  100.4 kg (221 lb 5.5 oz)     Height:

## 2023-06-06 NOTE — ASSESSMENT & PLAN NOTE
Patient in AFib with RVR.  Was initiated on amiodarone drip in the ICU, but became hypotensive and it was stopped.  If rate continues to be high, may give IV digoxin.  Continue Xarelto.  Hold flecainide till EF has been determined.    Patient states that she is been compliant with Xarelto.  If continues to be in AFib in a.m., consider cardioversion    06/02/2023 - Appears to be in AFL. Rate improved. Give morning meds - see if she converts.    06/03/2023-continue current management.  Would like her to receive her beta-blocker today.  Recheck EKG.    06/04/2023-recurrent atrial fibrillation.  Continue current management.  Heart rate has decreased.  NPO midnight for cardioversion.    06/05/2023-converted to sinus rhythm with Lopressor IV.  Change carvedilol to Toprol.  Continue flecainide.    06/06/2023- back in sinus. Continue current regimen.

## 2023-06-06 NOTE — NURSING
Discharge instructions complete. Educated pt on upcoming appointments, medications and when to seek help. Pt displayed understanding. Family at bedside. Pt discharged to home.

## 2023-06-06 NOTE — DISCHARGE SUMMARY
[facility] Medicine  Discharge Summary      Patient Name: Yaneli Steele  MRN: [id number]  UMU: [id number]  Patient Class: IP- Inpatient  Admission Date: 6/1/2023  Hospital Length of Stay: 5 days  Discharge Date and Time:  06/06/2023 3:23 PM  Attending Physician: Herberth Engel DO   Discharging Provider: Herberth Engel DO  Primary Care Provider: Jannet Le MD    Primary Care Team: Networked reference to record PCT     HPI:   67 y.o. female with PAF, HTN presents with a complaint of nausea.  Acute onset, duration 2 weeks, associated with cough, congestion, and URI symptoms.  No known exacerbating or alleviating factors.  Reports adherence to home medication regimen.  Denies fever, chills, chest pain, palpitations, dizziness, syncope, diarrhea, or abdominal pain.  In the ED she was found to be in atrial fibrillation with RVR.  Rate controlled after 2 doses of IV diltiazem.  Placed in observation.      * No surgery found *      Hospital Course:   67 y.o. female with PAF, HTN admitted with atrial fibrillation with RVR on 06/01/2023.  Given IV amiodarone however blood pressure dropped.  Amiodarone was stopped and she was still in flutter but better rate controlled.  Cardiology consulted for possible cardioversion. Pt converted to NSR on morning of 6/5 on metoprolol, but then later back in a flutter rate of 120s-130s on 06/05.  Increased metoprolol po to 50mg BID with improvement. Patient now in NSR. Cardiology recommends to continue current regimen with metoprolol 50 mg b.i.d. and flecainide. Referral to EPS orderd.    Patient admits feeling significantly better.  No longer having any nausea, vomiting, palpitations. Pt denies any fever, headaches, vision changes, chest pain, shortness of breath, palpitations, abdominal pain, nausea, vomiting, or any new weaknesses. Feels ready to go home. Patient's exam on discharge was as follow: Patient is alert and oriented, appears in no acute  distress, heart with regular rate and rhythm, lungs clear to asculation with non-labored breathing, abdomen soft, and no new weaknesses or focal deficits seen. Bilateral lower extremities without any edema or calf tenderness.     Patient was counseled regarding any abnormal labs, differential diagnosis, treatment options, risk-benefit, lifestyle changes, current condition, and medications. Patient was interactive and attentive.  Patient's questions were answered in a respectful and timely manner. Patient was instructed to follow-up with PCP within 1 week and to continue taking medications as prescribed.  Instructed to also follow up with her cardiologist and EPS. Referral for EPS ordered. Also, extensively discussed the risks, benefits, and side effects of patient's medications. Discussed with patient about any medication changes. Patient verbalized understanding and agrees to treatment plan.  Patient is stable for discharge.  Patient has no other questions or concerns at this time.  ED precautions discussed with the patient.    Vital signs are stable. Ambulating without any difficulty. Tolerating p.o. intake without any nausea or vomiting. Afebrile for over 24 hours. Patient is in stable condition and has no questions or concerns. Patient will be discharge to home once transportation secured. Prescriptions sent to pharmacy.  CM/SW to assist with discharge planning.     Vitals:    06/06/23 0349 06/06/23 0400 06/06/23 0753 06/06/23 1145   BP: 116/86  (!) 120/93 121/77   BP Location: Right arm  Left arm Left arm   Patient Position: Lying  Sitting Sitting   Pulse: 81  97 81   Resp: 18  18 18   Temp: 97.9 °F (36.6 °C)  97.8 °F (36.6 °C) 97.8 °F (36.6 °C)   TempSrc: Oral  Oral Oral   SpO2: 96%  (!) 94% 97%   Weight:  100.4 kg (221 lb 5.5 oz)     Height:                Goals of Care Treatment Preferences:  Code Status: Full Code      Consults:   Consults (From admission, onward)        Status Ordering Provider      Inpatient consult to Social Work/Case Management  Once        Provider:  (Not yet assigned)    Completed TARAH SIDDIQUI JR     Inpatient consult to Registered Dietitian/Nutritionist  Once        Provider:  (Not yet assigned)    Completed TARAH SIDDIQUI JR     Inpatient consult to Cardiology  Once        Provider:  Devante Issa MD    Completed TARAH SIDDIQUI JR          No new Assessment & Plan notes have been filed under this hospital service since the last note was generated.  Service: Hospital Medicine    Final Active Diagnoses:    Diagnosis Date Noted POA    PRINCIPAL PROBLEM:  Atrial fibrillation with rapid ventricular response [I48.91] 02/22/2018 Yes    Obesity (BMI 30-39.9) [E66.9] 06/03/2023 Yes    Acute on chronic congestive heart failure [I50.9] 06/01/2023 Yes    Elevated brain natriuretic peptide (BNP) level [R79.89] 06/01/2023 Yes    Essential (primary) hypertension [I10] 07/19/2022 Yes      Problems Resolved During this Admission:       Discharged Condition: stable    Disposition: Home or Self Care    Follow Up:   Follow-up Information     Jannet Le MD. Go on 6/16/2023.    Specialty: Internal Medicine  Why: scheduled  appointment at 9 am.  Contact information:  [facility]  [place] [address] [phone]                       Patient Instructions:      Ambulatory referral/consult to Cardiac Electrophysiology   Standing Status: Future   Referral Priority: Routine Referral Type: Consultation   Referral Reason: Specialty Services Required   Requested Specialty: Cardiology   Number of Visits Requested: 1     Diet Cardiac     Call MD for:  persistent nausea and vomiting or diarrhea     Call MD for:  severe uncontrolled pain     Call MD for:  difficulty breathing or increased cough     Call MD for:  severe persistent headache     Call MD for:  persistent dizziness, light-headedness, or visual disturbances     Call MD for:  increased confusion or weakness       Significant Diagnostic  Studies: Labs: All labs within the past 24 hours have been reviewed       Recent Results (from the past 100 hour(s))   CBC auto differential    Collection Time: 06/03/23 11:18 AM   Result Value Ref Range    WBC 8.09 3.90 - 12.70 K/uL    RBC 5.88 (H) 4.00 - 5.40 M/uL    Hemoglobin 15.6 12.0 - 16.0 g/dL    Hematocrit 50.1 (H) 37.0 - 48.5 %    MCV 85 82 - 98 fL    MCH 26.5 (L) 27.0 - 31.0 pg    MCHC 31.1 (L) 32.0 - 36.0 g/dL    RDW 13.0 11.5 - 14.5 %    Platelets 213 150 - 450 K/uL    MPV 11.6 9.2 - 12.9 fL    Immature Granulocytes 0.2 0.0 - 0.5 %    Gran # (ANC) 5.6 1.8 - 7.7 K/uL    Immature Grans (Abs) 0.02 0.00 - 0.04 K/uL    Lymph # 2.0 1.0 - 4.8 K/uL    Mono # 0.4 0.3 - 1.0 K/uL    Eos # 0.0 0.0 - 0.5 K/uL    Baso # 0.02 0.00 - 0.20 K/uL    nRBC 0 0 /100 WBC    Gran % 69.5 38.0 - 73.0 %    Lymph % 25.0 18.0 - 48.0 %    Mono % 4.7 4.0 - 15.0 %    Eosinophil % 0.4 0.0 - 8.0 %    Basophil % 0.2 0.0 - 1.9 %    Differential Method Automated    Basic metabolic panel    Collection Time: 06/03/23 11:18 AM   Result Value Ref Range    Sodium 139 136 - 145 mmol/L    Potassium 3.8 3.5 - 5.1 mmol/L    Chloride 103 95 - 110 mmol/L    CO2 26 23 - 29 mmol/L    Glucose 170 (H) 70 - 110 mg/dL    BUN 19 8 - 23 mg/dL    Creatinine 1.1 0.5 - 1.4 mg/dL    Calcium 9.2 8.7 - 10.5 mg/dL    Anion Gap 10 8 - 16 mmol/L    eGFR 55 (A) >60 mL/min/1.73 m^2   Lactic acid, plasma    Collection Time: 06/03/23 11:18 AM   Result Value Ref Range    Lactate (Lactic Acid) 1.9 0.5 - 2.2 mmol/L   Basic metabolic panel    Collection Time: 06/04/23  6:48 AM   Result Value Ref Range    Sodium 141 136 - 145 mmol/L    Potassium 3.7 3.5 - 5.1 mmol/L    Chloride 104 95 - 110 mmol/L    CO2 26 23 - 29 mmol/L    Glucose 124 (H) 70 - 110 mg/dL    BUN 16 8 - 23 mg/dL    Creatinine 0.9 0.5 - 1.4 mg/dL    Calcium 8.9 8.7 - 10.5 mg/dL    Anion Gap 11 8 - 16 mmol/L    eGFR >60 >60 mL/min/1.73 m^2   Magnesium    Collection Time: 06/04/23  6:48 AM   Result Value Ref  Range    Magnesium 1.6 1.6 - 2.6 mg/dL   Basic metabolic panel    Collection Time: 06/05/23  1:57 PM   Result Value Ref Range    Sodium 139 136 - 145 mmol/L    Potassium 4.2 3.5 - 5.1 mmol/L    Chloride 104 95 - 110 mmol/L    CO2 26 23 - 29 mmol/L    Glucose 164 (H) 70 - 110 mg/dL    BUN 16 8 - 23 mg/dL    Creatinine 0.9 0.5 - 1.4 mg/dL    Calcium 9.4 8.7 - 10.5 mg/dL    Anion Gap 9 8 - 16 mmol/L    eGFR >60 >60 mL/min/1.73 m^2   Magnesium    Collection Time: 06/05/23  1:57 PM   Result Value Ref Range    Magnesium 1.9 1.6 - 2.6 mg/dL   Basic Metabolic Panel    Collection Time: 06/06/23  3:58 AM   Result Value Ref Range    Sodium 138 136 - 145 mmol/L    Potassium 4.3 3.5 - 5.1 mmol/L    Chloride 107 95 - 110 mmol/L    CO2 20 (L) 23 - 29 mmol/L    Glucose 118 (H) 70 - 110 mg/dL    BUN 15 8 - 23 mg/dL    Creatinine 0.8 0.5 - 1.4 mg/dL    Calcium 9.2 8.7 - 10.5 mg/dL    Anion Gap 11 8 - 16 mmol/L    eGFR >60 >60 mL/min/1.73 m^2   Magnesium    Collection Time: 06/06/23  3:58 AM   Result Value Ref Range    Magnesium 1.7 1.6 - 2.6 mg/dL       Microbiology Results (last 7 days)     ** No results found for the last 168 hours. **          Imaging Results          X-Ray Chest AP Portable (Final result)  Result time 06/01/23 16:26:54    Final result by Ham Ladd MD (06/01/23 16:26:54)                 Impression:      Cardiomegaly with pulmonary vascular disease, suggestive of pulmonary edema secondary to CHF.      Electronically signed by: Ham Ladd MD  Date:    06/01/2023  Time:    16:26             Narrative:    EXAMINATION:  XR CHEST AP PORTABLE    CLINICAL HISTORY:  Other specified abnormal findings of blood chemistry    TECHNIQUE:  Single frontal view of the chest was performed.    COMPARISON:  07/19/2022.    FINDINGS:  Monitoring EKG leads are present.  The trachea is unremarkable.  The cardiac silhouette is enlarged.  There is no evidence of free air beneath the hemidiaphragms.  There are no pleural effusions.   There is no evidence of a pneumothorax.  There is no evidence of pneumomediastinum.  There is pulmonary vascular congestion.  There is no focal consolidation.                                  Pending Diagnostic Studies:     Procedure Component Value Units Date/Time    EKG 12-lead [[id number]]     Order Status: Sent Lab Status: No result          Medications:  Reconciled Home Medications:      Medication List      START taking these medications    metoprolol succinate 50 MG 24 hr tablet  Commonly known as: TOPROL-XL  Take 1 tablet (50 mg total) by mouth 2 (two) times daily.        CONTINUE taking these medications    ENTRESTO 49-51 mg per tablet  Generic drug: sacubitriL-valsartan  Take 1 tablet by mouth 2 (two) times daily.     flecainide 100 MG Tab  Commonly known as: TAMBOCOR  Take 1 tablet (100 mg total) by mouth every 12 (twelve) hours.     rivaroxaban 20 mg Tab  Commonly known as: XARELTO  Take 1 tablet (20 mg total) by mouth daily with dinner or evening meal.        STOP taking these medications    carvediloL 25 MG tablet  Commonly known as: COREG            Indwelling Lines/Drains at time of discharge:   Lines/Drains/Airways     None                 Time spent on the discharge of patient: Greater than 35 minutes         Herberth Engel DO  Department of Hospital Medicine  [facility] - Telemetry

## 2023-06-07 ENCOUNTER — PATIENT OUTREACH (OUTPATIENT)
Dept: ADMINISTRATIVE | Facility: CLINIC | Age: 68
End: 2023-06-07
Payer: COMMERCIAL

## 2023-06-07 NOTE — PROGRESS NOTES
C3 nurse attempted to contact Yaneli Steele  for a TCC post hospital discharge follow up call. No answer. Left voicemail with callback information. The patient has a scheduled HOSFU appointment with Jannet Le MD  on 6/16/23 @ 0900.

## 2023-06-08 NOTE — PROGRESS NOTES
2nd Attempt made to reach patient for TCC call. Left voicemail please call [phone] leave first name, last name, and  for Patricia I will return your call.

## 2023-06-08 NOTE — PROGRESS NOTES
C3 nurse spoke with Yaneli Steele  for a TCC post hospital discharge follow up call. The patient has a scheduled HOS appointment with Jannet Le MD  on 6/16/23 @ 0900.

## 2023-06-08 NOTE — PROGRESS NOTES
3rd Attempt made to reach patient for TCC call. Spoke to Surya Steele he provided two new contact numbers. Left voicemail please call [phone] leave first name, last name, and  for Patricia I will return your call.

## 2023-06-12 RX ORDER — SACUBITRIL AND VALSARTAN 49; 51 MG/1; MG/1
1 TABLET, FILM COATED ORAL 2 TIMES DAILY
Qty: 180 TABLET | Refills: 3 | Status: SHIPPED | OUTPATIENT
Start: 2023-06-12 | End: 2023-08-28 | Stop reason: SDUPTHER

## 2023-06-26 ENCOUNTER — TELEPHONE (OUTPATIENT)
Dept: ELECTROPHYSIOLOGY | Facility: CLINIC | Age: 68
End: 2023-06-26
Payer: COMMERCIAL

## 2023-06-27 ENCOUNTER — TELEPHONE (OUTPATIENT)
Dept: ELECTROPHYSIOLOGY | Facility: CLINIC | Age: 68
End: 2023-06-27
Payer: COMMERCIAL

## 2023-06-27 NOTE — TELEPHONE ENCOUNTER
Called pt to confirm appt and ask new pt questions phone went straight to voicemail. Left a message.

## 2023-06-28 ENCOUNTER — OFFICE VISIT (OUTPATIENT)
Dept: ELECTROPHYSIOLOGY | Facility: CLINIC | Age: 68
End: 2023-06-28
Payer: COMMERCIAL

## 2023-06-28 ENCOUNTER — ANESTHESIA EVENT (OUTPATIENT)
Dept: MEDSURG UNIT | Facility: HOSPITAL | Age: 68
DRG: 273 | End: 2023-06-28
Payer: COMMERCIAL

## 2023-06-28 ENCOUNTER — HOSPITAL ENCOUNTER (INPATIENT)
Facility: HOSPITAL | Age: 68
LOS: 8 days | Discharge: HOME | DRG: 273 | End: 2023-07-06
Attending: EMERGENCY MEDICINE | Admitting: EMERGENCY MEDICINE
Payer: COMMERCIAL

## 2023-06-28 VITALS — HEIGHT: 67 IN | BODY MASS INDEX: 35.22 KG/M2 | WEIGHT: 224.44 LBS | HEART RATE: 157 BPM

## 2023-06-28 DIAGNOSIS — I48.4 ATYPICAL ATRIAL FLUTTER: Primary | ICD-10-CM

## 2023-06-28 DIAGNOSIS — I47.20 V TACH: ICD-10-CM

## 2023-06-28 DIAGNOSIS — E66.9 OBESITY (BMI 30-39.9): ICD-10-CM

## 2023-06-28 DIAGNOSIS — I49.9 ARRHYTHMIA: ICD-10-CM

## 2023-06-28 DIAGNOSIS — I36.1 NON-RHEUMATIC TRICUSPID VALVE INSUFFICIENCY: ICD-10-CM

## 2023-06-28 DIAGNOSIS — I48.0 PAROXYSMAL ATRIAL FIBRILLATION: Primary | Chronic | ICD-10-CM

## 2023-06-28 DIAGNOSIS — I43 TACHYCARDIA INDUCED CARDIOMYOPATHY: ICD-10-CM

## 2023-06-28 DIAGNOSIS — I48.92 ATRIAL FLUTTER, UNSPECIFIED TYPE: ICD-10-CM

## 2023-06-28 DIAGNOSIS — I48.91 ATRIAL FIBRILLATION: ICD-10-CM

## 2023-06-28 DIAGNOSIS — R00.1 BRADYCARDIA: ICD-10-CM

## 2023-06-28 DIAGNOSIS — I48.92 ATRIAL FLUTTER WITH RAPID VENTRICULAR RESPONSE: ICD-10-CM

## 2023-06-28 DIAGNOSIS — I48.91 A-FIB: ICD-10-CM

## 2023-06-28 DIAGNOSIS — R94.31 QT PROLONGATION: ICD-10-CM

## 2023-06-28 DIAGNOSIS — I48.92 ATRIAL FLUTTER: ICD-10-CM

## 2023-06-28 DIAGNOSIS — R07.9 CHEST PAIN: ICD-10-CM

## 2023-06-28 DIAGNOSIS — R00.0 TACHYCARDIA INDUCED CARDIOMYOPATHY: ICD-10-CM

## 2023-06-28 DIAGNOSIS — I34.0 NON-RHEUMATIC MITRAL REGURGITATION: ICD-10-CM

## 2023-06-28 DIAGNOSIS — I49.9 ABNORMAL HEART RHYTHM: ICD-10-CM

## 2023-06-28 DIAGNOSIS — I48.91 ATRIAL FIBRILLATION WITH RVR: ICD-10-CM

## 2023-06-28 DIAGNOSIS — R00.0 TACHYCARDIA: ICD-10-CM

## 2023-06-28 DIAGNOSIS — K86.89 PANCREATIC MASS: ICD-10-CM

## 2023-06-28 DIAGNOSIS — I50.43 ACUTE ON CHRONIC COMBINED SYSTOLIC AND DIASTOLIC CONGESTIVE HEART FAILURE: ICD-10-CM

## 2023-06-28 DIAGNOSIS — I10 ESSENTIAL (PRIMARY) HYPERTENSION: ICD-10-CM

## 2023-06-28 PROBLEM — I42.8 TACHYCARDIA INDUCED CARDIOMYOPATHY: Status: ACTIVE | Noted: 2023-06-01

## 2023-06-28 PROBLEM — I50.33 ACUTE ON CHRONIC DIASTOLIC CONGESTIVE HEART FAILURE: Status: ACTIVE | Noted: 2023-06-01

## 2023-06-28 PROBLEM — R79.89 ABNORMAL LFTS (LIVER FUNCTION TESTS): Status: ACTIVE | Noted: 2023-06-28

## 2023-06-28 LAB
ALBUMIN SERPL BCP-MCNC: 2.9 G/DL (ref 3.5–5.2)
ALP SERPL-CCNC: 257 U/L (ref 55–135)
ALT SERPL W/O P-5'-P-CCNC: 75 U/L (ref 10–44)
ANION GAP SERPL CALC-SCNC: 10 MMOL/L (ref 8–16)
ASCENDING AORTA: 3.05 CM
AST SERPL-CCNC: 77 U/L (ref 10–40)
AV INDEX (PROSTH): 0.68
AV MEAN GRADIENT: 2 MMHG
AV PEAK GRADIENT: 3 MMHG
AV VALVE AREA: 2.12 CM2
AV VELOCITY RATIO: 0.69
BASOPHILS # BLD AUTO: 0.05 K/UL (ref 0–0.2)
BASOPHILS NFR BLD: 0.7 % (ref 0–1.9)
BILIRUB SERPL-MCNC: 2.7 MG/DL (ref 0.1–1)
BNP SERPL-MCNC: 1511 PG/ML (ref 0–99)
BSA FOR ECHO PROCEDURE: 2.15 M2
BUN SERPL-MCNC: 21 MG/DL (ref 8–23)
CALCIUM SERPL-MCNC: 9.2 MG/DL (ref 8.7–10.5)
CHLORIDE SERPL-SCNC: 107 MMOL/L (ref 95–110)
CO2 SERPL-SCNC: 24 MMOL/L (ref 23–29)
CREAT SERPL-MCNC: 1.3 MG/DL (ref 0.5–1.4)
CV ECHO LV RWT: 0.41 CM
DIFFERENTIAL METHOD: ABNORMAL
DOP CALC AO PEAK VEL: 0.91 M/S
DOP CALC AO VTI: 11.8 CM
DOP CALC LVOT AREA: 3.1 CM2
DOP CALC LVOT DIAMETER: 1.99 CM
DOP CALC LVOT PEAK VEL: 0.63 M/S
DOP CALC LVOT STROKE VOLUME: 24.99 CM3
DOP CALCLVOT PEAK VEL VTI: 8.04 CM
E WAVE DECELERATION TIME: 162.68 MSEC
E/A RATIO: 1.6
E/E' RATIO: 18.53 M/S
ECHO LV POSTERIOR WALL: 1.02 CM (ref 0.6–1.1)
EJECTION FRACTION: 25 %
EOSINOPHIL # BLD AUTO: 0.1 K/UL (ref 0–0.5)
EOSINOPHIL NFR BLD: 1.8 % (ref 0–8)
ERYTHROCYTE [DISTWIDTH] IN BLOOD BY AUTOMATED COUNT: 16.5 % (ref 11.5–14.5)
EST. GFR  (NO RACE VARIABLE): 45.1 ML/MIN/1.73 M^2
FRACTIONAL SHORTENING: 13 % (ref 28–44)
GLUCOSE SERPL-MCNC: 175 MG/DL (ref 70–110)
HCT VFR BLD AUTO: 57.4 % (ref 37–48.5)
HCV AB SERPL QL IA: NORMAL
HGB BLD-MCNC: 17.6 G/DL (ref 12–16)
HIV 1+2 AB+HIV1 P24 AG SERPL QL IA: NORMAL
IMM GRANULOCYTES # BLD AUTO: 0.01 K/UL (ref 0–0.04)
IMM GRANULOCYTES NFR BLD AUTO: 0.1 % (ref 0–0.5)
INTERVENTRICULAR SEPTUM: 0.89 CM (ref 0.6–1.1)
IVRT: 97.05 MSEC
LA MAJOR: 6.83 CM
LA MINOR: 6.53 CM
LA WIDTH: 5.2 CM
LACTATE SERPL-SCNC: 2.1 MMOL/L (ref 0.5–2.2)
LACTATE SERPL-SCNC: 3.3 MMOL/L (ref 0.5–2.2)
LACTATE SERPL-SCNC: 4 MMOL/L (ref 0.5–2.2)
LEFT ATRIUM SIZE: 5.27 CM
LEFT ATRIUM VOLUME INDEX: 74.4 ML/M2
LEFT ATRIUM VOLUME: 155.52 CM3
LEFT INTERNAL DIMENSION IN SYSTOLE: 4.32 CM (ref 2.1–4)
LEFT VENTRICLE DIASTOLIC VOLUME INDEX: 55.63 ML/M2
LEFT VENTRICLE DIASTOLIC VOLUME: 116.27 ML
LEFT VENTRICLE MASS INDEX: 81 G/M2
LEFT VENTRICLE SYSTOLIC VOLUME INDEX: 40.3 ML/M2
LEFT VENTRICLE SYSTOLIC VOLUME: 84.17 ML
LEFT VENTRICULAR INTERNAL DIMENSION IN DIASTOLE: 4.96 CM (ref 3.5–6)
LEFT VENTRICULAR MASS: 168.85 G
LV LATERAL E/E' RATIO: 17.38 M/S
LV SEPTAL E/E' RATIO: 19.86 M/S
LYMPHOCYTES # BLD AUTO: 2.8 K/UL (ref 1–4.8)
LYMPHOCYTES NFR BLD: 41.1 % (ref 18–48)
MAGNESIUM SERPL-MCNC: 1.9 MG/DL (ref 1.6–2.6)
MCH RBC QN AUTO: 26.5 PG (ref 27–31)
MCHC RBC AUTO-ENTMCNC: 30.7 G/DL (ref 32–36)
MCV RBC AUTO: 87 FL (ref 82–98)
MONOCYTES # BLD AUTO: 0.5 K/UL (ref 0.3–1)
MONOCYTES NFR BLD: 6.9 % (ref 4–15)
MV PEAK A VEL: 0.87 M/S
MV PEAK E VEL: 1.39 M/S
MV STENOSIS PRESSURE HALF TIME: 47.18 MS
MV VALVE AREA P 1/2 METHOD: 4.66 CM2
NEUTROPHILS # BLD AUTO: 3.4 K/UL (ref 1.8–7.7)
NEUTROPHILS NFR BLD: 49.4 % (ref 38–73)
NRBC BLD-RTO: 0 /100 WBC
PISA MRMAX VEL: 0.05 M/S
PISA TR MAX VEL: 3.26 M/S
PLATELET # BLD AUTO: 148 K/UL (ref 150–450)
PMV BLD AUTO: 12.3 FL (ref 9.2–12.9)
POC CARDIAC TROPONIN I: 0.01 NG/ML (ref 0–0.08)
POTASSIUM SERPL-SCNC: 4.8 MMOL/L (ref 3.5–5.1)
PROCALCITONIN SERPL IA-MCNC: 0.12 NG/ML
PROT SERPL-MCNC: 6 G/DL (ref 6–8.4)
RA MAJOR: 6.5 CM
RA PRESSURE: 8 MMHG
RA WIDTH: 4.49 CM
RBC # BLD AUTO: 6.63 M/UL (ref 4–5.4)
RIGHT VENTRICULAR END-DIASTOLIC DIMENSION: 3.24 CM
SAMPLE: NORMAL
SINUS: 2.93 CM
SODIUM SERPL-SCNC: 141 MMOL/L (ref 136–145)
STJ: 2.42 CM
TDI LATERAL: 0.08 M/S
TDI SEPTAL: 0.07 M/S
TDI: 0.08 M/S
TR MAX PG: 43 MMHG
TRICUSPID ANNULAR PLANE SYSTOLIC EXCURSION: 1.59 CM
TROPONIN I SERPL DL<=0.01 NG/ML-MCNC: 0.01 NG/ML (ref 0–0.03)
TV REST PULMONARY ARTERY PRESSURE: 51 MMHG
WBC # BLD AUTO: 6.84 K/UL (ref 3.9–12.7)

## 2023-06-28 PROCEDURE — 83605 ASSAY OF LACTIC ACID: CPT | Performed by: EMERGENCY MEDICINE

## 2023-06-28 PROCEDURE — 1111F DSCHRG MED/CURRENT MED MERGE: CPT | Mod: CPTII,S$GLB,, | Performed by: INTERNAL MEDICINE

## 2023-06-28 PROCEDURE — 96374 THER/PROPH/DIAG INJ IV PUSH: CPT

## 2023-06-28 PROCEDURE — 99223 PR INITIAL HOSPITAL CARE,LEVL III: ICD-10-PCS | Mod: ,,, | Performed by: HOSPITALIST

## 2023-06-28 PROCEDURE — 1126F AMNT PAIN NOTED NONE PRSNT: CPT | Mod: CPTII,S$GLB,, | Performed by: INTERNAL MEDICINE

## 2023-06-28 PROCEDURE — 3008F PR BODY MASS INDEX (BMI) DOCUMENTED: ICD-10-PCS | Mod: CPTII,S$GLB,, | Performed by: INTERNAL MEDICINE

## 2023-06-28 PROCEDURE — 87040 BLOOD CULTURE FOR BACTERIA: CPT | Performed by: HOSPITALIST

## 2023-06-28 PROCEDURE — 83735 ASSAY OF MAGNESIUM: CPT | Performed by: HOSPITALIST

## 2023-06-28 PROCEDURE — 85025 COMPLETE CBC W/AUTO DIFF WBC: CPT | Performed by: EMERGENCY MEDICINE

## 2023-06-28 PROCEDURE — 93010 EKG 12-LEAD: ICD-10-PCS | Mod: ,,, | Performed by: INTERNAL MEDICINE

## 2023-06-28 PROCEDURE — 1159F MED LIST DOCD IN RCRD: CPT | Mod: CPTII,S$GLB,, | Performed by: INTERNAL MEDICINE

## 2023-06-28 PROCEDURE — 93005 RHYTHM STRIP: ICD-10-PCS | Mod: S$GLB,,, | Performed by: INTERNAL MEDICINE

## 2023-06-28 PROCEDURE — 84145 PROCALCITONIN (PCT): CPT | Performed by: HOSPITALIST

## 2023-06-28 PROCEDURE — 87389 HIV-1 AG W/HIV-1&-2 AB AG IA: CPT | Performed by: PHYSICIAN ASSISTANT

## 2023-06-28 PROCEDURE — 3288F PR FALLS RISK ASSESSMENT DOCUMENTED: ICD-10-PCS | Mod: CPTII,S$GLB,, | Performed by: INTERNAL MEDICINE

## 2023-06-28 PROCEDURE — 36415 COLL VENOUS BLD VENIPUNCTURE: CPT | Performed by: HOSPITALIST

## 2023-06-28 PROCEDURE — 4010F PR ACE/ARB THEARPY RXD/TAKEN: ICD-10-PCS | Mod: CPTII,S$GLB,, | Performed by: INTERNAL MEDICINE

## 2023-06-28 PROCEDURE — 1160F PR REVIEW ALL MEDS BY PRESCRIBER/CLIN PHARMACIST DOCUMENTED: ICD-10-PCS | Mod: CPTII,S$GLB,, | Performed by: INTERNAL MEDICINE

## 2023-06-28 PROCEDURE — 93010 ELECTROCARDIOGRAM REPORT: CPT | Mod: ,,, | Performed by: INTERNAL MEDICINE

## 2023-06-28 PROCEDURE — 80053 COMPREHEN METABOLIC PANEL: CPT | Performed by: HOSPITALIST

## 2023-06-28 PROCEDURE — 93010 RHYTHM STRIP: ICD-10-PCS | Mod: S$GLB,,, | Performed by: INTERNAL MEDICINE

## 2023-06-28 PROCEDURE — 93005 ELECTROCARDIOGRAM TRACING: CPT

## 2023-06-28 PROCEDURE — 93005 ELECTROCARDIOGRAM TRACING: CPT | Mod: S$GLB,,, | Performed by: INTERNAL MEDICINE

## 2023-06-28 PROCEDURE — 96375 TX/PRO/DX INJ NEW DRUG ADDON: CPT

## 2023-06-28 PROCEDURE — 84484 ASSAY OF TROPONIN QUANT: CPT | Performed by: HOSPITALIST

## 2023-06-28 PROCEDURE — 99999 PR PBB SHADOW E&M-EST. PATIENT-LVL III: ICD-10-PCS | Mod: PBBFAC,,, | Performed by: INTERNAL MEDICINE

## 2023-06-28 PROCEDURE — 86803 HEPATITIS C AB TEST: CPT | Performed by: PHYSICIAN ASSISTANT

## 2023-06-28 PROCEDURE — 1159F PR MEDICATION LIST DOCUMENTED IN MEDICAL RECORD: ICD-10-PCS | Mod: CPTII,S$GLB,, | Performed by: INTERNAL MEDICINE

## 2023-06-28 PROCEDURE — 20600001 HC STEP DOWN PRIVATE ROOM

## 2023-06-28 PROCEDURE — 83605 ASSAY OF LACTIC ACID: CPT | Mod: 91 | Performed by: HOSPITALIST

## 2023-06-28 PROCEDURE — 4010F ACE/ARB THERAPY RXD/TAKEN: CPT | Mod: CPTII,S$GLB,, | Performed by: INTERNAL MEDICINE

## 2023-06-28 PROCEDURE — 3008F BODY MASS INDEX DOCD: CPT | Mod: CPTII,S$GLB,, | Performed by: INTERNAL MEDICINE

## 2023-06-28 PROCEDURE — 1126F PR PAIN SEVERITY QUANTIFIED, NO PAIN PRESENT: ICD-10-PCS | Mod: CPTII,S$GLB,, | Performed by: INTERNAL MEDICINE

## 2023-06-28 PROCEDURE — 99215 PR OFFICE/OUTPT VISIT, EST, LEVL V, 40-54 MIN: ICD-10-PCS | Mod: S$GLB,,, | Performed by: INTERNAL MEDICINE

## 2023-06-28 PROCEDURE — 63600175 PHARM REV CODE 636 W HCPCS: Performed by: HOSPITALIST

## 2023-06-28 PROCEDURE — 1111F PR DISCHARGE MEDS RECONCILED W/ CURRENT OUTPATIENT MED LIST: ICD-10-PCS | Mod: CPTII,S$GLB,, | Performed by: INTERNAL MEDICINE

## 2023-06-28 PROCEDURE — 1101F PT FALLS ASSESS-DOCD LE1/YR: CPT | Mod: CPTII,S$GLB,, | Performed by: INTERNAL MEDICINE

## 2023-06-28 PROCEDURE — 3288F FALL RISK ASSESSMENT DOCD: CPT | Mod: CPTII,S$GLB,, | Performed by: INTERNAL MEDICINE

## 2023-06-28 PROCEDURE — 93010 ELECTROCARDIOGRAM REPORT: CPT | Mod: S$GLB,,, | Performed by: INTERNAL MEDICINE

## 2023-06-28 PROCEDURE — 1160F RVW MEDS BY RX/DR IN RCRD: CPT | Mod: CPTII,S$GLB,, | Performed by: INTERNAL MEDICINE

## 2023-06-28 PROCEDURE — 3044F HG A1C LEVEL LT 7.0%: CPT | Mod: CPTII,S$GLB,, | Performed by: INTERNAL MEDICINE

## 2023-06-28 PROCEDURE — 25000003 PHARM REV CODE 250: Performed by: HOSPITALIST

## 2023-06-28 PROCEDURE — 25000003 PHARM REV CODE 250: Performed by: INTERNAL MEDICINE

## 2023-06-28 PROCEDURE — 99223 1ST HOSP IP/OBS HIGH 75: CPT | Mod: ,,, | Performed by: HOSPITALIST

## 2023-06-28 PROCEDURE — 99999 PR PBB SHADOW E&M-EST. PATIENT-LVL III: CPT | Mod: PBBFAC,,, | Performed by: INTERNAL MEDICINE

## 2023-06-28 PROCEDURE — 83880 ASSAY OF NATRIURETIC PEPTIDE: CPT | Performed by: EMERGENCY MEDICINE

## 2023-06-28 PROCEDURE — 99285 EMERGENCY DEPT VISIT HI MDM: CPT | Mod: 25

## 2023-06-28 PROCEDURE — 99215 OFFICE O/P EST HI 40 MIN: CPT | Mod: S$GLB,,, | Performed by: INTERNAL MEDICINE

## 2023-06-28 PROCEDURE — 63600175 PHARM REV CODE 636 W HCPCS: Performed by: EMERGENCY MEDICINE

## 2023-06-28 PROCEDURE — 3044F PR MOST RECENT HEMOGLOBIN A1C LEVEL <7.0%: ICD-10-PCS | Mod: CPTII,S$GLB,, | Performed by: INTERNAL MEDICINE

## 2023-06-28 PROCEDURE — 1101F PR PT FALLS ASSESS DOC 0-1 FALLS W/OUT INJ PAST YR: ICD-10-PCS | Mod: CPTII,S$GLB,, | Performed by: INTERNAL MEDICINE

## 2023-06-28 RX ORDER — ONDANSETRON 2 MG/ML
4 INJECTION INTRAMUSCULAR; INTRAVENOUS
Status: COMPLETED | OUTPATIENT
Start: 2023-06-28 | End: 2023-06-28

## 2023-06-28 RX ORDER — ADENOSINE 3 MG/ML
18 INJECTION, SOLUTION INTRAVENOUS
Status: COMPLETED | OUTPATIENT
Start: 2023-06-28 | End: 2023-06-28

## 2023-06-28 RX ORDER — FUROSEMIDE 10 MG/ML
80 INJECTION INTRAMUSCULAR; INTRAVENOUS
Status: COMPLETED | OUTPATIENT
Start: 2023-06-28 | End: 2023-06-28

## 2023-06-28 RX ORDER — FLECAINIDE ACETATE 100 MG/1
100 TABLET ORAL EVERY 12 HOURS
Status: CANCELLED | OUTPATIENT
Start: 2023-06-28

## 2023-06-28 RX ORDER — METOPROLOL SUCCINATE 50 MG/1
50 TABLET, EXTENDED RELEASE ORAL 2 TIMES DAILY
Status: DISCONTINUED | OUTPATIENT
Start: 2023-06-28 | End: 2023-06-29

## 2023-06-28 RX ORDER — MAGNESIUM SULFATE HEPTAHYDRATE 40 MG/ML
2 INJECTION, SOLUTION INTRAVENOUS ONCE
Status: COMPLETED | OUTPATIENT
Start: 2023-06-28 | End: 2023-06-28

## 2023-06-28 RX ORDER — FLECAINIDE ACETATE 100 MG/1
100 TABLET ORAL EVERY 12 HOURS
Status: DISCONTINUED | OUTPATIENT
Start: 2023-06-28 | End: 2023-06-28

## 2023-06-28 RX ORDER — FUROSEMIDE 10 MG/ML
40 INJECTION INTRAMUSCULAR; INTRAVENOUS ONCE
Status: COMPLETED | OUTPATIENT
Start: 2023-06-28 | End: 2023-06-28

## 2023-06-28 RX ORDER — ADENOSINE 3 MG/ML
12 INJECTION, SOLUTION INTRAVENOUS
Status: COMPLETED | OUTPATIENT
Start: 2023-06-28 | End: 2023-06-28

## 2023-06-28 RX ORDER — METOPROLOL TARTRATE 1 MG/ML
5 INJECTION, SOLUTION INTRAVENOUS
Status: COMPLETED | OUTPATIENT
Start: 2023-06-28 | End: 2023-06-28

## 2023-06-28 RX ORDER — METOPROLOL SUCCINATE 50 MG/1
50 TABLET, EXTENDED RELEASE ORAL 2 TIMES DAILY
Status: CANCELLED | OUTPATIENT
Start: 2023-06-28

## 2023-06-28 RX ORDER — FUROSEMIDE 10 MG/ML
80 INJECTION INTRAMUSCULAR; INTRAVENOUS 2 TIMES DAILY
Status: DISCONTINUED | OUTPATIENT
Start: 2023-06-28 | End: 2023-06-28

## 2023-06-28 RX ADMIN — FUROSEMIDE 40 MG: 10 INJECTION, SOLUTION INTRAMUSCULAR; INTRAVENOUS at 06:06

## 2023-06-28 RX ADMIN — RIVAROXABAN 20 MG: 20 TABLET, FILM COATED ORAL at 04:06

## 2023-06-28 RX ADMIN — METOPROLOL SUCCINATE 50 MG: 50 TABLET, EXTENDED RELEASE ORAL at 09:06

## 2023-06-28 RX ADMIN — METOROPROLOL TARTRATE 5 MG: 5 INJECTION, SOLUTION INTRAVENOUS at 09:06

## 2023-06-28 RX ADMIN — ADENOSINE 18 MG: 3 INJECTION INTRAVENOUS at 09:06

## 2023-06-28 RX ADMIN — METOPROLOL SUCCINATE 50 MG: 50 TABLET, EXTENDED RELEASE ORAL at 01:06

## 2023-06-28 RX ADMIN — ADENOSINE 12 MG: 3 INJECTION INTRAVENOUS at 09:06

## 2023-06-28 RX ADMIN — MAGNESIUM SULFATE 2 G: 2 INJECTION INTRAVENOUS at 04:06

## 2023-06-28 RX ADMIN — FUROSEMIDE 80 MG: 10 INJECTION, SOLUTION INTRAMUSCULAR; INTRAVENOUS at 11:06

## 2023-06-28 RX ADMIN — ONDANSETRON 4 MG: 2 INJECTION INTRAMUSCULAR; INTRAVENOUS at 09:06

## 2023-06-28 NOTE — ED NOTES
Telemetry Verification   Patient placed on Telemetry Box  Verified with War Room  Box # [id number]   Monitor Tech edmundo   Rate 90   Rhythm Ns

## 2023-06-28 NOTE — PROGRESS NOTES
PCP - Jannet Le MD  Subjective:     I had the pleasure today of seeing Yaneli Steele (67 y.o. female) for AF follow up.    History:  pAF ([id number] = 4 [CHF, age, HTN, F])  Tachy-induced CHF with recovered EF (25% > 55%)  Insulin resistance    9/22 initial visit:  Patient originally diagnosed with atrial fibrillation in 2018. Patient was in RVR at the time presented to hospital. Found to have new onset HFrEF, LHC clean, cardioverted and EF improved. After that felt fine until a few months ago. Palpitations occurred again. EF dropped DCCV and referred to [facility] EP. On Xarelto 20mg entresto and coreg.      10/19/22 update:  After JUNIOR/DCCV 9/2022, LVEF was better. Flecainide started 9/16/22.  Didn't start flecainide immediately; just started prior to seeing Dr. Issa, at which time she c/o LEs swelling. At his office Oct 6, likely AFL (typical vs. atypical?) with 2:1 conduction. . BB increased.  Since then, she feels better now. [employer] tells her HR is in 40s.    Update 6/28/23:  Last visit 10/19/23 > plan was for ECG > NSR (cont management) vs. AF/AFL (recommend PVI). Since last visit she was admitted to [facility] for AF/AFL with RVR & ADFH earlier this month > converted to NSR 6/5/23 > switched BB to Toprol (50 mg BID) & continued on home flecainide (100 mg BID). Was unable to tolerate amiodarone secondary to hypotension issues. She is anticoagulated with Xarelto. No bleeding issues. Renal function stable and wnl.    Patient presents with WCT at 157 bpm. She reports nausea and generalized fatigue. HD stable, and sating well.     History:     Social History     Tobacco Use    Smoking status: Former    Smokeless tobacco: Former    Tobacco comments:     Pt states she quit more than 30 years ago   Substance Use Topics    Alcohol use: No     Family History   Problem Relation Age of Onset    No Known Problems Mother     No Known Problems Father        Meds:     Review of patient's  allergies indicates:   Allergen Reactions    Amiodarone analogues Shortness Of Breath    Penicillins Shortness Of Breath       Current Outpatient Medications:     flecainide (TAMBOCOR) 100 MG Tab, Take 1 tablet (100 mg total) by mouth every 12 (twelve) hours., Disp: 60 tablet, Rfl: 11    metoprolol succinate (TOPROL-XL) 50 MG 24 hr tablet, Take 1 tablet (50 mg total) by mouth 2 (two) times daily., Disp: 60 tablet, Rfl: 1    rivaroxaban (XARELTO) 20 mg Tab, Take 1 tablet (20 mg total) by mouth daily with dinner or evening meal., Disp: 90 tablet, Rfl: 3    sacubitriL-valsartan (ENTRESTO) 49-51 mg per tablet, Take 1 tablet by mouth 2 (two) times daily., Disp: 180 tablet, Rfl: 3    Constitution: Negative for fever or chills. Negative for weight loss or gain.   HENT: Negative for sore throat or headaches. Negative for rhinorrhea.  Eyes: Negative for blurred or double vision.   Cardiovascular: See above  Pulmonary: Negative for SOB. Negative for cough.   Gastrointestinal: Negative for abdominal pain. Negative for nausea/ vomiting. Negative for diarrhea.   : Negative for dysuria.   Neurological: Negative for focal weakness or sensory changes.    Objective:   No vitals    General: NAD. AAO.  HENT: No scleral icterus. Extraocular movements intact.  Neck: No JVD  Cardiovascular: tachycardic and regular.S1/S2 appreciated.  Respiratory: CTAB. No increased work of breathing.  Extremities: Warm. No edema.  Skin: no ulceration or wounds present    Labs & Imaging   Reviewed in clinic today    Assessment:   Yaneli Steele is a 67 y.o. y.o. female who presented today for AF/AFL follow up. Tachy-induced CM with AF/AFL previously with recovered EF. Has failed AAD therapy. However, patient presents in WCT at 157 bpm. Will transfer to the ED for management.     1. Paroxysmal atrial fibrillation        2. Tachycardia induced cardiomyopathy        3. Essential (primary) hypertension        4. Non-rheumatic tricuspid valve  insufficiency        5. Non-rheumatic mitral regurgitation        6. Obesity (BMI 30-39.9)        7. Atrial flutter, unspecified type              Plan:     - Transfer to the ED for management  - Discussed with cardiology ED fellow    Cristian Silva MD, PGY7  Electrophysiology

## 2023-06-28 NOTE — ASSESSMENT & PLAN NOTE
Patient is identified as having Combined Systolic and Diastolic heart failure that is Acute on chronic. CHF is currently uncontrolled due to Continued edema of extremities. Latest ECHO performed and demonstrates- Results for orders placed during the hospital encounter of 02/07/23    Echo    Interpretation Summary  · The left ventricle is normal in size with normal systolic function.  · The estimated ejection fraction is 55%.  · Indeterminate left ventricular diastolic function.  · Normal right ventricular size with normal right ventricular systolic function.  · Moderate left atrial enlargement.  · Mild right atrial enlargement.  · Mild-to-moderate mitral regurgitation.  · Normal central venous pressure (3 mmHg).  · The estimated PA systolic pressure is 31 mmHg.  . Continue Beta Blocker and Furosemide and monitor clinical status closely. Monitor on telemetry. Patient is on CHF pathway.  Monitor strict Is&Os and daily weights.  Place on fluid restriction of 1.5 L. Cardiology has been consulted. Continue to stress to patient importance of self efficacy and  on diet for CHF. Last BNP reviewed- and noted below   Recent Labs   Lab 06/28/23  0846   BNP 1,511*     S/p IV Lasix 80 mg today. Repeat echo with reduced EF 25%, CVP 8 , mod-severe TR, severe MR. Give another 40 mg IV this evening.

## 2023-06-28 NOTE — HPI
67yoF with CHFrEF (EF 35%, diastolic dysfxn), pAF/AFL s/p DCCV 9/22, HTN, presenting with elevated heart rate associated with mild nausea & fatigue.  Patient was seen at her regularly scheduled EP appointment this morning.  She was noted to have a heart rate of 160 with a wide complex tachycardia and sent to ED.  Denies chest pain or shortness of breath.  Recent hospitalization for AF RVR managed medically.     In ED, 's and gave IV adenosine with no effect. Improved with IV metoprolol and rhythm noted to be atypical atrial flutter.  BP lowered to 85/56 in ED, now improved to 115 systolic. BNP 1511 , lactate 3.3, TBili 2.7, AST 77, ALT 75. She also received IV Lasix 80 mg.

## 2023-06-28 NOTE — HPI
Yaneli Washington (67 y.o. female) for AF follow up today in EP clinic.     History:  pAF ([id number] = 4 [CHF, age, HTN, F])  Tachy-induced CHF with recovered EF (25% > 55%)  Insulin resistance     9/22 initial visit:  Patient originally diagnosed with atrial fibrillation in 2018. Patient was in RVR at the time presented to hospital. Found to have new onset HFrEF, LHC clean, cardioverted and EF improved. After that felt fine until a few months ago. Palpitations occurred again. EF dropped DCCV and referred to [facility] EP. On Xarelto 20mg entresto and coreg.      10/19/22 update:  After JUNIOR/DCCV 9/2022, LVEF was better. Flecainide started 9/16/22.  Didn't start flecainide immediately; just started prior to seeing Dr. Issa, at which time she c/o LEs swelling. At his office Oct 6, likely AFL (typical vs. atypical?) with 2:1 conduction. . BB increased.  Since then, she feels better now. [employer] Watch tells her HR is in 40s.     Update 6/28/23:  Last visit 10/19/23 > plan was for ECG > NSR (cont management) vs. AF/AFL (recommend PVI). Since last visit she was admitted to [facility] for AF/AFL with RVR & ADFH earlier this month > converted to NSR 6/5/23 > switched BB to Toprol (50 mg BID) & continued on home flecainide (100 mg BID). Was unable to tolerate amiodarone secondary to hypotension issues. She is anticoagulated with Xarelto. No bleeding issues. Renal function stable and wnl.     Patient presents with WCT at 157 bpm. She reports nausea and generalized fatigue. HD stable, and sating well.     Sent to ED this morning from clinic for WCT mgmt.

## 2023-06-28 NOTE — H&P
[facility] - Emergency Dept  [facility] Medicine  History & Physical    Patient Name: Yaneli Steele  MRN: [id number]  Patient Class: IP- Inpatient  Admission Date: 6/28/2023  Attending Physician: Julianna Mcgregor MD   Primary Care Provider: Jannet Le MD         Patient information was obtained from patient, past medical records and ER records.     Subjective:     Principal Problem:Atrial flutter with rapid ventricular response    Chief Complaint:   Chief Complaint   Patient presents with    Tachycardia        HPI: 67F with combined heart failure (recovered EF), pAF/AFL s/p DCCV 9/22, HTN, presenting with elevated heart rate associated with mild nausea & fatigue.  Patient was seen at her regularly scheduled EP appointment this morning.  She was noted to have a heart rate of 160 with a wide complex tachycardia and sent to ED.  Denies chest pain or shortness of breath.  Recent hospitalization for AF RVR managed medically.     In ED, 's and gave IV adenosine with no effect. Improved with IV metoprolol and rhythm noted to be atypical atrial flutter.  BP lowered to 85/56 in ED, now improved to 115 systolic. BNP 1511 , lactate 3.3, TBili 2.7, AST 77, ALT 75. She also received IV Lasix 80 mg.       Past Medical History:   Diagnosis Date    Chronic systolic heart failure     Essential (primary) hypertension     Paroxysmal atrial fibrillation        Past Surgical History:   Procedure Laterality Date    ANGIOGRAM, CORONARY, WITH LEFT HEART CATHETERIZATION         Review of patient's allergies indicates:   Allergen Reactions    Amiodarone analogues Shortness Of Breath    Penicillins Shortness Of Breath       No current facility-administered medications on file prior to encounter.     Current Outpatient Medications on File Prior to Encounter   Medication Sig    flecainide (TAMBOCOR) 100 MG Tab Take 1 tablet (100 mg total) by mouth every 12 (twelve) hours.    metoprolol succinate (TOPROL-XL) 50 MG 24 hr tablet  Take 1 tablet (50 mg total) by mouth 2 (two) times daily.    rivaroxaban (XARELTO) 20 mg Tab Take 1 tablet (20 mg total) by mouth daily with dinner or evening meal.    sacubitriL-valsartan (ENTRESTO) 49-51 mg per tablet Take 1 tablet by mouth 2 (two) times daily.     Family History       Problem Relation (Age of Onset)    No Known Problems Mother, Father          Tobacco Use    Smoking status: Former    Smokeless tobacco: Former    Tobacco comments:     Pt states she quit more than 30 years ago   Substance and Sexual Activity    Alcohol use: No    Drug use: No    Sexual activity: Not Currently     Review of Systems  Objective:     Vital Signs (Most Recent):  Temp: 98.4 °F (36.9 °C) (06/28/23 1116)  Pulse: 85 (06/28/23 1349)  Resp: 19 (06/28/23 1247)  BP: 115/84 (06/28/23 1349)  SpO2: 99 % (06/28/23 1349) Vital Signs (24h Range):  Temp:  [97.6 °F (36.4 °C)-98.4 °F (36.9 °C)] 98.4 °F (36.9 °C)  Pulse:  [] 85  Resp:  [10-27] 19  SpO2:  [93 %-99 %] 99 %  BP: ()/(51-88) 115/84     Weight: 98 kg (216 lb)  Body mass index is 33.33 kg/m².     Physical Exam  Vitals reviewed.   Constitutional:       General: She is not in acute distress.     Appearance: She is not diaphoretic.   HENT:      Head: Normocephalic and atraumatic.      Nose: Nose normal.      Mouth/Throat:      Pharynx: No oropharyngeal exudate.   Eyes:      General: No scleral icterus.        Right eye: No discharge.         Left eye: No discharge.      Extraocular Movements: EOM normal.      Conjunctiva/sclera: Conjunctivae normal.      Pupils: Pupils are equal, round, and reactive to light.   Neck:      Thyroid: No thyromegaly.      Vascular: No JVD.      Trachea: No tracheal deviation.   Cardiovascular:      Rate and Rhythm: Tachycardia present. Rhythm irregular.      Heart sounds: Normal heart sounds. No murmur heard.    No friction rub.   Pulmonary:      Effort: Pulmonary effort is normal. No respiratory distress.      Breath sounds:  Normal breath sounds. No stridor. No wheezing or rales.   Chest:      Chest wall: No tenderness.   Abdominal:      General: Bowel sounds are normal. There is no distension.      Palpations: Abdomen is soft. There is no mass.      Tenderness: There is no abdominal tenderness. There is no guarding or rebound.   Musculoskeletal:         General: No tenderness or deformity. Normal range of motion.      Cervical back: Neck supple.   Lymphadenopathy:      Cervical: No cervical adenopathy.   Skin:     General: Skin is warm and dry.      Coloration: Skin is not pale.      Findings: No erythema or rash.   Neurological:      Mental Status: She is alert and oriented to person, place, and time.      Cranial Nerves: No cranial nerve deficit.      Motor: No abnormal muscle tone.      Coordination: Coordination normal.   Psychiatric:         Behavior: Behavior normal.         Thought Content: Thought content normal.         Judgment: Judgment normal.            CRANIAL NERVES     CN III, IV, VI   Pupils are equal, round, and reactive to light.  Extraocular motions are normal.      Significant Labs: All pertinent labs within the past 24 hours have been reviewed.    Significant Imaging: I have reviewed all pertinent imaging results/findings within the past 24 hours.    Assessment/Plan:     * Atrial flutter with rapid ventricular response  EP consulted. Continue home Toprol 50 BID. Hold home Flecainide 100 BID. Continue home Xarelto.  TTE. NPO at [facility] for JUNIOR/DCCV. Repeat EKG ordered for AM. Cardiac monitoring. Plan for Tikosyn initiation with inpatient monitoring x 3 days, followed by outpatient PVI.       Abnormal LFTs (liver function tests)  Elevated TBili, AST/ALT/ALP. Likely hepatic congestion as occurred previously during previous hospital stay, but will check ultrasound.  Negative hepatitis panel in the past.       Acute on chronic combined systolic and diastolic congestive heart failure  Patient is identified as having Combined  Systolic and Diastolic heart failure that is Acute on chronic. CHF is currently uncontrolled due to Continued edema of extremities. Latest ECHO performed and demonstrates- Results for orders placed during the hospital encounter of 02/07/23    Echo    Interpretation Summary  · The left ventricle is normal in size with normal systolic function.  · The estimated ejection fraction is 55%.  · Indeterminate left ventricular diastolic function.  · Normal right ventricular size with normal right ventricular systolic function.  · Moderate left atrial enlargement.  · Mild right atrial enlargement.  · Mild-to-moderate mitral regurgitation.  · Normal central venous pressure (3 mmHg).  · The estimated PA systolic pressure is 31 mmHg.  . Continue Beta Blocker and Furosemide and monitor clinical status closely. Monitor on telemetry. Patient is on CHF pathway.  Monitor strict Is&Os and daily weights.  Place on fluid restriction of 1.5 L. Cardiology has been consulted. Continue to stress to patient importance of self efficacy and  on diet for CHF. Last BNP reviewed- and noted below   Recent Labs   Lab 06/28/23  0846   BNP 1,511*     S/p IV Lasix 80 mg today. Repeat echo with reduced EF 25%, CVP 8 , mod-severe TR, severe MR. Give another 40 mg IV this evening.     Essential (primary) hypertension  BP in 80s in ED, now 110's and controlled. Hold Entresto for now. Continue home Toprol.    VTE Risk Mitigation (From admission, onward)         Ordered     rivaroxaban tablet 20 mg  With dinner         06/28/23 7032                           Julianna Mcgregor MD  Department of Hospital Medicine  [facility] Dept

## 2023-06-28 NOTE — ED TRIAGE NOTES
Pt complains of fast heart rate since June 1,2023  States she was hositalized from June 1 to June 6 with and elevated heart rate  Pt was sent to the ED today from cardiology clinic for an elevated heart rate

## 2023-06-28 NOTE — ASSESSMENT & PLAN NOTE
Acute Heart Failure (likely combined systolic and diastolic)  Tachycardia-mediated Cardiomyopathy  Persistent Atrial Fibrillation  Atypical Atrial Flutter  EF previously 60% on TTE 2/23. Had JUNIOR/DCCV 9/22. Currently on Toprol 50 mg BID and Flecainide 100 mg BID. On Eliquis for AF/AFL ([id number]). Presenting in regular WCT today concerning for AFL vs VT. Hemodynamically stable on room air. No effect from Adenosine 12 mg x1 and 18 mg x1. HR slowed to 100s after IV Metoprolol 5 mg x1 and revealed atypical atrial flutter with variable conduction. Initial rhythm consistent with atypical atrial flutter with RVR and aberrancy.    - Admit to medicine  - Diuresis with IV Lasix 80 mg BID  - Continue home Toprol 50 mg BID and Flecainide 100 mg BID  - Continue Xarelto 20 mg daily  - TTE  - Monitor on telemetry  - NPO at [facility] for JUNIOR/DCCV

## 2023-06-28 NOTE — ED NOTES
Attempted BP with doppler and unsuccessful, pt states may have to use leg , ' they never can get my bp

## 2023-06-28 NOTE — SUBJECTIVE & OBJECTIVE
Past Medical History:   Diagnosis Date    Chronic systolic heart failure     Essential (primary) hypertension     Paroxysmal atrial fibrillation        Past Surgical History:   Procedure Laterality Date    ANGIOGRAM, CORONARY, WITH LEFT HEART CATHETERIZATION         Review of patient's allergies indicates:   Allergen Reactions    Amiodarone analogues Shortness Of Breath    Penicillins Shortness Of Breath       No current facility-administered medications on file prior to encounter.     Current Outpatient Medications on File Prior to Encounter   Medication Sig    flecainide (TAMBOCOR) 100 MG Tab Take 1 tablet (100 mg total) by mouth every 12 (twelve) hours.    metoprolol succinate (TOPROL-XL) 50 MG 24 hr tablet Take 1 tablet (50 mg total) by mouth 2 (two) times daily.    rivaroxaban (XARELTO) 20 mg Tab Take 1 tablet (20 mg total) by mouth daily with dinner or evening meal.    sacubitriL-valsartan (ENTRESTO) 49-51 mg per tablet Take 1 tablet by mouth 2 (two) times daily.     Family History       Problem Relation (Age of Onset)    No Known Problems Mother, Father          Tobacco Use    Smoking status: Former    Smokeless tobacco: Former    Tobacco comments:     Pt states she quit more than 30 years ago   Substance and Sexual Activity    Alcohol use: No    Drug use: No    Sexual activity: Not Currently     Review of Systems   Constitutional: Positive for malaise/fatigue.   All other systems reviewed and are negative.  Objective:     Vital Signs (Most Recent):  Temp: 98.1 °F (36.7 °C) (06/28/23 0818)  Pulse: (!) 157 (06/28/23 0818)  Resp: 20 (06/28/23 0818)  BP:  (not reg nibp or manual) (06/28/23 0818)  SpO2: 97 % (06/28/23 0818) Vital Signs (24h Range):  Temp:  [98.1 °F (36.7 °C)] 98.1 °F (36.7 °C)  Pulse:  [157] 157  Resp:  [20] 20  SpO2:  [97 %] 97 %       Weight: 98 kg (216 lb)  Body mass index is 33.33 kg/m².    SpO2: 97 %        Physical Exam  Constitutional:       Appearance: Normal appearance. She is obese.    HENT:      Head: Normocephalic.      Mouth/Throat:      Mouth: Mucous membranes are moist.   Eyes:      Extraocular Movements: Extraocular movements intact.   Neck:      Comments: JVD to mid neck at 45 degrees  Cardiovascular:      Rate and Rhythm: Regular rhythm. Tachycardia present.      Pulses: Normal pulses.   Pulmonary:      Effort: Pulmonary effort is normal. No respiratory distress.   Abdominal:      Palpations: Abdomen is soft.   Musculoskeletal:      Cervical back: Neck supple.      Right lower leg: Edema present.      Left lower leg: Edema present.   Skin:     General: Skin is warm.   Neurological:      Mental Status: She is alert and oriented to person, place, and time.          Significant Labs: BMP: No results for input(s): GLU, NA, K, CL, CO2, BUN, CREATININE, CALCIUM, MG in the last 48 hours., CMP: No results for input(s): NA, K, CL, CO2, GLU, BUN, CREATININE, CALCIUM, PROT, ALBUMIN, BILITOT, ALKPHOS, AST, ALT, ANIONGAP, ESTGFRAFRICA, EGFRNONAA in the last 48 hours., CBC: No results for input(s): WBC, HGB, HCT, PLT in the last 48 hours., INR: No results for input(s): INR, PROTIME in the last 48 hours., Lipid Panel No results for input(s): CHOL, HDL, LDLCALC, TRIG, CHOLHDL in the last 48 hours., and Troponin No results for input(s): TROPONINI in the last 48 hours.    Significant Imaging:   TTE 2/7/23  The left ventricle is normal in size with normal systolic function.  The estimated ejection fraction is 55%.  Indeterminate left ventricular diastolic function.  Normal right ventricular size with normal right ventricular systolic function.  Moderate left atrial enlargement.  Mild right atrial enlargement.  Mild-to-moderate mitral regurgitation.  Normal central venous pressure (3 mmHg).  The estimated PA systolic pressure is 31 mmHg.

## 2023-06-28 NOTE — ED PROVIDER NOTES
Encounter Date: 6/28/2023       History     Chief Complaint   Patient presents with    Tachycardia     67-year-old female with a history of heart failure, paroxysmal atrial fibrillation, arrhythmia presenting with elevated heart rate, mild nausea.  Patient was seen at her regularly scheduled electrophysiology appointment this morning.  She was noted to have a heart rate of 160 with a wide complex tachycardia.  Patient notes nausea though denies chest pain or shortness of breath.  She walked here under her own power without difficulty.  Reports recent hospitalization for an abnormal heart rhythm.  Denies other medical complaints at this time.    Review of patient's allergies indicates:   Allergen Reactions    Amiodarone analogues Shortness Of Breath    Penicillins Shortness Of Breath     Past Medical History:   Diagnosis Date    Chronic systolic heart failure     Essential (primary) hypertension     Paroxysmal atrial fibrillation      Past Surgical History:   Procedure Laterality Date    ANGIOGRAM, CORONARY, WITH LEFT HEART CATHETERIZATION       Family History   Problem Relation Age of Onset    No Known Problems Mother     No Known Problems Father      Social History     Tobacco Use    Smoking status: Former    Smokeless tobacco: Former    Tobacco comments:     Pt states she quit more than 30 years ago   Substance Use Topics    Alcohol use: No    Drug use: No     Review of Systems   Constitutional:  Negative for chills and fever.   Respiratory:  Negative for chest tightness and shortness of breath.    Cardiovascular:  Positive for palpitations. Negative for chest pain.   Gastrointestinal:  Positive for nausea. Negative for abdominal pain and vomiting.   Musculoskeletal:  Negative for back pain.   Skin:  Negative for rash.   Neurological:  Negative for weakness.     Physical Exam     Initial Vitals   BP Pulse Resp Temp SpO2   06/28/23 0859 06/28/23 0818 06/28/23 0818 06/28/23 0818 06/28/23 0818   (!) 85/56 (!) 157 20  98.1 °F (36.7 °C) 97 %      MAP       --                Physical Exam    Nursing note and vitals reviewed.  Constitutional: She appears well-developed and well-nourished. She is not diaphoretic. No distress.   HENT:   Head: Normocephalic and atraumatic.   Nose: Nose normal.   Eyes: EOM are normal. Pupils are equal, round, and reactive to light. No scleral icterus.   Neck: Neck supple.   Normal range of motion.  Cardiovascular:  Regular rhythm, normal heart sounds and intact distal pulses.     Exam reveals no gallop and no friction rub.       No murmur heard.  Tachycardia   Pulmonary/Chest: Breath sounds normal. No stridor. No respiratory distress. She has no wheezes. She has no rhonchi. She has no rales.   Abdominal: Abdomen is soft. Bowel sounds are normal. She exhibits no distension. There is no abdominal tenderness. There is no rebound and no guarding.   Musculoskeletal:         General: No tenderness or edema. Normal range of motion.      Cervical back: Normal range of motion and neck supple.     Neurological: She is alert and oriented to person, place, and time. No cranial nerve deficit. GCS score is 15. GCS eye subscore is 4. GCS verbal subscore is 5. GCS motor subscore is 6.   Skin: Skin is warm and dry. No rash noted.   Psychiatric: She has a normal mood and affect. Her behavior is normal.       ED Course   Critical Care    Date/Time: 6/28/2023 8:55 AM  Performed by: Alin Johnston MD  Authorized by: Alin Johnston MD   Direct patient critical care time: 30 minutes  Total critical care time (exclusive of procedural time) : 30 minutes      Labs Reviewed   CBC W/ AUTO DIFFERENTIAL - Abnormal; Notable for the following components:       Result Value    RBC 6.63 (*)     Hemoglobin 17.6 (*)     Hematocrit 57.4 (*)     MCH 26.5 (*)     MCHC 30.7 (*)     RDW 16.5 (*)     Platelets 148 (*)     All other components within normal limits    Narrative:     Release to patient->Immediate   B-TYPE NATRIURETIC  PEPTIDE - Abnormal; Notable for the following components:    BNP 1,511 (*)     All other components within normal limits    Narrative:     Release to patient->Immediate   LACTIC ACID, PLASMA - Abnormal; Notable for the following components:    Lactate (Lactic Acid) 3.3 (*)     All other components within normal limits   COMPREHENSIVE METABOLIC PANEL - Abnormal; Notable for the following components:    Glucose 175 (*)     Albumin 2.9 (*)     Total Bilirubin 2.7 (*)     Alkaline Phosphatase 257 (*)     AST 77 (*)     ALT 75 (*)     eGFR 45.1 (*)     All other components within normal limits    Narrative:     ADD ON MAG PER SHEILA LOBO MD ORDER# [id number] @  06/28/2023  11:24    HIV 1 / 2 ANTIBODY    Narrative:     Release to patient->Immediate   HEPATITIS C ANTIBODY    Narrative:     Release to patient->Immediate   TROPONIN ISTAT   MAGNESIUM   TROPONIN I    Narrative:     ADD ON MAG PER SHEILA LOBO MD ORDER# [id number] @  06/28/2023  11:24    MAGNESIUM   MAGNESIUM    Narrative:     ADD ON MAG PER SHEILA LOBO MD ORDER# [id number] @  06/28/2023  11:24      EKG Readings: (Independently Interpreted)   Initial Reading: No STEMI. DO NOT USE Rhythm: Wide complex regular tachycardia. Heart Rate: 159. DO NOT USE Ectopy: Wide complex.   No ST segment elevation or depression concerning for acute ischemia.      ECG Results              EKG 12-lead (Final result)  Result time 06/28/23 09:52:46      Final result by Interface, Lab In [facility] (06/28/23 09:52:46)                   Narrative:    Test Reason : R07.9,    Vent. Rate : 158 BPM     Atrial Rate : 157 BPM     P-R Int : 000 ms          QRS Dur : 134 ms      QT Int : 270 ms       P-R-T Axes : 000 259 117 degrees     QTc Int : 437 ms    Wide QRS tachycardia  Right superior axis deviation  Nonspecific intraventricular block  Inferior infarct ,age undetermined vs due to conduction  Cannot rule out Anteroseptal infarct ,age undetermined  T wave abnormality, consider lateral  ischemia  Abnormal ECG  When compared with ECG of 28-JUN-2023 08:01,  No change  Confirmed by Moreno ROBERTSON MD ([id number]) on 6/28/2023 9:52:34 AM    Referred By: [email or web address]   SELF           Confirmed By:Morneo ROBERTSON MD                                  Imaging Results              X-Ray Chest AP Portable (Final result)  Result time 06/28/23 11:18:16      Final result by Galo Cohen MD (06/28/23 11:18:16)                   Impression:      No convincing evidence of acute detrimental change relative prior radiograph performed 06/01/2023.      Electronically signed by: Galo Cohen  Date:    06/28/2023  Time:    11:18               Narrative:    EXAMINATION:  XR CHEST AP PORTABLE    CLINICAL HISTORY:  Chest Pain;    TECHNIQUE:  Single frontal view of the chest was performed.    COMPARISON:  Chest radiograph 06/01/2023    FINDINGS:  Assessment of the chest limited due to overlying support apparatus.  Cardiac monitoring leads are noted.    Grossly unchanged cardiomediastinal contours.  Mildly prominent interstitial markings, relatively similar to 06/01/2023, 15:58 hours radiograph.  Findings could relate to mild degree of pulmonary vascular congestion/interstitial edema.    No new focal airspace consolidation is identified.    No definite pneumothorax or large volume pleural effusion.  No acute change in the visualized abdomen osseous soft tissue structures also appear without definite acute change.                                       Medications   furosemide injection 80 mg (has no administration in time range)   metoprolol succinate (TOPROL-XL) 24 hr tablet 50 mg (has no administration in time range)   adenosine injection 12 mg (12 mg Intravenous Given 6/28/23 0904)   ondansetron injection 4 mg (4 mg Intravenous Given 6/28/23 0912)   adenosine injection 18 mg (18 mg Intravenous Given 6/28/23 0907)   metoprolol injection 5 mg (5 mg Intravenous Given 6/28/23 0927)   furosemide injection 80 mg (80 mg Intravenous Given  6/28/23 1101)     Medical Decision Making:   Initial Assessment:   67-year-old female presenting with wide complex tachycardia.  Patient seen by electrophysiology just prior to arrival.  She is well-appearing, in no acute distress, speaking full sentences, sitting upright and aside from mild nausea asymptomatic.  Blood pressure difficult to record via machine though has palpable pulse in her foot and appears to be perfusing well.  No definite time of onset, patient appears stable.  Discussed with them on the phone shortly after patient arrival.  Plan is for adenosine with pads placed.  Cardiology on-call notified, at bedside.  A fib with abberancy vs V tach. NO known Hx of WPW.   ED Management:  12 mg adenosine unsuccessful and changing rhythm.  Patient then given IV metoprolol with significant change in rhythm.  Now appears to show a flutter with variable conduction.  Patient hemodynamically stable.  Will treat heart failure with Lasix.  Patient to be admitted to medicine for further evaluation and treatment, possible cardioversion.                        Clinical Impression:   Final diagnoses:  [R07.9] Chest pain  [R00.0] Tachycardia  [I48.91] A-fib  [I48.4] Atypical atrial flutter (Primary)        ED Disposition Condition    Admit Stable                Alin Johnston MD  06/28/23 8236

## 2023-06-28 NOTE — ED NOTES
Code cart in room, pt placed on monitoring, vitals stable at this time. Denies any needs at this time, call light within reach, bed in lowest position

## 2023-06-28 NOTE — ED NOTES
Mulitfunction electrode debif pads placed on chest and back by MARCOS Henry RN  Pt remains on cardiac monitor

## 2023-06-28 NOTE — CONSULTS
[facility] Emergency Dept  Cardiac Electrophysiology  Consult Note    Admission Date: 6/28/2023  Code Status: Prior   Attending Provider: Julianna Mcgregor MD  Consulting Provider: Anthony Oconnor MD  Principal Problem:<principal problem not specified>    Inpatient consult to Electrophysiology  Consult performed by: Anthony Oconnor MD  Consult ordered by: Alin Johnston MD  Reason for consult: Wide-complex tachycardia        Subjective:     Chief Complaint:  Fatigue, Nausea     HPI:   Yaneli Steele (67 y.o. female) for AF follow up today in EP clinic.     History:  pAF ([id number] = 4 [CHF, age, HTN, F])  Tachy-induced CHF with recovered EF (25% > 55%)  Insulin resistance     9/22 initial visit:  Patient originally diagnosed with atrial fibrillation in 2018. Patient was in RVR at the time presented to hospital. Found to have new onset HFrEF, LHC clean, cardioverted and EF improved. After that felt fine until a few months ago. Palpitations occurred again. EF dropped DCCV and referred to [facility]. On Xarelto 20mg entresto and coreg.      10/19/22 update:  After JUNIOR/DCCV 9/2022, LVEF was better. Flecainide started 9/16/22.  Didn't start flecainide immediately; just started prior to seeing Dr. Issa, at which time she c/o LEs swelling. At his office Oct 6, likely AFL (typical vs. atypical?) with 2:1 conduction. . BB increased.  Since then, she feels better now. Apple Watch tells her HR is in 40s.     Update 6/28/23:  Last visit 10/19/23 > plan was for ECG > NSR (cont management) vs. AF/AFL (recommend PVI). Since last visit she was admitted to [facility] for AF/AFL with RVR & ADFH earlier this month > converted to NSR 6/5/23 > switched BB to Toprol (50 mg BID) & continued on home flecainide (100 mg BID). Was unable to tolerate amiodarone secondary to hypotension issues. She is anticoagulated with Xarelto. No bleeding issues. Renal function stable and wnl.     Patient presents with WCT at 157 bpm.  She reports nausea and generalized fatigue. HD stable, and sating well.     Sent to ED this morning from clinic for WCT mgmt.       Past Medical History:   Diagnosis Date    Chronic systolic heart failure     Essential (primary) hypertension     Paroxysmal atrial fibrillation        Past Surgical History:   Procedure Laterality Date    ANGIOGRAM, CORONARY, WITH LEFT HEART CATHETERIZATION         Review of patient's allergies indicates:   Allergen Reactions    Amiodarone analogues Shortness Of Breath    Penicillins Shortness Of Breath       No current facility-administered medications on file prior to encounter.     Current Outpatient Medications on File Prior to Encounter   Medication Sig    flecainide (TAMBOCOR) 100 MG Tab Take 1 tablet (100 mg total) by mouth every 12 (twelve) hours.    metoprolol succinate (TOPROL-XL) 50 MG 24 hr tablet Take 1 tablet (50 mg total) by mouth 2 (two) times daily.    rivaroxaban (XARELTO) 20 mg Tab Take 1 tablet (20 mg total) by mouth daily with dinner or evening meal.    sacubitriL-valsartan (ENTRESTO) 49-51 mg per tablet Take 1 tablet by mouth 2 (two) times daily.     Family History       Problem Relation (Age of Onset)    No Known Problems Mother, Father          Tobacco Use    Smoking status: Former    Smokeless tobacco: Former    Tobacco comments:     Pt states she quit more than 30 years ago   Substance and Sexual Activity    Alcohol use: No    Drug use: No    Sexual activity: Not Currently     Review of Systems   Constitutional: Positive for malaise/fatigue.   All other systems reviewed and are negative.  Objective:     Vital Signs (Most Recent):  Temp: 98.1 °F (36.7 °C) (06/28/23 0818)  Pulse: (!) 157 (06/28/23 0818)  Resp: 20 (06/28/23 0818)  BP:  (not reg nibp or manual) (06/28/23 0818)  SpO2: 97 % (06/28/23 0818) Vital Signs (24h Range):  Temp:  [98.1 °F (36.7 °C)] 98.1 °F (36.7 °C)  Pulse:  [157] 157  Resp:  [20] 20  SpO2:  [97 %] 97 %       Weight: 98 kg  (216 lb)  Body mass index is 33.33 kg/m².    SpO2: 97 %        Physical Exam  Constitutional:       Appearance: Normal appearance. She is obese.   HENT:      Head: Normocephalic.      Mouth/Throat:      Mouth: Mucous membranes are moist.   Eyes:      Extraocular Movements: Extraocular movements intact.   Neck:      Comments: JVD to mid neck at 45 degrees  Cardiovascular:      Rate and Rhythm: Regular rhythm. Tachycardia present.      Pulses: Normal pulses.   Pulmonary:      Effort: Pulmonary effort is normal. No respiratory distress.   Abdominal:      Palpations: Abdomen is soft.   Musculoskeletal:      Cervical back: Neck supple.      Right lower leg: Edema present.      Left lower leg: Edema present.   Skin:     General: Skin is warm.   Neurological:      Mental Status: She is alert and oriented to person, place, and time.          Significant Labs: BMP: No results for input(s): GLU, NA, K, CL, CO2, BUN, CREATININE, CALCIUM, MG in the last 48 hours., CMP: No results for input(s): NA, K, CL, CO2, GLU, BUN, CREATININE, CALCIUM, PROT, ALBUMIN, BILITOT, ALKPHOS, AST, ALT, ANIONGAP, ESTGFRAFRICA, EGFRNONAA in the last 48 hours., CBC: No results for input(s): WBC, HGB, HCT, PLT in the last 48 hours., INR: No results for input(s): INR, PROTIME in the last 48 hours., Lipid Panel No results for input(s): CHOL, HDL, LDLCALC, TRIG, CHOLHDL in the last 48 hours., and Troponin No results for input(s): TROPONINI in the last 48 hours.    Significant Imaging:   TTE 2/7/23   The left ventricle is normal in size with normal systolic function.   The estimated ejection fraction is 55%.   Indeterminate left ventricular diastolic function.   Normal right ventricular size with normal right ventricular systolic function.   Moderate left atrial enlargement.   Mild right atrial enlargement.   Mild-to-moderate mitral regurgitation.   Normal central venous pressure (3 mmHg).   The estimated PA systolic pressure is 31 mmHg.               Assessment and Plan:     Wide-complex tachycardia  Acute Heart Failure (likely combined systolic and diastolic)  Tachycardia-mediated Cardiomyopathy  Persistent Atrial Fibrillation  Atypical Atrial Flutter  EF previously 60% on TTE 2/23. Had JUNIOR/DCCV 9/22. Currently on Toprol 50 mg BID and Flecainide 100 mg BID. On Eliquis for AF/AFL ([id number]). Presenting in regular WCT today concerning for AFL vs VT. Hemodynamically stable on room air. No effect from Adenosine 12 mg x1 and 18 mg x1. HR slowed to 100s after IV Metoprolol 5 mg x1 and revealed atypical atrial flutter with variable conduction. Initial rhythm consistent with atypical atrial flutter with RVR and aberrancy.    - Admit to medicine  - Diuresis with IV Lasix 80 mg BID  - Continue home Toprol 50 mg BID and Flecainide 100 mg BID  - Continue Xarelto 20 mg daily  - TTE  - Monitor on telemetry  - Repeat EKG tomorrow morning  - NPO at [facility] for JUNIOR/DCCV         Case discussed and staffed with Dr Silvio Polk MD.    Thank you for your consult. I will follow-up with patient. Please contact us if you have any additional questions.    Anthony Oconnor MD  Cardiac Electrophysiology  [facility] Dept

## 2023-06-28 NOTE — ED NOTES
Pt resting in bed with son at bedside, food tray disposed of, suction canister emptied. Output documented

## 2023-06-28 NOTE — ANESTHESIA PREPROCEDURE EVALUATION
[facility]  Anesthesia Pre-Operative Evaluation         Patient Name: Yaneli Steele  YOB: 1955  MRN: [id number]    SUBJECTIVE:     Pre-operative evaluation for Procedure(s) (LRB):  Cardioversion or Defibrillation (N/A)  ECHOCARDIOGRAM, TRANSESOPHAGEAL (N/A)     06/28/2023    Yaneli Steele is a 67 y.o. female w/ a significant PMHx of history of heart failure, paroxysmal atrial fibrillation, arrhythmia presenting with elevated heart rate, mild nausea.  Found to have a wide complex tachycardia in the ED.    Patient now presents for the above procedure(s).         The left ventricle is normal in size with severely decreased systolic function.   The estimated ejection fraction is 25%.   There is left ventricular global hypokinesis.   Normal right ventricular size with mildly reduced right ventricular systolic function.   Severe biatrial enlargement.   Severe mitral regurgitation.   Moderate to severe tricuspid regurgitation.   The estimated PA systolic pressure is 51 mmHg.   There is pulmonary hypertension.   Intermediate central venous pressure (8 mmHg).   Atrial fibrillation observed.       TTE: 2/7/23   The left ventricle is normal in size with normal systolic function.   The estimated ejection fraction is 55%.   Indeterminate left ventricular diastolic function.   Normal right ventricular size with normal right ventricular systolic function.   Moderate left atrial enlargement.   Mild right atrial enlargement.   Mild-to-moderate mitral regurgitation.   Normal central venous pressure (3 mmHg).   The estimated PA systolic pressure is 31 mmHg.      LDA:        Peripheral IV - Single Lumen 06/28/23 0840 20 G;1 in Posterior;Right Hand (Active)   Site Assessment Clean;Dry;Intact;No redness;No swelling 06/28/23 0840   Line Status Blood return noted;Flushed;Saline locked 06/28/23 0840   Dressing Status Clean;Dry;Intact 06/28/23 0840   Dressing Intervention  First dressing 06/28/23 0840   Number of days: 0            Peripheral IV - Single Lumen 06/28/23 0858 20 G;1 in Anterior;Left Forearm (Active)   Site Assessment Clean;Dry;Intact;No redness;No swelling 06/28/23 0858   Line Status Blood return noted;Flushed;Saline locked 06/28/23 0858   Dressing Status Dry;Intact;Clean 06/28/23 0858   Dressing Intervention First dressing 06/28/23 0858   Number of days: 0       Female External Urinary Catheter 06/28/23 1129 (Active)   Number of days: 0       Prev airway: None documented.    Drips: None documented.    Patient Active Problem List   Diagnosis    Non-rheumatic mitral regurgitation    Non-rheumatic tricuspid valve insufficiency    Paroxysmal atrial fibrillation    Essential (primary) hypertension    Severe obesity (BMI 35.0-39.9) with comorbidity    Tachycardia induced cardiomyopathy    Elevated brain natriuretic peptide (BNP) level    Acute on chronic diastolic congestive heart failure    Obesity (BMI 30-39.9)    Atrial flutter    Atrial flutter with rapid ventricular response       Review of patient's allergies indicates:   Allergen Reactions    Amiodarone analogues Shortness Of Breath    Penicillins Shortness Of Breath       Current Outpatient Medications:    Current Facility-Administered Medications:     furosemide injection 80 mg, 80 mg, Intravenous, BID, Julianna Mcgregor MD    Current Outpatient Medications:     flecainide (TAMBOCOR) 100 MG Tab, Take 1 tablet (100 mg total) by mouth every 12 (twelve) hours., Disp: 60 tablet, Rfl: 11    metoprolol succinate (TOPROL-XL) 50 MG 24 hr tablet, Take 1 tablet (50 mg total) by mouth 2 (two) times daily., Disp: 60 tablet, Rfl: 1    rivaroxaban (XARELTO) 20 mg Tab, Take 1 tablet (20 mg total) by mouth daily with dinner or evening meal., Disp: 90 tablet, Rfl: 3    sacubitriL-valsartan (ENTRESTO) 49-51 mg per tablet, Take 1 tablet by mouth 2 (two) times daily., Disp: 180 tablet, Rfl: 3    Past Surgical History:    Procedure Laterality Date    ANGIOGRAM, CORONARY, WITH LEFT HEART CATHETERIZATION         Social History     Socioeconomic History    Marital status: Single   Tobacco Use    Smoking status: Former    Smokeless tobacco: Former    Tobacco comments:     Pt states she quit more than 30 years ago   Substance and Sexual Activity    Alcohol use: No    Drug use: No    Sexual activity: Not Currently     Social Determinants of Health     Financial Resource Strain: Low Risk     Difficulty of Paying Living Expenses: Not hard at all   Food Insecurity: No Food Insecurity    Worried About Running Out of Food in the Last Year: Never true    Ran Out of Food in the Last Year: Never true   Transportation Needs: No Transportation Needs    Lack of Transportation (Medical): No    Lack of Transportation (Non-Medical): No   Physical Activity: Sufficiently Active    Days of Exercise per Week: 4 days    Minutes of Exercise per Session: 70 min   Stress: No Stress Concern Present    Feeling of Stress : Not at all   Social Connections: Unknown    Frequency of Communication with Friends and Family: More than three times a week    Frequency of Social Gatherings with Friends and Family: More than three times a week    Active Member of Clubs or Organizations: Yes    Attends Club or Organization Meetings: More than 4 times per year    Marital Status: Never    Housing Stability: Low Risk     Unable to Pay for Housing in the Last Year: No    Number of Places Lived in the Last Year: 1    Unstable Housing in the Last Year: No       OBJECTIVE:     Vital Signs Range (Last 24H):  Temp:  [36.4 °C (97.6 °F)-36.9 °C (98.4 °F)]   Pulse:  []   Resp:  [10-26]   BP: ()/(51-84)   SpO2:  [93 %-99 %]       Significant Labs:  Lab Results   Component Value Date    WBC 6.84 06/28/2023    HGB 17.6 (H) 06/28/2023    HCT 57.4 (H) 06/28/2023     (L) 06/28/2023    CHOL 132 02/23/2018    TRIG 68 02/23/2018    HDL 34 (L)  02/23/2018    ALT 75 (H) 06/28/2023    AST 77 (H) 06/28/2023     06/28/2023    K 4.8 06/28/2023     06/28/2023    CREATININE 1.3 06/28/2023    BUN 21 06/28/2023    CO2 24 06/28/2023    TSH 0.816 02/23/2018    INR 1.0 07/19/2022    HGBA1C 6.2 (H) 06/01/2023       Diagnostic Studies: No relevant studies.    EKG:   Results for orders placed or performed during the hospital encounter of 06/28/23   EKG 12-lead    Collection Time: 06/28/23  8:58 AM    Narrative    Test Reason : R07.9,    Vent. Rate : 158 BPM     Atrial Rate : 157 BPM     P-R Int : 000 ms          QRS Dur : 134 ms      QT Int : 270 ms       P-R-T Axes : 000 259 117 degrees     QTc Int : 437 ms    Wide QRS tachycardia  Right superior axis deviation  Nonspecific intraventricular block  Inferior infarct ,age undetermined vs due to conduction  Cannot rule out Anteroseptal infarct ,age undetermined  T wave abnormality, consider lateral ischemia  Abnormal ECG  When compared with ECG of 28-JUN-2023 08:01,  No change  Confirmed by Moreno ROBERTSON MD ([id number]) on 6/28/2023 9:52:34 AM    Referred By: [email or web address]   SELF           Confirmed By:Moreno ROBERTSON MD       2D ECHO:  TTE:  Results for orders placed or performed during the hospital encounter of 02/07/23   Echo   Result Value Ref Range    TDI SEPTAL 0.04 m/s    LV LATERAL E/E' RATIO 18.25 m/s    LV SEPTAL E/E' RATIO 36.50 m/s    LA WIDTH 5.60 cm    IVC diameter 20 cm    Left Ventricular Outflow Tract Mean Velocity 0.82 cm/s    Left Ventricular Outflow Tract Mean Gradient 2.78 mmHg    TDI LATERAL 0.08 m/s    PV PEAK VELOCITY 0.88 cm/s    LVIDd 5.39 3.5 - 6.0 cm    IVS 0.89 0.6 - 1.1 cm    Posterior Wall 0.78 0.6 - 1.1 cm    LVIDs 4.19 (A) 2.1 - 4.0 cm    FS 22 28 - 44 %    LA volume 164.28 cm3    Sinus 3.04 cm    STJ 2.52 cm    LV mass 163.09 g    LA size 5.72 cm    RVDD 4.31 cm    TAPSE 2.27 cm    Left Ventricle Relative Wall Thickness 0.29 cm    AV mean gradient 5 mmHg    AV valve area 2.79 cm2    AV Velocity  Ratio 0.69     AV index (prosthetic) 0.81     MV valve area p 1/2 method 2.46 cm2    E/A ratio 1.36     Mean e' 0.06 m/s    E wave deceleration time 308.20 msec    LVOT diameter 2.09 cm    LVOT area 3.4 cm2    LVOT peak mazin 1.04 m/s    LVOT peak VTI 26.40 cm    Ao peak mazin 1.50 m/s    Ao VTI 32.4 cm    LVOT stroke volume 90.52 cm3    AV peak gradient 9 mmHg    E/E' ratio 24.33 m/s    MV Peak E Mazin 1.46 m/s    TR Max Mazin 2.66 m/s    MV stenosis pressure 1/2 time 89.38 ms    MV Peak A Mazin 1.07 m/s    LV Systolic Volume 78.26 mL    LV Diastolic Volume 140.90 mL    RA Major Axis 6.46 cm    Left Atrium Minor Axis 5.63 cm    Left Atrium Major Axis 6.50 cm    Triscuspid Valve Regurgitation Peak Gradient 28 mmHg    RA Width 5.71 cm    Right Atrial Pressure (from IVC) 3 mmHg    EF 55 %    TV rest pulmonary artery pressure 31 mmHg    Narrative    · The left ventricle is normal in size with normal systolic function.  · The estimated ejection fraction is 55%.  · Indeterminate left ventricular diastolic function.  · Normal right ventricular size with normal right ventricular systolic   function.  · Moderate left atrial enlargement.  · Mild right atrial enlargement.  · Mild-to-moderate mitral regurgitation.  · Normal central venous pressure (3 mmHg).  · The estimated PA systolic pressure is 31 mmHg.          JUNIOR:  No results found for this or any previous visit.    ASSESSMENT/PLAN:                                                                                                                  06/28/2023  Yaneli Steele is a 67 y.o., female.      Pre-op Assessment    I have reviewed the Patient Summary Reports.     I have reviewed the Nursing Notes. I have reviewed the NPO Status.   I have reviewed the Medications.     Review of Systems  Anesthesia Hx:  No problems with previous Anesthesia  History of prior surgery of interest to airway management or planning: Denies Family Hx of Anesthesia complications.   Denies Personal  Hx of Anesthesia complications.   Cardiovascular:   Hypertension Denies MI.  Denies CAD.   Dysrhythmias atrial fibrillation CHF    Hepatic/GI:   Denies GERD.    Endocrine:  Obesity / BMI > 30      Physical Exam  General: Well nourished, Cooperative, Alert and Oriented    Airway:  Mallampati: I   Mouth Opening: Normal  TM Distance: Normal  Tongue: Large  Neck ROM: Normal ROM    Dental:  Dentures, Partial Dentures  Extensive periodontal disease   Chest/Lungs:  Clear to auscultation, Normal Respiratory Rate    Heart:  Rate: Tachycardia        Anesthesia Plan  Type of Anesthesia, risks & benefits discussed:    Anesthesia Type: Gen Natural Airway, MAC  Intra-op Monitoring Plan: Standard ASA Monitors and Art Line  Post Op Pain Control Plan: multimodal analgesia  Induction:  IV  Informed Consent: Informed consent signed with the Patient and all parties understand the risks and agree with anesthesia plan.  All questions answered.   ASA Score: 4  Day of Surgery Review of History & Physical: H&P Update referred to the surgeon/provider.    Ready For Surgery From Anesthesia Perspective.     .

## 2023-06-28 NOTE — ASSESSMENT & PLAN NOTE
Elevated TBili, AST/ALT/ALP. Likely hepatic congestion as occurred previously during previous hospital stay, but will check ultrasound.  Negative hepatitis panel in the past.

## 2023-06-28 NOTE — ASSESSMENT & PLAN NOTE
EP consulted. Continue home Toprol 50 BID. Hold home Flecainide 100 BID. Continue home Xarelto.  TTE. NPO at [facility] for JUNIOR/DCCV. Repeat EKG ordered for AM. Cardiac monitoring. Plan for Tikosyn initiation with inpatient monitoring x 3 days, followed by outpatient PVI.

## 2023-06-28 NOTE — PLAN OF CARE
AAOX4,VSS,O2 sats>92% on room air. Plan of care discussed with patient.Patient has no complaints of pain/SOB. Discussed medications and care. Patient has no questions at this time.Pt visualised and stable.Call light within reach.Pt resting,bed at lowest position.     Problem: Adult Inpatient Plan of Care  Goal: Plan of Care Review  Outcome: Ongoing, Progressing  Goal: Absence of Hospital-Acquired Illness or Injury  Outcome: Ongoing, Progressing  Goal: Optimal Comfort and Wellbeing  Outcome: Ongoing, Progressing

## 2023-06-28 NOTE — ED NOTES
Pt identifiers Yaneli Steele were checked and are correct  LOC: The patient is awake, alert, aware of environment with an appropriate affect. Oriented x4, speaking appropriately  APPEARANCE: Pt resting comfortably, in no acute distress, pt is clean and well groomed, clothing properly fastened  SKIN: Skin warm, dry and intact, normal skin turgor, moist mucus membranes  RESPIRATORY: Airway is open and patent, respirations are spontaneous, even and unlabored, normal effort and rate  Breath sounds clear nitin to all lung fields on ausucultation  CARDIAC: Tachycardia noted with heart rate 156 bpm, 2 + peripheral edema noted, capillary refill < 3 seconds, bilateral radial pulses 2+ Pt is on a cardiac monitor   ABDOMEN: Soft, nontender, nondistended. Bowel sounds present to all four quad of abd on auscultation  NEUROLOGIC: PERRL, facial expression is symmetrical, patient moving all extremities spontaneously, normal sensation in all extremities when touched with a finger.  Follows all commands appropriately  MUSCULOSKELETAL: No obvious deformities.

## 2023-06-28 NOTE — SUBJECTIVE & OBJECTIVE
Past Medical History:   Diagnosis Date    Chronic systolic heart failure     Essential (primary) hypertension     Paroxysmal atrial fibrillation        Past Surgical History:   Procedure Laterality Date    ANGIOGRAM, CORONARY, WITH LEFT HEART CATHETERIZATION         Review of patient's allergies indicates:   Allergen Reactions    Amiodarone analogues Shortness Of Breath    Penicillins Shortness Of Breath       No current facility-administered medications on file prior to encounter.     Current Outpatient Medications on File Prior to Encounter   Medication Sig    flecainide (TAMBOCOR) 100 MG Tab Take 1 tablet (100 mg total) by mouth every 12 (twelve) hours.    metoprolol succinate (TOPROL-XL) 50 MG 24 hr tablet Take 1 tablet (50 mg total) by mouth 2 (two) times daily.    rivaroxaban (XARELTO) 20 mg Tab Take 1 tablet (20 mg total) by mouth daily with dinner or evening meal.    sacubitriL-valsartan (ENTRESTO) 49-51 mg per tablet Take 1 tablet by mouth 2 (two) times daily.     Family History       Problem Relation (Age of Onset)    No Known Problems Mother, Father          Tobacco Use    Smoking status: Former    Smokeless tobacco: Former    Tobacco comments:     Pt states she quit more than 30 years ago   Substance and Sexual Activity    Alcohol use: No    Drug use: No    Sexual activity: Not Currently     Review of Systems  Objective:     Vital Signs (Most Recent):  Temp: 98.4 °F (36.9 °C) (06/28/23 1116)  Pulse: 85 (06/28/23 1349)  Resp: 19 (06/28/23 1247)  BP: 115/84 (06/28/23 1349)  SpO2: 99 % (06/28/23 1349) Vital Signs (24h Range):  Temp:  [97.6 °F (36.4 °C)-98.4 °F (36.9 °C)] 98.4 °F (36.9 °C)  Pulse:  [] 85  Resp:  [10-27] 19  SpO2:  [93 %-99 %] 99 %  BP: ()/(51-88) 115/84     Weight: 98 kg (216 lb)  Body mass index is 33.33 kg/m².     Physical Exam  Vitals reviewed.   Constitutional:       General: She is not in acute distress.     Appearance: She is not diaphoretic.   HENT:      Head:  Normocephalic and atraumatic.      Nose: Nose normal.      Mouth/Throat:      Pharynx: No oropharyngeal exudate.   Eyes:      General: No scleral icterus.        Right eye: No discharge.         Left eye: No discharge.      Extraocular Movements: EOM normal.      Conjunctiva/sclera: Conjunctivae normal.      Pupils: Pupils are equal, round, and reactive to light.   Neck:      Thyroid: No thyromegaly.      Vascular: No JVD.      Trachea: No tracheal deviation.   Cardiovascular:      Rate and Rhythm: Tachycardia present. Rhythm irregular.      Heart sounds: Normal heart sounds. No murmur heard.    No friction rub.   Pulmonary:      Effort: Pulmonary effort is normal. No respiratory distress.      Breath sounds: Normal breath sounds. No stridor. No wheezing or rales.   Chest:      Chest wall: No tenderness.   Abdominal:      General: Bowel sounds are normal. There is no distension.      Palpations: Abdomen is soft. There is no mass.      Tenderness: There is no abdominal tenderness. There is no guarding or rebound.   Musculoskeletal:         General: No tenderness or deformity. Normal range of motion.      Cervical back: Neck supple.   Lymphadenopathy:      Cervical: No cervical adenopathy.   Skin:     General: Skin is warm and dry.      Coloration: Skin is not pale.      Findings: No erythema or rash.   Neurological:      Mental Status: She is alert and oriented to person, place, and time.      Cranial Nerves: No cranial nerve deficit.      Motor: No abnormal muscle tone.      Coordination: Coordination normal.   Psychiatric:         Behavior: Behavior normal.         Thought Content: Thought content normal.         Judgment: Judgment normal.            CRANIAL NERVES     CN III, IV, VI   Pupils are equal, round, and reactive to light.  Extraocular motions are normal.      Significant Labs: All pertinent labs within the past 24 hours have been reviewed.    Significant Imaging: I have reviewed all pertinent imaging  results/findings within the past 24 hours.

## 2023-06-29 ENCOUNTER — ANESTHESIA (OUTPATIENT)
Dept: MEDSURG UNIT | Facility: HOSPITAL | Age: 68
DRG: 273 | End: 2023-06-29
Payer: COMMERCIAL

## 2023-06-29 PROBLEM — K86.89 PANCREATIC MASS: Status: ACTIVE | Noted: 2023-06-29

## 2023-06-29 LAB
ANION GAP SERPL CALC-SCNC: 12 MMOL/L (ref 8–16)
ANION GAP SERPL CALC-SCNC: 18 MMOL/L (ref 8–16)
APTT PPP: 28.8 SEC (ref 21–32)
ASCENDING AORTA: 3.6 CM
BASOPHILS # BLD AUTO: 0.03 K/UL (ref 0–0.2)
BASOPHILS # BLD AUTO: 0.04 K/UL (ref 0–0.2)
BASOPHILS NFR BLD: 0.6 % (ref 0–1.9)
BASOPHILS NFR BLD: 0.7 % (ref 0–1.9)
BSA FOR ECHO PROCEDURE: 2.17 M2
BUN SERPL-MCNC: 17 MG/DL (ref 8–23)
BUN SERPL-MCNC: 19 MG/DL (ref 8–23)
CALCIUM SERPL-MCNC: 10.1 MG/DL (ref 8.7–10.5)
CALCIUM SERPL-MCNC: 8.9 MG/DL (ref 8.7–10.5)
CHLORIDE SERPL-SCNC: 104 MMOL/L (ref 95–110)
CHLORIDE SERPL-SCNC: 99 MMOL/L (ref 95–110)
CO2 SERPL-SCNC: 23 MMOL/L (ref 23–29)
CO2 SERPL-SCNC: 26 MMOL/L (ref 23–29)
CREAT SERPL-MCNC: 1 MG/DL (ref 0.5–1.4)
CREAT SERPL-MCNC: 1.1 MG/DL (ref 0.5–1.4)
DIFFERENTIAL METHOD: ABNORMAL
DIFFERENTIAL METHOD: ABNORMAL
EJECTION FRACTION: 35 %
EOSINOPHIL # BLD AUTO: 0.1 K/UL (ref 0–0.5)
EOSINOPHIL # BLD AUTO: 0.1 K/UL (ref 0–0.5)
EOSINOPHIL NFR BLD: 1.6 % (ref 0–8)
EOSINOPHIL NFR BLD: 2.3 % (ref 0–8)
ERYTHROCYTE [DISTWIDTH] IN BLOOD BY AUTOMATED COUNT: 14.7 % (ref 11.5–14.5)
ERYTHROCYTE [DISTWIDTH] IN BLOOD BY AUTOMATED COUNT: 16.1 % (ref 11.5–14.5)
EST. GFR  (NO RACE VARIABLE): 55.1 ML/MIN/1.73 M^2
EST. GFR  (NO RACE VARIABLE): >60 ML/MIN/1.73 M^2
GLUCOSE SERPL-MCNC: 137 MG/DL (ref 70–110)
GLUCOSE SERPL-MCNC: 96 MG/DL (ref 70–110)
HCT VFR BLD AUTO: 51.5 % (ref 37–48.5)
HCT VFR BLD AUTO: 57.7 % (ref 37–48.5)
HGB BLD-MCNC: 16.1 G/DL (ref 12–16)
HGB BLD-MCNC: 17.5 G/DL (ref 12–16)
IMM GRANULOCYTES # BLD AUTO: 0.01 K/UL (ref 0–0.04)
IMM GRANULOCYTES # BLD AUTO: 0.01 K/UL (ref 0–0.04)
IMM GRANULOCYTES NFR BLD AUTO: 0.2 % (ref 0–0.5)
IMM GRANULOCYTES NFR BLD AUTO: 0.2 % (ref 0–0.5)
INR PPP: 1.5 (ref 0.8–1.2)
LACTATE SERPL-SCNC: 0.9 MMOL/L (ref 0.5–2.2)
LACTATE SERPL-SCNC: 3.1 MMOL/L (ref 0.5–2.2)
LYMPHOCYTES # BLD AUTO: 1.5 K/UL (ref 1–4.8)
LYMPHOCYTES # BLD AUTO: 2.2 K/UL (ref 1–4.8)
LYMPHOCYTES NFR BLD: 35.2 % (ref 18–48)
LYMPHOCYTES NFR BLD: 35.4 % (ref 18–48)
MAGNESIUM SERPL-MCNC: 1.7 MG/DL (ref 1.6–2.6)
MAGNESIUM SERPL-MCNC: 2.1 MG/DL (ref 1.6–2.6)
MCH RBC QN AUTO: 26.7 PG (ref 27–31)
MCH RBC QN AUTO: 26.9 PG (ref 27–31)
MCHC RBC AUTO-ENTMCNC: 30.3 G/DL (ref 32–36)
MCHC RBC AUTO-ENTMCNC: 31.3 G/DL (ref 32–36)
MCV RBC AUTO: 86 FL (ref 82–98)
MCV RBC AUTO: 88 FL (ref 82–98)
MONOCYTES # BLD AUTO: 0.4 K/UL (ref 0.3–1)
MONOCYTES # BLD AUTO: 0.5 K/UL (ref 0.3–1)
MONOCYTES NFR BLD: 7.8 % (ref 4–15)
MONOCYTES NFR BLD: 9.4 % (ref 4–15)
MR PISA EROA: 0.13 CM2
NEUTROPHILS # BLD AUTO: 2.2 K/UL (ref 1.8–7.7)
NEUTROPHILS # BLD AUTO: 3.4 K/UL (ref 1.8–7.7)
NEUTROPHILS NFR BLD: 52 % (ref 38–73)
NEUTROPHILS NFR BLD: 54.6 % (ref 38–73)
NRBC BLD-RTO: 0 /100 WBC
NRBC BLD-RTO: 0 /100 WBC
PISA MRMAX VEL: 4 M/S
PISA RADIUS: 0.5 CM
PISA VN NYQUIST: 0.33 M/S
PLATELET # BLD AUTO: 124 K/UL (ref 150–450)
PLATELET # BLD AUTO: 146 K/UL (ref 150–450)
PMV BLD AUTO: 11.7 FL (ref 9.2–12.9)
PMV BLD AUTO: 12.7 FL (ref 9.2–12.9)
POCT GLUCOSE: 135 MG/DL (ref 70–110)
POTASSIUM SERPL-SCNC: 3.6 MMOL/L (ref 3.5–5.1)
POTASSIUM SERPL-SCNC: 4.2 MMOL/L (ref 3.5–5.1)
PROTHROMBIN TIME: 15.9 SEC (ref 9–12.5)
RBC # BLD AUTO: 5.99 M/UL (ref 4–5.4)
RBC # BLD AUTO: 6.55 M/UL (ref 4–5.4)
SINUS: 2.8 CM
SODIUM SERPL-SCNC: 140 MMOL/L (ref 136–145)
SODIUM SERPL-SCNC: 142 MMOL/L (ref 136–145)
STJ: 2.5 CM
WBC # BLD AUTO: 4.27 K/UL (ref 3.9–12.7)
WBC # BLD AUTO: 6.19 K/UL (ref 3.9–12.7)

## 2023-06-29 PROCEDURE — 93005 ELECTROCARDIOGRAM TRACING: CPT

## 2023-06-29 PROCEDURE — 63600175 PHARM REV CODE 636 W HCPCS: Performed by: NURSE ANESTHETIST, CERTIFIED REGISTERED

## 2023-06-29 PROCEDURE — 80048 BASIC METABOLIC PNL TOTAL CA: CPT | Mod: 91 | Performed by: INTERNAL MEDICINE

## 2023-06-29 PROCEDURE — 37000009 HC ANESTHESIA EA ADD 15 MINS: Performed by: INTERNAL MEDICINE

## 2023-06-29 PROCEDURE — D9220A PRA ANESTHESIA: Mod: CRNA,,, | Performed by: NURSE ANESTHETIST, CERTIFIED REGISTERED

## 2023-06-29 PROCEDURE — 93010 ELECTROCARDIOGRAM REPORT: CPT | Mod: 76,,, | Performed by: INTERNAL MEDICINE

## 2023-06-29 PROCEDURE — D9220A PRA ANESTHESIA: ICD-10-PCS | Mod: ANES,,, | Performed by: STUDENT IN AN ORGANIZED HEALTH CARE EDUCATION/TRAINING PROGRAM

## 2023-06-29 PROCEDURE — 99900035 HC TECH TIME PER 15 MIN (STAT)

## 2023-06-29 PROCEDURE — 83735 ASSAY OF MAGNESIUM: CPT | Mod: 91 | Performed by: INTERNAL MEDICINE

## 2023-06-29 PROCEDURE — D9220A PRA ANESTHESIA: Mod: ANES,,, | Performed by: STUDENT IN AN ORGANIZED HEALTH CARE EDUCATION/TRAINING PROGRAM

## 2023-06-29 PROCEDURE — 20600001 HC STEP DOWN PRIVATE ROOM

## 2023-06-29 PROCEDURE — 99223 1ST HOSP IP/OBS HIGH 75: CPT | Mod: 25,,, | Performed by: STUDENT IN AN ORGANIZED HEALTH CARE EDUCATION/TRAINING PROGRAM

## 2023-06-29 PROCEDURE — 25000003 PHARM REV CODE 250: Performed by: HOSPITALIST

## 2023-06-29 PROCEDURE — 99223 PR INITIAL HOSPITAL CARE,LEVL III: ICD-10-PCS | Mod: 25,,, | Performed by: STUDENT IN AN ORGANIZED HEALTH CARE EDUCATION/TRAINING PROGRAM

## 2023-06-29 PROCEDURE — 25000003 PHARM REV CODE 250: Performed by: STUDENT IN AN ORGANIZED HEALTH CARE EDUCATION/TRAINING PROGRAM

## 2023-06-29 PROCEDURE — 93010 EKG 12-LEAD: ICD-10-PCS | Mod: 76,,, | Performed by: INTERNAL MEDICINE

## 2023-06-29 PROCEDURE — 25000003 PHARM REV CODE 250: Performed by: NURSE ANESTHETIST, CERTIFIED REGISTERED

## 2023-06-29 PROCEDURE — 99233 SBSQ HOSP IP/OBS HIGH 50: CPT | Mod: ,,, | Performed by: STUDENT IN AN ORGANIZED HEALTH CARE EDUCATION/TRAINING PROGRAM

## 2023-06-29 PROCEDURE — D9220A PRA ANESTHESIA: ICD-10-PCS | Mod: CRNA,,, | Performed by: NURSE ANESTHETIST, CERTIFIED REGISTERED

## 2023-06-29 PROCEDURE — 85730 THROMBOPLASTIN TIME PARTIAL: CPT | Performed by: INTERNAL MEDICINE

## 2023-06-29 PROCEDURE — 36415 COLL VENOUS BLD VENIPUNCTURE: CPT | Performed by: HOSPITALIST

## 2023-06-29 PROCEDURE — 93010 ELECTROCARDIOGRAM REPORT: CPT | Mod: ,,, | Performed by: INTERNAL MEDICINE

## 2023-06-29 PROCEDURE — 25000003 PHARM REV CODE 250: Performed by: INTERNAL MEDICINE

## 2023-06-29 PROCEDURE — 92960 CARDIOVERSION ELECTRIC EXT: CPT | Mod: ,,, | Performed by: INTERNAL MEDICINE

## 2023-06-29 PROCEDURE — 85610 PROTHROMBIN TIME: CPT | Performed by: INTERNAL MEDICINE

## 2023-06-29 PROCEDURE — 80048 BASIC METABOLIC PNL TOTAL CA: CPT | Performed by: HOSPITALIST

## 2023-06-29 PROCEDURE — 36415 COLL VENOUS BLD VENIPUNCTURE: CPT | Performed by: STUDENT IN AN ORGANIZED HEALTH CARE EDUCATION/TRAINING PROGRAM

## 2023-06-29 PROCEDURE — 93010 EKG 12-LEAD: ICD-10-PCS | Mod: ,,, | Performed by: INTERNAL MEDICINE

## 2023-06-29 PROCEDURE — 63600175 PHARM REV CODE 636 W HCPCS: Performed by: STUDENT IN AN ORGANIZED HEALTH CARE EDUCATION/TRAINING PROGRAM

## 2023-06-29 PROCEDURE — 83605 ASSAY OF LACTIC ACID: CPT | Mod: 91 | Performed by: HOSPITALIST

## 2023-06-29 PROCEDURE — 92960 PR CARDIOVERSION, ELECTIVE;EXTERN: ICD-10-PCS | Mod: ,,, | Performed by: INTERNAL MEDICINE

## 2023-06-29 PROCEDURE — 94761 N-INVAS EAR/PLS OXIMETRY MLT: CPT

## 2023-06-29 PROCEDURE — 37000008 HC ANESTHESIA 1ST 15 MINUTES: Performed by: INTERNAL MEDICINE

## 2023-06-29 PROCEDURE — 83735 ASSAY OF MAGNESIUM: CPT | Performed by: HOSPITALIST

## 2023-06-29 PROCEDURE — 92960 CARDIOVERSION ELECTRIC EXT: CPT | Performed by: INTERNAL MEDICINE

## 2023-06-29 PROCEDURE — 85025 COMPLETE CBC W/AUTO DIFF WBC: CPT | Mod: 91 | Performed by: INTERNAL MEDICINE

## 2023-06-29 PROCEDURE — 99233 PR SUBSEQUENT HOSPITAL CARE,LEVL III: ICD-10-PCS | Mod: ,,, | Performed by: STUDENT IN AN ORGANIZED HEALTH CARE EDUCATION/TRAINING PROGRAM

## 2023-06-29 PROCEDURE — 36415 COLL VENOUS BLD VENIPUNCTURE: CPT | Performed by: INTERNAL MEDICINE

## 2023-06-29 PROCEDURE — 85025 COMPLETE CBC W/AUTO DIFF WBC: CPT | Performed by: HOSPITALIST

## 2023-06-29 RX ORDER — MAGNESIUM SULFATE HEPTAHYDRATE 40 MG/ML
2 INJECTION, SOLUTION INTRAVENOUS ONCE
Status: COMPLETED | OUTPATIENT
Start: 2023-06-29 | End: 2023-06-29

## 2023-06-29 RX ORDER — VASOPRESSIN 20 [USP'U]/ML
INJECTION, SOLUTION INTRAMUSCULAR; SUBCUTANEOUS
Status: DISCONTINUED | OUTPATIENT
Start: 2023-06-29 | End: 2023-06-29

## 2023-06-29 RX ORDER — PROPOFOL 10 MG/ML
VIAL (ML) INTRAVENOUS CONTINUOUS PRN
Status: DISCONTINUED | OUTPATIENT
Start: 2023-06-29 | End: 2023-06-29

## 2023-06-29 RX ORDER — PROPOFOL 10 MG/ML
VIAL (ML) INTRAVENOUS
Status: DISCONTINUED | OUTPATIENT
Start: 2023-06-29 | End: 2023-06-29

## 2023-06-29 RX ORDER — DOFETILIDE 0.25 MG/1
500 CAPSULE ORAL ONCE
Status: COMPLETED | OUTPATIENT
Start: 2023-06-29 | End: 2023-06-29

## 2023-06-29 RX ORDER — FUROSEMIDE 10 MG/ML
80 INJECTION INTRAMUSCULAR; INTRAVENOUS ONCE
Status: COMPLETED | OUTPATIENT
Start: 2023-06-29 | End: 2023-06-29

## 2023-06-29 RX ORDER — LIDOCAINE HYDROCHLORIDE 20 MG/ML
INJECTION INTRAVENOUS
Status: DISCONTINUED | OUTPATIENT
Start: 2023-06-29 | End: 2023-06-29

## 2023-06-29 RX ORDER — SODIUM CHLORIDE 0.9 % (FLUSH) 0.9 %
10 SYRINGE (ML) INJECTION
Status: DISCONTINUED | OUTPATIENT
Start: 2023-06-29 | End: 2023-07-05 | Stop reason: HOSPADM

## 2023-06-29 RX ORDER — PHENYLEPHRINE HYDROCHLORIDE 10 MG/ML
INJECTION INTRAVENOUS
Status: DISCONTINUED | OUTPATIENT
Start: 2023-06-29 | End: 2023-06-29

## 2023-06-29 RX ORDER — POTASSIUM CHLORIDE 20 MEQ/1
40 TABLET, EXTENDED RELEASE ORAL ONCE
Status: COMPLETED | OUTPATIENT
Start: 2023-06-29 | End: 2023-06-29

## 2023-06-29 RX ORDER — FUROSEMIDE 10 MG/ML
40 INJECTION INTRAMUSCULAR; INTRAVENOUS ONCE
Status: DISCONTINUED | OUTPATIENT
Start: 2023-06-29 | End: 2023-06-29

## 2023-06-29 RX ORDER — ETOMIDATE 2 MG/ML
INJECTION INTRAVENOUS
Status: DISCONTINUED | OUTPATIENT
Start: 2023-06-29 | End: 2023-06-29

## 2023-06-29 RX ORDER — FENTANYL CITRATE 50 UG/ML
INJECTION, SOLUTION INTRAMUSCULAR; INTRAVENOUS
Status: DISCONTINUED | OUTPATIENT
Start: 2023-06-29 | End: 2023-06-29

## 2023-06-29 RX ORDER — LIDOCAINE HYDROCHLORIDE 20 MG/ML
SOLUTION OROPHARYNGEAL
Status: DISCONTINUED | OUTPATIENT
Start: 2023-06-29 | End: 2023-06-29

## 2023-06-29 RX ADMIN — SODIUM CHLORIDE: 9 INJECTION, SOLUTION INTRAVENOUS at 01:06

## 2023-06-29 RX ADMIN — PROPOFOL 30 MG: 10 INJECTION, EMULSION INTRAVENOUS at 01:06

## 2023-06-29 RX ADMIN — LIDOCAINE HYDROCHLORIDE 15 ML: 20 SOLUTION ORAL at 01:06

## 2023-06-29 RX ADMIN — LIDOCAINE HYDROCHLORIDE 80 MG: 20 INJECTION INTRAVENOUS at 01:06

## 2023-06-29 RX ADMIN — POTASSIUM CHLORIDE 40 MEQ: 1500 TABLET, EXTENDED RELEASE ORAL at 10:06

## 2023-06-29 RX ADMIN — PHENYLEPHRINE HYDROCHLORIDE 100 MCG: 10 INJECTION INTRAVENOUS at 01:06

## 2023-06-29 RX ADMIN — MAGNESIUM SULFATE 2 G: 2 INJECTION INTRAVENOUS at 10:06

## 2023-06-29 RX ADMIN — FENTANYL CITRATE 25 MCG: 0.05 INJECTION, SOLUTION INTRAMUSCULAR; INTRAVENOUS at 01:06

## 2023-06-29 RX ADMIN — DOFETILIDE 500 MCG: 250 CAPSULE ORAL at 08:06

## 2023-06-29 RX ADMIN — RIVAROXABAN 20 MG: 20 TABLET, FILM COATED ORAL at 05:06

## 2023-06-29 RX ADMIN — VASOPRESSIN 2 UNITS: 20 INJECTION INTRAVENOUS at 01:06

## 2023-06-29 RX ADMIN — Medication 125 MCG/KG/MIN: at 01:06

## 2023-06-29 RX ADMIN — ETOMIDATE 4 MG: 2 INJECTION INTRAVENOUS at 01:06

## 2023-06-29 RX ADMIN — FUROSEMIDE 80 MG: 10 INJECTION, SOLUTION INTRAMUSCULAR; INTRAVENOUS at 03:06

## 2023-06-29 RX ADMIN — METOPROLOL SUCCINATE 50 MG: 50 TABLET, EXTENDED RELEASE ORAL at 08:06

## 2023-06-29 NOTE — PROGRESS NOTES
[facility] - Cardiology [facility] Medicine  Progress Note    Patient Name: Yaneli Steele  MRN: [id number]  Patient Class: IP- Inpatient   Admission Date: 6/28/2023  Length of Stay: 1 days  Attending Physician: Serena Avilez MD  Primary Care Provider: Jannet Le MD        Subjective:     Principal Problem:Atrial flutter with rapid ventricular response        HPI:  67yoF with combined heart failure (recovered EF), pAF/AFL s/p DCCV 9/22, HTN, presenting with elevated heart rate associated with mild nausea & fatigue.  Patient was seen at her regularly scheduled EP appointment this morning.  She was noted to have a heart rate of 160 with a wide complex tachycardia and sent to ED.  Denies chest pain or shortness of breath.  Recent hospitalization for AF RVR managed medically.     In ED, 's and gave IV adenosine with no effect. Improved with IV metoprolol and rhythm noted to be atypical atrial flutter.  BP lowered to 85/56 in ED, now improved to 115 systolic. BNP 1511 , lactate 3.3, TBili 2.7, AST 77, ALT 75. She also received IV Lasix 80 mg.       Overview/Hospital Course:  Cardiology consulted- patient underwent DCCV/JUNIOR 06/29. Started on Tikosyn.       Interval History: Patient was seen and evaluated this am. No complaints. No nausea, vomiting, fevers, chills, night sweats, abd pain, diarrhea, constipation.    Objective:     Vital Signs (Most Recent):  Temp: 97.4 °F (36.3 °C) (06/29/23 0818)  Pulse: 91 (06/29/23 1056)  Resp: 20 (06/29/23 0818)  BP: 133/81 (06/29/23 0818)  SpO2: 95 % (06/29/23 0818) Vital Signs (24h Range):  Temp:  [97.4 °F (36.3 °C)-98.4 °F (36.9 °C)] 97.4 °F (36.3 °C)  Pulse:  [] 91  Resp:  [16-27] 20  SpO2:  [94 %-99 %] 95 %  BP: ()/(67-90) 133/81     Weight: 99.6 kg (219 lb 9.3 oz)  Body mass index is 34.39 kg/m².    Intake/Output Summary (Last 24 hours) at 6/29/2023 1200  Last data filed at 6/29/2023 0652  Gross per 24 hour   Intake 118 ml   Output 2600 ml    Net -2482 ml      Physical Exam  Gen: in NAD, appears stated age  Neuro: AAOx3, motor, sensory, and strength grossly intact BL  HEENT: NTNC, EOMI, PERRL, MMM  CVS: irregularly irregular rhythm, reg rate, no m/r/g; S1/S2 auscultated with no S3 or S4; capillary refill < 2 sec  Resp: lungs CTAB, no w/r/r; no belabored breathing or accessory muscle use appreciated   Abd: BS+ in all 4 quadrants; NTND, soft to palpation; no organomegaly appreciated   Extrem: pulses full, equal, and regular over all 4 extremities; no UE edema, BL LE edema    Significant Labs: All pertinent labs within the past 24 hours have been reviewed.  CBC:   Recent Labs   Lab 06/28/23  0846 06/29/23  0233   WBC 6.84 6.19   HGB 17.6* 16.1*   HCT 57.4* 51.5*   * 124*     CMP:   Recent Labs   Lab 06/28/23  1059 06/29/23  0233    142   K 4.8 3.6    104   CO2 24 26   * 137*   BUN 21 19   CREATININE 1.3 1.0   CALCIUM 9.2 8.9   PROT 6.0  --    ALBUMIN 2.9*  --    BILITOT 2.7*  --    ALKPHOS 257*  --    AST 77*  --    ALT 75*  --    ANIONGAP 10 12     Troponin:   Recent Labs   Lab 06/28/23  1059   TROPONINI 0.009       Significant Imaging: I have reviewed all pertinent imaging results/findings within the past 24 hours.      Assessment/Plan:      * Atrial flutter with rapid ventricular response  - EP consulted and following- continue home Toprol 50 BID  - hold home Flecainide 100 BID  - continue home Xarelto  - JUNIOR/DCCV today- afterwards, will begin tikosyn protocol- will obtain 6 doses total- ECG will need to be obtained 2 hrs post dose- will monitor QRS and QTc closely   - Tele       Pancreatic mass  - US of abd w/indeterminate 1.3cm hypoechoic focus in or adjacent to pancreatic head/body- pancreatic mass protocol- MRI vs. CT w/contrast recommended.   - will obtain CT vs. MRI w/contrast- pancreatic protocol- will order for tomorrow      Abnormal LFTs (liver function tests)  - Elevated TBili, AST/ALT/ALP  - Concern for elevation  2/2 congestive hepatopathy  - RUQ US obtained w/no findings of cholecystitis or ductal dilation; however, indeterminate 1.3cm hypoechoic focus in or adjacent to pancreatic head/body- pancreatic mass protocol- MRI vs. CT w/contrast recommended.   - Repeat LFTs tomorrow am  - will obtain CT vs. MRI w/contrast- pancreatic protocol      Acute on chronic combined systolic and diastolic congestive heart failure  Patient is identified as having Combined Systolic and Diastolic heart failure that is Acute on chronic. CHF is currently uncontrolled due to Continued edema of extremities. Latest ECHO performed and demonstrates- Results for orders placed during the hospital encounter of 02/07/23    Echo    Interpretation Summary  · The left ventricle is normal in size with normal systolic function.  · The estimated ejection fraction is 55%.  · Indeterminate left ventricular diastolic function.  · Normal right ventricular size with normal right ventricular systolic function.  · Moderate left atrial enlargement.  · Mild right atrial enlargement.  · Mild-to-moderate mitral regurgitation.  · Normal central venous pressure (3 mmHg).  · The estimated PA systolic pressure is 31 mmHg.  . Continue Beta Blocker and Furosemide and monitor clinical status closely. Monitor on telemetry. Patient is on CHF pathway.  Monitor strict Is&Os and daily weights.  Place on fluid restriction of 1.5 L. Cardiology has been consulted. Continue to stress to patient importance of self efficacy and  on diet for CHF. Last BNP reviewed- and noted below   Recent Labs   Lab 06/28/23  0846   BNP 1,511*     - Repeat echo with reduced EF 25%, CVP 8 , mod-severe TR, severe MR  - Received 120mg IV lasix yesterday  - Will administer additional dose of 80mg IV lasix today     Essential (primary) hypertension  - BP in 80s in ED, now 110's and controlled  - holding Entresto for now  - continue home Toprol      VTE Risk Mitigation (From admission, onward)          Ordered     rivaroxaban tablet 20 mg  With dinner         06/28/23 1332                Discharge Planning   DREW: 7/2/2023     Code Status: Full Code   Is the patient medically ready for discharge?: No    Reason for patient still in hospital (select all that apply): Patient trending condition                       Serena Avilez MD  Department of Hospital Medicine   [facility] - Cardiology Stepdown

## 2023-06-29 NOTE — ASSESSMENT & PLAN NOTE
- US of abd w/indeterminate 1.3cm hypoechoic focus in or adjacent to pancreatic head/body- pancreatic mass protocol- MRI vs. CT w/contrast recommended.   - will obtain CT vs. MRI w/contrast- pancreatic protocol- will order for tomorrow

## 2023-06-29 NOTE — TRANSFER OF CARE
"Anesthesia Transfer of Care Note    Patient: Yaneli Steele    Procedure(s) Performed: Procedure(s) (LRB):  Cardioversion or Defibrillation (N/A)  ECHOCARDIOGRAM, TRANSESOPHAGEAL (N/A)    Patient location: PACU    Anesthesia Type: general    Transport from OR: Transported from OR on room air with adequate spontaneous ventilation    Post pain: adequate analgesia    Post assessment: no apparent anesthetic complications and tolerated procedure well    Post vital signs: stable    Level of consciousness: awake and alert    Nausea/Vomiting: no nausea/vomiting    Complications: none    Transfer of care protocol was followed      Last vitals:   Visit Vitals  /81   Pulse 97   Temp 36.9 °C (98.4 °F) (Oral)   Resp 18   Ht 5' 7" (1.702 m)   Wt 99.3 kg (219 lb)   SpO2 100%   Breastfeeding No   BMI 34.30 kg/m²     "

## 2023-06-29 NOTE — CARE UPDATE
Post DCCV NSR, EKG seen by Dr Humphrey and Qtc manually calculated and is at 429  Will start tikosyn load, give 1st dose 8 pm tonight  EKG 2-3 h post tikosyn

## 2023-06-29 NOTE — NURSING TRANSFER
Nursing Transfer Note      6/29/2023     Reason patient is being transferred: back to inpatient room 300    Transfer From: cath [facility] bay 9    Transfer via bed    Transfer with cardiac monitoring    Transported by Wan Engel RN    Telemetry: Box Number [id number], Rate 63, and Rhythm NSR    Medicines sent: silvadene cream    Any special needs or follow-up needed: vs upon return    Chart send with patient: Yes    Notified: report called to Tricia Mcelroy RN    Patient reassessed at: 1500 6/29/2023  1  Upon arrival to floor: patient oriented to room, call bell in reach, and bed in lowest position

## 2023-06-29 NOTE — HOSPITAL COURSE
"Cardiology consulted- patient underwent DCCV/JUNIOR 06/29. JUNIOR with the following findings:  Summary    JUNIOR to evaluate for left atrial appendage thrombus prior to cardioversion for atrial fibrillation.  Moderately decreased systolic function. The estimated ejection fraction is 35%.  Normal right ventricular size with normal right ventricular systolic function.  Normal appearing left atrial appendage. No thrombus is present in the appendage. Abnormal appendage velocities.  Moderate tricuspid regurgitation.  Mild-to-moderate mitral regurgitation    Started on Tikosyn. Prolonged Qtc 572- patient had received first tikosyn dose PM of 06/29. She had dizziness- hypotension- given back 250cc NS 06/30- elevated lactate 3.1. Repeat lactate improved to normal. MRI pancreas protocol obtained due to hypoechoic focus at pancreatic head per US- MRI w/the following results:    1. No identifiable pancreatic mass or ductal dilatation.  Recommend further evaluation of sonographic abnormality with pancreas protocol CT.  2. Cholelithiasis with mural calcification.  No signs of acute cholecystitis.    T bili downtrended from 3 to 2.2. She was also started on amiodarone as tikosyn was stopped- started amiodarone 200mg BID for load. She received 2 doses of amiodarone, but stated that she was feeling "anxious and jittery." She refused further amio dose- it was explained to her that this was the only anti-arrhythmic available with per prolonged Qtc. She continued to decline. Offered anti-anxiety meds- patient declined. Xarelto was also briefly transitioned to eliquis with amiodarone and decreased CrCl. Patient requested to resume xarelto with not taking amiodarone, and so she was transitioned back. She was noted to have declining H:H- but no source of blood loss. WBC and plt noted to be downtrending. Given dose of lasix with concern for fluid-overload. H:H, WBC and plt seemed to improve with diuresis. Likely dilutional in nature.     Patient " had episode of SVT vs. Afib/flutter with RVR- -209 max. Given IV adenosine 6mg, then 12mg-  no response. Given IV metoprolol 5mg x1- improvement in HR to 110s. Transitioned to metoprolol 25mg q6h. EP performed ablation 07/05/23.

## 2023-06-29 NOTE — ASSESSMENT & PLAN NOTE
Patient is identified as having Combined Systolic and Diastolic heart failure that is Acute on chronic. CHF is currently uncontrolled due to Continued edema of extremities. Latest ECHO performed and demonstrates- Results for orders placed during the hospital encounter of 02/07/23    Echo    Interpretation Summary  · The left ventricle is normal in size with normal systolic function.  · The estimated ejection fraction is 55%.  · Indeterminate left ventricular diastolic function.  · Normal right ventricular size with normal right ventricular systolic function.  · Moderate left atrial enlargement.  · Mild right atrial enlargement.  · Mild-to-moderate mitral regurgitation.  · Normal central venous pressure (3 mmHg).  · The estimated PA systolic pressure is 31 mmHg.  . Continue Beta Blocker and Furosemide and monitor clinical status closely. Monitor on telemetry. Patient is on CHF pathway.  Monitor strict Is&Os and daily weights.  Place on fluid restriction of 1.5 L. Cardiology has been consulted. Continue to stress to patient importance of self efficacy and  on diet for CHF. Last BNP reviewed- and noted below   Recent Labs   Lab 06/28/23  0846   BNP 1,511*     - Repeat echo with reduced EF 25%, CVP 8 , mod-severe TR, severe MR  - Received 120mg IV lasix yesterday  - Will administer additional dose of 80mg IV lasix today

## 2023-06-29 NOTE — ASSESSMENT & PLAN NOTE
- Elevated TBili, AST/ALT/ALP  - Concern for elevation 2/2 congestive hepatopathy  - RUQ US obtained w/no findings of cholecystitis or ductal dilation; however, indeterminate 1.3cm hypoechoic focus in or adjacent to pancreatic head/body- pancreatic mass protocol- MRI vs. CT w/contrast recommended.   - Repeat LFTs tomorrow am  - will obtain CT vs. MRI w/contrast- pancreatic protocol

## 2023-06-29 NOTE — PLAN OF CARE
Patient received to cath recovery bay 9 s/p cardioversion at 1415. . Bedside report received from Billy Sheldon CRNA and Nathalie RN. Patient is awake and alert. Vss. No distress noted. No complaints. Patient abl;e to move all extremities. Breathing is even and unlabored.  EKG completed. Will monitor.

## 2023-06-29 NOTE — ASSESSMENT & PLAN NOTE
- EP consulted and following- continue home Toprol 50 BID  - hold home Flecainide 100 BID  - continue home Xarelto  - JUNIOR/DCCV today- afterwards, will begin tikosyn protocol- will obtain 6 doses total- ECG will need to be obtained 2 hrs post dose- will monitor QRS and QTc closely   - Tele

## 2023-06-29 NOTE — PLAN OF CARE
[facility] - Cardiology Stepdown  Initial Discharge Assessment       Primary Care Provider: Jannet Le MD    Admission Diagnosis: Atrial flutter [I48.92]  A-fib [I48.91]  Tachycardia [R00.0]  Atypical atrial flutter [I48.4]  Chest pain [R07.9]    Admission Date: 6/28/2023  Expected Discharge Date: 7/2/2023    Transition of Care Barriers: None    Payor: BLUE CROSS BLUE SHIELD / Plan: BCBS ALL OUT OF STATE / Product Type: PPO /     Extended Emergency Contact Information  [facility]  Mobile Phone: [phone]  Relation: Son  Secondary Emergency Contact: Yamile Steele  Mobile Phone: [phone]  Relation: Son    Discharge Plan A: Home  Discharge Plan B: Home with family      [facility] [address]  [address]  [facility] [address]  Phone: [phone] Fax: [phone]    [facility] [place]ESE  [address] [place] [address]  [address] [facility]  [place] [address]  Phone: [phone] Fax: [phone]      Initial Assessment (most recent)       Adult Discharge Assessment - 06/29/23 1223          Discharge Assessment    Assessment Type Discharge Planning Assessment     Confirmed/corrected address, phone number and insurance Yes     Confirmed Demographics Correct on Facesheet     Source of Information patient;family     Communicated DREW with patient/caregiver Date not available/Unable to determine     Reason For Admission atrial flutter with RVR     People in Home alone     Facility Arrived From: cardiology appointment     Do you expect to return to your current living situation? Yes     Do you have help at home or someone to help you manage your care at home? Yes     Who are your caregiver(s) and their phone number(s)? Lit [place] (son) [phone] or Walter Steele (son) [phone]     Prior to hospitilization cognitive status: Alert/Oriented     Current cognitive status: Alert/Oriented     Equipment Currently  Used at Home none     Readmission within 30 days? Yes     Patient currently being followed by outpatient case management? No     Do you currently have service(s) that help you manage your care at home? No     Do you take prescription medications? Yes     Do you have prescription coverage? Yes     Coverage BCBS and PHN     Do you have any problems affording any of your prescribed medications? No     Is the patient taking medications as prescribed? yes     Who is going to help you get home at discharge? Irmancbev Steele (son) [phone] or Walter Steele (son) [phone]     How do you get to doctors appointments? car, drives self;family or friend will provide     Are you on dialysis? No     Do you take coumadin? No     Discharge Plan A Home     Discharge Plan B Home with family     DME Needed Upon Discharge  none     Discharge Plan discussed with: Patient;Adult children     Transition of Care Barriers None        Physical Activity    On average, how many days per week do you engage in moderate to strenuous exercise (like a brisk walk)? 3 days     On average, how many minutes do you engage in exercise at this level? 30 min        Financial Resource Strain    How hard is it for you to pay for the very basics like food, housing, medical care, and heating? Not hard at all        Housing Stability    In the last 12 months, was there a time when you were not able to pay the mortgage or rent on time? No     In the last 12 months, how many places have you lived? 1     In the last 12 months, was there a time when you did not have a steady place to sleep or slept in a shelter (including now)? No        Transportation Needs    In the past 12 months, has lack of transportation kept you from medical appointments or from getting medications? No     In the past 12 months, has lack of transportation kept you from meetings, work, or from getting things needed for daily living? No        Food Insecurity    Within the past  12 months, you worried that your food would run out before you got the money to buy more. Never true     Within the past 12 months, the food you bought just didn't last and you didn't have money to get more. Never true        Stress    Do you feel stress - tense, restless, nervous, or anxious, or unable to sleep at night because your mind is troubled all the time - these days? Not at all        Social Connections    In a typical week, how many times do you talk on the phone with family, friends, or neighbors? More than three times a week     How often do you get together with friends or relatives? More than three times a week     How often do you attend Restorationism or Congregation services? More than 4 times per year     Do you belong to any clubs or organizations such as Restorationism groups, unions, fraternal or athletic groups, or school groups? Yes     How often do you attend meetings of the clubs or organizations you belong to? More than 4 times per year     Are you , , , , never , or living with a partner? Never         Alcohol Use    Q1: How often do you have a drink containing alcohol? Never     Q2: How many drinks containing alcohol do you have on a typical day when you are drinking? Patient does not drink     Q3: How often do you have six or more drinks on one occasion? Never                     Readmission Assessment (most recent)       Readmission Assessment - 06/29/23 1229          Readmission    Why were you hospitalized in the last 30 days? aflutter with RVR     Why were you readmitted? Told by provider to go to hospital   went to cardiology clinic and they told her to come to the ER    When you left the hospital where did you go? Home Alone     Did patient/caregiver refused recommended DC plan? No     Tell me about what happened between when you left the hospital and the day you returned. son noticed her having problems with fatigue and breathing and slight cough without  exertion.     When did you start not feeling well? day of her appointment.     Did you try to manage your symptoms your self? No     Did you call anyone? No     Why? went to clinic appointment     Did you try to see or did see a doctor or nurse before you came? Yes     Were you seen? Yes     Did you have  a follow-up appointment on discharge? Yes     Did you go? Yes     Was this a planned readmission? No                    CM met with patient and son Lit Steele (son) [phone] at the bedside . Discussed the discharge process with them. Gave them the discharge booklet and our contact numbers . Numbers placed on the white board as well. Patient had no questions or concerns. She lives in a single story house with one step to port of entry. She lives alone but sees her sons daily she is independent with ADL,s and drives self to appointments. If needed , her sons can assist her. She has no DME. She is not on coumadin and is not dialysis. Will continue to monitor for discharge needs.     Daniella Enrique RN    [phone]

## 2023-06-29 NOTE — CARE UPDATE
Post PPM implantation instructions:    Doxycycline 100 mg BID x 5 days  Avoid all heparin products (e.g., DOACs, enoxaparin, heparin) for 5 days following implant. If the patient is taking coumadin, this can be continued uninterrupted  CXR & ECG post PPM (ordered)  Aquacel dressing (brown patch attached to patient's skin) to remain in place for 1 week. This is be removed by the device clinic in 1 week. Pressure dressing (white tape over chest/back) to be removed by EP tomorrow morning (if hospitalized) or by patient (if discharged home same day). Patient can shower after pressure dressing is removed  Sling to arm for first 48 hours continuously, then only nightly for 6 weeks  No lifting elbow above shoulder height on arm ipsilateral to implant device for 6 weeks  No lifting over 5 lbs. for 2 weeks with arm ipsilateral to implanted device  No driving for 1 week if patient uses arm contralateral to implantation and 4 weeks if patient uses arm ipsilateral to implantation  Patient can shower the day after procedure (POD #1) -- see #4 for details. Do not submerge (e.g., bathing or swimming) the surgical site for at least 1 month or when the wound has fully healed.  Patient will be scheduled for device clinic follow up in 1 week to assess surgical site and device interrogation  Please contact EP for any questions or concerns at [phone] or page EP fellow on call  Patient is to seek immediate medical attention for acute onset of chest pain, shortness of breath, syncope, or evidence of surgical site infection or hematoma.

## 2023-06-29 NOTE — PROGRESS NOTES
[facility] - Cardiology Stepdown  Cardiac Electrophysiology  Progress Note    Admission Date: 6/28/2023  Code Status: Full Code   Attending Physician: Serena Avilez MD   Expected Discharge Date: 7/2/2023  Principal Problem:Atrial flutter with rapid ventricular response    Subjective:   -doing well subjectively, in flutter at V rate 95 bpm  -JUNIOR/DCCV today and then start tikosyn  -EKG 2 hours after each tikosyn days for total of 6 doses   Past Medical History:   Diagnosis Date    Chronic systolic heart failure     Essential (primary) hypertension     Paroxysmal atrial fibrillation        Past Surgical History:   Procedure Laterality Date    ANGIOGRAM, CORONARY, WITH LEFT HEART CATHETERIZATION         Review of patient's allergies indicates:   Allergen Reactions    Amiodarone analogues Shortness Of Breath    Penicillins Shortness Of Breath       No current facility-administered medications on file prior to encounter.     Current Outpatient Medications on File Prior to Encounter   Medication Sig    flecainide (TAMBOCOR) 100 MG Tab Take 1 tablet (100 mg total) by mouth every 12 (twelve) hours.    metoprolol succinate (TOPROL-XL) 50 MG 24 hr tablet Take 1 tablet (50 mg total) by mouth 2 (two) times daily.    rivaroxaban (XARELTO) 20 mg Tab Take 1 tablet (20 mg total) by mouth daily with dinner or evening meal.    sacubitriL-valsartan (ENTRESTO) 49-51 mg per tablet Take 1 tablet by mouth 2 (two) times daily.     Family History       Problem Relation (Age of Onset)    No Known Problems Mother, Father          Tobacco Use    Smoking status: Former    Smokeless tobacco: Former    Tobacco comments:     Pt states she quit more than 30 years ago   Substance and Sexual Activity    Alcohol use: No    Drug use: No    Sexual activity: Not Currently     Review of Systems   Constitutional: Positive for malaise/fatigue.   All other systems reviewed and are negative.  Objective:     Vital Signs (Most Recent):  Temp:  97.4 °F (36.3 °C) (06/29/23 0818)  Pulse: 80 (06/29/23 0818)  Resp: 20 (06/29/23 0818)  BP: 133/81 (06/29/23 0818)  SpO2: 95 % (06/29/23 0818) Vital Signs (24h Range):  Temp:  [97.4 °F (36.3 °C)-98.4 °F (36.9 °C)] 97.4 °F (36.3 °C)  Pulse:  [] 80  Resp:  [13-27] 20  SpO2:  [93 %-99 %] 95 %  BP: ()/(63-90) 133/81       Weight: 99.6 kg (219 lb 9.3 oz)  Body mass index is 34.39 kg/m².    SpO2: 95 %        Physical Exam  Constitutional:       Appearance: Normal appearance. She is obese.   HENT:      Head: Normocephalic.      Mouth/Throat:      Mouth: Mucous membranes are moist.   Eyes:      Extraocular Movements: Extraocular movements intact.   Neck:      Comments: JVD to mid neck at 45 degrees  Cardiovascular:      Rate and Rhythm: Regular rhythm. Tachycardia present.      Pulses: Normal pulses.   Pulmonary:      Effort: Pulmonary effort is normal. No respiratory distress.   Abdominal:      Palpations: Abdomen is soft.   Musculoskeletal:      Cervical back: Neck supple.      Right lower leg: Edema present.      Left lower leg: Edema present.   Skin:     General: Skin is warm.   Neurological:      Mental Status: She is alert and oriented to person, place, and time.          Significant Labs: BMP:   Recent Labs   Lab 06/28/23  1059 06/29/23  0233   * 137*    142   K 4.8 3.6    104   CO2 24 26   BUN 21 19   CREATININE 1.3 1.0   CALCIUM 9.2 8.9   MG 1.9 1.7   , CMP:   Recent Labs   Lab 06/28/23  1059 06/29/23  0233    142   K 4.8 3.6    104   CO2 24 26   * 137*   BUN 21 19   CREATININE 1.3 1.0   CALCIUM 9.2 8.9   PROT 6.0  --    ALBUMIN 2.9*  --    BILITOT 2.7*  --    ALKPHOS 257*  --    AST 77*  --    ALT 75*  --    ANIONGAP 10 12     , CBC:   Recent Labs   Lab 06/28/23  0846 06/29/23  0233   WBC 6.84 6.19   HGB 17.6* 16.1*   HCT 57.4* 51.5*   * 124*   , INR: No results for input(s): INR, PROTIME in the last 48 hours., Lipid Panel No results for input(s): CHOL, HDL,  LDLCALC, TRIG, CHOLHDL in the last 48 hours., and Troponin   Recent Labs   Lab 06/28/23  1059   TROPONINI 0.009       Significant Imaging:   TTE 2/7/23   The left ventricle is normal in size with normal systolic function.   The estimated ejection fraction is 55%.   Indeterminate left ventricular diastolic function.   Normal right ventricular size with normal right ventricular systolic function.   Moderate left atrial enlargement.   Mild right atrial enlargement.   Mild-to-moderate mitral regurgitation.   Normal central venous pressure (3 mmHg).   The estimated PA systolic pressure is 31 mmHg.    Assessment and Plan:     * Atrial flutter with rapid ventricular response  Acute Heart Failure (likely combined systolic and diastolic)  Tachycardia-mediated Cardiomyopathy  Persistent Atrial Fibrillation  Atypical Atrial Flutter  EF previously 60% on TTE 2/23. Had JUNIOR/DCCV 9/22. Currently on Toprol 50 mg BID and Flecainide 100 mg BID at home. On Eliquis for AF/AFL ([id number]). Presenting in regular WCT 6/26 in clinic concerning for AFL. Hemodynamically stable on room air. No effect from Adenosine 12 mg x1 and 18 mg x1. HR slowed to 100s after IV Metoprolol 5 mg x1 and revealed atypical atrial flutter with variable conduction. Initial rhythm consistent with atypical atrial flutter with RVR and aberrancy.      Plan  -JUNIOR/DCCV today and then start tikosyn  -EKG 2 hours after each tikosyn dose for total of 6 doses  - Continue home Toprol 50 mg BID, might have to stop it once in sinus  - stopped flecainide upon admission  - Continue Xarelto 20 mg daily   -Volume status/diuresis per primary team    Enrique Velasco MD  Cardiac Electrophysiology  [facility] - Cardiology Stepdown

## 2023-06-29 NOTE — SUBJECTIVE & OBJECTIVE
Past Medical History:   Diagnosis Date    Chronic systolic heart failure     Essential (primary) hypertension     Paroxysmal atrial fibrillation        Past Surgical History:   Procedure Laterality Date    ANGIOGRAM, CORONARY, WITH LEFT HEART CATHETERIZATION         Review of patient's allergies indicates:   Allergen Reactions    Amiodarone analogues Shortness Of Breath    Penicillins Shortness Of Breath       No current facility-administered medications on file prior to encounter.     Current Outpatient Medications on File Prior to Encounter   Medication Sig    flecainide (TAMBOCOR) 100 MG Tab Take 1 tablet (100 mg total) by mouth every 12 (twelve) hours.    metoprolol succinate (TOPROL-XL) 50 MG 24 hr tablet Take 1 tablet (50 mg total) by mouth 2 (two) times daily.    rivaroxaban (XARELTO) 20 mg Tab Take 1 tablet (20 mg total) by mouth daily with dinner or evening meal.    sacubitriL-valsartan (ENTRESTO) 49-51 mg per tablet Take 1 tablet by mouth 2 (two) times daily.     Family History       Problem Relation (Age of Onset)    No Known Problems Mother, Father          Tobacco Use    Smoking status: Former    Smokeless tobacco: Former    Tobacco comments:     Pt states she quit more than 30 years ago   Substance and Sexual Activity    Alcohol use: No    Drug use: No    Sexual activity: Not Currently     Review of Systems   Constitutional: Positive for malaise/fatigue.   All other systems reviewed and are negative.  Objective:     Vital Signs (Most Recent):  Temp: 97.4 °F (36.3 °C) (06/29/23 0818)  Pulse: 80 (06/29/23 0818)  Resp: 20 (06/29/23 0818)  BP: 133/81 (06/29/23 0818)  SpO2: 95 % (06/29/23 0818) Vital Signs (24h Range):  Temp:  [97.4 °F (36.3 °C)-98.4 °F (36.9 °C)] 97.4 °F (36.3 °C)  Pulse:  [] 80  Resp:  [13-27] 20  SpO2:  [93 %-99 %] 95 %  BP: ()/(63-90) 133/81       Weight: 99.6 kg (219 lb 9.3 oz)  Body mass index is 34.39 kg/m².    SpO2: 95 %        Physical Exam  Constitutional:        Appearance: Normal appearance. She is obese.   HENT:      Head: Normocephalic.      Mouth/Throat:      Mouth: Mucous membranes are moist.   Eyes:      Extraocular Movements: Extraocular movements intact.   Neck:      Comments: JVD to mid neck at 45 degrees  Cardiovascular:      Rate and Rhythm: Regular rhythm. Tachycardia present.      Pulses: Normal pulses.   Pulmonary:      Effort: Pulmonary effort is normal. No respiratory distress.   Abdominal:      Palpations: Abdomen is soft.   Musculoskeletal:      Cervical back: Neck supple.      Right lower leg: Edema present.      Left lower leg: Edema present.   Skin:     General: Skin is warm.   Neurological:      Mental Status: She is alert and oriented to person, place, and time.          Significant Labs: BMP:   Recent Labs   Lab 06/28/23  1059 06/29/23  0233   * 137*    142   K 4.8 3.6    104   CO2 24 26   BUN 21 19   CREATININE 1.3 1.0   CALCIUM 9.2 8.9   MG 1.9 1.7   , CMP:   Recent Labs   Lab 06/28/23  1059 06/29/23  0233    142   K 4.8 3.6    104   CO2 24 26   * 137*   BUN 21 19   CREATININE 1.3 1.0   CALCIUM 9.2 8.9   PROT 6.0  --    ALBUMIN 2.9*  --    BILITOT 2.7*  --    ALKPHOS 257*  --    AST 77*  --    ALT 75*  --    ANIONGAP 10 12     , CBC:   Recent Labs   Lab 06/28/23  0846 06/29/23  0233   WBC 6.84 6.19   HGB 17.6* 16.1*   HCT 57.4* 51.5*   * 124*   , INR: No results for input(s): INR, PROTIME in the last 48 hours., Lipid Panel No results for input(s): CHOL, HDL, LDLCALC, TRIG, CHOLHDL in the last 48 hours., and Troponin   Recent Labs   Lab 06/28/23  1059   TROPONINI 0.009       Significant Imaging:   TTE 2/7/23  The left ventricle is normal in size with normal systolic function.  The estimated ejection fraction is 55%.  Indeterminate left ventricular diastolic function.  Normal right ventricular size with normal right ventricular systolic function.  Moderate left atrial enlargement.  Mild right atrial  enlargement.  Mild-to-moderate mitral regurgitation.  Normal central venous pressure (3 mmHg).  The estimated PA systolic pressure is 31 mmHg.

## 2023-06-29 NOTE — SUBJECTIVE & OBJECTIVE
Interval History: Patient was seen and evaluated this am. No complaints. No nausea, vomiting, fevers, chills, night sweats, abd pain, diarrhea, constipation.    Objective:     Vital Signs (Most Recent):  Temp: 97.4 °F (36.3 °C) (06/29/23 0818)  Pulse: 91 (06/29/23 1056)  Resp: 20 (06/29/23 0818)  BP: 133/81 (06/29/23 0818)  SpO2: 95 % (06/29/23 0818) Vital Signs (24h Range):  Temp:  [97.4 °F (36.3 °C)-98.4 °F (36.9 °C)] 97.4 °F (36.3 °C)  Pulse:  [] 91  Resp:  [16-27] 20  SpO2:  [94 %-99 %] 95 %  BP: ()/(67-90) 133/81     Weight: 99.6 kg (219 lb 9.3 oz)  Body mass index is 34.39 kg/m².    Intake/Output Summary (Last 24 hours) at 6/29/2023 1200  Last data filed at 6/29/2023 0652  Gross per 24 hour   Intake 118 ml   Output 2600 ml   Net -2482 ml      Physical Exam  Gen: in NAD, appears stated age  Neuro: AAOx3, motor, sensory, and strength grossly intact BL  HEENT: NTNC, EOMI, PERRL, MMM  CVS: irregularly irregular rhythm, reg rate, no m/r/g; S1/S2 auscultated with no S3 or S4; capillary refill < 2 sec  Resp: lungs CTAB, no w/r/r; no belabored breathing or accessory muscle use appreciated   Abd: BS+ in all 4 quadrants; NTND, soft to palpation; no organomegaly appreciated   Extrem: pulses full, equal, and regular over all 4 extremities; no UE edema, BL LE edema    Significant Labs: All pertinent labs within the past 24 hours have been reviewed.  CBC:   Recent Labs   Lab 06/28/23  0846 06/29/23  0233   WBC 6.84 6.19   HGB 17.6* 16.1*   HCT 57.4* 51.5*   * 124*     CMP:   Recent Labs   Lab 06/28/23  1059 06/29/23  0233    142   K 4.8 3.6    104   CO2 24 26   * 137*   BUN 21 19   CREATININE 1.3 1.0   CALCIUM 9.2 8.9   PROT 6.0  --    ALBUMIN 2.9*  --    BILITOT 2.7*  --    ALKPHOS 257*  --    AST 77*  --    ALT 75*  --    ANIONGAP 10 12     Troponin:   Recent Labs   Lab 06/28/23  1059   TROPONINI 0.009       Significant Imaging: I have reviewed all pertinent imaging results/findings  within the past 24 hours.

## 2023-06-29 NOTE — ANESTHESIA POSTPROCEDURE EVALUATION
Anesthesia Post Evaluation    Patient: Yaneli Steele    Procedure(s) Performed: Procedure(s) (LRB):  Cardioversion or Defibrillation (N/A)  ECHOCARDIOGRAM, TRANSESOPHAGEAL (N/A)    Final Anesthesia Type: general      Patient location during evaluation: PACU  Patient participation: Yes- Able to Participate  Level of consciousness: awake and alert, awake and oriented  Post-procedure vital signs: reviewed and stable  Pain management: adequate  Airway patency: patent    PONV status at discharge: No PONV  Anesthetic complications: no      Cardiovascular status: blood pressure returned to baseline, hemodynamically stable and stable  Respiratory status: unassisted, spontaneous ventilation and room air  Hydration status: euvolemic  Follow-up not needed.          Vitals Value Taken Time   /72 06/29/23 1502   Temp 36.2 °C (97.2 °F) 06/29/23 1415   Pulse 59 06/29/23 1531   Resp 37 06/29/23 1512   SpO2 98 % 06/29/23 1450   Vitals shown include unvalidated device data.      No case tracking events are documented in the log.      Pain/Brigette Score: Brigette Score: 10 (6/29/2023  3:00 PM)

## 2023-06-29 NOTE — CONSULTS
[facility] [place]  JUNIOR Consult      Yaneli Steele  YOB: 1955  Medical Record Number:  [id number]  Attending Physician:  Serena Avilez MD   Date of Admission: 6/28/2023       Hospital Day:  1  Current Principal Problem:  Atrial flutter with rapid ventricular response      History     Cc: atrial fibrillation     HPI  Yaneli Steele is a 67 year old female with PMH of atrial fibrillation, tachy mediated cardiomyopathy (recovered EF) who [facility] [place] [facility] with wide complex tachycardia which was ultimately found to be atypical atrial flutter. Surface echo performed with once again decreased ejection fraction to 25%. EP recommending cardioversion with JUNIOR.        Anticoagulant/antiplatelets: Rivaroxaban 20 mg daily   ECG: Atrial flutter     Dysphagia or odynophagia:  No   Liver Disease, esophageal disease, or known varices:  No   Upper GI Bleeding: No   Snoring:  No   Sleep Apnea:  No   Prior neck surgery or radiation:  No   History of anesthetic difficulties:  No   Family history of anesthetic difficulties:  No   Last oral intake: Yesterday PM   Able to move neck in all directions: Yes   Platelet count: 124k   INR: N/A    JUNIOR 9/2022:   Severe biventricular dysfxn, LVEF 25%  Global hypokinesis  Biatrial enlargement  No LA/ABBIE thrombus  Normal valves  Mod MR/TR    TTE 6/28/23:   The left ventricle is normal in size with severely decreased systolic function.  The estimated ejection fraction is 25%.  There is left ventricular global hypokinesis.  Normal right ventricular size with mildly reduced right ventricular systolic function.  Severe biatrial enlargement.  Severe mitral regurgitation.  Moderate to severe tricuspid regurgitation.  The estimated PA systolic pressure is 51 mmHg.  There is pulmonary hypertension.  Intermediate central venous pressure (8 mmHg).  Atrial fibrillation observed.    Medications - Outpatient  Prior to Admission medications    Medication Sig Start Date  End Date Taking? Authorizing Provider   flecainide (TAMBOCOR) 100 MG Tab Take 1 tablet (100 mg total) by mouth every 12 (twelve) hours. 9/16/22 9/16/23 Yes Joshua Carrillo MD   metoprolol succinate (TOPROL-XL) 50 MG 24 hr tablet Take 1 tablet (50 mg total) by mouth 2 (two) times daily. 6/6/23 8/5/23 Yes Evi-My SANDY. Engel, DO   rivaroxaban (XARELTO) 20 mg Tab Take 1 tablet (20 mg total) by mouth daily with dinner or evening meal. 1/23/23  Yes Devante Issa MD   sacubitriL-valsartan (ENTRESTO) 49-51 mg per tablet Take 1 tablet by mouth 2 (two) times daily. 6/12/23  Yes Devante Issa MD         Medications - Current  Scheduled Meds:   metoprolol succinate  50 mg Oral BID    rivaroxaban  20 mg Oral Daily with dinner     Continuous Infusions:  PRN Meds:.      Allergies  Review of patient's allergies indicates:   Allergen Reactions    Amiodarone analogues Shortness Of Breath    Penicillins Shortness Of Breath         Past Medical History  Past Medical History:   Diagnosis Date    Chronic systolic heart failure     Essential (primary) hypertension     Paroxysmal atrial fibrillation          Past Surgical History  Past Surgical History:   Procedure Laterality Date    ANGIOGRAM, CORONARY, WITH LEFT HEART CATHETERIZATION           Social History  Social History     Socioeconomic History    Marital status: Single   Tobacco Use    Smoking status: Former    Smokeless tobacco: Former    Tobacco comments:     Pt states she quit more than 30 years ago   Substance and Sexual Activity    Alcohol use: No    Drug use: No    Sexual activity: Not Currently     Social Determinants of Health     Financial Resource Strain: Low Risk     Difficulty of Paying Living Expenses: Not hard at all   Food Insecurity: No Food Insecurity    Worried About Running Out of Food in the Last Year: Never true    Ran Out of Food in the Last Year: Never true   Transportation Needs: No Transportation Needs    Lack of Transportation (Medical): No     Lack of Transportation (Non-Medical): No   Physical Activity: Sufficiently Active    Days of Exercise per Week: 4 days    Minutes of Exercise per Session: 70 min   Stress: No Stress Concern Present    Feeling of Stress : Not at all   Social Connections: Unknown    Frequency of Communication with Friends and Family: More than three times a week    Frequency of Social Gatherings with Friends and Family: More than three times a week    Active Member of Clubs or Organizations: Yes    Attends Club or Organization Meetings: More than 4 times per year    Marital Status: Never    Housing Stability: Low Risk     Unable to Pay for Housing in the Last Year: No    Number of Places Lived in the Last Year: 1    Unstable Housing in the Last Year: No         ROS  10 point ROS performed and negative except as stated in HPI     Physical Examination         Vital Signs  Vitals  Temp: 97.7 °F (36.5 °C)  Temp Source: Oral  Pulse: 88  Heart Rate Source: Monitor  Resp: 18  SpO2: 95 %  Pulse Oximetry Type: Continuous  BP: 93/67  MAP (mmHg): 76  BP Location: Right arm  BP Method: Automatic  Patient Position: Lying          24 Hour VS Range    Temp:  [97.6 °F (36.4 °C)-98.4 °F (36.9 °C)]   Pulse:  []   Resp:  [10-27]   BP: ()/(51-90)   SpO2:  [93 %-99 %]     Intake/Output Summary (Last 24 hours) at 6/29/2023 0715  Last data filed at 6/29/2023 0652  Gross per 24 hour   Intake 118 ml   Output 2600 ml   Net -2482 ml         Physical Exam:   Constitutional: no acute distress  HEENT: NCAT, EOMI, no scleral icterus  Cardiovascular: Irregularly irregular rhythm   Pulmonary: Normal respiratory effort   Abdomen: nontender, non-distended   Neuro: alert and oriented, no focal deficits  Extremities: warm, no edema   MSK: no deformities  Integument: intact, no rashes       Data       Recent Labs   Lab 06/28/23  0846 06/29/23  0233   WBC 6.84 6.19   HGB 17.6* 16.1*   HCT 57.4* 51.5*   * 124*        No results for input(s):  PROTIME, INR in the last 168 hours.     Recent Labs   Lab 06/28/23  1059 06/29/23  0233    142   K 4.8 3.6    104   CO2 24 26   BUN 21 19   CREATININE 1.3 1.0   ANIONGAP 10 12   CALCIUM 9.2 8.9        Recent Labs   Lab 06/28/23  1059   PROT 6.0   ALBUMIN 2.9*   BILITOT 2.7*   ALKPHOS 257*   AST 77*   ALT 75*        Recent Labs   Lab 06/28/23  1059   TROPONINI 0.009        BNP (pg/mL)   Date Value   06/28/2023 1,511 (H)   06/01/2023 745 (H)       Recent Labs   Lab 06/28/23  1832 06/28/23  1836   LABBLOO No Growth to date No Growth to date            Assessment & Plan   67 year old female with PMH of atrial fibrillation, tachy mediated cardiomyopathy (recovered EF) who presented to [facility] with wide complex tachycardia which was ultimately found to be atypical atrial flutter.     Atrial flutter  Tachycardia mediated cardiomyopathy:   To echo lab for JUNIOR/DCCV.   -No absolute contraindications of esophageal stricture, tumor, perforation, laceration,or diverticulum and/or active GI bleed  -The risks, benefits & alternatives of the procedure were explained to the patient.   -The risks of transesophageal echo include but are not limited to:  Dental trauma, esophageal trauma/perforation, bleeding, laryngospasm/brochospasm, aspiration, sore throat/hoarseness, & dislodgement of the endotracheal tube/nasogastric tube (where applicable).    -The risks of moderate sedation include hypotension, respiratory depression, arrhythmias, bronchospasm, & death.    -Informed consent was obtained. The patient is agreeable to proceed with the procedure and all questions and concerns addressed          Oral Fields MD  [facility]   Cardiovascular Disease PGY-V

## 2023-06-30 PROBLEM — I95.9 HYPOTENSION: Status: ACTIVE | Noted: 2023-06-30

## 2023-06-30 PROBLEM — R09.81 NASAL CONGESTION: Status: ACTIVE | Noted: 2023-06-30

## 2023-06-30 LAB
ALBUMIN SERPL BCP-MCNC: 3.2 G/DL (ref 3.5–5.2)
ALP SERPL-CCNC: 208 U/L (ref 55–135)
ALT SERPL W/O P-5'-P-CCNC: 47 U/L (ref 10–44)
ANION GAP SERPL CALC-SCNC: 13 MMOL/L (ref 8–16)
AST SERPL-CCNC: 30 U/L (ref 10–40)
BASOPHILS # BLD AUTO: 0.01 K/UL (ref 0–0.2)
BASOPHILS NFR BLD: 0.2 % (ref 0–1.9)
BILIRUB SERPL-MCNC: 3 MG/DL (ref 0.1–1)
BUN SERPL-MCNC: 17 MG/DL (ref 8–23)
CALCIUM SERPL-MCNC: 9.2 MG/DL (ref 8.7–10.5)
CHLORIDE SERPL-SCNC: 96 MMOL/L (ref 95–110)
CO2 SERPL-SCNC: 30 MMOL/L (ref 23–29)
CREAT SERPL-MCNC: 1.1 MG/DL (ref 0.5–1.4)
DIFFERENTIAL METHOD: ABNORMAL
EOSINOPHIL # BLD AUTO: 0.1 K/UL (ref 0–0.5)
EOSINOPHIL NFR BLD: 1.9 % (ref 0–8)
ERYTHROCYTE [DISTWIDTH] IN BLOOD BY AUTOMATED COUNT: 14.9 % (ref 11.5–14.5)
EST. GFR  (NO RACE VARIABLE): 55.1 ML/MIN/1.73 M^2
GLUCOSE SERPL-MCNC: 116 MG/DL (ref 70–110)
HCT VFR BLD AUTO: 52.8 % (ref 37–48.5)
HGB BLD-MCNC: 16.3 G/DL (ref 12–16)
IMM GRANULOCYTES # BLD AUTO: 0.01 K/UL (ref 0–0.04)
IMM GRANULOCYTES NFR BLD AUTO: 0.2 % (ref 0–0.5)
LACTATE SERPL-SCNC: 1.4 MMOL/L (ref 0.5–2.2)
LYMPHOCYTES # BLD AUTO: 1.3 K/UL (ref 1–4.8)
LYMPHOCYTES NFR BLD: 30.6 % (ref 18–48)
MAGNESIUM SERPL-MCNC: 1.7 MG/DL (ref 1.6–2.6)
MCH RBC QN AUTO: 27.1 PG (ref 27–31)
MCHC RBC AUTO-ENTMCNC: 30.9 G/DL (ref 32–36)
MCV RBC AUTO: 88 FL (ref 82–98)
MONOCYTES # BLD AUTO: 0.4 K/UL (ref 0.3–1)
MONOCYTES NFR BLD: 8.4 % (ref 4–15)
NEUTROPHILS # BLD AUTO: 2.5 K/UL (ref 1.8–7.7)
NEUTROPHILS NFR BLD: 58.7 % (ref 38–73)
NRBC BLD-RTO: 0 /100 WBC
PLATELET # BLD AUTO: 117 K/UL (ref 150–450)
PMV BLD AUTO: 12.6 FL (ref 9.2–12.9)
POTASSIUM SERPL-SCNC: 4 MMOL/L (ref 3.5–5.1)
POTASSIUM SERPL-SCNC: 4 MMOL/L (ref 3.5–5.1)
PROT SERPL-MCNC: 6.4 G/DL (ref 6–8.4)
RBC # BLD AUTO: 6.02 M/UL (ref 4–5.4)
SODIUM SERPL-SCNC: 139 MMOL/L (ref 136–145)
WBC # BLD AUTO: 4.31 K/UL (ref 3.9–12.7)

## 2023-06-30 PROCEDURE — 83605 ASSAY OF LACTIC ACID: CPT | Performed by: STUDENT IN AN ORGANIZED HEALTH CARE EDUCATION/TRAINING PROGRAM

## 2023-06-30 PROCEDURE — 93005 ELECTROCARDIOGRAM TRACING: CPT

## 2023-06-30 PROCEDURE — 25000003 PHARM REV CODE 250: Performed by: HOSPITALIST

## 2023-06-30 PROCEDURE — 93010 ELECTROCARDIOGRAM REPORT: CPT | Mod: ,,, | Performed by: INTERNAL MEDICINE

## 2023-06-30 PROCEDURE — 99233 PR SUBSEQUENT HOSPITAL CARE,LEVL III: ICD-10-PCS | Mod: ,,, | Performed by: INTERNAL MEDICINE

## 2023-06-30 PROCEDURE — 80053 COMPREHEN METABOLIC PANEL: CPT | Performed by: STUDENT IN AN ORGANIZED HEALTH CARE EDUCATION/TRAINING PROGRAM

## 2023-06-30 PROCEDURE — 93010 EKG 12-LEAD: ICD-10-PCS | Mod: ,,, | Performed by: INTERNAL MEDICINE

## 2023-06-30 PROCEDURE — 25000003 PHARM REV CODE 250: Performed by: STUDENT IN AN ORGANIZED HEALTH CARE EDUCATION/TRAINING PROGRAM

## 2023-06-30 PROCEDURE — 83735 ASSAY OF MAGNESIUM: CPT | Performed by: HOSPITALIST

## 2023-06-30 PROCEDURE — 36415 COLL VENOUS BLD VENIPUNCTURE: CPT | Performed by: STUDENT IN AN ORGANIZED HEALTH CARE EDUCATION/TRAINING PROGRAM

## 2023-06-30 PROCEDURE — 85025 COMPLETE CBC W/AUTO DIFF WBC: CPT | Performed by: HOSPITALIST

## 2023-06-30 PROCEDURE — 25000003 PHARM REV CODE 250: Performed by: INTERNAL MEDICINE

## 2023-06-30 PROCEDURE — 99233 PR SUBSEQUENT HOSPITAL CARE,LEVL III: ICD-10-PCS | Mod: ,,, | Performed by: STUDENT IN AN ORGANIZED HEALTH CARE EDUCATION/TRAINING PROGRAM

## 2023-06-30 PROCEDURE — 99233 SBSQ HOSP IP/OBS HIGH 50: CPT | Mod: ,,, | Performed by: INTERNAL MEDICINE

## 2023-06-30 PROCEDURE — 99233 SBSQ HOSP IP/OBS HIGH 50: CPT | Mod: ,,, | Performed by: STUDENT IN AN ORGANIZED HEALTH CARE EDUCATION/TRAINING PROGRAM

## 2023-06-30 PROCEDURE — 20600001 HC STEP DOWN PRIVATE ROOM

## 2023-06-30 PROCEDURE — 36415 COLL VENOUS BLD VENIPUNCTURE: CPT | Performed by: HOSPITALIST

## 2023-06-30 RX ORDER — AMIODARONE HYDROCHLORIDE 200 MG/1
200 TABLET ORAL 2 TIMES DAILY
Status: DISCONTINUED | OUTPATIENT
Start: 2023-07-01 | End: 2023-07-01

## 2023-06-30 RX ORDER — DOFETILIDE 0.25 MG/1
250 CAPSULE ORAL EVERY 12 HOURS
Qty: 60 CAPSULE | Refills: 11 | Status: SHIPPED | OUTPATIENT
Start: 2023-06-30 | End: 2023-06-30 | Stop reason: HOSPADM

## 2023-06-30 RX ORDER — AMIODARONE HYDROCHLORIDE 200 MG/1
200 TABLET ORAL 2 TIMES DAILY
Status: DISCONTINUED | OUTPATIENT
Start: 2023-06-30 | End: 2023-06-30

## 2023-06-30 RX ORDER — DOFETILIDE 0.25 MG/1
250 CAPSULE ORAL ONCE
Status: COMPLETED | OUTPATIENT
Start: 2023-06-30 | End: 2023-06-30

## 2023-06-30 RX ORDER — SODIUM CHLORIDE 9 MG/ML
INJECTION, SOLUTION INTRAVENOUS CONTINUOUS
Status: ACTIVE | OUTPATIENT
Start: 2023-06-30 | End: 2023-06-30

## 2023-06-30 RX ADMIN — RIVAROXABAN 20 MG: 20 TABLET, FILM COATED ORAL at 04:06

## 2023-06-30 RX ADMIN — DOFETILIDE 250 MCG: 250 CAPSULE ORAL at 09:06

## 2023-06-30 RX ADMIN — SODIUM CHLORIDE: 9 INJECTION, SOLUTION INTRAVENOUS at 09:06

## 2023-06-30 NOTE — ASSESSMENT & PLAN NOTE
- US of abd w/indeterminate 1.3cm hypoechoic focus in or adjacent to pancreatic head/body- pancreatic mass protocol- MRI vs. CT w/contrast recommended.   - ordered MRI w/contrast- pancreatic protocol

## 2023-06-30 NOTE — ASSESSMENT & PLAN NOTE
Patient is identified as having Combined Systolic and Diastolic heart failure that is Acute on chronic. CHF is currently uncontrolled due to Continued edema of extremities. Latest ECHO performed and demonstrates- Results for orders placed during the hospital encounter of 02/07/23    Echo    Interpretation Summary  · The left ventricle is normal in size with normal systolic function.  · The estimated ejection fraction is 55%.  · Indeterminate left ventricular diastolic function.  · Normal right ventricular size with normal right ventricular systolic function.  · Moderate left atrial enlargement.  · Mild right atrial enlargement.  · Mild-to-moderate mitral regurgitation.  · Normal central venous pressure (3 mmHg).  · The estimated PA systolic pressure is 31 mmHg.  . Continue Beta Blocker and Furosemide and monitor clinical status closely. Monitor on telemetry. Patient is on CHF pathway.  Monitor strict Is&Os and daily weights.  Place on fluid restriction of 1.5 L. Cardiology has been consulted. Continue to stress to patient importance of self efficacy and  on diet for CHF. Last BNP reviewed- and noted below   Recent Labs   Lab 06/28/23  0846   BNP 1,511*     - Repeat echo with reduced EF 25%, CVP 8 , mod-severe TR, severe MR  - Received 120mg IV lasix 06/28, 80mg IV lasix 06/29- holding further lasix doses

## 2023-06-30 NOTE — CARE UPDATE
EP care update    Patient is due for tikosyn dose this morning  Qtc rev'd and around 528  Will give 2nd dose at 250 mcg now and EKG in 2 hours  D/w Dr Polk

## 2023-06-30 NOTE — ASSESSMENT & PLAN NOTE
Acute Heart Failure (likely combined systolic and diastolic)  Tachycardia-mediated Cardiomyopathy  Persistent Atrial Fibrillation  Atypical Atrial Flutter  EF previously 60% on TTE 2/23. Had JUNIOR/DCCV 9/22. Currently on Toprol 50 mg BID and Flecainide 100 mg BID. On Eliquis for AF/AFL ([id number]). Presenting in regular WCT today concerning for AFL vs VT. Hemodynamically stable on room air. No effect from Adenosine 12 mg x1 and 18 mg x1. HR slowed to 100s after IV Metoprolol 5 mg x1 and revealed atypical atrial flutter with variable conduction. Initial rhythm consistent with atypical atrial flutter with RVR and aberrancy.    - Admit to medicine  - Diuresis with IV Lasix 80 mg BID  - Continue home Toprol 50 mg BID and Flecainide 100 mg BID  - Continue Xarelto 20 mg daily  - TTE  - Monitor on telemetry  - NPO at MN for JUNIOR/DCCV

## 2023-06-30 NOTE — ASSESSMENT & PLAN NOTE
- stopped home metoprolol and flecainide   - continue home Xarelto  - JUNIOR/DCCV 06/29- currently on tikosyn protocol- will obtain 6 doses total- ECG will need to be obtained 2 hrs post dose- will monitor QRS and QTc closely   - QTc 572- holding further tikosyn doses at this time  - EP following closely   - Tele

## 2023-06-30 NOTE — SUBJECTIVE & OBJECTIVE
Past Medical History:   Diagnosis Date    Chronic systolic heart failure     Essential (primary) hypertension     Paroxysmal atrial fibrillation        Past Surgical History:   Procedure Laterality Date    ANGIOGRAM, CORONARY, WITH LEFT HEART CATHETERIZATION      TRANSESOPHAGEAL ECHOCARDIOGRAPHY N/A 6/29/2023    Procedure: ECHOCARDIOGRAM, TRANSESOPHAGEAL;  Surgeon: Leola Benavides MD;  Location: [facility] EP LAB;  Service: Cardiology;  Laterality: N/A;    TREATMENT OF CARDIAC ARRHYTHMIA N/A 6/29/2023    Procedure: Cardioversion or Defibrillation;  Surgeon: Devante Arevalo MD;  Location: [facility] EP LAB;  Service: Cardiology;  Laterality: N/A;  AF, JUNIOR/DCCV, ANES, DM, ED BED 11       Review of patient's allergies indicates:   Allergen Reactions    Amiodarone analogues Shortness Of Breath    Penicillins Shortness Of Breath       No current facility-administered medications on file prior to encounter.     Current Outpatient Medications on File Prior to Encounter   Medication Sig    flecainide (TAMBOCOR) 100 MG Tab Take 1 tablet (100 mg total) by mouth every 12 (twelve) hours.    metoprolol succinate (TOPROL-XL) 50 MG 24 hr tablet Take 1 tablet (50 mg total) by mouth 2 (two) times daily.    rivaroxaban (XARELTO) 20 mg Tab Take 1 tablet (20 mg total) by mouth daily with dinner or evening meal.    sacubitriL-valsartan (ENTRESTO) 49-51 mg per tablet Take 1 tablet by mouth 2 (two) times daily.     Family History       Problem Relation (Age of Onset)    No Known Problems Mother, Father          Tobacco Use    Smoking status: Former    Smokeless tobacco: Former    Tobacco comments:     Pt states she quit more than 30 years ago   Substance and Sexual Activity    Alcohol use: No    Drug use: No    Sexual activity: Not Currently     Review of Systems   Constitutional: Positive for malaise/fatigue.   All other systems reviewed and are negative.  Objective:     Vital Signs (Most Recent):  Temp: 97.6 °F (36.4 °C) (06/30/23  0810)  Pulse: (!) 53 (06/30/23 0923)  Resp: 16 (06/30/23 0810)  BP: (!) 95/57 (06/30/23 0923)  SpO2: 97 % (06/30/23 0810) Vital Signs (24h Range):  Temp:  [97.2 °F (36.2 °C)-98.4 °F (36.9 °C)] 97.6 °F (36.4 °C)  Pulse:  [51-97] 53  Resp:  [14-26] 16  SpO2:  [94 %-100 %] 97 %  BP: ()/(55-85) 95/57       Weight: 99.3 kg (219 lb)  Body mass index is 34.3 kg/m².    SpO2: 97 %        Physical Exam  Constitutional:       Appearance: Normal appearance. She is obese.   HENT:      Head: Normocephalic.      Mouth/Throat:      Mouth: Mucous membranes are moist.   Eyes:      Extraocular Movements: Extraocular movements intact.   Neck:      Comments: JVD to mid neck at 45 degrees  Cardiovascular:      Rate and Rhythm: Regular rhythm. Tachycardia present.      Pulses: Normal pulses.   Pulmonary:      Effort: Pulmonary effort is normal. No respiratory distress.   Abdominal:      Palpations: Abdomen is soft.   Musculoskeletal:      Cervical back: Neck supple.      Right lower leg: Edema present.      Left lower leg: Edema present.   Skin:     General: Skin is warm.   Neurological:      Mental Status: She is alert and oriented to person, place, and time.          Significant Labs: BMP:   Recent Labs   Lab 06/29/23  0233 06/29/23  1845 06/30/23  0605   * 96 116*    140 139   K 3.6 4.2 4.0  4.0    99 96   CO2 26 23 30*   BUN 19 17 17   CREATININE 1.0 1.1 1.1   CALCIUM 8.9 10.1 9.2   MG 1.7 2.1 1.7     , CMP:   Recent Labs   Lab 06/28/23  1059 06/29/23  0233 06/29/23  1845 06/30/23  0605    142 140 139   K 4.8 3.6 4.2 4.0  4.0    104 99 96   CO2 24 26 23 30*   * 137* 96 116*   BUN 21 19 17 17   CREATININE 1.3 1.0 1.1 1.1   CALCIUM 9.2 8.9 10.1 9.2   PROT 6.0  --   --  6.4   ALBUMIN 2.9*  --   --  3.2*   BILITOT 2.7*  --   --  3.0*   ALKPHOS 257*  --   --  208*   AST 77*  --   --  30   ALT 75*  --   --  47*   ANIONGAP 10 12 18* 13       , CBC:   Recent Labs   Lab 06/29/23  0236  06/29/23  1852 06/30/23  0605   WBC 6.19 4.27 4.31   HGB 16.1* 17.5* 16.3*   HCT 51.5* 57.7* 52.8*   * 146* 117*     , INR:   Recent Labs   Lab 06/29/23  1845   INR 1.5*   , Lipid Panel No results for input(s): CHOL, HDL, LDLCALC, TRIG, CHOLHDL in the last 48 hours., and Troponin   Recent Labs   Lab 06/28/23  1059   TROPONINI 0.009         Significant Imaging:   TTE 2/7/23  The left ventricle is normal in size with normal systolic function.  The estimated ejection fraction is 55%.  Indeterminate left ventricular diastolic function.  Normal right ventricular size with normal right ventricular systolic function.  Moderate left atrial enlargement.  Mild right atrial enlargement.  Mild-to-moderate mitral regurgitation.  Normal central venous pressure (3 mmHg).  The estimated PA systolic pressure is 31 mmHg.

## 2023-06-30 NOTE — ASSESSMENT & PLAN NOTE
- elevated lactate and hypotension  - will administer 250cc fluid bolus slowly- 50cc/hr over 5 hrs  - repeat lactate  - monitor BP closely

## 2023-06-30 NOTE — PROGRESS NOTES
[facility] - Cardiology Stepdown  Cardiac Electrophysiology  Progress Note    Admission Date: 6/28/2023  Code Status: Full Code   Attending Physician: Serena Avilez MD   Expected Discharge Date: 7/2/2023  Principal Problem:Atrial flutter with rapid ventricular response    Subjective:   Patient is due for tikosyn dose this morning  Qtc rev'd and around 528  Will give 2nd dose at 250 mcg now and EKG in 2 hours (first dose was 500 last night)  Past Medical History:   Diagnosis Date    Chronic systolic heart failure     Essential (primary) hypertension     Paroxysmal atrial fibrillation        Past Surgical History:   Procedure Laterality Date    ANGIOGRAM, CORONARY, WITH LEFT HEART CATHETERIZATION      TRANSESOPHAGEAL ECHOCARDIOGRAPHY N/A 6/29/2023    Procedure: ECHOCARDIOGRAM, TRANSESOPHAGEAL;  Surgeon: Leola Benavides MD;  Location: [facility] EP LAB;  Service: Cardiology;  Laterality: N/A;    TREATMENT OF CARDIAC ARRHYTHMIA N/A 6/29/2023    Procedure: Cardioversion or Defibrillation;  Surgeon: Devante Arevalo MD;  Location: [facility] EP LAB;  Service: Cardiology;  Laterality: N/A;  AF, JUNIOR/DCCV, ANES, DM, ED BED 11       Review of patient's allergies indicates:   Allergen Reactions    Amiodarone analogues Shortness Of Breath    Penicillins Shortness Of Breath       No current facility-administered medications on file prior to encounter.     Current Outpatient Medications on File Prior to Encounter   Medication Sig    flecainide (TAMBOCOR) 100 MG Tab Take 1 tablet (100 mg total) by mouth every 12 (twelve) hours.    metoprolol succinate (TOPROL-XL) 50 MG 24 hr tablet Take 1 tablet (50 mg total) by mouth 2 (two) times daily.    rivaroxaban (XARELTO) 20 mg Tab Take 1 tablet (20 mg total) by mouth daily with dinner or evening meal.    sacubitriL-valsartan (ENTRESTO) 49-51 mg per tablet Take 1 tablet by mouth 2 (two) times daily.     Family History       Problem Relation (Age of Onset)    No Known Problems  Mother, Father          Tobacco Use    Smoking status: Former    Smokeless tobacco: Former    Tobacco comments:     Pt states she quit more than 30 years ago   Substance and Sexual Activity    Alcohol use: No    Drug use: No    Sexual activity: Not Currently     Review of Systems   Constitutional: Positive for malaise/fatigue.   All other systems reviewed and are negative.  Objective:     Vital Signs (Most Recent):  Temp: 97.6 °F (36.4 °C) (06/30/23 0810)  Pulse: (!) 53 (06/30/23 0923)  Resp: 16 (06/30/23 0810)  BP: (!) 95/57 (06/30/23 0923)  SpO2: 97 % (06/30/23 0810) Vital Signs (24h Range):  Temp:  [97.2 °F (36.2 °C)-98.4 °F (36.9 °C)] 97.6 °F (36.4 °C)  Pulse:  [51-97] 53  Resp:  [14-26] 16  SpO2:  [94 %-100 %] 97 %  BP: ()/(55-85) 95/57       Weight: 99.3 kg (219 lb)  Body mass index is 34.3 kg/m².    SpO2: 97 %        Physical Exam  Constitutional:       Appearance: Normal appearance. She is obese.   HENT:      Head: Normocephalic.      Mouth/Throat:      Mouth: Mucous membranes are moist.   Eyes:      Extraocular Movements: Extraocular movements intact.   Neck:      Comments: JVD to mid neck at 45 degrees  Cardiovascular:      Rate and Rhythm: Regular rhythm. Tachycardia present.      Pulses: Normal pulses.   Pulmonary:      Effort: Pulmonary effort is normal. No respiratory distress.   Abdominal:      Palpations: Abdomen is soft.   Musculoskeletal:      Cervical back: Neck supple.      Right lower leg: Edema present.      Left lower leg: Edema present.   Skin:     General: Skin is warm.   Neurological:      Mental Status: She is alert and oriented to person, place, and time.          Significant Labs: BMP:   Recent Labs   Lab 06/29/23  0233 06/29/23  1845 06/30/23  0605   * 96 116*    140 139   K 3.6 4.2 4.0  4.0    99 96   CO2 26 23 30*   BUN 19 17 17   CREATININE 1.0 1.1 1.1   CALCIUM 8.9 10.1 9.2   MG 1.7 2.1 1.7     , CMP:   Recent Labs   Lab 06/28/23  1059 06/29/23  0236  06/29/23  1845 06/30/23  0605    142 140 139   K 4.8 3.6 4.2 4.0  4.0    104 99 96   CO2 24 26 23 30*   * 137* 96 116*   BUN 21 19 17 17   CREATININE 1.3 1.0 1.1 1.1   CALCIUM 9.2 8.9 10.1 9.2   PROT 6.0  --   --  6.4   ALBUMIN 2.9*  --   --  3.2*   BILITOT 2.7*  --   --  3.0*   ALKPHOS 257*  --   --  208*   AST 77*  --   --  30   ALT 75*  --   --  47*   ANIONGAP 10 12 18* 13       , CBC:   Recent Labs   Lab 06/29/23  0233 06/29/23  1852 06/30/23  0605   WBC 6.19 4.27 4.31   HGB 16.1* 17.5* 16.3*   HCT 51.5* 57.7* 52.8*   * 146* 117*     , INR:   Recent Labs   Lab 06/29/23  1845   INR 1.5*   , Lipid Panel No results for input(s): CHOL, HDL, LDLCALC, TRIG, CHOLHDL in the last 48 hours., and Troponin   Recent Labs   Lab 06/28/23  1059   TROPONINI 0.009         Significant Imaging:   TTE 2/7/23   The left ventricle is normal in size with normal systolic function.   The estimated ejection fraction is 55%.   Indeterminate left ventricular diastolic function.   Normal right ventricular size with normal right ventricular systolic function.   Moderate left atrial enlargement.   Mild right atrial enlargement.   Mild-to-moderate mitral regurgitation.   Normal central venous pressure (3 mmHg).   The estimated PA systolic pressure is 31 mmHg.    Assessment and Plan:     * Atrial flutter with rapid ventricular response  Acute Heart Failure (likely combined systolic and diastolic)  Tachycardia-mediated Cardiomyopathy EF 35%  Persistent Atrial Fibrillation  Atypical Atrial Flutter  EF previously 60% on TTE 2/23. Had JUNIOR/DCCV 9/22. Currently on Toprol 50 mg BID and Flecainide 100 mg BID. On Eliquis for AF/AFL ([id number]). Presented in regular WCT today concerning for AFL vs VT. Hemodynamically stable on room air. In the ER, no effect from Adenosine 12 mg x1 and 18 mg x1. HR slowed to 100s after IV Metoprolol 5 mg x1 and revealed atypical atrial flutter with variable conduction. Initial rhythm  consistent with atypical atrial flutter with RVR and aberrancy.    Tikosyn dosing  Dose 1: 6/29 night with 500 mcg  Dose 2: 6/30 AM with 250 mcg, dropped due to QTc 528    Plan  -underwent JUNIOR/DCCV 6/29 with post op NSR 55-60 and stopped home toprol xl, currently on tikosyn load  -keep xarelto going           Nunez Edilson Velasco MD  Cardiac Electrophysiology  [facility] - Cardiology Stepdown

## 2023-06-30 NOTE — PROGRESS NOTES
06/30/23 0921 06/30/23 0922 06/30/23 0923   Vital Signs   Pulse (!) 52 (!) 53 (!) 53   Heart Rate Source Monitor Monitor Monitor   BP (!) 99/58 (!) 92/55 (!) 95/57   MAP (mmHg) 75 69 71   Patient Position Lying Sitting Standing     Pt complained of dizziness while walking around her room. Pt states dizziness resolved when she laid back down. Orthostatic vital signs checked. MD Fatmata notified. 250 mL NS bolus initiated - see MAR for details. Pt currently resting comfortably in bed with no complaints of dizziness.

## 2023-06-30 NOTE — SUBJECTIVE & OBJECTIVE
Interval History: Patient was seen and evaluated this am. No complaints. No nausea, vomiting, fevers, chills, night sweats, abd pain, diarrhea, constipation.    Objective:     Vital Signs (Most Recent):  Temp: 98.2 °F (36.8 °C) (06/30/23 1100)  Pulse: (!) 53 (06/30/23 1100)  Resp: 16 (06/30/23 0810)  BP: (!) 93/56 (06/30/23 1100)  SpO2: 97 % (06/30/23 0810) Vital Signs (24h Range):  Temp:  [97.2 °F (36.2 °C)-98.2 °F (36.8 °C)] 98.2 °F (36.8 °C)  Pulse:  [49-79] 53  Resp:  [14-26] 16  SpO2:  [94 %-100 %] 97 %  BP: ()/(55-81) 93/56     Weight: 99.3 kg (219 lb)  Body mass index is 34.3 kg/m².    Intake/Output Summary (Last 24 hours) at 6/30/2023 1303  Last data filed at 6/30/2023 0916  Gross per 24 hour   Intake 670 ml   Output 1050 ml   Net -380 ml      Physical Exam  Gen: in NAD, appears stated age  Neuro: AAOx3, motor, sensory, and strength grossly intact BL  HEENT: NTNC, EOMI, PERRL, MMM  CVS: reg rhythm, bradycardic, no m/r/g; S1/S2 auscultated with no S3 or S4; capillary refill < 2 sec  Resp: lungs CTAB, no w/r/r; no belabored breathing or accessory muscle use appreciated   Abd: BS+ in all 4 quadrants; NTND, soft to palpation; no organomegaly appreciated   Extrem: pulses full, equal, and regular over all 4 extremities; no UE edema, BL LE edema    Significant Labs: All pertinent labs within the past 24 hours have been reviewed.  CBC:   Recent Labs   Lab 06/29/23  0233 06/29/23  1852 06/30/23  0605   WBC 6.19 4.27 4.31   HGB 16.1* 17.5* 16.3*   HCT 51.5* 57.7* 52.8*   * 146* 117*     CMP:   Recent Labs   Lab 06/29/23  0233 06/29/23  1845 06/30/23  0605    140 139   K 3.6 4.2 4.0  4.0    99 96   CO2 26 23 30*   * 96 116*   BUN 19 17 17   CREATININE 1.0 1.1 1.1   CALCIUM 8.9 10.1 9.2   PROT  --   --  6.4   ALBUMIN  --   --  3.2*   BILITOT  --   --  3.0*   ALKPHOS  --   --  208*   AST  --   --  30   ALT  --   --  47*   ANIONGAP 12 18* 13     Troponin:   No results for input(s):  TROPONINI, TROPONINIHS in the last 48 hours.      Significant Imaging: I have reviewed all pertinent imaging results/findings within the past 24 hours.

## 2023-06-30 NOTE — PLAN OF CARE
EP Plan of Care    Patient with history of persistent AFib and Atypical Aflutter who was successfully cardioverted from AFl w RVR to NSR 6/29/23 and feels much better. Tikosyn initiated but patient's QTc prolonged despite reducing dose. Qtc this morning 572.    Plan  -Q12 EKGs  -stop tikosyn  -will start amiodarone 200mg BID x30 days once her QTc <500, then 200mg daily  -monitor inpatient for 1-2 days on amiodarone as patient was hypotensive during prior IV amio bolus but no other side effects  -continue xarelto  -follow up arranged to discuss PVI    Case discussed with EP fellow and Dr. Polk.      Miguel Clark MD  Cardiac Electrophysiology PGY-4  [facility]

## 2023-06-30 NOTE — PROGRESS NOTES
[facility] - Cardiology [facility] Medicine  Progress Note    Patient Name: Yaneli Steele  MRN: [id number]  Patient Class: IP- Inpatient   Admission Date: 6/28/2023  Length of Stay: 2 days  Attending Physician: Serena Avilez MD  Primary Care Provider: Jannet Le MD        Subjective:     Principal Problem:Atrial flutter with rapid ventricular response        HPI:  67yoF with combined heart failure (recovered EF), pAF/AFL s/p DCCV 9/22, HTN, presenting with elevated heart rate associated with mild nausea & fatigue.  Patient was seen at her regularly scheduled EP appointment this morning.  She was noted to have a heart rate of 160 with a wide complex tachycardia and sent to ED.  Denies chest pain or shortness of breath.  Recent hospitalization for AF RVR managed medically.     In ED, 's and gave IV adenosine with no effect. Improved with IV metoprolol and rhythm noted to be atypical atrial flutter.  BP lowered to 85/56 in ED, now improved to 115 systolic. BNP 1511 , lactate 3.3, TBili 2.7, AST 77, ALT 75. She also received IV Lasix 80 mg.       Overview/Hospital Course:  Cardiology consulted- patient underwent DCCV/JUNIOR 06/29. Started on Tikosyn. Prolonged Qtc 572- patient had received first tikosyn dose PM of 06/29. She had dizziness- hypotension- given back 250cc NS 06/30- elevated lactate 3.1.      Interval History: Patient was seen and evaluated this am. Dizziness and some nasal congestion this am- low-normal BP this am. No nausea, vomiting, fevers, chills, night sweats, abd pain, diarrhea, constipation.    Objective:     Vital Signs (Most Recent):  Temp: 98.2 °F (36.8 °C) (06/30/23 1100)  Pulse: (!) 53 (06/30/23 1100)  Resp: 16 (06/30/23 0810)  BP: (!) 93/56 (06/30/23 1100)  SpO2: 97 % (06/30/23 0810) Vital Signs (24h Range):  Temp:  [97.2 °F (36.2 °C)-98.2 °F (36.8 °C)] 98.2 °F (36.8 °C)  Pulse:  [49-79] 53  Resp:  [14-26] 16  SpO2:  [94 %-100 %] 97 %  BP: ()/(55-81) 93/56      Weight: 99.3 kg (219 lb)  Body mass index is 34.3 kg/m².    Intake/Output Summary (Last 24 hours) at 6/30/2023 1303  Last data filed at 6/30/2023 0916  Gross per 24 hour   Intake 670 ml   Output 1050 ml   Net -380 ml      Physical Exam  Gen: in NAD, appears stated age  Neuro: AAOx3, motor, sensory, and strength grossly intact BL  HEENT: NTNC, EOMI, PERRL, MMM  CVS: reg rhythm, bradycardic, no m/r/g; S1/S2 auscultated with no S3 or S4; capillary refill < 2 sec  Resp: lungs CTAB, no w/r/r; no belabored breathing or accessory muscle use appreciated   Abd: BS+ in all 4 quadrants; NTND, soft to palpation; no organomegaly appreciated   Extrem: pulses full, equal, and regular over all 4 extremities; no UE edema, BL LE edema    Significant Labs: All pertinent labs within the past 24 hours have been reviewed.  CBC:   Recent Labs   Lab 06/29/23 0233 06/29/23  1852 06/30/23  0605   WBC 6.19 4.27 4.31   HGB 16.1* 17.5* 16.3*   HCT 51.5* 57.7* 52.8*   * 146* 117*     CMP:   Recent Labs   Lab 06/29/23 0233 06/29/23  1845 06/30/23  0605    140 139   K 3.6 4.2 4.0  4.0    99 96   CO2 26 23 30*   * 96 116*   BUN 19 17 17   CREATININE 1.0 1.1 1.1   CALCIUM 8.9 10.1 9.2   PROT  --   --  6.4   ALBUMIN  --   --  3.2*   BILITOT  --   --  3.0*   ALKPHOS  --   --  208*   AST  --   --  30   ALT  --   --  47*   ANIONGAP 12 18* 13     Troponin:   No results for input(s): TROPONINI, TROPONINIHS in the last 48 hours.      Significant Imaging: I have reviewed all pertinent imaging results/findings within the past 24 hours.      Assessment/Plan:      * Atrial flutter with rapid ventricular response  - stopped home metoprolol and flecainide   - continue home Xarelto  - JUNIOR/DCCV 06/29- currently on tikosyn protocol- will obtain 6 doses total- ECG will need to be obtained 2 hrs post dose- will monitor QRS and QTc closely   - QTc 572- holding further tikosyn doses at this time  - EP following closely   - Tele        Nasal congestion  - right-sided congestion   - some sore throat present  - PRN nasal spray  - chloraseptic spray  - will monitor symptoms in the case patient is attempting to develop viral illness  - symptomatic are       Hypotension  - elevated lactate and hypotension  - will administer 250cc fluid bolus slowly- 50cc/hr over 5 hrs  - repeat lactate  - monitor BP closely        Pancreatic mass  - US of abd w/indeterminate 1.3cm hypoechoic focus in or adjacent to pancreatic head/body- pancreatic mass protocol- MRI vs. CT w/contrast recommended.   - ordered MRI w/contrast- pancreatic protocol    Abnormal LFTs (liver function tests)  - Elevated TBili, AST/ALT/ALP  - Concern for elevation 2/2 congestive hepatopathy  - RUQ US obtained w/no findings of cholecystitis or ductal dilation; however, indeterminate 1.3cm hypoechoic focus in or adjacent to pancreatic head/body- pancreatic mass protocol- MRI vs. CT w/contrast recommended.   - Repeat LFTs tomorrow am  - will obtain CT vs. MRI w/contrast- pancreatic protocol- MRI ordered today       Acute on chronic combined systolic and diastolic congestive heart failure  Patient is identified as having Combined Systolic and Diastolic heart failure that is Acute on chronic. CHF is currently uncontrolled due to Continued edema of extremities. Latest ECHO performed and demonstrates- Results for orders placed during the hospital encounter of 02/07/23    Echo    Interpretation Summary  · The left ventricle is normal in size with normal systolic function.  · The estimated ejection fraction is 55%.  · Indeterminate left ventricular diastolic function.  · Normal right ventricular size with normal right ventricular systolic function.  · Moderate left atrial enlargement.  · Mild right atrial enlargement.  · Mild-to-moderate mitral regurgitation.  · Normal central venous pressure (3 mmHg).  · The estimated PA systolic pressure is 31 mmHg.  . Continue Beta Blocker and Furosemide and  monitor clinical status closely. Monitor on telemetry. Patient is on CHF pathway.  Monitor strict Is&Os and daily weights.  Place on fluid restriction of 1.5 L. Cardiology has been consulted. Continue to stress to patient importance of self efficacy and  on diet for CHF. Last BNP reviewed- and noted below   Recent Labs   Lab 06/28/23  0846   BNP 1,511*     - Repeat echo with reduced EF 25%, CVP 8 , mod-severe TR, severe MR  - Received 120mg IV lasix 06/28, 80mg IV lasix 06/29- holding further lasix doses     Essential (primary) hypertension  - BP in 80s in ED, now hypotension  - holding Entresto      VTE Risk Mitigation (From admission, onward)           Ordered     rivaroxaban tablet 20 mg  With dinner         06/28/23 1332                    Discharge Planning   DREW: 7/2/2023     Code Status: Full Code   Is the patient medically ready for discharge?: No    Reason for patient still in hospital (select all that apply): Patient trending condition  Discharge Plan A: Home              Serena Avilez MD  Department of Hospital Medicine   [facility] - Cardiology Stepdown

## 2023-06-30 NOTE — ASSESSMENT & PLAN NOTE
- Elevated TBili, AST/ALT/ALP  - Concern for elevation 2/2 congestive hepatopathy  - RUQ US obtained w/no findings of cholecystitis or ductal dilation; however, indeterminate 1.3cm hypoechoic focus in or adjacent to pancreatic head/body- pancreatic mass protocol- MRI vs. CT w/contrast recommended.   - Repeat LFTs tomorrow am  - will obtain CT vs. MRI w/contrast- pancreatic protocol- MRI ordered today

## 2023-06-30 NOTE — ASSESSMENT & PLAN NOTE
- right-sided congestion   - some sore throat present  - PRN nasal spray  - chloraseptic spray  - will monitor symptoms in the case patient is attempting to develop viral illness  - symptomatic are

## 2023-06-30 NOTE — PLAN OF CARE
Problem: Adult Inpatient Plan of Care  Goal: Plan of Care Review  Outcome: Ongoing, Progressing  Flowsheets (Taken 6/29/2023 1923)  Plan of Care Reviewed With: patient  Goal: Patient-Specific Goal (Individualized)  Outcome: Ongoing, Progressing  Goal: Optimal Comfort and Wellbeing  Outcome: Ongoing, Progressing     Problem: Fall Injury Risk  Goal: Absence of Fall and Fall-Related Injury  Outcome: Ongoing, Progressing

## 2023-06-30 NOTE — PLAN OF CARE
AAOX4,VSS,O2 sats>92% on room air. Plan of care discussed with patient. Patient has no complaints of pain/SOB. Patient now free from dizziness. Discussed medications and care. Patient has no questions at this time.Pt visualised and stable.Call light within reach.Pt resting,bed at lowest position.     Problem: Adult Inpatient Plan of Care  Goal: Plan of Care Review  Outcome: Ongoing, Progressing  Goal: Absence of Hospital-Acquired Illness or Injury  Outcome: Ongoing, Progressing  Goal: Optimal Comfort and Wellbeing  Outcome: Ongoing, Progressing     Problem: Fall Injury Risk  Goal: Absence of Fall and Fall-Related Injury  Outcome: Ongoing, Progressing

## 2023-07-01 LAB
ALBUMIN SERPL BCP-MCNC: 2.9 G/DL (ref 3.5–5.2)
ALP SERPL-CCNC: 173 U/L (ref 55–135)
ALT SERPL W/O P-5'-P-CCNC: 35 U/L (ref 10–44)
ANION GAP SERPL CALC-SCNC: 9 MMOL/L (ref 8–16)
AST SERPL-CCNC: 22 U/L (ref 10–40)
BASOPHILS # BLD AUTO: 0.02 K/UL (ref 0–0.2)
BASOPHILS NFR BLD: 0.5 % (ref 0–1.9)
BILIRUB SERPL-MCNC: 2.2 MG/DL (ref 0.1–1)
BUN SERPL-MCNC: 15 MG/DL (ref 8–23)
CALCIUM SERPL-MCNC: 8.5 MG/DL (ref 8.7–10.5)
CHLORIDE SERPL-SCNC: 97 MMOL/L (ref 95–110)
CO2 SERPL-SCNC: 30 MMOL/L (ref 23–29)
CREAT SERPL-MCNC: 1 MG/DL (ref 0.5–1.4)
DIFFERENTIAL METHOD: ABNORMAL
EOSINOPHIL # BLD AUTO: 0.1 K/UL (ref 0–0.5)
EOSINOPHIL NFR BLD: 2.6 % (ref 0–8)
ERYTHROCYTE [DISTWIDTH] IN BLOOD BY AUTOMATED COUNT: 14.1 % (ref 11.5–14.5)
EST. GFR  (NO RACE VARIABLE): >60 ML/MIN/1.73 M^2
GLUCOSE SERPL-MCNC: 127 MG/DL (ref 70–110)
HCT VFR BLD AUTO: 47.5 % (ref 37–48.5)
HGB BLD-MCNC: 14.3 G/DL (ref 12–16)
IMM GRANULOCYTES # BLD AUTO: 0.01 K/UL (ref 0–0.04)
IMM GRANULOCYTES NFR BLD AUTO: 0.2 % (ref 0–0.5)
LYMPHOCYTES # BLD AUTO: 1.7 K/UL (ref 1–4.8)
LYMPHOCYTES NFR BLD: 40.9 % (ref 18–48)
MAGNESIUM SERPL-MCNC: 1.6 MG/DL (ref 1.6–2.6)
MCH RBC QN AUTO: 26.3 PG (ref 27–31)
MCHC RBC AUTO-ENTMCNC: 30.1 G/DL (ref 32–36)
MCV RBC AUTO: 88 FL (ref 82–98)
MONOCYTES # BLD AUTO: 0.5 K/UL (ref 0.3–1)
MONOCYTES NFR BLD: 11.1 % (ref 4–15)
NEUTROPHILS # BLD AUTO: 1.9 K/UL (ref 1.8–7.7)
NEUTROPHILS NFR BLD: 44.7 % (ref 38–73)
NRBC BLD-RTO: 0 /100 WBC
PLATELET # BLD AUTO: 121 K/UL (ref 150–450)
PMV BLD AUTO: 12 FL (ref 9.2–12.9)
POTASSIUM SERPL-SCNC: 3.5 MMOL/L (ref 3.5–5.1)
POTASSIUM SERPL-SCNC: 3.5 MMOL/L (ref 3.5–5.1)
PROT SERPL-MCNC: 5.7 G/DL (ref 6–8.4)
RBC # BLD AUTO: 5.43 M/UL (ref 4–5.4)
SODIUM SERPL-SCNC: 136 MMOL/L (ref 136–145)
WBC # BLD AUTO: 4.25 K/UL (ref 3.9–12.7)

## 2023-07-01 PROCEDURE — 94761 N-INVAS EAR/PLS OXIMETRY MLT: CPT

## 2023-07-01 PROCEDURE — 99233 SBSQ HOSP IP/OBS HIGH 50: CPT | Mod: ,,, | Performed by: STUDENT IN AN ORGANIZED HEALTH CARE EDUCATION/TRAINING PROGRAM

## 2023-07-01 PROCEDURE — 85025 COMPLETE CBC W/AUTO DIFF WBC: CPT | Performed by: HOSPITALIST

## 2023-07-01 PROCEDURE — 83735 ASSAY OF MAGNESIUM: CPT | Performed by: HOSPITALIST

## 2023-07-01 PROCEDURE — 36415 COLL VENOUS BLD VENIPUNCTURE: CPT | Performed by: HOSPITALIST

## 2023-07-01 PROCEDURE — 93005 ELECTROCARDIOGRAM TRACING: CPT

## 2023-07-01 PROCEDURE — 93010 EKG 12-LEAD: ICD-10-PCS | Mod: ,,, | Performed by: INTERNAL MEDICINE

## 2023-07-01 PROCEDURE — 63600175 PHARM REV CODE 636 W HCPCS: Performed by: STUDENT IN AN ORGANIZED HEALTH CARE EDUCATION/TRAINING PROGRAM

## 2023-07-01 PROCEDURE — 99233 PR SUBSEQUENT HOSPITAL CARE,LEVL III: ICD-10-PCS | Mod: ,,, | Performed by: STUDENT IN AN ORGANIZED HEALTH CARE EDUCATION/TRAINING PROGRAM

## 2023-07-01 PROCEDURE — 93010 ELECTROCARDIOGRAM REPORT: CPT | Mod: ,,, | Performed by: INTERNAL MEDICINE

## 2023-07-01 PROCEDURE — A9585 GADOBUTROL INJECTION: HCPCS | Performed by: STUDENT IN AN ORGANIZED HEALTH CARE EDUCATION/TRAINING PROGRAM

## 2023-07-01 PROCEDURE — 25000003 PHARM REV CODE 250: Performed by: STUDENT IN AN ORGANIZED HEALTH CARE EDUCATION/TRAINING PROGRAM

## 2023-07-01 PROCEDURE — 80053 COMPREHEN METABOLIC PANEL: CPT | Performed by: STUDENT IN AN ORGANIZED HEALTH CARE EDUCATION/TRAINING PROGRAM

## 2023-07-01 PROCEDURE — 99900035 HC TECH TIME PER 15 MIN (STAT)

## 2023-07-01 PROCEDURE — 25000003 PHARM REV CODE 250: Performed by: INTERNAL MEDICINE

## 2023-07-01 PROCEDURE — 25500020 PHARM REV CODE 255: Performed by: STUDENT IN AN ORGANIZED HEALTH CARE EDUCATION/TRAINING PROGRAM

## 2023-07-01 PROCEDURE — 20600001 HC STEP DOWN PRIVATE ROOM

## 2023-07-01 RX ORDER — AMIODARONE HYDROCHLORIDE 200 MG/1
200 TABLET ORAL 2 TIMES DAILY
Status: DISCONTINUED | OUTPATIENT
Start: 2023-07-01 | End: 2023-07-01

## 2023-07-01 RX ORDER — POTASSIUM CHLORIDE 750 MG/1
50 CAPSULE, EXTENDED RELEASE ORAL ONCE
Status: COMPLETED | OUTPATIENT
Start: 2023-07-01 | End: 2023-07-01

## 2023-07-01 RX ORDER — MAGNESIUM SULFATE HEPTAHYDRATE 40 MG/ML
2 INJECTION, SOLUTION INTRAVENOUS ONCE
Status: COMPLETED | OUTPATIENT
Start: 2023-07-01 | End: 2023-07-01

## 2023-07-01 RX ORDER — GADOBUTROL 604.72 MG/ML
10 INJECTION INTRAVENOUS
Status: COMPLETED | OUTPATIENT
Start: 2023-07-01 | End: 2023-07-01

## 2023-07-01 RX ORDER — AMIODARONE HYDROCHLORIDE 200 MG/1
200 TABLET ORAL 2 TIMES DAILY
Status: DISCONTINUED | OUTPATIENT
Start: 2023-07-01 | End: 2023-07-02

## 2023-07-01 RX ADMIN — GADOBUTROL 10 ML: 604.72 INJECTION INTRAVENOUS at 12:07

## 2023-07-01 RX ADMIN — POTASSIUM CHLORIDE 50 MEQ: 10 CAPSULE, COATED, EXTENDED RELEASE ORAL at 09:07

## 2023-07-01 RX ADMIN — AMIODARONE HYDROCHLORIDE 200 MG: 200 TABLET ORAL at 09:07

## 2023-07-01 RX ADMIN — MAGNESIUM SULFATE 2 G: 2 INJECTION INTRAVENOUS at 12:07

## 2023-07-01 RX ADMIN — AMIODARONE HYDROCHLORIDE 200 MG: 200 TABLET ORAL at 08:07

## 2023-07-01 RX ADMIN — APIXABAN 5 MG: 5 TABLET, FILM COATED ORAL at 08:07

## 2023-07-01 NOTE — SUBJECTIVE & OBJECTIVE
Interval History: Patient was seen and evaluated this am. Doing well- some dizziness, but improved from yesterday- no congestion. When discussing amiodarone allergy- patient reported feeling anxious and like her 'throat was closing up'- however she was given haldol with resolution of symptoms- this was with previous amiodarone IV bolus. She is agreeable to attempting PO amiodarone at lower dose as we are limited with other medication options due to her prolonged Qtc. No nausea, vomiting, fevers, chills, night sweats, abd pain, diarrhea, constipation.    Objective:     Vital Signs (Most Recent):  Temp: 98.4 °F (36.9 °C) (07/01/23 1531)  Pulse: (!) 57 (07/01/23 1531)  Resp: 16 (07/01/23 1531)  BP: 114/71 (07/01/23 1531)  SpO2: 98 % (07/01/23 1531) Vital Signs (24h Range):  Temp:  [97.7 °F (36.5 °C)-98.4 °F (36.9 °C)] 98.4 °F (36.9 °C)  Pulse:  [52-71] 57  Resp:  [16-18] 16  SpO2:  [94 %-98 %] 98 %  BP: (100-129)/(58-71) 114/71     Weight: 97 kg (213 lb 13.5 oz)  Body mass index is 33.49 kg/m².    Intake/Output Summary (Last 24 hours) at 7/1/2023 1612  Last data filed at 7/1/2023 1000  Gross per 24 hour   Intake 740 ml   Output --   Net 740 ml      Physical Exam  Gen: in NAD, appears stated age  Neuro: AAOx3, motor, sensory, and strength grossly intact BL  HEENT: NTNC, EOMI, PERRL, MMM  CVS: reg rhythm, bradycardic, no m/r/g; S1/S2 auscultated with no S3 or S4; capillary refill < 2 sec  Resp: lungs CTAB, no w/r/r; no belabored breathing or accessory muscle use appreciated   Abd: BS+ in all 4 quadrants; NTND, soft to palpation; no organomegaly appreciated   Extrem: pulses full, equal, and regular over all 4 extremities; no UE edema, BL LE edema    Significant Labs: All pertinent labs within the past 24 hours have been reviewed.  CBC:   Recent Labs   Lab 06/29/23  1852 06/30/23  0605 07/01/23  0332   WBC 4.27 4.31 4.25   HGB 17.5* 16.3* 14.3   HCT 57.7* 52.8* 47.5   * 117* 121*     CMP:   Recent Labs   Lab  06/29/23  1845 06/30/23  0605 07/01/23  0332    139 136   K 4.2 4.0  4.0 3.5  3.5   CL 99 96 97   CO2 23 30* 30*   GLU 96 116* 127*   BUN 17 17 15   CREATININE 1.1 1.1 1.0   CALCIUM 10.1 9.2 8.5*   PROT  --  6.4 5.7*   ALBUMIN  --  3.2* 2.9*   BILITOT  --  3.0* 2.2*   ALKPHOS  --  208* 173*   AST  --  30 22   ALT  --  47* 35   ANIONGAP 18* 13 9     Troponin:   No results for input(s): TROPONINI, TROPONINIHS in the last 48 hours.      Significant Imaging: I have reviewed all pertinent imaging results/findings within the past 24 hours.

## 2023-07-01 NOTE — PROGRESS NOTES
[facility] - Cardiology [facility] Medicine  Progress Note    Patient Name: Yaneli Steele  MRN: [id number]  Patient Class: IP- Inpatient   Admission Date: 6/28/2023  Length of Stay: 3 days  Attending Physician: Serena Avilez MD  Primary Care Provider: Jannet Le MD        Subjective:     Principal Problem:Atrial flutter with rapid ventricular response        HPI:  67yoF with combined heart failure (recovered EF), pAF/AFL s/p DCCV 9/22, HTN, presenting with elevated heart rate associated with mild nausea & fatigue.  Patient was seen at her regularly scheduled EP appointment this morning.  She was noted to have a heart rate of 160 with a wide complex tachycardia and sent to ED.  Denies chest pain or shortness of breath.  Recent hospitalization for AF RVR managed medically.     In ED, 's and gave IV adenosine with no effect. Improved with IV metoprolol and rhythm noted to be atypical atrial flutter.  BP lowered to 85/56 in ED, now improved to 115 systolic. BNP 1511 , lactate 3.3, TBili 2.7, AST 77, ALT 75. She also received IV Lasix 80 mg.       Overview/Hospital Course:  Cardiology consulted- patient underwent DCCV/JUNIOR 06/29. Started on Tikosyn. Prolonged Qtc 572- patient had received first tikosyn dose PM of 06/29. She had dizziness- hypotension- given back 250cc NS 06/30- elevated lactate 3.1. Repeat lactate improved to normal. MRI pancreas protocol obtained due to hypoechoic focus at pancreatic head per US- MRI w/the following results:    1. No identifiable pancreatic mass or ductal dilatation.  Recommend further evaluation of sonographic abnormality with pancreas protocol CT.  2. Cholelithiasis with mural calcification.  No signs of acute cholecystitis.    T bili downtrended from 3 to 2.2. She was also started on amiodarone as tikosyn was stopped- started amiodarone 200mg BID for load.       Interval History: Patient was seen and evaluated this am. Doing well- some dizziness, but improved  from yesterday- no congestion. When discussing amiodarone allergy- patient reported feeling anxious and like her 'throat was closing up'- however she was given haldol with resolution of symptoms- this was with previous amiodarone IV bolus. She is agreeable to attempting PO amiodarone at lower dose as we are limited with other medication options due to her prolonged Qtc. No nausea, vomiting, fevers, chills, night sweats, abd pain, diarrhea, constipation.    Objective:     Vital Signs (Most Recent):  Temp: 98.4 °F (36.9 °C) (07/01/23 1531)  Pulse: (!) 57 (07/01/23 1531)  Resp: 16 (07/01/23 1531)  BP: 114/71 (07/01/23 1531)  SpO2: 98 % (07/01/23 1531) Vital Signs (24h Range):  Temp:  [97.7 °F (36.5 °C)-98.4 °F (36.9 °C)] 98.4 °F (36.9 °C)  Pulse:  [52-71] 57  Resp:  [16-18] 16  SpO2:  [94 %-98 %] 98 %  BP: (100-129)/(58-71) 114/71     Weight: 97 kg (213 lb 13.5 oz)  Body mass index is 33.49 kg/m².    Intake/Output Summary (Last 24 hours) at 7/1/2023 1612  Last data filed at 7/1/2023 1000  Gross per 24 hour   Intake 740 ml   Output --   Net 740 ml      Physical Exam  Gen: in NAD, appears stated age  Neuro: AAOx3, motor, sensory, and strength grossly intact BL  HEENT: NTNC, EOMI, PERRL, MMM  CVS: reg rhythm, bradycardic, no m/r/g; S1/S2 auscultated with no S3 or S4; capillary refill < 2 sec  Resp: lungs CTAB, no w/r/r; no belabored breathing or accessory muscle use appreciated   Abd: BS+ in all 4 quadrants; NTND, soft to palpation; no organomegaly appreciated   Extrem: pulses full, equal, and regular over all 4 extremities; no UE edema, BL LE edema    Significant Labs: All pertinent labs within the past 24 hours have been reviewed.  CBC:   Recent Labs   Lab 06/29/23  1852 06/30/23  0605 07/01/23  0332   WBC 4.27 4.31 4.25   HGB 17.5* 16.3* 14.3   HCT 57.7* 52.8* 47.5   * 117* 121*     CMP:   Recent Labs   Lab 06/29/23  1845 06/30/23  0605 07/01/23  0332    139 136   K 4.2 4.0  4.0 3.5  3.5   CL 99 96 97    CO2 23 30* 30*   GLU 96 116* 127*   BUN 17 17 15   CREATININE 1.1 1.1 1.0   CALCIUM 10.1 9.2 8.5*   PROT  --  6.4 5.7*   ALBUMIN  --  3.2* 2.9*   BILITOT  --  3.0* 2.2*   ALKPHOS  --  208* 173*   AST  --  30 22   ALT  --  47* 35   ANIONGAP 18* 13 9     Troponin:   No results for input(s): TROPONINI, TROPONINIHS in the last 48 hours.      Significant Imaging: I have reviewed all pertinent imaging results/findings within the past 24 hours.      Assessment/Plan:      * Atrial flutter with rapid ventricular response  - stopped home metoprolol and flecainide   - continue home Xarelto  - JUNIOR/DCCV 06/29- was started on tikosyn, prolonged QTc- started on amiodarone today as QTc <500- plan for amio 200mg BID for 30d then 200mg qday   - changed xarelto to eliquis due to reduced creatinine clearance per pharm recommendations   - ECG q12h  - EP following closely   - Tele       Nasal congestion  - resolved        Hypotension  - elevated lactate and hypotension  - s/p gentle IVF  - Repeat lactate wnl  - Resolved   - monitor BP closely        Pancreatic mass  - US of abd w/indeterminate 1.3cm hypoechoic focus in or adjacent to pancreatic head/body- pancreatic mass protocol- MRI vs. CT w/contrast recommended.   - ordered MRI w/contrast- pancreatic protocol- no identifiable mass, but recommended CT pancreas protocol will hold off for now as patient just had contrast exposure- can plan on obtaining via outpatient basis     Abnormal LFTs (liver function tests)  - Elevated TBili, AST/ALT/ALP  - Concern for elevation 2/2 congestive hepatopathy  - RUQ US obtained w/no findings of cholecystitis or ductal dilation; however, indeterminate 1.3cm hypoechoic focus in or adjacent to pancreatic head/body- pancreatic mass protocol- MRI vs. CT w/contrast recommended.   - Repeat LFTs tomorrow am  - MRI pancreas protocol with the following findings:     1. No identifiable pancreatic mass or ductal dilatation.  Recommend further evaluation of  sonographic abnormality with pancreas protocol CT.  2. Cholelithiasis with mural calcification.  No signs of acute cholecystitis.    - T bili downtrending- 2.2 today, AST:ALT wnl and ALP downtrending- possibly ischemic event 2/2 afib with RVR?   - will continue to trend- arrange CT pancreas protocol on outpatient basis       Acute on chronic combined systolic and diastolic congestive heart failure  Patient is identified as having Combined Systolic and Diastolic heart failure that is Acute on chronic. CHF is currently uncontrolled due to Continued edema of extremities. Latest ECHO performed and demonstrates- Results for orders placed during the hospital encounter of 02/07/23    Echo    Interpretation Summary  · The left ventricle is normal in size with normal systolic function.  · The estimated ejection fraction is 55%.  · Indeterminate left ventricular diastolic function.  · Normal right ventricular size with normal right ventricular systolic function.  · Moderate left atrial enlargement.  · Mild right atrial enlargement.  · Mild-to-moderate mitral regurgitation.  · Normal central venous pressure (3 mmHg).  · The estimated PA systolic pressure is 31 mmHg.  . Continue Beta Blocker and Furosemide and monitor clinical status closely. Monitor on telemetry. Patient is on CHF pathway.  Monitor strict Is&Os and daily weights.  Place on fluid restriction of 1.5 L. Cardiology has been consulted. Continue to stress to patient importance of self efficacy and  on diet for CHF. Last BNP reviewed- and noted below   Recent Labs   Lab 06/28/23  0846   BNP 1,511*     - Repeat echo with reduced EF 25%, CVP 8 , mod-severe TR, severe MR  - Received 120mg IV lasix 06/28, 80mg IV lasix 06/29  - no further diuresis at this time- appears euvolemic     Essential (primary) hypertension  - BP in 80s in ED, now hypotension  - holding Entresto      VTE Risk Mitigation (From admission, onward)         Ordered     apixaban tablet 5 mg  2  times daily         07/01/23 0848                Discharge Planning   DREW: 7/2/2023     Code Status: Full Code   Is the patient medically ready for discharge?: No    Reason for patient still in hospital (select all that apply): Patient trending condition  Discharge Plan A: Home                    Serena Avilez MD  Department of Hospital Medicine   [facility] - Cardiology Stepdown

## 2023-07-01 NOTE — ASSESSMENT & PLAN NOTE
- elevated lactate and hypotension  - s/p gentle IVF  - Repeat lactate wnl  - Resolved   - monitor BP closely

## 2023-07-01 NOTE — SUBJECTIVE & OBJECTIVE
Past Medical History:   Diagnosis Date    Chronic systolic heart failure     Essential (primary) hypertension     Paroxysmal atrial fibrillation        Past Surgical History:   Procedure Laterality Date    ANGIOGRAM, CORONARY, WITH LEFT HEART CATHETERIZATION      TRANSESOPHAGEAL ECHOCARDIOGRAPHY N/A 6/29/2023    Procedure: ECHOCARDIOGRAM, TRANSESOPHAGEAL;  Surgeon: Leola Benavides MD;  Location: [facility] EP LAB;  Service: Cardiology;  Laterality: N/A;    TREATMENT OF CARDIAC ARRHYTHMIA N/A 6/29/2023    Procedure: Cardioversion or Defibrillation;  Surgeon: Devante Arevalo MD;  Location: [facility] EP LAB;  Service: Cardiology;  Laterality: N/A;  AF, JUNIOR/DCCV, ANES, DM, ED BED 11       Review of patient's allergies indicates:   Allergen Reactions    Amiodarone analogues Shortness Of Breath    Penicillins Shortness Of Breath       No current facility-administered medications on file prior to encounter.     Current Outpatient Medications on File Prior to Encounter   Medication Sig    flecainide (TAMBOCOR) 100 MG Tab Take 1 tablet (100 mg total) by mouth every 12 (twelve) hours.    metoprolol succinate (TOPROL-XL) 50 MG 24 hr tablet Take 1 tablet (50 mg total) by mouth 2 (two) times daily.    rivaroxaban (XARELTO) 20 mg Tab Take 1 tablet (20 mg total) by mouth daily with dinner or evening meal.    sacubitriL-valsartan (ENTRESTO) 49-51 mg per tablet Take 1 tablet by mouth 2 (two) times daily.     Family History       Problem Relation (Age of Onset)    No Known Problems Mother, Father          Tobacco Use    Smoking status: Former    Smokeless tobacco: Former    Tobacco comments:     Pt states she quit more than 30 years ago   Substance and Sexual Activity    Alcohol use: No    Drug use: No    Sexual activity: Not Currently     Review of Systems   Constitutional: Positive for malaise/fatigue.   All other systems reviewed and are negative.  Objective:     Vital Signs (Most Recent):  Temp: 97.7 °F (36.5 °C) (07/01/23  0724)  Pulse: (!) 52 (07/01/23 0724)  Resp: 16 (07/01/23 0724)  BP: 108/63 (07/01/23 0724)  SpO2: 97 % (07/01/23 0724) Vital Signs (24h Range):  Temp:  [97.7 °F (36.5 °C)-98 °F (36.7 °C)] 97.7 °F (36.5 °C)  Pulse:  [51-71] 52  Resp:  [16-20] 16  SpO2:  [94 %-97 %] 97 %  BP: (100-108)/(55-63) 108/63       Weight: 99.3 kg (219 lb)  Body mass index is 34.3 kg/m².    SpO2: 97 %        Physical Exam  Constitutional:       Appearance: Normal appearance. She is obese.   HENT:      Head: Normocephalic.      Mouth/Throat:      Mouth: Mucous membranes are moist.   Eyes:      Extraocular Movements: Extraocular movements intact.   Neck:      Comments: JVD to mid neck at 45 degrees  Cardiovascular:      Rate and Rhythm: Regular rhythm. Tachycardia present.      Pulses: Normal pulses.   Pulmonary:      Effort: Pulmonary effort is normal. No respiratory distress.   Abdominal:      Palpations: Abdomen is soft.   Musculoskeletal:      Cervical back: Neck supple.      Right lower leg: Edema present.      Left lower leg: Edema present.   Skin:     General: Skin is warm.   Neurological:      Mental Status: She is alert and oriented to person, place, and time.          Significant Labs: BMP:   Recent Labs   Lab 06/29/23 1845 06/30/23 0605 07/01/23  0332   GLU 96 116* 127*    139 136   K 4.2 4.0  4.0 3.5  3.5   CL 99 96 97   CO2 23 30* 30*   BUN 17 17 15   CREATININE 1.1 1.1 1.0   CALCIUM 10.1 9.2 8.5*   MG 2.1 1.7 1.6     , CMP:   Recent Labs   Lab 06/29/23 1845 06/30/23  0605 07/01/23  0332    139 136   K 4.2 4.0  4.0 3.5  3.5   CL 99 96 97   CO2 23 30* 30*   GLU 96 116* 127*   BUN 17 17 15   CREATININE 1.1 1.1 1.0   CALCIUM 10.1 9.2 8.5*   PROT  --  6.4 5.7*   ALBUMIN  --  3.2* 2.9*   BILITOT  --  3.0* 2.2*   ALKPHOS  --  208* 173*   AST  --  30 22   ALT  --  47* 35   ANIONGAP 18* 13 9       , CBC:   Recent Labs   Lab 06/29/23  1852 06/30/23  0605 07/01/23  0332   WBC 4.27 4.31 4.25   HGB 17.5* 16.3* 14.3   HCT  57.7* 52.8* 47.5   * 117* 121*     , INR:   Recent Labs   Lab 06/29/23  1845   INR 1.5*     , Lipid Panel No results for input(s): CHOL, HDL, LDLCALC, TRIG, CHOLHDL in the last 48 hours., and Troponin No results for input(s): TROPONINI in the last 48 hours.      Significant Imaging:   TTE 2/7/23  The left ventricle is normal in size with normal systolic function.  The estimated ejection fraction is 55%.  Indeterminate left ventricular diastolic function.  Normal right ventricular size with normal right ventricular systolic function.  Moderate left atrial enlargement.  Mild right atrial enlargement.  Mild-to-moderate mitral regurgitation.  Normal central venous pressure (3 mmHg).  The estimated PA systolic pressure is 31 mmHg.

## 2023-07-01 NOTE — PLAN OF CARE
AAOX4,VSS,O2 sats>92% on room air. Plan of care discussed with patient. Patient has no complaints of pain/SOB. Pt free from episodes of dizziness. Discussed medications and care. Patient has no questions at this time.Pt visualised and stable.Call light within reach.Pt resting,bed at lowest position.     Problem: Adult Inpatient Plan of Care  Goal: Plan of Care Review  7/1/2023 1613 by Tatiana Renae RN  Outcome: Ongoing, Progressing  7/1/2023 1613 by Tatiana Renae RN  Outcome: Ongoing, Progressing  Goal: Absence of Hospital-Acquired Illness or Injury  7/1/2023 1613 by Tatiana Renae RN  Outcome: Ongoing, Progressing  7/1/2023 1613 by Tatiana Renae RN  Outcome: Ongoing, Progressing  Goal: Optimal Comfort and Wellbeing  7/1/2023 1613 by Tatiana Renae RN  Outcome: Ongoing, Progressing  7/1/2023 1613 by Tatiana Renae RN  Outcome: Ongoing, Progressing     Problem: Fall Injury Risk  Goal: Absence of Fall and Fall-Related Injury  Outcome: Ongoing, Progressing

## 2023-07-01 NOTE — ASSESSMENT & PLAN NOTE
- Elevated TBili, AST/ALT/ALP  - Concern for elevation 2/2 congestive hepatopathy  - RUQ US obtained w/no findings of cholecystitis or ductal dilation; however, indeterminate 1.3cm hypoechoic focus in or adjacent to pancreatic head/body- pancreatic mass protocol- MRI vs. CT w/contrast recommended.   - Repeat LFTs tomorrow am  - MRI pancreas protocol with the following findings:     1. No identifiable pancreatic mass or ductal dilatation.  Recommend further evaluation of sonographic abnormality with pancreas protocol CT.  2. Cholelithiasis with mural calcification.  No signs of acute cholecystitis.    - T bili downtrending- 2.2 today, AST:ALT wnl and ALP downtrending- possibly ischemic event 2/2 afib with RVR?   - will continue to trend- arrange CT pancreas protocol on outpatient basis

## 2023-07-01 NOTE — ASSESSMENT & PLAN NOTE
- stopped home metoprolol and flecainide   - continue home Xarelto  - JUNIOR/DCCV 06/29- was started on tikosyn, prolonged QTc- started on amiodarone today as QTc <500- plan for amio 200mg BID for 30d then 200mg qday   - changed xarelto to eliquis due to reduced creatinine clearance per pharm recommendations   - ECG q12h  - EP following closely   - Tele

## 2023-07-01 NOTE — SUBJECTIVE & OBJECTIVE
Past Medical History:   Diagnosis Date    Chronic systolic heart failure     Essential (primary) hypertension     Paroxysmal atrial fibrillation        Past Surgical History:   Procedure Laterality Date    ANGIOGRAM, CORONARY, WITH LEFT HEART CATHETERIZATION      TRANSESOPHAGEAL ECHOCARDIOGRAPHY N/A 6/29/2023    Procedure: ECHOCARDIOGRAM, TRANSESOPHAGEAL;  Surgeon: Leola Benavides MD;  Location: [facility] EP LAB;  Service: Cardiology;  Laterality: N/A;    TREATMENT OF CARDIAC ARRHYTHMIA N/A 6/29/2023    Procedure: Cardioversion or Defibrillation;  Surgeon: Devante Arevalo MD;  Location: [facility] EP LAB;  Service: Cardiology;  Laterality: N/A;  AF, JUNIOR/DCCV, ANES, DM, ED BED 11       Review of patient's allergies indicates:   Allergen Reactions    Amiodarone analogues Shortness Of Breath    Penicillins Shortness Of Breath       No current facility-administered medications on file prior to encounter.     Current Outpatient Medications on File Prior to Encounter   Medication Sig    flecainide (TAMBOCOR) 100 MG Tab Take 1 tablet (100 mg total) by mouth every 12 (twelve) hours.    metoprolol succinate (TOPROL-XL) 50 MG 24 hr tablet Take 1 tablet (50 mg total) by mouth 2 (two) times daily.    rivaroxaban (XARELTO) 20 mg Tab Take 1 tablet (20 mg total) by mouth daily with dinner or evening meal.    sacubitriL-valsartan (ENTRESTO) 49-51 mg per tablet Take 1 tablet by mouth 2 (two) times daily.     Family History       Problem Relation (Age of Onset)    No Known Problems Mother, Father          Tobacco Use    Smoking status: Former    Smokeless tobacco: Former    Tobacco comments:     Pt states she quit more than 30 years ago   Substance and Sexual Activity    Alcohol use: No    Drug use: No    Sexual activity: Not Currently     Review of Systems   Constitutional: Positive for malaise/fatigue.   All other systems reviewed and are negative.  Objective:     Vital Signs (Most Recent):  Temp: 97.7 °F (36.5 °C) (07/01/23  0724)  Pulse: (!) 52 (07/01/23 0724)  Resp: 16 (07/01/23 0724)  BP: 108/63 (07/01/23 0724)  SpO2: 97 % (07/01/23 0724) Vital Signs (24h Range):  Temp:  [97.7 °F (36.5 °C)-98 °F (36.7 °C)] 97.7 °F (36.5 °C)  Pulse:  [51-71] 52  Resp:  [16-20] 16  SpO2:  [94 %-97 %] 97 %  BP: (100-108)/(55-63) 108/63       Weight: 99.3 kg (219 lb)  Body mass index is 34.3 kg/m².    SpO2: 97 %        Physical Exam  Constitutional:       Appearance: Normal appearance. She is obese.   HENT:      Head: Normocephalic.      Mouth/Throat:      Mouth: Mucous membranes are moist.   Eyes:      Extraocular Movements: Extraocular movements intact.   Neck:      Comments: JVD to mid neck at 45 degrees  Cardiovascular:      Rate and Rhythm: Regular rhythm. Tachycardia present.      Pulses: Normal pulses.   Pulmonary:      Effort: Pulmonary effort is normal. No respiratory distress.   Abdominal:      Palpations: Abdomen is soft.   Musculoskeletal:      Cervical back: Neck supple.      Right lower leg: Edema present.      Left lower leg: Edema present.   Skin:     General: Skin is warm.   Neurological:      Mental Status: She is alert and oriented to person, place, and time.          Significant Labs: BMP:   Recent Labs   Lab 06/29/23 1845 06/30/23 0605 07/01/23  0332   GLU 96 116* 127*    139 136   K 4.2 4.0  4.0 3.5  3.5   CL 99 96 97   CO2 23 30* 30*   BUN 17 17 15   CREATININE 1.1 1.1 1.0   CALCIUM 10.1 9.2 8.5*   MG 2.1 1.7 1.6     , CMP:   Recent Labs   Lab 06/29/23 1845 06/30/23  0605 07/01/23  0332    139 136   K 4.2 4.0  4.0 3.5  3.5   CL 99 96 97   CO2 23 30* 30*   GLU 96 116* 127*   BUN 17 17 15   CREATININE 1.1 1.1 1.0   CALCIUM 10.1 9.2 8.5*   PROT  --  6.4 5.7*   ALBUMIN  --  3.2* 2.9*   BILITOT  --  3.0* 2.2*   ALKPHOS  --  208* 173*   AST  --  30 22   ALT  --  47* 35   ANIONGAP 18* 13 9       , CBC:   Recent Labs   Lab 06/29/23  1852 06/30/23  0605 07/01/23  0332   WBC 4.27 4.31 4.25   HGB 17.5* 16.3* 14.3   HCT  57.7* 52.8* 47.5   * 117* 121*     , INR:   Recent Labs   Lab 06/29/23  1845   INR 1.5*     , Lipid Panel No results for input(s): CHOL, HDL, LDLCALC, TRIG, CHOLHDL in the last 48 hours., and Troponin No results for input(s): TROPONINI in the last 48 hours.      Significant Imaging:   TTE 2/7/23  The left ventricle is normal in size with normal systolic function.  The estimated ejection fraction is 55%.  Indeterminate left ventricular diastolic function.  Normal right ventricular size with normal right ventricular systolic function.  Moderate left atrial enlargement.  Mild right atrial enlargement.  Mild-to-moderate mitral regurgitation.  Normal central venous pressure (3 mmHg).  The estimated PA systolic pressure is 31 mmHg.

## 2023-07-01 NOTE — ASSESSMENT & PLAN NOTE
Patient is identified as having Combined Systolic and Diastolic heart failure that is Acute on chronic. CHF is currently uncontrolled due to Continued edema of extremities. Latest ECHO performed and demonstrates- Results for orders placed during the hospital encounter of 02/07/23    Echo    Interpretation Summary  · The left ventricle is normal in size with normal systolic function.  · The estimated ejection fraction is 55%.  · Indeterminate left ventricular diastolic function.  · Normal right ventricular size with normal right ventricular systolic function.  · Moderate left atrial enlargement.  · Mild right atrial enlargement.  · Mild-to-moderate mitral regurgitation.  · Normal central venous pressure (3 mmHg).  · The estimated PA systolic pressure is 31 mmHg.  . Continue Beta Blocker and Furosemide and monitor clinical status closely. Monitor on telemetry. Patient is on CHF pathway.  Monitor strict Is&Os and daily weights.  Place on fluid restriction of 1.5 L. Cardiology has been consulted. Continue to stress to patient importance of self efficacy and  on diet for CHF. Last BNP reviewed- and noted below   Recent Labs   Lab 06/28/23  0846   BNP 1,511*     - Repeat echo with reduced EF 25%, CVP 8 , mod-severe TR, severe MR  - Received 120mg IV lasix 06/28, 80mg IV lasix 06/29  - no further diuresis at this time- appears euvolemic

## 2023-07-01 NOTE — PROGRESS NOTES
[facility]  Cardiac Electrophysiology  Progress Note    Admission Date: 6/28/2023  Code Status: Full Code   Attending Physician: Serena Avilez MD   Expected Discharge Date: 7/2/2023  Principal Problem:Atrial flutter with rapid ventricular response    Subjective:   -tikosyn load stopped due to prolong QT problem, QTc now less than 500 so starting amio today  -remains in sinus with rate 55-60, keep BB stopped as done two days ago  -cont xarelto 20 daily   -ok to d/c from EP standpoint   Past Medical History:   Diagnosis Date    Chronic systolic heart failure     Essential (primary) hypertension     Paroxysmal atrial fibrillation        Past Surgical History:   Procedure Laterality Date    ANGIOGRAM, CORONARY, WITH LEFT HEART CATHETERIZATION      TRANSESOPHAGEAL ECHOCARDIOGRAPHY N/A 6/29/2023    Procedure: ECHOCARDIOGRAM, TRANSESOPHAGEAL;  Surgeon: Leola Benavides MD;  Location: [facility] EP LAB;  Service: Cardiology;  Laterality: N/A;    TREATMENT OF CARDIAC ARRHYTHMIA N/A 6/29/2023    Procedure: Cardioversion or Defibrillation;  Surgeon: Devante Arevalo MD;  Location: [facility] EP LAB;  Service: Cardiology;  Laterality: N/A;  AF, JUNIOR/DCCV, ANES, DM, ED BED 11       Review of patient's allergies indicates:   Allergen Reactions    Amiodarone analogues Shortness Of Breath    Penicillins Shortness Of Breath       No current facility-administered medications on file prior to encounter.     Current Outpatient Medications on File Prior to Encounter   Medication Sig    flecainide (TAMBOCOR) 100 MG Tab Take 1 tablet (100 mg total) by mouth every 12 (twelve) hours.    metoprolol succinate (TOPROL-XL) 50 MG 24 hr tablet Take 1 tablet (50 mg total) by mouth 2 (two) times daily.    rivaroxaban (XARELTO) 20 mg Tab Take 1 tablet (20 mg total) by mouth daily with dinner or evening meal.    sacubitriL-valsartan (ENTRESTO) 49-51 mg per tablet Take 1 tablet by mouth 2 (two) times daily.     Family  History       Problem Relation (Age of Onset)    No Known Problems Mother, Father          Tobacco Use    Smoking status: Former    Smokeless tobacco: Former    Tobacco comments:     Pt states she quit more than 30 years ago   Substance and Sexual Activity    Alcohol use: No    Drug use: No    Sexual activity: Not Currently     Review of Systems   Constitutional: Positive for malaise/fatigue.   All other systems reviewed and are negative.  Objective:     Vital Signs (Most Recent):  Temp: 97.7 °F (36.5 °C) (07/01/23 0724)  Pulse: (!) 52 (07/01/23 0724)  Resp: 16 (07/01/23 0724)  BP: 108/63 (07/01/23 0724)  SpO2: 97 % (07/01/23 0724) Vital Signs (24h Range):  Temp:  [97.7 °F (36.5 °C)-98 °F (36.7 °C)] 97.7 °F (36.5 °C)  Pulse:  [51-71] 52  Resp:  [16-20] 16  SpO2:  [94 %-97 %] 97 %  BP: (100-108)/(55-63) 108/63       Weight: 99.3 kg (219 lb)  Body mass index is 34.3 kg/m².    SpO2: 97 %        Physical Exam  Constitutional:       Appearance: Normal appearance. She is obese.   HENT:      Head: Normocephalic.      Mouth/Throat:      Mouth: Mucous membranes are moist.   Eyes:      Extraocular Movements: Extraocular movements intact.   Neck:      Comments: JVD to mid neck at 45 degrees  Cardiovascular:      Rate and Rhythm: Regular rhythm. Tachycardia present.      Pulses: Normal pulses.   Pulmonary:      Effort: Pulmonary effort is normal. No respiratory distress.   Abdominal:      Palpations: Abdomen is soft.   Musculoskeletal:      Cervical back: Neck supple.      Right lower leg: Edema present.      Left lower leg: Edema present.   Skin:     General: Skin is warm.   Neurological:      Mental Status: She is alert and oriented to person, place, and time.          Significant Labs: BMP:   Recent Labs   Lab 06/29/23  1845 06/30/23  0605 07/01/23  0332   GLU 96 116* 127*    139 136   K 4.2 4.0  4.0 3.5  3.5   CL 99 96 97   CO2 23 30* 30*   BUN 17 17 15   CREATININE 1.1 1.1 1.0   CALCIUM 10.1 9.2 8.5*   MG 2.1  1.7 1.6     , CMP:   Recent Labs   Lab 06/29/23  1845 06/30/23  0605 07/01/23  0332    139 136   K 4.2 4.0  4.0 3.5  3.5   CL 99 96 97   CO2 23 30* 30*   GLU 96 116* 127*   BUN 17 17 15   CREATININE 1.1 1.1 1.0   CALCIUM 10.1 9.2 8.5*   PROT  --  6.4 5.7*   ALBUMIN  --  3.2* 2.9*   BILITOT  --  3.0* 2.2*   ALKPHOS  --  208* 173*   AST  --  30 22   ALT  --  47* 35   ANIONGAP 18* 13 9       , CBC:   Recent Labs   Lab 06/29/23  1852 06/30/23  0605 07/01/23  0332   WBC 4.27 4.31 4.25   HGB 17.5* 16.3* 14.3   HCT 57.7* 52.8* 47.5   * 117* 121*     , INR:   Recent Labs   Lab 06/29/23 1845   INR 1.5*     , Lipid Panel No results for input(s): CHOL, HDL, LDLCALC, TRIG, CHOLHDL in the last 48 hours., and Troponin No results for input(s): TROPONINI in the last 48 hours.      Significant Imaging:   TTE 2/7/23   The left ventricle is normal in size with normal systolic function.   The estimated ejection fraction is 55%.   Indeterminate left ventricular diastolic function.   Normal right ventricular size with normal right ventricular systolic function.   Moderate left atrial enlargement.   Mild right atrial enlargement.   Mild-to-moderate mitral regurgitation.   Normal central venous pressure (3 mmHg).   The estimated PA systolic pressure is 31 mmHg.    Assessment and Plan:     * Atrial flutter with rapid ventricular response  Persistent Atrial Fibrillation  Atypical Atrial Flutter  Acute Heart Failure (likely combined systolic and diastolic)  Tachycardia-mediated Cardiomyopathy    EF previously 60% on TTE 2/23. Had JUNIOR/DCCV 9/22. Currently on Toprol 50 mg BID and Flecainide 100 mg BID. On Eliquis for AF/AFL ([id number]). Presenting in regular WCT today concerning for AFL vs VT. Hemodynamically stable on room air. No effect from Adenosine 12 mg x1 and 18 mg x1. HR slowed to 100s after IV Metoprolol 5 mg x1 and revealed atypical atrial flutter with variable conduction. Initial rhythm consistent with  atypical atrial flutter with RVR and aberrancy.       Plan  -underwent JUNIOR/DCCV 6/29 with post op NSR 55-60 sostopped home toprol xl, d/c BB   -no tikosyn due to QT problem, start amio today 200mg BID x30 days then 200mg daily  -keep xarelto   -volume management per primary team   -PVI/CTI outpatient with Dr Polk, f/up with Dr Polk outpatient    EP will sign off. Thanks for the consult.         Enrique Velasco MD  Cardiac Electrophysiology  [facility]

## 2023-07-01 NOTE — ASSESSMENT & PLAN NOTE
- US of abd w/indeterminate 1.3cm hypoechoic focus in or adjacent to pancreatic head/body- pancreatic mass protocol- MRI vs. CT w/contrast recommended.   - ordered MRI w/contrast- pancreatic protocol- no identifiable mass, but recommended CT pancreas protocol will hold off for now as patient just had contrast exposure- can plan on obtaining via outpatient basis

## 2023-07-02 PROBLEM — D72.819 LEUKOPENIA: Status: ACTIVE | Noted: 2023-07-02

## 2023-07-02 PROBLEM — D69.6 THROMBOCYTOPENIA: Status: ACTIVE | Noted: 2023-07-02

## 2023-07-02 LAB
ALBUMIN SERPL BCP-MCNC: 2.8 G/DL (ref 3.5–5.2)
ALP SERPL-CCNC: 145 U/L (ref 55–135)
ALT SERPL W/O P-5'-P-CCNC: 33 U/L (ref 10–44)
ANION GAP SERPL CALC-SCNC: 10 MMOL/L (ref 8–16)
AST SERPL-CCNC: 26 U/L (ref 10–40)
BASOPHILS # BLD AUTO: 0.02 K/UL (ref 0–0.2)
BASOPHILS NFR BLD: 0.5 % (ref 0–1.9)
BILIRUB SERPL-MCNC: 1.5 MG/DL (ref 0.1–1)
BUN SERPL-MCNC: 10 MG/DL (ref 8–23)
CALCIUM SERPL-MCNC: 8.5 MG/DL (ref 8.7–10.5)
CHLORIDE SERPL-SCNC: 105 MMOL/L (ref 95–110)
CO2 SERPL-SCNC: 24 MMOL/L (ref 23–29)
CREAT SERPL-MCNC: 0.9 MG/DL (ref 0.5–1.4)
DIFFERENTIAL METHOD: ABNORMAL
EOSINOPHIL # BLD AUTO: 0.1 K/UL (ref 0–0.5)
EOSINOPHIL NFR BLD: 2.6 % (ref 0–8)
ERYTHROCYTE [DISTWIDTH] IN BLOOD BY AUTOMATED COUNT: 14.5 % (ref 11.5–14.5)
ERYTHROCYTE [DISTWIDTH] IN BLOOD BY AUTOMATED COUNT: 14.6 % (ref 11.5–14.5)
EST. GFR  (NO RACE VARIABLE): >60 ML/MIN/1.73 M^2
GLUCOSE SERPL-MCNC: 115 MG/DL (ref 70–110)
HCT VFR BLD AUTO: 44.3 % (ref 37–48.5)
HCT VFR BLD AUTO: 50.6 % (ref 37–48.5)
HGB BLD-MCNC: 13.7 G/DL (ref 12–16)
HGB BLD-MCNC: 15.8 G/DL (ref 12–16)
IMM GRANULOCYTES # BLD AUTO: 0.01 K/UL (ref 0–0.04)
IMM GRANULOCYTES NFR BLD AUTO: 0.3 % (ref 0–0.5)
LYMPHOCYTES # BLD AUTO: 1.6 K/UL (ref 1–4.8)
LYMPHOCYTES NFR BLD: 41.6 % (ref 18–48)
MAGNESIUM SERPL-MCNC: 2 MG/DL (ref 1.6–2.6)
MCH RBC QN AUTO: 27 PG (ref 27–31)
MCH RBC QN AUTO: 27.1 PG (ref 27–31)
MCHC RBC AUTO-ENTMCNC: 30.9 G/DL (ref 32–36)
MCHC RBC AUTO-ENTMCNC: 31.2 G/DL (ref 32–36)
MCV RBC AUTO: 86 FL (ref 82–98)
MCV RBC AUTO: 88 FL (ref 82–98)
MONOCYTES # BLD AUTO: 0.5 K/UL (ref 0.3–1)
MONOCYTES NFR BLD: 11.6 % (ref 4–15)
NEUTROPHILS # BLD AUTO: 1.7 K/UL (ref 1.8–7.7)
NEUTROPHILS NFR BLD: 43.4 % (ref 38–73)
NRBC BLD-RTO: 0 /100 WBC
PLATELET # BLD AUTO: 121 K/UL (ref 150–450)
PLATELET # BLD AUTO: 144 K/UL (ref 150–450)
PMV BLD AUTO: 12.2 FL (ref 9.2–12.9)
PMV BLD AUTO: 12.3 FL (ref 9.2–12.9)
POTASSIUM SERPL-SCNC: 4.3 MMOL/L (ref 3.5–5.1)
POTASSIUM SERPL-SCNC: 4.3 MMOL/L (ref 3.5–5.1)
PROT SERPL-MCNC: 5.5 G/DL (ref 6–8.4)
RBC # BLD AUTO: 5.06 M/UL (ref 4–5.4)
RBC # BLD AUTO: 5.86 M/UL (ref 4–5.4)
SODIUM SERPL-SCNC: 139 MMOL/L (ref 136–145)
T4 FREE SERPL-MCNC: 1.1 NG/DL (ref 0.71–1.51)
TSH SERPL DL<=0.005 MIU/L-ACNC: 1.48 UIU/ML (ref 0.4–4)
WBC # BLD AUTO: 3.87 K/UL (ref 3.9–12.7)
WBC # BLD AUTO: 4.35 K/UL (ref 3.9–12.7)

## 2023-07-02 PROCEDURE — 93005 ELECTROCARDIOGRAM TRACING: CPT

## 2023-07-02 PROCEDURE — 25000003 PHARM REV CODE 250: Performed by: STUDENT IN AN ORGANIZED HEALTH CARE EDUCATION/TRAINING PROGRAM

## 2023-07-02 PROCEDURE — 94761 N-INVAS EAR/PLS OXIMETRY MLT: CPT

## 2023-07-02 PROCEDURE — 99900035 HC TECH TIME PER 15 MIN (STAT)

## 2023-07-02 PROCEDURE — 36415 COLL VENOUS BLD VENIPUNCTURE: CPT | Performed by: STUDENT IN AN ORGANIZED HEALTH CARE EDUCATION/TRAINING PROGRAM

## 2023-07-02 PROCEDURE — 63600175 PHARM REV CODE 636 W HCPCS: Performed by: STUDENT IN AN ORGANIZED HEALTH CARE EDUCATION/TRAINING PROGRAM

## 2023-07-02 PROCEDURE — 83735 ASSAY OF MAGNESIUM: CPT | Performed by: HOSPITALIST

## 2023-07-02 PROCEDURE — 80053 COMPREHEN METABOLIC PANEL: CPT | Performed by: STUDENT IN AN ORGANIZED HEALTH CARE EDUCATION/TRAINING PROGRAM

## 2023-07-02 PROCEDURE — 84439 ASSAY OF FREE THYROXINE: CPT | Performed by: STUDENT IN AN ORGANIZED HEALTH CARE EDUCATION/TRAINING PROGRAM

## 2023-07-02 PROCEDURE — 93010 ELECTROCARDIOGRAM REPORT: CPT | Mod: ,,, | Performed by: INTERNAL MEDICINE

## 2023-07-02 PROCEDURE — 85027 COMPLETE CBC AUTOMATED: CPT | Performed by: STUDENT IN AN ORGANIZED HEALTH CARE EDUCATION/TRAINING PROGRAM

## 2023-07-02 PROCEDURE — 84443 ASSAY THYROID STIM HORMONE: CPT | Performed by: STUDENT IN AN ORGANIZED HEALTH CARE EDUCATION/TRAINING PROGRAM

## 2023-07-02 PROCEDURE — 36415 COLL VENOUS BLD VENIPUNCTURE: CPT | Performed by: HOSPITALIST

## 2023-07-02 PROCEDURE — 99233 PR SUBSEQUENT HOSPITAL CARE,LEVL III: ICD-10-PCS | Mod: ,,, | Performed by: STUDENT IN AN ORGANIZED HEALTH CARE EDUCATION/TRAINING PROGRAM

## 2023-07-02 PROCEDURE — 99233 SBSQ HOSP IP/OBS HIGH 50: CPT | Mod: ,,, | Performed by: STUDENT IN AN ORGANIZED HEALTH CARE EDUCATION/TRAINING PROGRAM

## 2023-07-02 PROCEDURE — 85025 COMPLETE CBC W/AUTO DIFF WBC: CPT | Performed by: HOSPITALIST

## 2023-07-02 PROCEDURE — 93010 EKG 12-LEAD: ICD-10-PCS | Mod: ,,, | Performed by: INTERNAL MEDICINE

## 2023-07-02 PROCEDURE — 20600001 HC STEP DOWN PRIVATE ROOM

## 2023-07-02 RX ORDER — FUROSEMIDE 10 MG/ML
40 INJECTION INTRAMUSCULAR; INTRAVENOUS ONCE
Status: COMPLETED | OUTPATIENT
Start: 2023-07-02 | End: 2023-07-02

## 2023-07-02 RX ORDER — HYDROXYZINE HYDROCHLORIDE 25 MG/1
25 TABLET, FILM COATED ORAL 3 TIMES DAILY PRN
Status: DISCONTINUED | OUTPATIENT
Start: 2023-07-02 | End: 2023-07-02

## 2023-07-02 RX ORDER — HYDROXYZINE HYDROCHLORIDE 10 MG/1
10 TABLET, FILM COATED ORAL ONCE
Status: DISCONTINUED | OUTPATIENT
Start: 2023-07-02 | End: 2023-07-06 | Stop reason: HOSPADM

## 2023-07-02 RX ADMIN — RIVAROXABAN 20 MG: 20 TABLET, FILM COATED ORAL at 04:07

## 2023-07-02 RX ADMIN — FUROSEMIDE 40 MG: 10 INJECTION, SOLUTION INTRAMUSCULAR; INTRAVENOUS at 01:07

## 2023-07-02 NOTE — PROGRESS NOTES
"[facility] - Cardiology [facility] Medicine  Progress Note    Patient Name: Yaneli Steele  MRN: [id number]  Patient Class: IP- Inpatient   Admission Date: 6/28/2023  Length of Stay: 4 days  Attending Physician: Serena Avilez MD  Primary Care Provider: Jannet Le MD        Subjective:     Principal Problem:Atrial flutter with rapid ventricular response        HPI:  67yoF with combined heart failure (recovered EF), pAF/AFL s/p DCCV 9/22, HTN, presenting with elevated heart rate associated with mild nausea & fatigue.  Patient was seen at her regularly scheduled EP appointment this morning.  She was noted to have a heart rate of 160 with a wide complex tachycardia and sent to ED.  Denies chest pain or shortness of breath.  Recent hospitalization for AF RVR managed medically.     In ED, 's and gave IV adenosine with no effect. Improved with IV metoprolol and rhythm noted to be atypical atrial flutter.  BP lowered to 85/56 in ED, now improved to 115 systolic. BNP 1511 , lactate 3.3, TBili 2.7, AST 77, ALT 75. She also received IV Lasix 80 mg.       Overview/Hospital Course:  Cardiology consulted- patient underwent DCCV/JUNIOR 06/29. Started on Tikosyn. Prolonged Qtc 572- patient had received first tikosyn dose PM of 06/29. She had dizziness- hypotension- given back 250cc NS 06/30- elevated lactate 3.1. Repeat lactate improved to normal. MRI pancreas protocol obtained due to hypoechoic focus at pancreatic head per US- MRI w/the following results:    1. No identifiable pancreatic mass or ductal dilatation.  Recommend further evaluation of sonographic abnormality with pancreas protocol CT.  2. Cholelithiasis with mural calcification.  No signs of acute cholecystitis.    T bili downtrended from 3 to 2.2. She was also started on amiodarone as tikosyn was stopped- started amiodarone 200mg BID for load. She received 2 doses of amiodarone, but stated that she was feeling "anxious and jittery." She refused " "further amio dose- it was explained to her that this was the only anti-arrhythmic available with per prolonged Qtc. She continued to decline. Offered anti-anxiety meds- patient declined. Xarelto was also briefly transitioned to eliquis with amiodarone and decreased CrCl. Patient requested to resume xarelto with not taking amiodarone, and so she was transitioned back. She was noted to have declining H:H- but no source of blood loss. WBC and plt noted to be downtrending. Given dose of lasix with concern for fluid-overload.       Interval History: Patient was seen and evaluated this am. Patient reports anxiety and "jitteriness" today- last dose of amio was night prior. Patient is declining further amio doses. I discussed how amiodarone is not typically associated with anxiety symptoms and offered anti-anxiety meds, but patient declined. I discussed that we are limited with medications and if she decides against amio then she has a high liklihood of converting back to afib with possible RVR. She reports that she is aware, but still wanting to not do amio until speaking with cardiology once more. No nausea, vomiting, fevers, chills, night sweats, abd pain, diarrhea, constipation.    Discussed declining H:H, WBC, Plt- patient denied hematochezia, melena, hemoptysis, hematemesis, abd pain, chest pain, sob.     Objective:     Vital Signs (Most Recent):  Temp: 97.8 °F (36.6 °C) (07/02/23 1127)  Pulse: (!) 55 (07/02/23 1127)  Resp: 20 (07/02/23 1127)  BP: 118/72 (07/02/23 1127)  SpO2: 98 % (07/02/23 1127) Vital Signs (24h Range):  Temp:  [97.5 °F (36.4 °C)-98.6 °F (37 °C)] 97.8 °F (36.6 °C)  Pulse:  [52-61] 55  Resp:  [16-20] 20  SpO2:  [93 %-98 %] 98 %  BP: (100-135)/(62-81) 118/72     Weight: 97.1 kg (214 lb 1.1 oz)  Body mass index is 33.53 kg/m².    Intake/Output Summary (Last 24 hours) at 7/2/2023 1344  Last data filed at 7/2/2023 1330  Gross per 24 hour   Intake 1200 ml   Output --   Net 1200 ml      Physical Exam  Gen: " in NAD, appears stated age  Neuro: AAOx3, motor, sensory, and strength grossly intact BL  HEENT: NTNC, EOMI, PERRL, MMM  CVS: reg rhythm, bradycardic, no m/r/g; S1/S2 auscultated with no S3 or S4; capillary refill < 2 sec  Resp: lungs CTAB, no w/r/r; no belabored breathing or accessory muscle use appreciated   Abd: BS+ in all 4 quadrants; NTND, soft to palpation; no organomegaly appreciated   Extrem: pulses full, equal, and regular over all 4 extremities; no UE edema, BL LE non-pitting edema    Significant Labs: All pertinent labs within the past 24 hours have been reviewed.  CBC:   Recent Labs   Lab 07/01/23  0332 07/02/23  0400   WBC 4.25 3.87*   HGB 14.3 13.7   HCT 47.5 44.3   * 121*     CMP:   Recent Labs   Lab 07/01/23  0332 07/02/23  0400    139   K 3.5  3.5 4.3  4.3   CL 97 105   CO2 30* 24   * 115*   BUN 15 10   CREATININE 1.0 0.9   CALCIUM 8.5* 8.5*   PROT 5.7* 5.5*   ALBUMIN 2.9* 2.8*   BILITOT 2.2* 1.5*   ALKPHOS 173* 145*   AST 22 26   ALT 35 33   ANIONGAP 9 10     Troponin:   No results for input(s): TROPONINI, TROPONINIHS in the last 48 hours.      Significant Imaging: I have reviewed all pertinent imaging results/findings within the past 24 hours.      Assessment/Plan:      * Atrial flutter with rapid ventricular response  - stopped home metoprolol and flecainide   - continue home Xarelto  - JUNIOR/DCCV 06/29- was started on tikosyn, prolonged QTc- started on amiodarone today as QTc <500- plan for amio 200mg BID for 30d then 200mg qday - patient now declining amiodarone- will reach out to EP  - patient declining eliquis and requesting to be transitioned back to xarelto since she is not taking amiodarone- will resume xarelto  - ECG q12h  - EP following closely   - Tele     Thrombocytopenia  - see leukopenia    Leukopenia  - new leukopenia today- but patient with declining hemoglobin for the past couple of days- with low WBC, plt and declining Hg- possibility of hemodilution will  admin lasix 40mg IV x1 today- repeat CBC this afternoon  - also, amio can be associated with pancytopenia though patient not meeting exact criteria at the moment with H:H being wnl- and also patient only received 2 doses of amio so far during current hospitalization  - She did briefly have nasal congestion, and so consideration of transient viral suppression  - will evaluate meds for any possible contributers  - Trend CBC- if further decline by tomorrow, will discuss with hematology     Nasal congestion  - resolved      Hypotension  - elevated lactate and hypotension  - s/p gentle IVF  - Repeat lactate wnl  - Resolved   - monitor BP closely      Pancreatic mass  - US of abd w/indeterminate 1.3cm hypoechoic focus in or adjacent to pancreatic head/body- pancreatic mass protocol- MRI vs. CT w/contrast recommended.   - ordered MRI w/contrast- pancreatic protocol- no identifiable mass, but recommended CT pancreas protocol will hold off for now as patient just had contrast exposure- can plan on obtaining via outpatient basis     Abnormal LFTs (liver function tests)  - Elevated TBili, AST/ALT/ALP  - Concern for elevation 2/2 congestive hepatopathy  - RUQ US obtained w/no findings of cholecystitis or ductal dilation; however, indeterminate 1.3cm hypoechoic focus in or adjacent to pancreatic head/body- pancreatic mass protocol- MRI vs. CT w/contrast recommended.   - Repeat LFTs tomorrow am  - MRI pancreas protocol with the following findings:     1. No identifiable pancreatic mass or ductal dilatation.  Recommend further evaluation of sonographic abnormality with pancreas protocol CT.  2. Cholelithiasis with mural calcification.  No signs of acute cholecystitis.    - T bili downtrending daily- 1.5 today, AST:ALT wnl and ALP downtrending- possibly ischemic event 2/2 afib with RVR?   - will continue to trend- arrange CT pancreas protocol on outpatient basis     Acute on chronic combined systolic and diastolic congestive heart  failure  Patient is identified as having Combined Systolic and Diastolic heart failure that is Acute on chronic. CHF is currently uncontrolled due to Continued edema of extremities. Latest ECHO performed and demonstrates- Results for orders placed during the hospital encounter of 02/07/23    Echo    Interpretation Summary  · The left ventricle is normal in size with normal systolic function.  · The estimated ejection fraction is 55%.  · Indeterminate left ventricular diastolic function.  · Normal right ventricular size with normal right ventricular systolic function.  · Moderate left atrial enlargement.  · Mild right atrial enlargement.  · Mild-to-moderate mitral regurgitation.  · Normal central venous pressure (3 mmHg).  · The estimated PA systolic pressure is 31 mmHg.  . Continue Beta Blocker and Furosemide and monitor clinical status closely. Monitor on telemetry. Patient is on CHF pathway.  Monitor strict Is&Os and daily weights.  Place on fluid restriction of 1.5 L. Cardiology has been consulted. Continue to stress to patient importance of self efficacy and  on diet for CHF. Last BNP reviewed- and noted below   Recent Labs   Lab 06/28/23  0846   BNP 1,511*     - Repeat echo with reduced EF 25%, CVP 8 , mod-severe TR, severe MR  - Received 120mg IV lasix 06/28, 80mg IV lasix 06/29  - will give dose of IV lasix 40mg x1 today    Essential (primary) hypertension  - BP in 80s in ED, now hypotension  - holding Entresto    VTE Risk Mitigation (From admission, onward)         Ordered     rivaroxaban tablet 20 mg  With dinner         07/02/23 1223                Discharge Planning   DREW: 7/2/2023     Code Status: Full Code   Is the patient medically ready for discharge?: No    Reason for patient still in hospital (select all that apply): Patient trending condition  Discharge Plan A: Home                    Serena Avilez MD  Department of Hospital Medicine   [facility]

## 2023-07-02 NOTE — ASSESSMENT & PLAN NOTE
- new leukopenia today- but patient with declining hemoglobin for the past couple of days- with low WBC, plt and declining Hg- possibility of hemodilution will admin lasix 40mg IV x1 today- repeat CBC this afternoon  - also, amio can be associated with pancytopenia though patient not meeting exact criteria at the moment with H:H being wnl- and also patient only received 2 doses of amio so far during current hospitalization  - She did briefly have nasal congestion, and so consideration of transient viral suppression  - will evaluate meds for any possible contributers  - Trend CBC- if further decline by tomorrow, will discuss with hematology

## 2023-07-02 NOTE — ASSESSMENT & PLAN NOTE
- stopped home metoprolol and flecainide   - continue home Xarelto  - JUNIOR/DCCV 06/29- was started on tikosyn, prolonged QTc- started on amiodarone today as QTc <500- plan for amio 200mg BID for 30d then 200mg qday - patient now declining amiodarone- will reach out to EP  - patient declining eliquis and requesting to be transitioned back to xarelto since she is not taking amiodarone- will resume xarelto  - ECG q12h  - EP following closely   - Tele

## 2023-07-02 NOTE — ASSESSMENT & PLAN NOTE
- Elevated TBili, AST/ALT/ALP  - Concern for elevation 2/2 congestive hepatopathy  - RUQ US obtained w/no findings of cholecystitis or ductal dilation; however, indeterminate 1.3cm hypoechoic focus in or adjacent to pancreatic head/body- pancreatic mass protocol- MRI vs. CT w/contrast recommended.   - Repeat LFTs tomorrow am  - MRI pancreas protocol with the following findings:     1. No identifiable pancreatic mass or ductal dilatation.  Recommend further evaluation of sonographic abnormality with pancreas protocol CT.  2. Cholelithiasis with mural calcification.  No signs of acute cholecystitis.    - T bili downtrending daily- 1.5 today, AST:ALT wnl and ALP downtrending- possibly ischemic event 2/2 afib with RVR?   - will continue to trend- arrange CT pancreas protocol on outpatient basis

## 2023-07-02 NOTE — PLAN OF CARE
AAOX4,VSS,O2 sats>92% on room air. Plan of care discussed with patient. Patient has no complaints of pain/SOB. Discussed medications and care. Patient has no questions at this time.Pt visualised and stable.Call light within reach.Pt resting,bed at lowest position.     Problem: Adult Inpatient Plan of Care  Goal: Plan of Care Review  7/2/2023 1709 by Tataina Renae RN  Outcome: Ongoing, Progressing  7/2/2023 1647 by Tatiana Renae RN  Outcome: Ongoing, Progressing  Goal: Absence of Hospital-Acquired Illness or Injury  7/2/2023 1709 by Tatiana Renae RN  Outcome: Ongoing, Progressing  7/2/2023 1647 by Tatiana Renae RN  Outcome: Ongoing, Progressing  Goal: Optimal Comfort and Wellbeing  7/2/2023 1709 by Tatiana Renae RN  Outcome: Ongoing, Progressing  7/2/2023 1647 by Tatiana Renae RN  Outcome: Ongoing, Progressing     Problem: Fall Injury Risk  Goal: Absence of Fall and Fall-Related Injury  Outcome: Ongoing, Progressing

## 2023-07-02 NOTE — SUBJECTIVE & OBJECTIVE
"Interval History: Patient was seen and evaluated this am. Patient reports anxiety and "jitteriness" today- last dose of amio was night prior. Patient is declining further amio doses. I discussed how amiodarone is not typically associated with anxiety symptoms and offered anti-anxiety meds, but patient declined. I discussed that we are limited with medications and if she decides against amio then she has a high liklihood of converting back to afib with possible RVR. She reports that she is aware, but still wanting to not do amio until speaking with cardiology once more. No nausea, vomiting, fevers, chills, night sweats, abd pain, diarrhea, constipation.    Discussed declining H:H, WBC, Plt- patient denied hematochezia, melena, hemoptysis, hematemesis, abd pain, chest pain, sob.     Objective:     Vital Signs (Most Recent):  Temp: 97.8 °F (36.6 °C) (07/02/23 1127)  Pulse: (!) 55 (07/02/23 1127)  Resp: 20 (07/02/23 1127)  BP: 118/72 (07/02/23 1127)  SpO2: 98 % (07/02/23 1127) Vital Signs (24h Range):  Temp:  [97.5 °F (36.4 °C)-98.6 °F (37 °C)] 97.8 °F (36.6 °C)  Pulse:  [52-61] 55  Resp:  [16-20] 20  SpO2:  [93 %-98 %] 98 %  BP: (100-135)/(62-81) 118/72     Weight: 97.1 kg (214 lb 1.1 oz)  Body mass index is 33.53 kg/m².    Intake/Output Summary (Last 24 hours) at 7/2/2023 1344  Last data filed at 7/2/2023 1330  Gross per 24 hour   Intake 1200 ml   Output --   Net 1200 ml      Physical Exam  Gen: in NAD, appears stated age  Neuro: AAOx3, motor, sensory, and strength grossly intact BL  HEENT: NTNC, EOMI, PERRL, MMM  CVS: reg rhythm, bradycardic, no m/r/g; S1/S2 auscultated with no S3 or S4; capillary refill < 2 sec  Resp: lungs CTAB, no w/r/r; no belabored breathing or accessory muscle use appreciated   Abd: BS+ in all 4 quadrants; NTND, soft to palpation; no organomegaly appreciated   Extrem: pulses full, equal, and regular over all 4 extremities; no UE edema, BL LE non-pitting edema    Significant Labs: All pertinent " labs within the past 24 hours have been reviewed.  CBC:   Recent Labs   Lab 07/01/23  0332 07/02/23  0400   WBC 4.25 3.87*   HGB 14.3 13.7   HCT 47.5 44.3   * 121*     CMP:   Recent Labs   Lab 07/01/23  0332 07/02/23  0400    139   K 3.5  3.5 4.3  4.3   CL 97 105   CO2 30* 24   * 115*   BUN 15 10   CREATININE 1.0 0.9   CALCIUM 8.5* 8.5*   PROT 5.7* 5.5*   ALBUMIN 2.9* 2.8*   BILITOT 2.2* 1.5*   ALKPHOS 173* 145*   AST 22 26   ALT 35 33   ANIONGAP 9 10     Troponin:   No results for input(s): TROPONINI, TROPONINIHS in the last 48 hours.      Significant Imaging: I have reviewed all pertinent imaging results/findings within the past 24 hours.

## 2023-07-02 NOTE — ASSESSMENT & PLAN NOTE
Patient is identified as having Combined Systolic and Diastolic heart failure that is Acute on chronic. CHF is currently uncontrolled due to Continued edema of extremities. Latest ECHO performed and demonstrates- Results for orders placed during the hospital encounter of 02/07/23    Echo    Interpretation Summary  · The left ventricle is normal in size with normal systolic function.  · The estimated ejection fraction is 55%.  · Indeterminate left ventricular diastolic function.  · Normal right ventricular size with normal right ventricular systolic function.  · Moderate left atrial enlargement.  · Mild right atrial enlargement.  · Mild-to-moderate mitral regurgitation.  · Normal central venous pressure (3 mmHg).  · The estimated PA systolic pressure is 31 mmHg.  . Continue Beta Blocker and Furosemide and monitor clinical status closely. Monitor on telemetry. Patient is on CHF pathway.  Monitor strict Is&Os and daily weights.  Place on fluid restriction of 1.5 L. Cardiology has been consulted. Continue to stress to patient importance of self efficacy and  on diet for CHF. Last BNP reviewed- and noted below   Recent Labs   Lab 06/28/23  0846   BNP 1,511*     - Repeat echo with reduced EF 25%, CVP 8 , mod-severe TR, severe MR  - Received 120mg IV lasix 06/28, 80mg IV lasix 06/29  - will give dose of IV lasix 40mg x1 today

## 2023-07-03 ENCOUNTER — ANESTHESIA EVENT (OUTPATIENT)
Dept: MEDSURG UNIT | Facility: HOSPITAL | Age: 68
DRG: 273 | End: 2023-07-03
Payer: COMMERCIAL

## 2023-07-03 LAB
ALBUMIN SERPL BCP-MCNC: 2.9 G/DL (ref 3.5–5.2)
ALP SERPL-CCNC: 154 U/L (ref 55–135)
ALT SERPL W/O P-5'-P-CCNC: 31 U/L (ref 10–44)
ANION GAP SERPL CALC-SCNC: 10 MMOL/L (ref 8–16)
AST SERPL-CCNC: 25 U/L (ref 10–40)
BACTERIA BLD CULT: NORMAL
BACTERIA BLD CULT: NORMAL
BASOPHILS # BLD AUTO: 0.03 K/UL (ref 0–0.2)
BASOPHILS NFR BLD: 0.7 % (ref 0–1.9)
BILIRUB SERPL-MCNC: 1.5 MG/DL (ref 0.1–1)
BUN SERPL-MCNC: 8 MG/DL (ref 8–23)
CALCIUM SERPL-MCNC: 9 MG/DL (ref 8.7–10.5)
CHLORIDE SERPL-SCNC: 104 MMOL/L (ref 95–110)
CO2 SERPL-SCNC: 26 MMOL/L (ref 23–29)
CREAT SERPL-MCNC: 0.8 MG/DL (ref 0.5–1.4)
DIFFERENTIAL METHOD: ABNORMAL
EOSINOPHIL # BLD AUTO: 0.1 K/UL (ref 0–0.5)
EOSINOPHIL NFR BLD: 3.2 % (ref 0–8)
ERYTHROCYTE [DISTWIDTH] IN BLOOD BY AUTOMATED COUNT: 14.6 % (ref 11.5–14.5)
EST. GFR  (NO RACE VARIABLE): >60 ML/MIN/1.73 M^2
GLUCOSE SERPL-MCNC: 105 MG/DL (ref 70–110)
HCT VFR BLD AUTO: 48.5 % (ref 37–48.5)
HGB BLD-MCNC: 15.2 G/DL (ref 12–16)
IMM GRANULOCYTES # BLD AUTO: 0 K/UL (ref 0–0.04)
IMM GRANULOCYTES NFR BLD AUTO: 0 % (ref 0–0.5)
LYMPHOCYTES # BLD AUTO: 1.9 K/UL (ref 1–4.8)
LYMPHOCYTES NFR BLD: 42.1 % (ref 18–48)
MAGNESIUM SERPL-MCNC: 1.8 MG/DL (ref 1.6–2.6)
MCH RBC QN AUTO: 26.9 PG (ref 27–31)
MCHC RBC AUTO-ENTMCNC: 31.3 G/DL (ref 32–36)
MCV RBC AUTO: 86 FL (ref 82–98)
MONOCYTES # BLD AUTO: 0.5 K/UL (ref 0.3–1)
MONOCYTES NFR BLD: 11.8 % (ref 4–15)
NEUTROPHILS # BLD AUTO: 1.9 K/UL (ref 1.8–7.7)
NEUTROPHILS NFR BLD: 42.2 % (ref 38–73)
NRBC BLD-RTO: 0 /100 WBC
PLATELET # BLD AUTO: 131 K/UL (ref 150–450)
PMV BLD AUTO: 12 FL (ref 9.2–12.9)
POTASSIUM SERPL-SCNC: 3.7 MMOL/L (ref 3.5–5.1)
POTASSIUM SERPL-SCNC: 3.7 MMOL/L (ref 3.5–5.1)
PROT SERPL-MCNC: 5.8 G/DL (ref 6–8.4)
RBC # BLD AUTO: 5.65 M/UL (ref 4–5.4)
SODIUM SERPL-SCNC: 140 MMOL/L (ref 136–145)
WBC # BLD AUTO: 4.42 K/UL (ref 3.9–12.7)

## 2023-07-03 PROCEDURE — 99233 SBSQ HOSP IP/OBS HIGH 50: CPT | Mod: ,,, | Performed by: INTERNAL MEDICINE

## 2023-07-03 PROCEDURE — 93010 ELECTROCARDIOGRAM REPORT: CPT | Mod: ,,, | Performed by: INTERNAL MEDICINE

## 2023-07-03 PROCEDURE — 85025 COMPLETE CBC W/AUTO DIFF WBC: CPT | Performed by: HOSPITALIST

## 2023-07-03 PROCEDURE — 93010 ELECTROCARDIOGRAM REPORT: CPT | Mod: 76,,, | Performed by: INTERNAL MEDICINE

## 2023-07-03 PROCEDURE — 99291 PR CRITICAL CARE, E/M 30-74 MINUTES: ICD-10-PCS | Mod: ,,, | Performed by: STUDENT IN AN ORGANIZED HEALTH CARE EDUCATION/TRAINING PROGRAM

## 2023-07-03 PROCEDURE — 80053 COMPREHEN METABOLIC PANEL: CPT | Performed by: STUDENT IN AN ORGANIZED HEALTH CARE EDUCATION/TRAINING PROGRAM

## 2023-07-03 PROCEDURE — 36415 COLL VENOUS BLD VENIPUNCTURE: CPT | Performed by: HOSPITALIST

## 2023-07-03 PROCEDURE — 63600175 PHARM REV CODE 636 W HCPCS: Performed by: STUDENT IN AN ORGANIZED HEALTH CARE EDUCATION/TRAINING PROGRAM

## 2023-07-03 PROCEDURE — 83735 ASSAY OF MAGNESIUM: CPT | Performed by: HOSPITALIST

## 2023-07-03 PROCEDURE — 93010 EKG 12-LEAD: ICD-10-PCS | Mod: 76,,, | Performed by: INTERNAL MEDICINE

## 2023-07-03 PROCEDURE — 93010 EKG 12-LEAD: ICD-10-PCS | Mod: ,,, | Performed by: INTERNAL MEDICINE

## 2023-07-03 PROCEDURE — 25000003 PHARM REV CODE 250

## 2023-07-03 PROCEDURE — 20600001 HC STEP DOWN PRIVATE ROOM

## 2023-07-03 PROCEDURE — 99291 CRITICAL CARE FIRST HOUR: CPT | Mod: ,,, | Performed by: STUDENT IN AN ORGANIZED HEALTH CARE EDUCATION/TRAINING PROGRAM

## 2023-07-03 PROCEDURE — 93005 ELECTROCARDIOGRAM TRACING: CPT

## 2023-07-03 PROCEDURE — 25000003 PHARM REV CODE 250: Performed by: STUDENT IN AN ORGANIZED HEALTH CARE EDUCATION/TRAINING PROGRAM

## 2023-07-03 PROCEDURE — 99233 PR SUBSEQUENT HOSPITAL CARE,LEVL III: ICD-10-PCS | Mod: ,,, | Performed by: INTERNAL MEDICINE

## 2023-07-03 RX ORDER — POTASSIUM CHLORIDE 750 MG/1
30 CAPSULE, EXTENDED RELEASE ORAL ONCE
Status: COMPLETED | OUTPATIENT
Start: 2023-07-03 | End: 2023-07-03

## 2023-07-03 RX ORDER — MAGNESIUM SULFATE HEPTAHYDRATE 40 MG/ML
2 INJECTION, SOLUTION INTRAVENOUS ONCE
Status: COMPLETED | OUTPATIENT
Start: 2023-07-03 | End: 2023-07-03

## 2023-07-03 RX ORDER — METOPROLOL TARTRATE 25 MG/1
25 TABLET, FILM COATED ORAL 2 TIMES DAILY
Status: DISCONTINUED | OUTPATIENT
Start: 2023-07-03 | End: 2023-07-03

## 2023-07-03 RX ORDER — METOPROLOL TARTRATE 1 MG/ML
INJECTION, SOLUTION INTRAVENOUS
Status: COMPLETED
Start: 2023-07-03 | End: 2023-07-03

## 2023-07-03 RX ORDER — METOPROLOL TARTRATE 25 MG/1
25 TABLET, FILM COATED ORAL EVERY 6 HOURS
Status: DISCONTINUED | OUTPATIENT
Start: 2023-07-03 | End: 2023-07-06

## 2023-07-03 RX ADMIN — POTASSIUM CHLORIDE 30 MEQ: 10 CAPSULE, COATED, EXTENDED RELEASE ORAL at 08:07

## 2023-07-03 RX ADMIN — METOPROLOL TARTRATE 25 MG: 25 TABLET, FILM COATED ORAL at 08:07

## 2023-07-03 RX ADMIN — METOPROLOL TARTRATE 25 MG: 25 TABLET, FILM COATED ORAL at 11:07

## 2023-07-03 RX ADMIN — METOROPROLOL TARTRATE: 5 INJECTION, SOLUTION INTRAVENOUS at 09:07

## 2023-07-03 RX ADMIN — METOPROLOL TARTRATE 25 MG: 25 TABLET, FILM COATED ORAL at 05:07

## 2023-07-03 RX ADMIN — RIVAROXABAN 20 MG: 20 TABLET, FILM COATED ORAL at 05:07

## 2023-07-03 RX ADMIN — MAGNESIUM SULFATE 2 G: 2 INJECTION INTRAVENOUS at 09:07

## 2023-07-03 NOTE — PLAN OF CARE
Plan of care reviewed with patient. Patient is AOX4 and VS stable. Patient remained free of falls and trauma, fall precautions are in place. Patient is ambulating independently. Patient has no complaints of pain. Magnesium replaced per order. Patient has no questions at this time. Wheels are locked and the bed is in lowest position. The call bell is within reach. Telemetry is on. Will continue to monitor.

## 2023-07-03 NOTE — ASSESSMENT & PLAN NOTE
- new leukopenia today- but patient with declining hemoglobin for the past couple of days- with low WBC, plt and declining Hg- possibility of hemodilution  - also, amio can be associated with pancytopenia though patient not meeting exact criteria at the moment with H:H being wnl- and also patient only received 2 doses of amio so far during current hospitalization  - She did briefly have nasal congestion, and so consideration of transient viral suppression  - will evaluate meds for any possible contributers  - improvement in labs following diuresis- likely hemo dilutional in the absence of acute blood loss, will monitor closely

## 2023-07-03 NOTE — NURSING
Patient currently showing SVT/VTACH in the monitor with HR into 200's. DR. Avilez at bedside aware.

## 2023-07-03 NOTE — ASSESSMENT & PLAN NOTE
- stopped home metoprolol and flecainide on admission   - JUNIOR/DCCV 06/29- was started on tikosyn, prolonged QTc- started on amiodarone today as QTc <500- plan for amio 200mg BID for 30d then 200mg qday - patient with intolerance to amiodarone  - Recurrence of aflutter and RVR- improved with IV metoprolol 5mg  - EP following- ablation on Wednesday 07/05-  - continue metoprolol 25mg q6h, continue xarelto  - ECG q12h  - Tele

## 2023-07-03 NOTE — PROGRESS NOTES
[facility] - Cardiology Stepdown  Cardiac Electrophysiology  Progress Note    Admission Date: 6/28/2023  Code Status: Full Code   Attending Physician: Serena Avilez MD   Expected Discharge Date: 7/2/2023  Principal Problem:Atrial flutter with rapid ventricular response    Subjective:     Interval History: This morning patient developed narrow complex tachycardia with rate 205. /90. Adenosine 6mg, 12mg unsuccessful. Metoprolol 5mg with response after 2 minutes. HR down to 100. Rhythm on EKG showing atrial flutter with 2:1 condution.    Objective:     Vital Signs (Most Recent):  Temp: 97.5 °F (36.4 °C) (07/03/23 0733)  Pulse: 103 (07/03/23 0908)  Resp: 15 (07/03/23 0733)  BP: (!) 143/92 (07/03/23 0908)  SpO2: 99 % (07/03/23 0908) Vital Signs (24h Range):  Temp:  [97.5 °F (36.4 °C)-98.3 °F (36.8 °C)] 97.5 °F (36.4 °C)  Pulse:  [] 103  Resp:  [15-20] 15  SpO2:  [94 %-100 %] 99 %  BP: (118-172)/() 143/92     Weight: 94.3 kg (207 lb 14.3 oz)  Body mass index is 32.56 kg/m².     SpO2: 99 %        Physical Exam  Constitutional:       Appearance: Normal appearance. She is obese.   HENT:      Head: Normocephalic.      Mouth/Throat:      Mouth: Mucous membranes are moist.   Eyes:      Extraocular Movements: Extraocular movements intact.   Cardiovascular:      Rate and Rhythm: Regular rhythm. Tachycardia present.      Pulses: Normal pulses.   Pulmonary:      Effort: Pulmonary effort is normal. No respiratory distress.   Abdominal:      Palpations: Abdomen is soft.   Musculoskeletal:      Cervical back: Neck supple.      Right lower leg: Edema present.      Left lower leg: Edema present.   Skin:     General: Skin is warm.   Neurological:      Mental Status: She is alert and oriented to person, place, and time.          Significant Labs: All pertinent lab results from the last 24 hours have been reviewed.    Significant Imaging: EKG: atrial flutter with 2:1 conduction    Assessment and Plan:     * Atrial  flutter with rapid ventricular response  Acute Heart Failure (likely combined systolic and diastolic)  Tachycardia-mediated Cardiomyopathy  Persistent Atrial Fibrillation  Atypical Atrial Flutter  EF previously 60% on TTE 2/23. Had JUNIOR/DCCV 9/22. Previously on Toprol 50 mg BID and Flecainide 100 mg BID. AF/AFL ([id number]).    Developed SVT this morning. No effect from Adenosine 6 mg x1 and 12 mg x1. HR slowed to 100s after IV Metoprolol 5 mg x1 and revealed typical atrial flutter with 2:1 conduction. Initial rhythm consistent with typical atrial flutter with 1:1 conduction.    - Recommend increasing metoprolol to 25mg q6h. Plan for CTI ablation and possible PVI on Wednesday. NPO Tuesday night.  - Continue Xarelto 20 mg daily  - Monitor on telemetry           Rose Choe MD  Cardiac Electrophysiology  [facility] - Cardiology Stepdown

## 2023-07-03 NOTE — CODE/ RAPID DOCUMENTATION
RAPID RESPONSE NURSE NOTE        Admit Date: 2023  LOS: 5  Code Status: Full Code   Date of Consult: 2023  : 1955  Age: 67 y.o.  Weight:   Wt Readings from Last 1 Encounters:   23 94.3 kg (207 lb 14.3 oz)     Sex: female  Race: Black or    Bed: 300/300 A:   MRN: [id number]  Time Rapid Response Team page Received: 0834  Time Rapid Response Team at Bedside: 0834  Time Rapid Response Team left Bedside: 0910  Was the patient discharged from an ICU this admission? No   Was the patient discharged from a PACU within last 24 hours? No   Did the patient receive conscious sedation/general anesthesia in last 24 hours? No  Was the patient in the ED within the past 24 hours? No  Was the patient on NIPPV within the past 24 hours? No   Did this progress into an ARC or CPA: No  Attending Physician: Serena Avilez MD  Primary Service: [facility] MED C       SITUATION    Notified by charge RN during rounding.  Reason for alert: Wide complex tachycardia  Called to evaluate the patient for Dysrythmia    BACKGROUND     Why is the patient in the hospital?: Atrial flutter with rapid ventricular response    Patient has a past medical history of Chronic systolic heart failure, Essential (primary) hypertension, and Paroxysmal atrial fibrillation.    Last Vitals:  Temp: 97.5 °F (36.4 °C) (733)  Pulse: 103 (908)  Resp: 15 (733)  BP: 143/92 (908)  SpO2: 99 % (908)    24 Hours Vitals Range:  Temp:  [97.5 °F (36.4 °C)-98.3 °F (36.8 °C)]   Pulse:  []   Resp:  [15-20]   BP: (118-172)/()   SpO2:  [94 %-100 %]     Labs:  Recent Labs     23  0400 23  1728 23  0236   WBC 3.87* 4.35 4.42   HGB 13.7 15.8 15.2   HCT 44.3 50.6* 48.5   * 144* 131*       Recent Labs     23  0332 23  0400 23  0236    139 140   K 3.5  3.5 4.3  4.3 3.7  3.7   CL 97 105 104   CO2 30* 24 26   CREATININE 1.0 0.9 0.8   * 115* 105   MG 1.6  2.0 1.8        No results for input(s): PH, PCO2, PO2, HCO3, POCSATURATED, BE in the last 72 hours.     ASSESSMENT    Physical Exam  Constitutional:       General: She is not in acute distress.  Cardiovascular:      Rate and Rhythm: Regular rhythm. Tachycardia present.   Pulmonary:      Effort: Pulmonary effort is normal.   Skin:     General: Skin is warm and dry.   Neurological:      Mental Status: She is alert and oriented to person, place, and time.     Patient lying awake and alert in bed. Currently tachycardic with heart rate 206. /95. She denies any chest pain or shortness of breath at this time.     INTERVENTIONS    The patient was seen for Cardiac problem. Staff concerns included tachycardia. The following interventions were performed: EKG, continuous cardiac monitoring, and IV adenosine and metoprolol.    Patient received 6mg Adenosine IV @ 0841 without response. She then received an additional dose of 12mg Adenosine IV @ 0847 without response. Finally she received 5mg Metoprolol IV @ 0853 with change in rhythm, current , /92.     RECOMMENDATIONS    We recommend:     - Continuous cardiac monitoring  - Maintain IV Access  - POC for dysrhythmia per EP    PROVIDER ESCALATION    Orders received and case discussed with Dr. Choe, Dr. Kauffman, Dr. Fields and Dr. Avilez .    Primary team arrival time: at bedside upon arrival    Disposition: Remain in room 300.    FOLLOW UP    Charge Yuridia SOOD  updated on plan of care. Instructed to call the Rapid Response Nurse, Kylee Escalante RN at [phone] for additional questions or concerns.          RRN IV START       IV started during emergency event. 20g placed to right hand.   Please call Rapid Response RNKylee RN with any questions or concerns at [phone].

## 2023-07-03 NOTE — ASSESSMENT & PLAN NOTE
Acute Heart Failure (likely combined systolic and diastolic)  Tachycardia-mediated Cardiomyopathy  Persistent Atrial Fibrillation  Atypical Atrial Flutter  EF previously 60% on TTE 2/23. Had JUNIOR/DCCV 9/22. Previously on Toprol 50 mg BID and Flecainide 100 mg BID. AF/AFL ([id number]).    Developed SVT this morning. No effect from Adenosine 6 mg x1 and 12 mg x1. HR slowed to 100s after IV Metoprolol 5 mg x1 and revealed typical atrial flutter with 2:1 conduction. Initial rhythm consistent with typical atrial flutter with 1:1 conduction.    - Recommend increasing metoprolol to 25mg q6h. Plan for CTI ablation and possible PVI on Wednesday. NPO Tuesday night.  - Continue Xarelto 20 mg daily  - Monitor on telemetry

## 2023-07-03 NOTE — ANESTHESIA PREPROCEDURE EVALUATION
[facility]  Anesthesia Pre-Operative Evaluation         Patient Name: Yaneli Steele  YOB: 1955  MRN: [id number]    SUBJECTIVE:     Pre-operative evaluation for Procedure(s) (LRB):  Ablation, Atrial Flutter, Typical (Bilateral)  Ablation atrial fibrillation (Bilateral)  ECHOCARDIOGRAM, TRANSESOPHAGEAL (N/A)     07/03/2023    Yaneli Steele is a 67 y.o. female w/ a significant PMHx of combined heart failure (recovered EF), pAF/AFL s/p DCCV 9/22, HTN, pancreatic mass.    Patient now presents for the above procedure(s).      TTE 6/29/23:      EE to evaluate for left atrial appendage thrombus prior to cardioversion for atrial fibrillation.   Moderately decreased systolic function. The estimated ejection fraction is 35%.   Normal right ventricular size with normal right ventricular systolic function.   Normal appearing left atrial appendage. No thrombus is present in the appendage. Abnormal appendage velocities.   Moderate tricuspid regurgitation.   Mild-to-moderate mitral regurgitation.            Peripheral IV - Single Lumen 07/01/23 0900 22 G Anterior;Left Forearm (Active)   Site Assessment Clean;Dry;Intact;No redness;No swelling 07/03/23 1100   Extremity Assessment Distal to IV No warmth;No swelling;No redness;No abnormal discoloration 07/03/23 1100   Line Status Saline locked 07/03/23 1100   Dressing Status Clean;Dry;Intact 07/03/23 1100   Dressing Intervention Integrity maintained 07/03/23 1100   Dressing Change Due 07/04/23 07/01/23 0958   Site Change Due 07/04/23 07/01/23 0958   Reason Not Rotated Not due 07/03/23 1100   Number of days: 2       Female External Urinary Catheter 06/28/23 1129 (Active)   Skin no redness;no breakdown 07/02/23 0710   Tolerance no signs/symptoms of discomfort 07/01/23 0958   Suction Continuous suction at 40 mmHg 06/30/23 1920   Number of days: 5       Prev airway: None documented.        Patient Active  Problem List   Diagnosis    Non-rheumatic mitral regurgitation    Non-rheumatic tricuspid valve insufficiency    Paroxysmal atrial fibrillation    Essential (primary) hypertension    Severe obesity (BMI 35.0-39.9) with comorbidity    Tachycardia induced cardiomyopathy    Elevated brain natriuretic peptide (BNP) level    Acute on chronic combined systolic and diastolic congestive heart failure    Obesity (BMI 30-39.9)    Atrial flutter    Atrial flutter with rapid ventricular response    Abnormal LFTs (liver function tests)    Pancreatic mass    Hypotension    Nasal congestion    Leukopenia    Thrombocytopenia       Review of patient's allergies indicates:   Allergen Reactions    Amiodarone analogues Shortness Of Breath    Penicillins Shortness Of Breath       Current Inpatient Medications:   hydrOXYzine HCL  10 mg Oral Once    metoprolol tartrate  25 mg Oral Q6H    rivaroxaban  20 mg Oral Daily with dinner       No current facility-administered medications on file prior to encounter.     Current Outpatient Medications on File Prior to Encounter   Medication Sig Dispense Refill    flecainide (TAMBOCOR) 100 MG Tab Take 1 tablet (100 mg total) by mouth every 12 (twelve) hours. 60 tablet 11    metoprolol succinate (TOPROL-XL) 50 MG 24 hr tablet Take 1 tablet (50 mg total) by mouth 2 (two) times daily. 60 tablet 1    rivaroxaban (XARELTO) 20 mg Tab Take 1 tablet (20 mg total) by mouth daily with dinner or evening meal. 90 tablet 3    sacubitriL-valsartan (ENTRESTO) 49-51 mg per tablet Take 1 tablet by mouth 2 (two) times daily. 180 tablet 3       Past Surgical History:   Procedure Laterality Date    ANGIOGRAM, CORONARY, WITH LEFT HEART CATHETERIZATION      TRANSESOPHAGEAL ECHOCARDIOGRAPHY N/A 6/29/2023    Procedure: ECHOCARDIOGRAM, TRANSESOPHAGEAL;  Surgeon: Leola Benavides MD;  Location: [facility];  Service: Cardiology;  Laterality: N/A;    TREATMENT OF CARDIAC ARRHYTHMIA N/A  6/29/2023    Procedure: Cardioversion or Defibrillation;  Surgeon: Devante Arevalo MD;  Location: [facility];  Service: Cardiology;  Laterality: N/A;  AF, JUNIOR/DCCV, ANES, DM, ED BED 11       Social History     Socioeconomic History    Marital status: Single   Tobacco Use    Smoking status: Former    Smokeless tobacco: Former    Tobacco comments:     Pt states she quit more than 30 years ago   Substance and Sexual Activity    Alcohol use: No    Drug use: No    Sexual activity: Not Currently     Social Determinants of Health     Financial Resource Strain: Low Risk     Difficulty of Paying Living Expenses: Not hard at all   Food Insecurity: No Food Insecurity    Worried About Running Out of Food in the Last Year: Never true    Ran Out of Food in the Last Year: Never true   Transportation Needs: No Transportation Needs    Lack of Transportation (Medical): No    Lack of Transportation (Non-Medical): No   Physical Activity: Insufficiently Active    Days of Exercise per Week: 3 days    Minutes of Exercise per Session: 30 min   Stress: No Stress Concern Present    Feeling of Stress : Not at all   Social Connections: Moderately Integrated    Frequency of Communication with Friends and Family: More than three times a week    Frequency of Social Gatherings with Friends and Family: More than three times a week    Attends Tenriism Services: More than 4 times per year    Active Member of Clubs or Organizations: Yes    Attends Club or Organization Meetings: More than 4 times per year    Marital Status: Never    Housing Stability: Low Risk     Unable to Pay for Housing in the Last Year: No    Number of Places Lived in the Last Year: 1    Unstable Housing in the Last Year: No       OBJECTIVE:     Vital Signs Range (Last 24H):  Temp:  [36.4 °C (97.5 °F)-36.8 °C (98.3 °F)]   Pulse:  []   Resp:  [15-19]   BP: (109-172)/()   SpO2:  [94 %-100 %]       Significant Labs:  Lab Results    Component Value Date    WBC 4.42 07/03/2023    HGB 15.2 07/03/2023    HCT 48.5 07/03/2023     (L) 07/03/2023    CHOL 132 02/23/2018    TRIG 68 02/23/2018    HDL 34 (L) 02/23/2018    ALT 31 07/03/2023    AST 25 07/03/2023     07/03/2023    K 3.7 07/03/2023    K 3.7 07/03/2023     07/03/2023    CREATININE 0.8 07/03/2023    BUN 8 07/03/2023    CO2 26 07/03/2023    TSH 1.481 07/02/2023    INR 1.5 (H) 06/29/2023    HGBA1C 6.2 (H) 06/01/2023       Diagnostic Studies: No relevant studies.    EKG:   Results for orders placed or performed during the hospital encounter of 06/28/23   EKG 12-lead    Collection Time: 07/02/23  7:19 AM    Narrative    Test Reason : R94.31,    Vent. Rate : 065 BPM     Atrial Rate : 065 BPM     P-R Int : 160 ms          QRS Dur : 092 ms      QT Int : 466 ms       P-R-T Axes : 054 -11 017 degrees     QTc Int : 484 ms    NSR  Low anterior forces and R wave progression  Non-specific ST-T abn  Abnormal ECG  When compared with ECG of 01-JUL-2023 08:04,  No major change  Confirmed by Moreno ROBERTSON MD ([id number]) on 7/2/2023 4:29:31 PM    Referred By: [email or web address]   SELF           Confirmed By:Moreno ROBERTSON MD       2D ECHO:  TTE:  Results for orders placed or performed during the hospital encounter of 06/28/23   Echo   Result Value Ref Range    Ascending aorta 3.05 cm    STJ 2.42 cm    AV mean gradient 2 mmHg    Ao peak mazin 0.91 m/s    Ao VTI 11.80 cm    IVRT 97.05 msec    IVS 0.89 0.6 - 1.1 cm    LA size 5.27 cm    Left Atrium Major Axis 6.83 cm    Left Atrium Minor Axis 6.53 cm    LVIDd 4.96 3.5 - 6.0 cm    LVIDs 4.32 (A) 2.1 - 4.0 cm    LVOT diameter 1.99 cm    LVOT peak VTI 8.04 cm    Posterior Wall 1.02 0.6 - 1.1 cm    MV Peak A Mazin 0.87 m/s    E wave deceleration time 162.68 msec    MV Peak E Mazin 1.39 m/s    RA Major Axis 6.50 cm    RA Width 4.49 cm    RVDD 3.24 cm    Sinus 2.93 cm    TAPSE 1.59 cm    TR Max Mazin 3.26 m/s    LA WIDTH 5.20 cm    MV stenosis pressure 1/2 time 47.18 ms    LV  Diastolic Volume 116.27 mL    LV Systolic Volume 84.17 mL    LVOT peak sarah 0.63 m/s    TDI LATERAL 0.08 m/s    TDI SEPTAL 0.07 m/s    Mr max sarah 0.05 m/s    LV LATERAL E/E' RATIO 17.38 m/s    LV SEPTAL E/E' RATIO 19.86 m/s    FS 13 %    LA volume 155.52 cm3    LV mass 168.85 g    Left Ventricle Relative Wall Thickness 0.41 cm    AV valve area 2.12 cm2    AV Velocity Ratio 0.69     AV index (prosthetic) 0.68     MV valve area p 1/2 method 4.66 cm2    E/A ratio 1.60     Mean e' 0.08 m/s    LVOT area 3.1 cm2    LVOT stroke volume 24.99 cm3    AV peak gradient 3 mmHg    E/E' ratio 18.53 m/s    LV Systolic Volume Index 40.3 mL/m2    LV Diastolic Volume Index 55.63 mL/m2    LA Volume Index 74.4 mL/m2    LV Mass Index 81 g/m2    Triscuspid Valve Regurgitation Peak Gradient 43 mmHg    BSA 2.15 m2    Right Atrial Pressure (from IVC) 8 mmHg    EF 25 %    TV rest pulmonary artery pressure 51 mmHg    Narrative    · The left ventricle is normal in size with severely decreased systolic   function.  · The estimated ejection fraction is 25%.  · There is left ventricular global hypokinesis.  · Normal right ventricular size with mildly reduced right ventricular   systolic function.  · Severe biatrial enlargement.  · Severe mitral regurgitation.  · Moderate to severe tricuspid regurgitation.  · The estimated PA systolic pressure is 51 mmHg.  · There is pulmonary hypertension.  · Intermediate central venous pressure (8 mmHg).  · Atrial fibrillation observed.          JUNIOR:  No results found for this or any previous visit.    ASSESSMENT/PLAN:       Pre-op Assessment    I have reviewed the Patient Summary Reports.     I have reviewed the Nursing Notes. I have reviewed the NPO Status.   I have reviewed the Medications.     Review of Systems  Anesthesia Hx:  No problems with previous Anesthesia  History of prior surgery of interest to airway management or planning: Denies Family Hx of Anesthesia complications.   Denies Personal Hx of  Anesthesia complications.   Social:  Non-Smoker, No Alcohol Use    Hematology/Oncology:        Denies Current/Recent Cancer   EENT/Dental:   denies chronic allergic rhinitis   Cardiovascular:   Denies Hypertension.  Denies CAD.    no hyperlipidemia    Pulmonary:   Denies COPD.  Denies Asthma.  Denies Sleep Apnea.    Renal/:   Denies Chronic Renal Disease.     Hepatic/GI:   Denies GERD.    Musculoskeletal:   Denies Arthritis.     Neurological:   Denies CVA. Denies Seizures.    Psych:   Denies Psychiatric History.          Physical Exam  General: Well nourished, Cooperative, Alert and Oriented    Airway:  Mallampati: II   Mouth Opening: Normal  TM Distance: Normal  Tongue: Normal  Neck ROM: Normal ROM    Dental:  Dentures, Partial Dentures  Full dentures on top.  Partial dentures on bottom.      Anesthesia Plan  Type of Anesthesia, risks & benefits discussed:    Anesthesia Type: Gen ETT  Intra-op Monitoring Plan: Standard ASA Monitors  Post Op Pain Control Plan: multimodal analgesia  Induction:  IV  Airway Plan: Direct, Post-Induction  Informed Consent: Informed consent signed with the Patient and all parties understand the risks and agree with anesthesia plan.  All questions answered.   ASA Score: 3  Day of Surgery Review of History & Physical: H&P Update referred to the surgeon/provider.    Ready For Surgery From Anesthesia Perspective.     .

## 2023-07-03 NOTE — ASSESSMENT & PLAN NOTE
Patient is identified as having Combined Systolic and Diastolic heart failure that is Acute on chronic. CHF is currently uncontrolled due to Continued edema of extremities. Latest ECHO performed and demonstrates- . Continue Beta Blocker and Furosemide and monitor clinical status closely. Monitor on telemetry. Patient is on CHF pathway.  Monitor strict Is&Os and daily weights.  Place on fluid restriction of 1.5 L. Cardiology has been consulted. Continue to stress to patient importance of self efficacy and  on diet for CHF. Last BNP reviewed- and noted below     Summary     JUNIOR to evaluate for left atrial appendage thrombus prior to cardioversion for atrial fibrillation.   Moderately decreased systolic function. The estimated ejection fraction is 35%.   Normal right ventricular size with normal right ventricular systolic function.   Normal appearing left atrial appendage. No thrombus is present in the appendage. Abnormal appendage velocities.   Moderate tricuspid regurgitation.   Mild-to-moderate mitral regurgitation    Recent Labs   Lab 06/28/23  0846   BNP 1,511*     - Repeat echo with reduced EF 25%, CVP 8 , mod-severe TR, severe MR  - Received 120mg IV lasix 06/28, 80mg IV lasix 06/29  - s/p 40mg IV lasix 07/02

## 2023-07-03 NOTE — NURSING
Notified Oncall IM Patient was no longer bradycardia but HR was tachy @0408. Provider ordered EKG. RN relayed EKG results to Provider @6450. Provider stated as long as patient was asymptomatic with -104, no further action would be taken at this time.

## 2023-07-03 NOTE — SUBJECTIVE & OBJECTIVE
Interval History: Patient was seen and evaluated this am. See critical care note for further info. Doing well as HR improved post IV metoprolol. No nausea, vomiting, chest pain, fevers, chills, shortness of breath.     Objective:     Vital Signs (Most Recent):  Temp: 98.1 °F (36.7 °C) (07/03/23 1151)  Pulse: 95 (07/03/23 1200)  Resp: 19 (07/03/23 1151)  BP: 109/88 (07/03/23 1151)  SpO2: 95 % (07/03/23 1151) Vital Signs (24h Range):  Temp:  [97.5 °F (36.4 °C)-98.3 °F (36.8 °C)] 98.1 °F (36.7 °C)  Pulse:  [] 95  Resp:  [15-19] 19  SpO2:  [94 %-100 %] 95 %  BP: (109-172)/() 109/88     Weight: 94.3 kg (207 lb 14.3 oz)  Body mass index is 32.56 kg/m².    Intake/Output Summary (Last 24 hours) at 7/3/2023 1254  Last data filed at 7/3/2023 0244  Gross per 24 hour   Intake 600 ml   Output --   Net 600 ml      Physical Exam  Gen: in NAD, appears stated age  Neuro: AAOx3, motor, sensory, and strength grossly intact BL  HEENT: NTNC, EOMI, PERRL, MMM  CVS: irregularly irregular rhythm, tachycardic, no m/r/g; S1/S2 auscultated with no S3 or S4; capillary refill < 2 sec  Resp: lungs CTAB, no w/r/r; no belabored breathing or accessory muscle use appreciated   Abd: BS+ in all 4 quadrants; NTND, soft to palpation; no organomegaly appreciated   Extrem: pulses full, equal, and regular over all 4 extremities; no UE or LE edema    Significant Labs: All pertinent labs within the past 24 hours have been reviewed.  CBC:   Recent Labs   Lab 07/02/23  0400 07/02/23  1728 07/03/23  0236   WBC 3.87* 4.35 4.42   HGB 13.7 15.8 15.2   HCT 44.3 50.6* 48.5   * 144* 131*     CMP:   Recent Labs   Lab 07/02/23  0400 07/03/23  0236    140   K 4.3  4.3 3.7  3.7    104   CO2 24 26   * 105   BUN 10 8   CREATININE 0.9 0.8   CALCIUM 8.5* 9.0   PROT 5.5* 5.8*   ALBUMIN 2.8* 2.9*   BILITOT 1.5* 1.5*   ALKPHOS 145* 154*   AST 26 25   ALT 33 31   ANIONGAP 10 10     Troponin:   No results for input(s): TROPONINI,  TROPONINIHS in the last 48 hours.      Significant Imaging: I have reviewed all pertinent imaging results/findings within the past 24 hours.    TTE:  Summary    JUNIOR to evaluate for left atrial appendage thrombus prior to cardioversion for atrial fibrillation.  Moderately decreased systolic function. The estimated ejection fraction is 35%.  Normal right ventricular size with normal right ventricular systolic function.  Normal appearing left atrial appendage. No thrombus is present in the appendage. Abnormal appendage velocities.  Moderate tricuspid regurgitation.  Mild-to-moderate mitral regurgitation.

## 2023-07-03 NOTE — CARE UPDATE
[facility] Medicine Rapid Response Note      Admit Date: 6/28/2023  LOS: 5  Code Status: Full Code   CC: Atrial flutter with rapid ventricular response  Date of Consult: 07/03/2023  RRT Indication(s): SVT   Attending Physician: Serena Avilez MD  Primary Service: [facility] [place] HOSP MED C    SUBJECTIVE:     Interval history: Was notified by RN of patient HR 200s- obtained an ECG- concern for narrow-complex tachycardia- SVT in particular- patient had received PO metoprolol 25mg at 0822. Electrophysiology notified- RR called- patient given 6mg of IV adenosine- no response. Given 12mg of IV adenosine 7 minutes later- no response. Given 5mg IV metoprolol push- HR improved to 120s. Repeat ECG w/aflutter. She was started on metoprolol 25mg q6h. She did not have any episodes of hypotension during event.       OBJECTIVE:     Vital Signs (Most Recent):  Temp: 98.1 °F (36.7 °C) (07/03/23 1151)  Pulse: 99 (07/03/23 1151)  Resp: 19 (07/03/23 1151)  BP: 109/88 (07/03/23 1151)  SpO2: 95 % (07/03/23 1151) Vital Signs (24h Range):  Temp:  [97.5 °F (36.4 °C)-98.3 °F (36.8 °C)] 98.1 °F (36.7 °C)  Pulse:  [] 99  Resp:  [15-19] 19  SpO2:  [94 %-100 %] 95 %  BP: (109-172)/() 109/88     I & O (24h):    Intake/Output Summary (Last 24 hours) at 7/3/2023 1245  Last data filed at 7/3/2023 0244  Gross per 24 hour   Intake 600 ml   Output --   Net 600 ml        Physical Exam:   Gen: in NAD, appears stated age  Neuro: AAOx3, motor, sensory, and strength grossly intact BL  HEENT: NTNC, EOMI, PERRL, MMM  CVS: tachycardia, no m/r/g; S1/S2 auscultated with no S3 or S4; capillary refill < 2 sec  Resp: lungs CTAB, no w/r/r; no belabored breathing or accessory muscle use appreciated   Abd: BS+ in all 4 quadrants; NTND, soft to palpation; no organomegaly appreciated   Extrem: pulses full, equal, and regular over all 4 extremities; no UE or LE edema       Lines/Drains/Airway:     Female External Urinary Catheter 06/28/23 1129 (Active)   Skin no  redness;no breakdown 07/02/23 0710   Tolerance no signs/symptoms of discomfort 07/01/23 0958   Suction Continuous suction at 40 mmHg 06/30/23 1920        Nutrition:  Current Diet Order   Procedures    Diet Cardiac         Labs/Imaging- All pertinent labs within the past 24 hours have been reviewed.  CBC:   Recent Labs   Lab 07/02/23  0400 07/02/23  1728 07/03/23  0236   WBC 3.87* 4.35 4.42   HGB 13.7 15.8 15.2   HCT 44.3 50.6* 48.5   * 144* 131*     CMP:   Recent Labs   Lab 07/02/23  0400 07/03/23  0236    140   K 4.3  4.3 3.7  3.7    104   CO2 24 26   * 105   BUN 10 8   CREATININE 0.9 0.8   CALCIUM 8.5* 9.0   PROT 5.5* 5.8*   ALBUMIN 2.8* 2.9*   BILITOT 1.5* 1.5*   ALKPHOS 145* 154*   AST 26 25   ALT 33 31   ANIONGAP 10 10     Cardiac Markers: No results for input(s): CKMB, MYOGLOBIN, BNP, TROPISTAT in the last 48 hours.  Troponin: No results for input(s): TROPONINI, TROPONINIHS in the last 48 hours.    Diagnostics/Interventions during Rapid response     - ECG x2  - IV adenosine 6mg x1, then 12mg x1  - IV metoprolol 5mg x1    ASSESSMENT/PLAN:     Active Hospital Problems    Diagnosis    *Atrial flutter with rapid ventricular response    Leukopenia    Thrombocytopenia    Hypotension    Nasal congestion    Pancreatic mass    Abnormal LFTs (liver function tests)    Acute on chronic combined systolic and diastolic congestive heart failure    Essential (primary) hypertension          - Spoke with EP- recommended metoprolol 25mg q6h in the meantime  - continue xarelto  - planning for ablation        Critical Care Time: 45 minutes       Critical care was time spent personally by me on the following activities: evaluating this patient's organ dysfunction, development of treatment plan, discussing treatment plan with patient or surrogate and bedside caregivers, discussions with consultants, evaluation of patient's response to treatment, examination of patient, ordering and performing treatments  and interventions, ordering and review of laboratory studies, ordering and review of radiographic studies, re-evaluation of patient's condition. This critical care time did not overlap with that of any other provider or involve time for any separately billed procedures    Diagnosis requiring critical care: Patient has a condition that poses threat to life and bodily function: TANIA Avilez MD  Department of Hospital Medicine  Department of Pediatrics  7/3/2023

## 2023-07-03 NOTE — SUBJECTIVE & OBJECTIVE
Interval History: This morning patient developed narrow complex tachycardia with rate 205. /90. Adenosine 6mg, 12mg unsuccessful. Metoprolol 5mg with response after 2 minutes. HR down to 100. Rhythm on EKG showing atrial flutter with 2:1 condution.    Objective:     Vital Signs (Most Recent):  Temp: 97.5 °F (36.4 °C) (07/03/23 0733)  Pulse: 103 (07/03/23 0908)  Resp: 15 (07/03/23 0733)  BP: (!) 143/92 (07/03/23 0908)  SpO2: 99 % (07/03/23 0908) Vital Signs (24h Range):  Temp:  [97.5 °F (36.4 °C)-98.3 °F (36.8 °C)] 97.5 °F (36.4 °C)  Pulse:  [] 103  Resp:  [15-20] 15  SpO2:  [94 %-100 %] 99 %  BP: (118-172)/() 143/92     Weight: 94.3 kg (207 lb 14.3 oz)  Body mass index is 32.56 kg/m².     SpO2: 99 %        Physical Exam  Constitutional:       Appearance: Normal appearance. She is obese.   HENT:      Head: Normocephalic.      Mouth/Throat:      Mouth: Mucous membranes are moist.   Eyes:      Extraocular Movements: Extraocular movements intact.   Cardiovascular:      Rate and Rhythm: Regular rhythm. Tachycardia present.      Pulses: Normal pulses.   Pulmonary:      Effort: Pulmonary effort is normal. No respiratory distress.   Abdominal:      Palpations: Abdomen is soft.   Musculoskeletal:      Cervical back: Neck supple.      Right lower leg: Edema present.      Left lower leg: Edema present.   Skin:     General: Skin is warm.   Neurological:      Mental Status: She is alert and oriented to person, place, and time.          Significant Labs: All pertinent lab results from the last 24 hours have been reviewed.    Significant Imaging: EKG: atrial flutter with 2:1 conduction

## 2023-07-03 NOTE — NURSING
Assumed care of this patient. Report given to this nurse from Lex SOOD. Patient AOX4 and VS stable. Call bell within reach.

## 2023-07-03 NOTE — PROGRESS NOTES
[facility] Medicine  Progress Note    Patient Name: Yaneli Steele  MRN: [id number]  Patient Class: IP- Inpatient   Admission Date: 6/28/2023  Length of Stay: 5 days  Attending Physician: Serena Avilez MD  Primary Care Provider: Jannet Le MD        Subjective:     Principal Problem:Atrial flutter with rapid ventricular response        HPI:  67yoF with CHFrEF (EF 35%, diastolic dysfxn), pAF/AFL s/p DCCV 9/22, HTN, presenting with elevated heart rate associated with mild nausea & fatigue.  Patient was seen at her regularly scheduled EP appointment this morning.  She was noted to have a heart rate of 160 with a wide complex tachycardia and sent to ED.  Denies chest pain or shortness of breath.  Recent hospitalization for AF RVR managed medically.     In ED, 's and gave IV adenosine with no effect. Improved with IV metoprolol and rhythm noted to be atypical atrial flutter.  BP lowered to 85/56 in ED, now improved to 115 systolic. BNP 1511 , lactate 3.3, TBili 2.7, AST 77, ALT 75. She also received IV Lasix 80 mg.       Overview/Hospital Course:  Cardiology consulted- patient underwent DCCV/JUNIOR 06/29. JUNIOR with the following findings:  Summary     JUNIOR to evaluate for left atrial appendage thrombus prior to cardioversion for atrial fibrillation.   Moderately decreased systolic function. The estimated ejection fraction is 35%.   Normal right ventricular size with normal right ventricular systolic function.   Normal appearing left atrial appendage. No thrombus is present in the appendage. Abnormal appendage velocities.   Moderate tricuspid regurgitation.   Mild-to-moderate mitral regurgitation    Started on Tikosyn. Prolonged Qtc 572- patient had received first tikosyn dose PM of 06/29. She had dizziness- hypotension- given back 250cc NS 06/30- elevated lactate 3.1. Repeat lactate improved to normal. MRI pancreas protocol obtained due to hypoechoic focus at pancreatic  "head per US- MRI w/the following results:    1. No identifiable pancreatic mass or ductal dilatation.  Recommend further evaluation of sonographic abnormality with pancreas protocol CT.  2. Cholelithiasis with mural calcification.  No signs of acute cholecystitis.    T bili downtrended from 3 to 2.2. She was also started on amiodarone as tikosyn was stopped- started amiodarone 200mg BID for load. She received 2 doses of amiodarone, but stated that she was feeling "anxious and jittery." She refused further amio dose- it was explained to her that this was the only anti-arrhythmic available with per prolonged Qtc. She continued to decline. Offered anti-anxiety meds- patient declined. Xarelto was also briefly transitioned to eliquis with amiodarone and decreased CrCl. Patient requested to resume xarelto with not taking amiodarone, and so she was transitioned back. She was noted to have declining H:H- but no source of blood loss. WBC and plt noted to be downtrending. Given dose of lasix with concern for fluid-overload. H:H, WBC and plt seemed to improve with diuresis. Likely dilutional in nature.     Patient had episode of SVT vs. Afib/flutter with RVR- -209 max. Given IV adenosine 6mg, then 12mg-  no response. Given IV metoprolol 5mg x1- improvement in HR to 110s. Transitioned to metoprolol 25mg q6h. EP planning for ablation.       Interval History: Patient was seen and evaluated this am. See critical care note for further info. Doing well as HR improved post IV metoprolol. No nausea, vomiting, chest pain, fevers, chills, shortness of breath.     Objective:     Vital Signs (Most Recent):  Temp: 98.1 °F (36.7 °C) (07/03/23 1151)  Pulse: 95 (07/03/23 1200)  Resp: 19 (07/03/23 1151)  BP: 109/88 (07/03/23 1151)  SpO2: 95 % (07/03/23 1151) Vital Signs (24h Range):  Temp:  [97.5 °F (36.4 °C)-98.3 °F (36.8 °C)] 98.1 °F (36.7 °C)  Pulse:  [] 95  Resp:  [15-19] 19  SpO2:  [94 %-100 %] 95 %  BP: (109-172)/() " 109/88     Weight: 94.3 kg (207 lb 14.3 oz)  Body mass index is 32.56 kg/m².    Intake/Output Summary (Last 24 hours) at 7/3/2023 1254  Last data filed at 7/3/2023 0244  Gross per 24 hour   Intake 600 ml   Output --   Net 600 ml      Physical Exam  Gen: in NAD, appears stated age  Neuro: AAOx3, motor, sensory, and strength grossly intact BL  HEENT: NTNC, EOMI, PERRL, MMM  CVS: irregularly irregular rhythm, tachycardic, no m/r/g; S1/S2 auscultated with no S3 or S4; capillary refill < 2 sec  Resp: lungs CTAB, no w/r/r; no belabored breathing or accessory muscle use appreciated   Abd: BS+ in all 4 quadrants; NTND, soft to palpation; no organomegaly appreciated   Extrem: pulses full, equal, and regular over all 4 extremities; no UE or LE edema    Significant Labs: All pertinent labs within the past 24 hours have been reviewed.  CBC:   Recent Labs   Lab 07/02/23  0400 07/02/23  1728 07/03/23  0236   WBC 3.87* 4.35 4.42   HGB 13.7 15.8 15.2   HCT 44.3 50.6* 48.5   * 144* 131*     CMP:   Recent Labs   Lab 07/02/23  0400 07/03/23  0236    140   K 4.3  4.3 3.7  3.7    104   CO2 24 26   * 105   BUN 10 8   CREATININE 0.9 0.8   CALCIUM 8.5* 9.0   PROT 5.5* 5.8*   ALBUMIN 2.8* 2.9*   BILITOT 1.5* 1.5*   ALKPHOS 145* 154*   AST 26 25   ALT 33 31   ANIONGAP 10 10     Troponin:   No results for input(s): TROPONINI, TROPONINIHS in the last 48 hours.      Significant Imaging: I have reviewed all pertinent imaging results/findings within the past 24 hours.    TTE:  Summary     JUNIOR to evaluate for left atrial appendage thrombus prior to cardioversion for atrial fibrillation.   Moderately decreased systolic function. The estimated ejection fraction is 35%.   Normal right ventricular size with normal right ventricular systolic function.   Normal appearing left atrial appendage. No thrombus is present in the appendage. Abnormal appendage velocities.   Moderate tricuspid regurgitation.   Mild-to-moderate  mitral regurgitation.      Assessment/Plan:      * Atrial flutter with rapid ventricular response  - stopped home metoprolol and flecainide on admission   - JUNIOR/DCCV 06/29- was started on tikosyn, prolonged QTc- started on amiodarone today as QTc <500- plan for amio 200mg BID for 30d then 200mg qday - patient with intolerance to amiodarone  - Recurrence of aflutter and RVR- improved with IV metoprolol 5mg  - EP following- ablation on Wednesday 07/05-  - continue metoprolol 25mg q6h, continue xarelto  - ECG q12h  - Tele       Thrombocytopenia  - see leukopenia      Leukopenia  - new leukopenia today- but patient with declining hemoglobin for the past couple of days- with low WBC, plt and declining Hg- possibility of hemodilution  - also, amio can be associated with pancytopenia though patient not meeting exact criteria at the moment with H:H being wnl- and also patient only received 2 doses of amio so far during current hospitalization  - She did briefly have nasal congestion, and so consideration of transient viral suppression  - will evaluate meds for any possible contributers  - improvement in labs following diuresis- likely hemo dilutional in the absence of acute blood loss, will monitor closely       Nasal congestion  - resolved        Hypotension  - occurred following admission with IV metoprolol push- resolved, now hypertensive    Pancreatic mass  - US of abd w/indeterminate 1.3cm hypoechoic focus in or adjacent to pancreatic head/body- pancreatic mass protocol- MRI vs. CT w/contrast recommended.   - ordered MRI w/contrast- pancreatic protocol- no identifiable mass, but recommended CT pancreas protocol will hold off for now as patient just had contrast exposure- can plan on obtaining via outpatient basis     Abnormal LFTs (liver function tests)  - Elevated TBili, AST/ALT/ALP  - Concern for elevation 2/2 congestive hepatopathy  - RUQ US obtained w/no findings of cholecystitis or ductal dilation; however,  indeterminate 1.3cm hypoechoic focus in or adjacent to pancreatic head/body- pancreatic mass protocol- MRI vs. CT w/contrast recommended.   - Repeat LFTs tomorrow am  - MRI pancreas protocol with the following findings:     1. No identifiable pancreatic mass or ductal dilatation.  Recommend further evaluation of sonographic abnormality with pancreas protocol CT.  2. Cholelithiasis with mural calcification.  No signs of acute cholecystitis.    - T bili downtrending daily- 1.5 today, AST:ALT wnl and ALP downtrending- possibly ischemic event 2/2 afib with RVR?   - will continue to trend- arrange CT pancreas protocol on outpatient basis       Atrial flutter        Acute on chronic combined systolic and diastolic congestive heart failure  Patient is identified as having Combined Systolic and Diastolic heart failure that is Acute on chronic. CHF is currently uncontrolled due to Continued edema of extremities. Latest ECHO performed and demonstrates- . Continue Beta Blocker and Furosemide and monitor clinical status closely. Monitor on telemetry. Patient is on CHF pathway.  Monitor strict Is&Os and daily weights.  Place on fluid restriction of 1.5 L. Cardiology has been consulted. Continue to stress to patient importance of self efficacy and  on diet for CHF. Last BNP reviewed- and noted below     Summary     JUNIOR to evaluate for left atrial appendage thrombus prior to cardioversion for atrial fibrillation.   Moderately decreased systolic function. The estimated ejection fraction is 35%.   Normal right ventricular size with normal right ventricular systolic function.   Normal appearing left atrial appendage. No thrombus is present in the appendage. Abnormal appendage velocities.   Moderate tricuspid regurgitation.   Mild-to-moderate mitral regurgitation    Recent Labs   Lab 06/28/23  0846   BNP 1,511*     - Repeat echo with reduced EF 25%, CVP 8 , mod-severe TR, severe MR  - Received 120mg IV lasix 06/28, 80mg  IV lasix 06/29  - s/p 40mg IV lasix 07/02     Essential (primary) hypertension  - holding home entresto  - continue metoprolol 25mg q6h  - monitor BP closely       VTE Risk Mitigation (From admission, onward)         Ordered     rivaroxaban tablet 20 mg  With dinner         07/02/23 1223                Discharge Planning   DREW: 7/5/2023     Code Status: Full Code   Is the patient medically ready for discharge?: No    Reason for patient still in hospital (select all that apply): Patient trending condition  Discharge Plan A: Home                    Serena Avilez MD  Department of Hospital Medicine   [facility] - Cardiology Stepdown

## 2023-07-04 LAB
ALBUMIN SERPL BCP-MCNC: 3.1 G/DL (ref 3.5–5.2)
ALP SERPL-CCNC: 154 U/L (ref 55–135)
ALT SERPL W/O P-5'-P-CCNC: 28 U/L (ref 10–44)
ANION GAP SERPL CALC-SCNC: 8 MMOL/L (ref 8–16)
AST SERPL-CCNC: 26 U/L (ref 10–40)
BASOPHILS # BLD AUTO: 0.03 K/UL (ref 0–0.2)
BASOPHILS NFR BLD: 0.7 % (ref 0–1.9)
BILIRUB SERPL-MCNC: 1.7 MG/DL (ref 0.1–1)
BUN SERPL-MCNC: 8 MG/DL (ref 8–23)
CALCIUM SERPL-MCNC: 9.3 MG/DL (ref 8.7–10.5)
CHLORIDE SERPL-SCNC: 103 MMOL/L (ref 95–110)
CO2 SERPL-SCNC: 28 MMOL/L (ref 23–29)
CREAT SERPL-MCNC: 0.9 MG/DL (ref 0.5–1.4)
DIFFERENTIAL METHOD: ABNORMAL
EOSINOPHIL # BLD AUTO: 0.2 K/UL (ref 0–0.5)
EOSINOPHIL NFR BLD: 3.9 % (ref 0–8)
ERYTHROCYTE [DISTWIDTH] IN BLOOD BY AUTOMATED COUNT: 14.4 % (ref 11.5–14.5)
EST. GFR  (NO RACE VARIABLE): >60 ML/MIN/1.73 M^2
GLUCOSE SERPL-MCNC: 105 MG/DL (ref 70–110)
HCT VFR BLD AUTO: 52.2 % (ref 37–48.5)
HGB BLD-MCNC: 16.2 G/DL (ref 12–16)
IMM GRANULOCYTES # BLD AUTO: 0 K/UL (ref 0–0.04)
IMM GRANULOCYTES NFR BLD AUTO: 0 % (ref 0–0.5)
LYMPHOCYTES # BLD AUTO: 1.7 K/UL (ref 1–4.8)
LYMPHOCYTES NFR BLD: 39.3 % (ref 18–48)
MAGNESIUM SERPL-MCNC: 1.9 MG/DL (ref 1.6–2.6)
MCH RBC QN AUTO: 27.2 PG (ref 27–31)
MCHC RBC AUTO-ENTMCNC: 31 G/DL (ref 32–36)
MCV RBC AUTO: 88 FL (ref 82–98)
MONOCYTES # BLD AUTO: 0.4 K/UL (ref 0.3–1)
MONOCYTES NFR BLD: 9.4 % (ref 4–15)
NEUTROPHILS # BLD AUTO: 2 K/UL (ref 1.8–7.7)
NEUTROPHILS NFR BLD: 46.7 % (ref 38–73)
NRBC BLD-RTO: 0 /100 WBC
PLATELET # BLD AUTO: 151 K/UL (ref 150–450)
PMV BLD AUTO: 12 FL (ref 9.2–12.9)
POTASSIUM SERPL-SCNC: 3.9 MMOL/L (ref 3.5–5.1)
POTASSIUM SERPL-SCNC: 3.9 MMOL/L (ref 3.5–5.1)
PROT SERPL-MCNC: 6.4 G/DL (ref 6–8.4)
RBC # BLD AUTO: 5.95 M/UL (ref 4–5.4)
SODIUM SERPL-SCNC: 139 MMOL/L (ref 136–145)
WBC # BLD AUTO: 4.35 K/UL (ref 3.9–12.7)

## 2023-07-04 PROCEDURE — 25000003 PHARM REV CODE 250: Performed by: STUDENT IN AN ORGANIZED HEALTH CARE EDUCATION/TRAINING PROGRAM

## 2023-07-04 PROCEDURE — 85025 COMPLETE CBC W/AUTO DIFF WBC: CPT | Performed by: HOSPITALIST

## 2023-07-04 PROCEDURE — 83735 ASSAY OF MAGNESIUM: CPT | Performed by: HOSPITALIST

## 2023-07-04 PROCEDURE — 36415 COLL VENOUS BLD VENIPUNCTURE: CPT | Performed by: HOSPITALIST

## 2023-07-04 PROCEDURE — 99233 SBSQ HOSP IP/OBS HIGH 50: CPT | Mod: ,,, | Performed by: INTERNAL MEDICINE

## 2023-07-04 PROCEDURE — 99233 PR SUBSEQUENT HOSPITAL CARE,LEVL III: ICD-10-PCS | Mod: ,,, | Performed by: STUDENT IN AN ORGANIZED HEALTH CARE EDUCATION/TRAINING PROGRAM

## 2023-07-04 PROCEDURE — 63600175 PHARM REV CODE 636 W HCPCS: Performed by: STUDENT IN AN ORGANIZED HEALTH CARE EDUCATION/TRAINING PROGRAM

## 2023-07-04 PROCEDURE — 80053 COMPREHEN METABOLIC PANEL: CPT | Performed by: STUDENT IN AN ORGANIZED HEALTH CARE EDUCATION/TRAINING PROGRAM

## 2023-07-04 PROCEDURE — 99233 PR SUBSEQUENT HOSPITAL CARE,LEVL III: ICD-10-PCS | Mod: ,,, | Performed by: INTERNAL MEDICINE

## 2023-07-04 PROCEDURE — 99233 SBSQ HOSP IP/OBS HIGH 50: CPT | Mod: ,,, | Performed by: STUDENT IN AN ORGANIZED HEALTH CARE EDUCATION/TRAINING PROGRAM

## 2023-07-04 PROCEDURE — 20600001 HC STEP DOWN PRIVATE ROOM

## 2023-07-04 RX ORDER — POTASSIUM CHLORIDE 750 MG/1
10 CAPSULE, EXTENDED RELEASE ORAL ONCE
Status: COMPLETED | OUTPATIENT
Start: 2023-07-04 | End: 2023-07-04

## 2023-07-04 RX ORDER — MAGNESIUM SULFATE 1 G/100ML
1 INJECTION INTRAVENOUS ONCE
Status: COMPLETED | OUTPATIENT
Start: 2023-07-04 | End: 2023-07-04

## 2023-07-04 RX ADMIN — RIVAROXABAN 20 MG: 20 TABLET, FILM COATED ORAL at 05:07

## 2023-07-04 RX ADMIN — METOPROLOL TARTRATE 25 MG: 25 TABLET, FILM COATED ORAL at 06:07

## 2023-07-04 RX ADMIN — MAGNESIUM SULFATE HEPTAHYDRATE 1 G: 500 INJECTION, SOLUTION INTRAMUSCULAR; INTRAVENOUS at 10:07

## 2023-07-04 RX ADMIN — METOPROLOL TARTRATE 25 MG: 25 TABLET, FILM COATED ORAL at 12:07

## 2023-07-04 RX ADMIN — POTASSIUM CHLORIDE 10 MEQ: 10 CAPSULE, COATED, EXTENDED RELEASE ORAL at 10:07

## 2023-07-04 RX ADMIN — METOPROLOL TARTRATE 25 MG: 25 TABLET, FILM COATED ORAL at 05:07

## 2023-07-04 RX ADMIN — METOPROLOL TARTRATE 25 MG: 25 TABLET, FILM COATED ORAL at 11:07

## 2023-07-04 NOTE — SUBJECTIVE & OBJECTIVE
Interval History: Patient was seen and evaluated this am. No issues overnight.  max- otherwise w/in 90s.  No nausea, vomiting, chest pain, fevers, chills, shortness of breath.     Objective:     Vital Signs (Most Recent):  Temp: 98.4 °F (36.9 °C) (07/04/23 1529)  Pulse: (!) 112 (07/04/23 1529)  Resp: 16 (07/04/23 1529)  BP: 129/86 (07/04/23 1529)  SpO2: (!) 94 % (07/04/23 1529) Vital Signs (24h Range):  Temp:  [97.6 °F (36.4 °C)-98.4 °F (36.9 °C)] 98.4 °F (36.9 °C)  Pulse:  [] 112  Resp:  [16-19] 16  SpO2:  [94 %-100 %] 94 %  BP: (116-133)/(76-93) 129/86     Weight: 94.3 kg (207 lb 14.3 oz)  Body mass index is 32.56 kg/m².    Intake/Output Summary (Last 24 hours) at 7/4/2023 1622  Last data filed at 7/4/2023 0451  Gross per 24 hour   Intake 550 ml   Output 600 ml   Net -50 ml      Physical Exam  Gen: in NAD, appears stated age  Neuro: AAOx3, motor, sensory, and strength grossly intact BL  HEENT: NTNC, EOMI, PERRL, MMM  CVS: irregularly irregular rhythm, tachycaridc no m/r/g; S1/S2 auscultated with no S3 or S4; capillary refill < 2 sec  Resp: lungs CTAB, no w/r/r; no belabored breathing or accessory muscle use appreciated   Abd: BS+ in all 4 quadrants; NTND, soft to palpation; no organomegaly appreciated   Extrem: pulses full, equal, and regular over all 4 extremities; no UE or LE edema    Significant Labs: All pertinent labs within the past 24 hours have been reviewed.  CBC:   Recent Labs   Lab 07/02/23  1728 07/03/23  0236 07/04/23  0449   WBC 4.35 4.42 4.35   HGB 15.8 15.2 16.2*   HCT 50.6* 48.5 52.2*   * 131* 151     CMP:   Recent Labs   Lab 07/03/23  0236 07/04/23  0449    139   K 3.7  3.7 3.9  3.9    103   CO2 26 28    105   BUN 8 8   CREATININE 0.8 0.9   CALCIUM 9.0 9.3   PROT 5.8* 6.4   ALBUMIN 2.9* 3.1*   BILITOT 1.5* 1.7*   ALKPHOS 154* 154*   AST 25 26   ALT 31 28   ANIONGAP 10 8     Troponin:   No results for input(s): TROPONINI, TROPONINIHS in the last 48  hours.      Significant Imaging: I have reviewed all pertinent imaging results/findings within the past 24 hours.    TTE:  Summary    JUNIOR to evaluate for left atrial appendage thrombus prior to cardioversion for atrial fibrillation.  Moderately decreased systolic function. The estimated ejection fraction is 35%.  Normal right ventricular size with normal right ventricular systolic function.  Normal appearing left atrial appendage. No thrombus is present in the appendage. Abnormal appendage velocities.  Moderate tricuspid regurgitation.  Mild-to-moderate mitral regurgitation.

## 2023-07-04 NOTE — PROGRESS NOTES
[facility] - Cardiology Stepdown  Cardiac Electrophysiology  Progress Note    Admission Date: 6/28/2023  Code Status: Full Code   Attending Physician: Serena Avilez MD   Expected Discharge Date: 7/6/2023  Principal Problem:Atrial flutter with rapid ventricular response    Subjective:     Interval History: NAEO. Patient remains in atrial flutter. She is asymptomatic. Plan for CTI tomorrow.    Objective:     Vital Signs (Most Recent):  Temp: 98 °F (36.7 °C) (07/04/23 1144)  Pulse: 94 (07/04/23 1144)  Resp: 18 (07/04/23 1144)  BP: 125/79 (07/04/23 1144)  SpO2: 99 % (07/04/23 1144) Vital Signs (24h Range):  Temp:  [97.6 °F (36.4 °C)-98.1 °F (36.7 °C)] 98 °F (36.7 °C)  Pulse:  [] 94  Resp:  [16-19] 18  SpO2:  [98 %-100 %] 99 %  BP: (116-133)/(76-95) 125/79     Weight: 94.3 kg (207 lb 14.3 oz)  Body mass index is 32.56 kg/m².     SpO2: 99 %        Physical Exam  Constitutional:       Appearance: Normal appearance. She is obese.   HENT:      Head: Normocephalic.      Mouth/Throat:      Mouth: Mucous membranes are moist.   Eyes:      Extraocular Movements: Extraocular movements intact.   Cardiovascular:      Rate and Rhythm: Regular rhythm. Tachycardia present.      Pulses: Normal pulses.   Pulmonary:      Effort: Pulmonary effort is normal. No respiratory distress.   Abdominal:      Palpations: Abdomen is soft.   Musculoskeletal:      Cervical back: Neck supple.      Right lower leg: Edema present.      Left lower leg: Edema present.   Skin:     General: Skin is warm.   Neurological:      Mental Status: She is alert and oriented to person, place, and time.          Significant Labs: All pertinent lab results from the last 24 hours have been reviewed.    Assessment and Plan:     * Atrial flutter with rapid ventricular response  Acute Heart Failure (likely combined systolic and diastolic)  Tachycardia-mediated Cardiomyopathy  Persistent Atrial Fibrillation  Atypical Atrial Flutter  EF previously 60% on TTE 2/23. Had  JUNIOR/DCCV 9/22. Previously on Toprol 50 mg BID and Flecainide 100 mg BID. AF/AFL ([id number]).    Developed SVT this morning. No effect from Adenosine 6 mg x1 and 12 mg x1. HR slowed to 100s after IV Metoprolol 5 mg x1 and revealed typical atrial flutter with 2:1 conduction. Initial rhythm consistent with typical atrial flutter with 1:1 conduction.    -NPO past midnight.  - Monitor on telemetry           Rose Choe MD  Cardiac Electrophysiology  [facility] - Cardiology Stepdown

## 2023-07-04 NOTE — PROGRESS NOTES
[facility] - Cardiology [facility] Medicine  Progress Note    Patient Name: Yaneli Steele  MRN: [id number]  Patient Class: IP- Inpatient   Admission Date: 6/28/2023  Length of Stay: 6 days  Attending Physician: Sernea Avilez MD  Primary Care Provider: Jannet Le MD        Subjective:     Principal Problem:Atrial flutter with rapid ventricular response        HPI:  67yoF with CHFrEF (EF 35%, diastolic dysfxn), pAF/AFL s/p DCCV 9/22, HTN, presenting with elevated heart rate associated with mild nausea & fatigue.  Patient was seen at her regularly scheduled EP appointment this morning.  She was noted to have a heart rate of 160 with a wide complex tachycardia and sent to ED.  Denies chest pain or shortness of breath.  Recent hospitalization for AF RVR managed medically.     In ED, 's and gave IV adenosine with no effect. Improved with IV metoprolol and rhythm noted to be atypical atrial flutter.  BP lowered to 85/56 in ED, now improved to 115 systolic. BNP 1511 , lactate 3.3, TBili 2.7, AST 77, ALT 75. She also received IV Lasix 80 mg.       Overview/Hospital Course:  Cardiology consulted- patient underwent DCCV/JUNIOR 06/29. JUNIOR with the following findings:  Summary     JUNIOR to evaluate for left atrial appendage thrombus prior to cardioversion for atrial fibrillation.   Moderately decreased systolic function. The estimated ejection fraction is 35%.   Normal right ventricular size with normal right ventricular systolic function.   Normal appearing left atrial appendage. No thrombus is present in the appendage. Abnormal appendage velocities.   Moderate tricuspid regurgitation.   Mild-to-moderate mitral regurgitation    Started on Tikosyn. Prolonged Qtc 572- patient had received first tikosyn dose PM of 06/29. She had dizziness- hypotension- given back 250cc NS 06/30- elevated lactate 3.1. Repeat lactate improved to normal. MRI pancreas protocol obtained due to hypoechoic focus at pancreatic  "head per US- MRI w/the following results:    1. No identifiable pancreatic mass or ductal dilatation.  Recommend further evaluation of sonographic abnormality with pancreas protocol CT.  2. Cholelithiasis with mural calcification.  No signs of acute cholecystitis.    T bili downtrended from 3 to 2.2. She was also started on amiodarone as tikosyn was stopped- started amiodarone 200mg BID for load. She received 2 doses of amiodarone, but stated that she was feeling "anxious and jittery." She refused further amio dose- it was explained to her that this was the only anti-arrhythmic available with per prolonged Qtc. She continued to decline. Offered anti-anxiety meds- patient declined. Xarelto was also briefly transitioned to eliquis with amiodarone and decreased CrCl. Patient requested to resume xarelto with not taking amiodarone, and so she was transitioned back. She was noted to have declining H:H- but no source of blood loss. WBC and plt noted to be downtrending. Given dose of lasix with concern for fluid-overload. H:H, WBC and plt seemed to improve with diuresis. Likely dilutional in nature.     Patient had episode of SVT vs. Afib/flutter with RVR- -209 max. Given IV adenosine 6mg, then 12mg-  no response. Given IV metoprolol 5mg x1- improvement in HR to 110s. Transitioned to metoprolol 25mg q6h. EP planning for ablation.       Interval History: Patient was seen and evaluated this am. No issues overnight.  max- otherwise w/in 90s.  No nausea, vomiting, chest pain, fevers, chills, shortness of breath.     Objective:     Vital Signs (Most Recent):  Temp: 98.4 °F (36.9 °C) (07/04/23 1529)  Pulse: (!) 112 (07/04/23 1529)  Resp: 16 (07/04/23 1529)  BP: 129/86 (07/04/23 1529)  SpO2: (!) 94 % (07/04/23 1529) Vital Signs (24h Range):  Temp:  [97.6 °F (36.4 °C)-98.4 °F (36.9 °C)] 98.4 °F (36.9 °C)  Pulse:  [] 112  Resp:  [16-19] 16  SpO2:  [94 %-100 %] 94 %  BP: (116-133)/(76-93) 129/86     Weight: 94.3 kg " (207 lb 14.3 oz)  Body mass index is 32.56 kg/m².    Intake/Output Summary (Last 24 hours) at 7/4/2023 1622  Last data filed at 7/4/2023 0451  Gross per 24 hour   Intake 550 ml   Output 600 ml   Net -50 ml      Physical Exam  Gen: in NAD, appears stated age  Neuro: AAOx3, motor, sensory, and strength grossly intact BL  HEENT: NTNC, EOMI, PERRL, MMM  CVS: irregularly irregular rhythm, tachycaridc no m/r/g; S1/S2 auscultated with no S3 or S4; capillary refill < 2 sec  Resp: lungs CTAB, no w/r/r; no belabored breathing or accessory muscle use appreciated   Abd: BS+ in all 4 quadrants; NTND, soft to palpation; no organomegaly appreciated   Extrem: pulses full, equal, and regular over all 4 extremities; no UE or LE edema    Significant Labs: All pertinent labs within the past 24 hours have been reviewed.  CBC:   Recent Labs   Lab 07/02/23  1728 07/03/23  0236 07/04/23  0449   WBC 4.35 4.42 4.35   HGB 15.8 15.2 16.2*   HCT 50.6* 48.5 52.2*   * 131* 151     CMP:   Recent Labs   Lab 07/03/23  0236 07/04/23  0449    139   K 3.7  3.7 3.9  3.9    103   CO2 26 28    105   BUN 8 8   CREATININE 0.8 0.9   CALCIUM 9.0 9.3   PROT 5.8* 6.4   ALBUMIN 2.9* 3.1*   BILITOT 1.5* 1.7*   ALKPHOS 154* 154*   AST 25 26   ALT 31 28   ANIONGAP 10 8     Troponin:   No results for input(s): TROPONINI, TROPONINIHS in the last 48 hours.      Significant Imaging: I have reviewed all pertinent imaging results/findings within the past 24 hours.    TTE:  Summary     JUNIOR to evaluate for left atrial appendage thrombus prior to cardioversion for atrial fibrillation.   Moderately decreased systolic function. The estimated ejection fraction is 35%.   Normal right ventricular size with normal right ventricular systolic function.   Normal appearing left atrial appendage. No thrombus is present in the appendage. Abnormal appendage velocities.   Moderate tricuspid regurgitation.   Mild-to-moderate mitral  regurgitation.      Assessment/Plan:      * Atrial flutter with rapid ventricular response  - stopped home metoprolol and flecainide on admission   - JUNIOR/DCCV 06/29- was started on tikosyn, prolonged QTc- started on amiodarone today as QTc <500- plan for amio 200mg BID for 30d then 200mg qday - patient with intolerance to amiodarone  - Recurrence of aflutter and RVR 07/04- improved with IV metoprolol 5mg  - EP following- ablation on Wednesday 07/05- NPO at midnight  - continue metoprolol 25mg q6h, continue xarelto  - ECG qday  - Tele     Thrombocytopenia  - see leukopenia    Leukopenia  - resolved    Nasal congestion  - resolved      Hypotension  - occurred following admission with IV metoprolol push- resolved, now hypertensive    Pancreatic mass  - US of abd w/indeterminate 1.3cm hypoechoic focus in or adjacent to pancreatic head/body- pancreatic mass protocol- MRI vs. CT w/contrast recommended.   - ordered MRI w/contrast- pancreatic protocol- no identifiable mass, but recommended CT pancreas protocol will hold off for now as patient just had contrast exposure- can plan on obtaining via outpatient basis     Abnormal LFTs (liver function tests)  - Elevated TBili, AST/ALT/ALP  - Concern for elevation 2/2 congestive hepatopathy  - RUQ US obtained w/no findings of cholecystitis or ductal dilation; however, indeterminate 1.3cm hypoechoic focus in or adjacent to pancreatic head/body- pancreatic mass protocol- MRI vs. CT w/contrast recommended.   - Repeat LFTs tomorrow am  - MRI pancreas protocol with the following findings:     1. No identifiable pancreatic mass or ductal dilatation.  Recommend further evaluation of sonographic abnormality with pancreas protocol CT.  2. Cholelithiasis with mural calcification.  No signs of acute cholecystitis.    - T bili downtrending daily- 1.5 today, AST:ALT wnl and ALP downtrending- possibly ischemic event 2/2 afib with RVR?   - will continue to trend- arrange CT pancreas protocol on  outpatient basis     Acute on chronic combined systolic and diastolic congestive heart failure  Patient is identified as having Combined Systolic and Diastolic heart failure that is Acute on chronic. CHF is currently uncontrolled due to Continued edema of extremities. Latest ECHO performed and demonstrates- . Continue Beta Blocker and Furosemide and monitor clinical status closely. Monitor on telemetry. Patient is on CHF pathway.  Monitor strict Is&Os and daily weights.  Place on fluid restriction of 1.5 L. Cardiology has been consulted. Continue to stress to patient importance of self efficacy and  on diet for CHF. Last BNP reviewed- and noted below     Summary     JUNIOR to evaluate for left atrial appendage thrombus prior to cardioversion for atrial fibrillation.   Moderately decreased systolic function. The estimated ejection fraction is 35%.   Normal right ventricular size with normal right ventricular systolic function.   Normal appearing left atrial appendage. No thrombus is present in the appendage. Abnormal appendage velocities.   Moderate tricuspid regurgitation.   Mild-to-moderate mitral regurgitation    Recent Labs   Lab 06/28/23  0846   BNP 1,511*     - Repeat echo with reduced EF 25%, CVP 8 , mod-severe TR, severe MR  - Received 120mg IV lasix 06/28, 80mg IV lasix 06/29  - s/p 40mg IV lasix 07/02     Essential (primary) hypertension  - holding home entresto  - continue metoprolol 25mg q6h  - monitor BP closely     VTE Risk Mitigation (From admission, onward)         Ordered     rivaroxaban tablet 20 mg  With dinner         07/02/23 1223                Discharge Planning   DREW: 7/6/2023     Code Status: Full Code   Is the patient medically ready for discharge?: No    Reason for patient still in hospital (select all that apply): Patient trending condition  Discharge Plan A: Home                    Serena Avilez MD  Department of Hospital Medicine   [facility] - Cardiology Stepdown

## 2023-07-04 NOTE — ASSESSMENT & PLAN NOTE
- stopped home metoprolol and flecainide on admission   - JUNIOR/DCCV 06/29- was started on tikosyn, prolonged QTc- started on amiodarone today as QTc <500- plan for amio 200mg BID for 30d then 200mg qday - patient with intolerance to amiodarone  - Recurrence of aflutter and RVR 07/04- improved with IV metoprolol 5mg  - EP following- ablation on Wednesday 07/05- NPO at midnight  - continue metoprolol 25mg q6h, continue xarelto  - ECG qday  - Tele

## 2023-07-04 NOTE — ASSESSMENT & PLAN NOTE
Acute Heart Failure (likely combined systolic and diastolic)  Tachycardia-mediated Cardiomyopathy  Persistent Atrial Fibrillation  Atypical Atrial Flutter  EF previously 60% on TTE 2/23. Had JUNIOR/DCCV 9/22. Previously on Toprol 50 mg BID and Flecainide 100 mg BID. AF/AFL ([id number] 4).    Developed SVT this morning. No effect from Adenosine 6 mg x1 and 12 mg x1. HR slowed to 100s after IV Metoprolol 5 mg x1 and revealed typical atrial flutter with 2:1 conduction. Initial rhythm consistent with typical atrial flutter with 1:1 conduction.    -NPO past midnight. Hold xarelto this evening.  - Monitor on telemetry

## 2023-07-04 NOTE — NURSING
Plan of care discussed with patient.  Patient remains free of falls and trauma. Patient ambulating independently, fall precautions in place. Patient has no complaints of pain. Discussed medications and care. Mg replaced IV. Pt to be NPO @ midnight for CTI procedure tomorrow 7/5. Patient has no questions at this time. Will continue to monitor.

## 2023-07-04 NOTE — SUBJECTIVE & OBJECTIVE
Interval History: NAEO. Patient remains in atrial flutter. She is asymptomatic. Plan for CTI tomorrow.    Objective:     Vital Signs (Most Recent):  Temp: 98 °F (36.7 °C) (07/04/23 1144)  Pulse: 94 (07/04/23 1144)  Resp: 18 (07/04/23 1144)  BP: 125/79 (07/04/23 1144)  SpO2: 99 % (07/04/23 1144) Vital Signs (24h Range):  Temp:  [97.6 °F (36.4 °C)-98.1 °F (36.7 °C)] 98 °F (36.7 °C)  Pulse:  [] 94  Resp:  [16-19] 18  SpO2:  [98 %-100 %] 99 %  BP: (116-133)/(76-95) 125/79     Weight: 94.3 kg (207 lb 14.3 oz)  Body mass index is 32.56 kg/m².     SpO2: 99 %        Physical Exam  Constitutional:       Appearance: Normal appearance. She is obese.   HENT:      Head: Normocephalic.      Mouth/Throat:      Mouth: Mucous membranes are moist.   Eyes:      Extraocular Movements: Extraocular movements intact.   Cardiovascular:      Rate and Rhythm: Regular rhythm. Tachycardia present.      Pulses: Normal pulses.   Pulmonary:      Effort: Pulmonary effort is normal. No respiratory distress.   Abdominal:      Palpations: Abdomen is soft.   Musculoskeletal:      Cervical back: Neck supple.      Right lower leg: Edema present.      Left lower leg: Edema present.   Skin:     General: Skin is warm.   Neurological:      Mental Status: She is alert and oriented to person, place, and time.          Significant Labs: All pertinent lab results from the last 24 hours have been reviewed.

## 2023-07-05 ENCOUNTER — ANESTHESIA (OUTPATIENT)
Dept: MEDSURG UNIT | Facility: HOSPITAL | Age: 68
DRG: 273 | End: 2023-07-05
Payer: MEDICARE

## 2023-07-05 LAB
ALBUMIN SERPL BCP-MCNC: 3 G/DL (ref 3.5–5.2)
ALP SERPL-CCNC: 140 U/L (ref 55–135)
ALT SERPL W/O P-5'-P-CCNC: 27 U/L (ref 10–44)
ANION GAP SERPL CALC-SCNC: 9 MMOL/L (ref 8–16)
AST SERPL-CCNC: 23 U/L (ref 10–40)
BASOPHILS # BLD AUTO: 0.04 K/UL (ref 0–0.2)
BASOPHILS NFR BLD: 0.8 % (ref 0–1.9)
BILIRUB SERPL-MCNC: 1.5 MG/DL (ref 0.1–1)
BSA FOR ECHO PROCEDURE: 2.11 M2
BUN SERPL-MCNC: 11 MG/DL (ref 8–23)
CALCIUM SERPL-MCNC: 9.4 MG/DL (ref 8.7–10.5)
CHLORIDE SERPL-SCNC: 104 MMOL/L (ref 95–110)
CO2 SERPL-SCNC: 27 MMOL/L (ref 23–29)
CREAT SERPL-MCNC: 0.8 MG/DL (ref 0.5–1.4)
DIFFERENTIAL METHOD: ABNORMAL
EJECTION FRACTION: 35 %
EOSINOPHIL # BLD AUTO: 0.2 K/UL (ref 0–0.5)
EOSINOPHIL NFR BLD: 3.9 % (ref 0–8)
ERYTHROCYTE [DISTWIDTH] IN BLOOD BY AUTOMATED COUNT: 14.7 % (ref 11.5–14.5)
EST. GFR  (NO RACE VARIABLE): >60 ML/MIN/1.73 M^2
GLUCOSE SERPL-MCNC: 95 MG/DL (ref 70–110)
HCT VFR BLD AUTO: 52.1 % (ref 37–48.5)
HGB BLD-MCNC: 15.8 G/DL (ref 12–16)
IMM GRANULOCYTES # BLD AUTO: 0.01 K/UL (ref 0–0.04)
IMM GRANULOCYTES NFR BLD AUTO: 0.2 % (ref 0–0.5)
LYMPHOCYTES # BLD AUTO: 2.2 K/UL (ref 1–4.8)
LYMPHOCYTES NFR BLD: 43.5 % (ref 18–48)
MAGNESIUM SERPL-MCNC: 1.8 MG/DL (ref 1.6–2.6)
MCH RBC QN AUTO: 26.7 PG (ref 27–31)
MCHC RBC AUTO-ENTMCNC: 30.3 G/DL (ref 32–36)
MCV RBC AUTO: 88 FL (ref 82–98)
MONOCYTES # BLD AUTO: 0.5 K/UL (ref 0.3–1)
MONOCYTES NFR BLD: 9.3 % (ref 4–15)
NEUTROPHILS # BLD AUTO: 2.2 K/UL (ref 1.8–7.7)
NEUTROPHILS NFR BLD: 42.3 % (ref 38–73)
NRBC BLD-RTO: 0 /100 WBC
PLATELET # BLD AUTO: 164 K/UL (ref 150–450)
PMV BLD AUTO: 12.2 FL (ref 9.2–12.9)
POC ACTIVATED CLOTTING TIME K: 137 SEC (ref 74–137)
POC ACTIVATED CLOTTING TIME K: 143 SEC (ref 74–137)
POC ACTIVATED CLOTTING TIME K: 414 SEC (ref 74–137)
POC ACTIVATED CLOTTING TIME K: 432 SEC (ref 74–137)
POCT GLUCOSE: 112 MG/DL (ref 70–110)
POCT GLUCOSE: 164 MG/DL (ref 70–110)
POTASSIUM SERPL-SCNC: 4.5 MMOL/L (ref 3.5–5.1)
POTASSIUM SERPL-SCNC: 4.5 MMOL/L (ref 3.5–5.1)
PROT SERPL-MCNC: 6.3 G/DL (ref 6–8.4)
RBC # BLD AUTO: 5.91 M/UL (ref 4–5.4)
SAMPLE: ABNORMAL
SAMPLE: NORMAL
SODIUM SERPL-SCNC: 140 MMOL/L (ref 136–145)
WBC # BLD AUTO: 5.15 K/UL (ref 3.9–12.7)

## 2023-07-05 PROCEDURE — D9220A PRA ANESTHESIA: ICD-10-PCS | Mod: ANES,,, | Performed by: ANESTHESIOLOGY

## 2023-07-05 PROCEDURE — C1887 CATHETER, GUIDING: HCPCS | Performed by: INTERNAL MEDICINE

## 2023-07-05 PROCEDURE — D9220A PRA ANESTHESIA: Mod: CRNA,,, | Performed by: NURSE ANESTHETIST, CERTIFIED REGISTERED

## 2023-07-05 PROCEDURE — 99233 PR SUBSEQUENT HOSPITAL CARE,LEVL III: ICD-10-PCS | Mod: ,,, | Performed by: STUDENT IN AN ORGANIZED HEALTH CARE EDUCATION/TRAINING PROGRAM

## 2023-07-05 PROCEDURE — 93010 EKG 12-LEAD: ICD-10-PCS | Mod: ,,, | Performed by: INTERNAL MEDICINE

## 2023-07-05 PROCEDURE — 93005 ELECTROCARDIOGRAM TRACING: CPT

## 2023-07-05 PROCEDURE — 25000003 PHARM REV CODE 250: Performed by: INTERNAL MEDICINE

## 2023-07-05 PROCEDURE — 27201423 OPTIME MED/SURG SUP & DEVICES STERILE SUPPLY: Performed by: INTERNAL MEDICINE

## 2023-07-05 PROCEDURE — 36415 COLL VENOUS BLD VENIPUNCTURE: CPT | Performed by: HOSPITALIST

## 2023-07-05 PROCEDURE — 85025 COMPLETE CBC W/AUTO DIFF WBC: CPT | Performed by: HOSPITALIST

## 2023-07-05 PROCEDURE — 63600175 PHARM REV CODE 636 W HCPCS: Performed by: NURSE ANESTHETIST, CERTIFIED REGISTERED

## 2023-07-05 PROCEDURE — 36620 INSERTION CATHETER ARTERY: CPT | Mod: 59,,, | Performed by: ANESTHESIOLOGY

## 2023-07-05 PROCEDURE — 99233 SBSQ HOSP IP/OBS HIGH 50: CPT | Mod: ,,, | Performed by: INTERNAL MEDICINE

## 2023-07-05 PROCEDURE — D9220A PRA ANESTHESIA: Mod: ANES,,, | Performed by: ANESTHESIOLOGY

## 2023-07-05 PROCEDURE — D9220A PRA ANESTHESIA: ICD-10-PCS | Mod: CRNA,,, | Performed by: NURSE ANESTHETIST, CERTIFIED REGISTERED

## 2023-07-05 PROCEDURE — 93653 COMPRE EP EVAL TX SVT: CPT | Mod: GC,,, | Performed by: INTERNAL MEDICINE

## 2023-07-05 PROCEDURE — 25000003 PHARM REV CODE 250: Performed by: STUDENT IN AN ORGANIZED HEALTH CARE EDUCATION/TRAINING PROGRAM

## 2023-07-05 PROCEDURE — 93010 ELECTROCARDIOGRAM REPORT: CPT | Mod: 76,,, | Performed by: INTERNAL MEDICINE

## 2023-07-05 PROCEDURE — 27201037 HC PRESSURE MONITORING SET UP

## 2023-07-05 PROCEDURE — 63600175 PHARM REV CODE 636 W HCPCS: Performed by: STUDENT IN AN ORGANIZED HEALTH CARE EDUCATION/TRAINING PROGRAM

## 2023-07-05 PROCEDURE — 93653 PR ELECTROPHYS EVAL, COMPREHEN, W/SUPRAVENT TACHYCARD TRMT: ICD-10-PCS | Mod: GC,,, | Performed by: INTERNAL MEDICINE

## 2023-07-05 PROCEDURE — C1894 INTRO/SHEATH, NON-LASER: HCPCS | Performed by: INTERNAL MEDICINE

## 2023-07-05 PROCEDURE — 83735 ASSAY OF MAGNESIUM: CPT | Performed by: HOSPITALIST

## 2023-07-05 PROCEDURE — 99233 SBSQ HOSP IP/OBS HIGH 50: CPT | Mod: ,,, | Performed by: STUDENT IN AN ORGANIZED HEALTH CARE EDUCATION/TRAINING PROGRAM

## 2023-07-05 PROCEDURE — 93653 COMPRE EP EVAL TX SVT: CPT | Performed by: INTERNAL MEDICINE

## 2023-07-05 PROCEDURE — 93010 ELECTROCARDIOGRAM REPORT: CPT | Mod: ,,, | Performed by: INTERNAL MEDICINE

## 2023-07-05 PROCEDURE — 80053 COMPREHEN METABOLIC PANEL: CPT | Performed by: STUDENT IN AN ORGANIZED HEALTH CARE EDUCATION/TRAINING PROGRAM

## 2023-07-05 PROCEDURE — C1730 CATH, EP, 19 OR FEW ELECT: HCPCS | Performed by: INTERNAL MEDICINE

## 2023-07-05 PROCEDURE — C1733 CATH, EP, OTHR THAN COOL-TIP: HCPCS | Performed by: INTERNAL MEDICINE

## 2023-07-05 PROCEDURE — 25000003 PHARM REV CODE 250: Performed by: NURSE ANESTHETIST, CERTIFIED REGISTERED

## 2023-07-05 PROCEDURE — 37000009 HC ANESTHESIA EA ADD 15 MINS: Performed by: INTERNAL MEDICINE

## 2023-07-05 PROCEDURE — 63600175 PHARM REV CODE 636 W HCPCS: Performed by: INTERNAL MEDICINE

## 2023-07-05 PROCEDURE — 37000008 HC ANESTHESIA 1ST 15 MINUTES: Performed by: INTERNAL MEDICINE

## 2023-07-05 PROCEDURE — C1766 INTRO/SHEATH,STRBLE,NON-PEEL: HCPCS | Performed by: INTERNAL MEDICINE

## 2023-07-05 PROCEDURE — 20600001 HC STEP DOWN PRIVATE ROOM

## 2023-07-05 PROCEDURE — 36620 PR INSERT CATH,ART,PERCUT,SHORTTERM: ICD-10-PCS | Mod: 59,,, | Performed by: ANESTHESIOLOGY

## 2023-07-05 PROCEDURE — 99233 PR SUBSEQUENT HOSPITAL CARE,LEVL III: ICD-10-PCS | Mod: ,,, | Performed by: INTERNAL MEDICINE

## 2023-07-05 RX ORDER — FENTANYL CITRATE 50 UG/ML
INJECTION, SOLUTION INTRAMUSCULAR; INTRAVENOUS
Status: DISCONTINUED | OUTPATIENT
Start: 2023-07-05 | End: 2023-07-05

## 2023-07-05 RX ORDER — LIDOCAINE HYDROCHLORIDE 10 MG/ML
INJECTION, SOLUTION EPIDURAL; INFILTRATION; INTRACAUDAL; PERINEURAL
Status: DISCONTINUED | OUTPATIENT
Start: 2023-07-05 | End: 2023-07-06 | Stop reason: HOSPADM

## 2023-07-05 RX ORDER — MIDAZOLAM HYDROCHLORIDE 1 MG/ML
INJECTION, SOLUTION INTRAMUSCULAR; INTRAVENOUS
Status: DISCONTINUED | OUTPATIENT
Start: 2023-07-05 | End: 2023-07-05

## 2023-07-05 RX ORDER — LIDOCAINE HYDROCHLORIDE 20 MG/ML
INJECTION, SOLUTION EPIDURAL; INFILTRATION; INTRACAUDAL; PERINEURAL
Status: DISCONTINUED | OUTPATIENT
Start: 2023-07-05 | End: 2023-07-05

## 2023-07-05 RX ORDER — ROCURONIUM BROMIDE 10 MG/ML
INJECTION, SOLUTION INTRAVENOUS
Status: DISCONTINUED | OUTPATIENT
Start: 2023-07-05 | End: 2023-07-05

## 2023-07-05 RX ORDER — PHENYLEPHRINE HCL IN 0.9% NACL 1 MG/10 ML
SYRINGE (ML) INTRAVENOUS
Status: DISCONTINUED | OUTPATIENT
Start: 2023-07-05 | End: 2023-07-05

## 2023-07-05 RX ORDER — SODIUM CHLORIDE 0.9 % (FLUSH) 0.9 %
3 SYRINGE (ML) INJECTION
Status: DISCONTINUED | OUTPATIENT
Start: 2023-07-05 | End: 2023-07-06 | Stop reason: HOSPADM

## 2023-07-05 RX ORDER — MAGNESIUM SULFATE HEPTAHYDRATE 40 MG/ML
2 INJECTION, SOLUTION INTRAVENOUS ONCE
Status: COMPLETED | OUTPATIENT
Start: 2023-07-05 | End: 2023-07-05

## 2023-07-05 RX ORDER — PROPOFOL 10 MG/ML
VIAL (ML) INTRAVENOUS
Status: DISCONTINUED | OUTPATIENT
Start: 2023-07-05 | End: 2023-07-05

## 2023-07-05 RX ORDER — ONDANSETRON 2 MG/ML
INJECTION INTRAMUSCULAR; INTRAVENOUS
Status: DISCONTINUED | OUTPATIENT
Start: 2023-07-05 | End: 2023-07-05

## 2023-07-05 RX ORDER — HEPARIN SODIUM 10000 [USP'U]/100ML
INJECTION, SOLUTION INTRAVENOUS CONTINUOUS PRN
Status: DISCONTINUED | OUTPATIENT
Start: 2023-07-05 | End: 2023-07-05

## 2023-07-05 RX ORDER — ACETAMINOPHEN 325 MG/1
650 TABLET ORAL EVERY 4 HOURS PRN
Status: DISCONTINUED | OUTPATIENT
Start: 2023-07-05 | End: 2023-07-06 | Stop reason: HOSPADM

## 2023-07-05 RX ORDER — HEPARIN SOD,PORCINE/0.9 % NACL 1000/500ML
INTRAVENOUS SOLUTION INTRAVENOUS
Status: DISCONTINUED | OUTPATIENT
Start: 2023-07-05 | End: 2023-07-06 | Stop reason: HOSPADM

## 2023-07-05 RX ORDER — PROTAMINE SULFATE 10 MG/ML
INJECTION, SOLUTION INTRAVENOUS
Status: DISCONTINUED | OUTPATIENT
Start: 2023-07-05 | End: 2023-07-05

## 2023-07-05 RX ORDER — HEPARIN SODIUM 1000 [USP'U]/ML
INJECTION, SOLUTION INTRAVENOUS; SUBCUTANEOUS
Status: DISCONTINUED | OUTPATIENT
Start: 2023-07-05 | End: 2023-07-05

## 2023-07-05 RX ADMIN — PROTAMINE SULFATE 10 MG: 10 INJECTION, SOLUTION INTRAVENOUS at 12:07

## 2023-07-05 RX ADMIN — HEPARIN SODIUM 14000 UNITS: 1000 INJECTION, SOLUTION INTRAVENOUS; SUBCUTANEOUS at 11:07

## 2023-07-05 RX ADMIN — SODIUM CHLORIDE: 0.9 INJECTION, SOLUTION INTRAVENOUS at 10:07

## 2023-07-05 RX ADMIN — SODIUM CHLORIDE 0.6 MCG/KG/MIN: 9 INJECTION, SOLUTION INTRAVENOUS at 11:07

## 2023-07-05 RX ADMIN — METOPROLOL TARTRATE 25 MG: 25 TABLET, FILM COATED ORAL at 06:07

## 2023-07-05 RX ADMIN — LIDOCAINE HYDROCHLORIDE 50 MG: 20 INJECTION, SOLUTION EPIDURAL; INFILTRATION; INTRACAUDAL at 10:07

## 2023-07-05 RX ADMIN — ROCURONIUM BROMIDE 10 MG: 10 INJECTION, SOLUTION INTRAVENOUS at 12:07

## 2023-07-05 RX ADMIN — PROPOFOL 100 MG: 10 INJECTION, EMULSION INTRAVENOUS at 10:07

## 2023-07-05 RX ADMIN — ACETAMINOPHEN 650 MG: 325 TABLET ORAL at 02:07

## 2023-07-05 RX ADMIN — MAGNESIUM SULFATE 2 G: 2 INJECTION INTRAVENOUS at 08:07

## 2023-07-05 RX ADMIN — ONDANSETRON 4 MG: 2 INJECTION INTRAMUSCULAR; INTRAVENOUS at 12:07

## 2023-07-05 RX ADMIN — MIDAZOLAM 2 MG: 1 INJECTION INTRAMUSCULAR; INTRAVENOUS at 10:07

## 2023-07-05 RX ADMIN — ROCURONIUM BROMIDE 20 MG: 10 INJECTION, SOLUTION INTRAVENOUS at 11:07

## 2023-07-05 RX ADMIN — FENTANYL CITRATE 50 MCG: 50 INJECTION, SOLUTION INTRAMUSCULAR; INTRAVENOUS at 10:07

## 2023-07-05 RX ADMIN — GLYCOPYRROLATE 0.2 MG: 0.2 INJECTION, SOLUTION INTRAMUSCULAR; INTRAVENOUS at 12:07

## 2023-07-05 RX ADMIN — SUGAMMADEX 200 MG: 100 INJECTION, SOLUTION INTRAVENOUS at 01:07

## 2023-07-05 RX ADMIN — SUGAMMADEX 200 MG: 100 INJECTION, SOLUTION INTRAVENOUS at 12:07

## 2023-07-05 RX ADMIN — METOPROLOL TARTRATE 25 MG: 25 TABLET, FILM COATED ORAL at 05:07

## 2023-07-05 RX ADMIN — Medication 200 MCG: at 10:07

## 2023-07-05 RX ADMIN — Medication 100 MCG: at 12:07

## 2023-07-05 RX ADMIN — ROCURONIUM BROMIDE 40 MG: 10 INJECTION, SOLUTION INTRAVENOUS at 10:07

## 2023-07-05 RX ADMIN — HEPARIN SODIUM 12 UNITS/KG/HR: 10000 INJECTION, SOLUTION INTRAVENOUS at 11:07

## 2023-07-05 NOTE — TRANSFER OF CARE
"Anesthesia Transfer of Care Note    Patient: Yaneli Steele    Procedure(s) Performed: Procedure(s) (LRB):  Ablation, Atrial Flutter, Typical (Bilateral)  ECHOCARDIOGRAM, TRANSESOPHAGEAL (N/A)    Patient location: PACU    Anesthesia Type: general    Transport from OR: Transported from OR on room air with adequate spontaneous ventilation    Post pain: adequate analgesia    Post assessment: no apparent anesthetic complications    Post vital signs: stable    Level of consciousness: awake and alert    Nausea/Vomiting: no nausea/vomiting    Complications: none    Transfer of care protocol was followed      Last vitals:   Visit Vitals  /81   Pulse (!) 53   Temp 36.1 °C (97 °F) (Temporal)   Resp 10   Ht 5' 7" (1.702 m)   Wt 93.9 kg (207 lb)   SpO2 99%   Breastfeeding No   BMI 32.42 kg/m²     "

## 2023-07-05 NOTE — PROGRESS NOTES
[facility] - Cardiology Stepdown  Cardiac Electrophysiology  Progress Note    Admission Date: 6/28/2023  Code Status: Full Code   Attending Physician: Serena Avilez MD   Expected Discharge Date: 7/6/2023  Principal Problem:Atrial flutter with rapid ventricular response    Subjective:     Interval History: NAEO. Telemetry reviewed, still in atrial flutter rate 100 with occasional PVCs.    Objective:     Vital Signs (Most Recent):  Temp: 97.5 °F (36.4 °C) (07/05/23 0823)  Pulse: 72 (07/05/23 0823)  Resp: 18 (07/05/23 0823)  BP: (!) 124/92 (07/05/23 0823)  SpO2: (!) 93 % (07/05/23 0823) Vital Signs (24h Range):  Temp:  [41 °F (5 °C)-98.4 °F (36.9 °C)] 97.5 °F (36.4 °C)  Pulse:  [] 72  Resp:  [16-18] 18  SpO2:  [93 %-99 %] 93 %  BP: (120-129)/() 124/92     Weight: 94.3 kg (207 lb 14.3 oz)  Body mass index is 32.56 kg/m².     SpO2: (!) 93 %        Physical Exam  Constitutional:       Appearance: Normal appearance. She is obese.   HENT:      Head: Normocephalic.      Mouth/Throat:      Mouth: Mucous membranes are moist.   Eyes:      Extraocular Movements: Extraocular movements intact.   Cardiovascular:      Rate and Rhythm: Regular rhythm. Tachycardia present.      Pulses: Normal pulses.   Pulmonary:      Effort: Pulmonary effort is normal. No respiratory distress.   Abdominal:      Palpations: Abdomen is soft.   Musculoskeletal:      Cervical back: Neck supple.   Skin:     General: Skin is warm.   Neurological:      Mental Status: She is alert and oriented to person, place, and time.          Significant Labs: CMP:   Recent Labs   Lab 07/04/23  0449 07/05/23  0330    140   K 3.9  3.9 4.5  4.5    104   CO2 28 27    95   BUN 8 11   CREATININE 0.9 0.8   CALCIUM 9.3 9.4   PROT 6.4 6.3   ALBUMIN 3.1* 3.0*   BILITOT 1.7* 1.5*   ALKPHOS 154* 140*   AST 26 23   ALT 28 27   ANIONGAP 8 9         Assessment and Plan:     * Atrial flutter with rapid ventricular response  Acute Heart Failure  (likely combined systolic and diastolic)  Tachycardia-mediated Cardiomyopathy  Persistent Atrial Fibrillation  Atypical Atrial Flutter  EF previously 60% on TTE 2/23. Had JUNIOR/DCCV 9/22. Previously on Toprol 50 mg BID and Flecainide 100 mg BID. AF/AFL ([id number] 4).  -CTI ablation today        Rose Choe MD  Cardiac Electrophysiology  [facility] - Cardiology Stepdown

## 2023-07-05 NOTE — NURSING TRANSFER
Nursing Transfer Note      7/5/2023     Reason patient is being transferred: back to bed post recovery    Transfer To: [id number]    Transfer via bed    Transfer with cardiac monitoring    Transported by escort    Telemetry: Box Number [id number]    Medicines sent: none    Any special needs or follow-up needed: continue bedrest w/ freq checks. Sutures due to be cut at 1545. Bedrest up at 1645.    Chart send with patient: Yes    Notified: son    Patient reassessed at: to be done by receiving RN (date, time)

## 2023-07-05 NOTE — NURSING
Plan of care discussed with patient.  Patient remains free of falls and trauma. Patient ambulating independently, fall precautions in place. Patient has no complaints of pain. Discussed medications and care. Pt had ablation done today with bilateral groin access, dsg CDI. Patient has no questions at this time. Will continue to monitor.         Detail Level: Detailed Detail Level: Generalized Detail Level: Zone

## 2023-07-05 NOTE — ANESTHESIA POSTPROCEDURE EVALUATION
Anesthesia Post Evaluation    Patient: Yaneli Steele    Procedure(s) Performed: Procedure(s) (LRB):  Ablation, Atrial Flutter, Typical (Bilateral)  ECHOCARDIOGRAM, TRANSESOPHAGEAL (N/A)    Final Anesthesia Type: general      Patient location during evaluation: PACU  Patient participation: Yes- Able to Participate  Level of consciousness: awake and alert  Post-procedure vital signs: reviewed and stable  Pain management: adequate  Airway patency: patent    PONV status at discharge: No PONV  Anesthetic complications: no      Cardiovascular status: blood pressure returned to baseline  Respiratory status: unassisted  Hydration status: euvolemic  Follow-up not needed.          Vitals Value Taken Time   /88 07/05/23 1401   Temp 36.1 °C (97 °F) 07/05/23 1319   Pulse 61 07/05/23 1413   Resp 10 07/05/23 1413   SpO2 97 % 07/05/23 1413   Vitals shown include unvalidated device data.      No case tracking events are documented in the log.      Pain/Brigette Score: Brigette Score: 9 (7/5/2023  1:30 PM)

## 2023-07-05 NOTE — ASSESSMENT & PLAN NOTE
Patient is identified as having Combined Systolic and Diastolic heart failure that is Acute on chronic. CHF is currently uncontrolled due to Continued edema of extremities. Latest ECHO performed and demonstrates- . Continue Beta Blocker and Furosemide and monitor clinical status closely. Monitor on telemetry. Patient is on CHF pathway.  Monitor strict Is&Os and daily weights.  Place on fluid restriction of 1.5 L. Cardiology has been consulted. Continue to stress to patient importance of self efficacy and  on diet for CHF. Last BNP reviewed- and noted below     Summary     JUNIOR to evaluate for left atrial appendage thrombus prior to cardioversion for atrial fibrillation.   Moderately decreased systolic function. The estimated ejection fraction is 35%.   Normal right ventricular size with normal right ventricular systolic function.   Normal appearing left atrial appendage. No thrombus is present in the appendage. Abnormal appendage velocities.   Moderate tricuspid regurgitation.   Mild-to-moderate mitral regurgitation    No results for input(s): BNP, BNPTRIAGEBLO in the last 168 hours.  - Repeat echo with reduced EF 25%, CVP 8 , mod-severe TR, severe MR  - Received 120mg IV lasix 06/28, 80mg IV lasix 06/29  - s/p 40mg IV lasix 07/02

## 2023-07-05 NOTE — ASSESSMENT & PLAN NOTE
Acute Heart Failure (likely combined systolic and diastolic)  Tachycardia-mediated Cardiomyopathy  Persistent Atrial Fibrillation  Atypical Atrial Flutter  EF previously 60% on TTE 2/23. Had JUNIOR/DCCV 9/22. Previously on Toprol 50 mg BID and Flecainide 100 mg BID. AF/AFL ([id number] 4).  -CTI ablation today

## 2023-07-05 NOTE — SUBJECTIVE & OBJECTIVE
Interval History: NAEO. Telemetry reviewed, still in atrial flutter rate 100 with occasional PVCs.    Objective:     Vital Signs (Most Recent):  Temp: 97.5 °F (36.4 °C) (07/05/23 0823)  Pulse: 72 (07/05/23 0823)  Resp: 18 (07/05/23 0823)  BP: (!) 124/92 (07/05/23 0823)  SpO2: (!) 93 % (07/05/23 0823) Vital Signs (24h Range):  Temp:  [41 °F (5 °C)-98.4 °F (36.9 °C)] 97.5 °F (36.4 °C)  Pulse:  [] 72  Resp:  [16-18] 18  SpO2:  [93 %-99 %] 93 %  BP: (120-129)/() 124/92     Weight: 94.3 kg (207 lb 14.3 oz)  Body mass index is 32.56 kg/m².     SpO2: (!) 93 %        Physical Exam  Constitutional:       Appearance: Normal appearance. She is obese.   HENT:      Head: Normocephalic.      Mouth/Throat:      Mouth: Mucous membranes are moist.   Eyes:      Extraocular Movements: Extraocular movements intact.   Cardiovascular:      Rate and Rhythm: Regular rhythm. Tachycardia present.      Pulses: Normal pulses.   Pulmonary:      Effort: Pulmonary effort is normal. No respiratory distress.   Abdominal:      Palpations: Abdomen is soft.   Musculoskeletal:      Cervical back: Neck supple.   Skin:     General: Skin is warm.   Neurological:      Mental Status: She is alert and oriented to person, place, and time.          Significant Labs: CMP:   Recent Labs   Lab 07/04/23  0449 07/05/23  0330    140   K 3.9  3.9 4.5  4.5    104   CO2 28 27    95   BUN 8 11   CREATININE 0.9 0.8   CALCIUM 9.3 9.4   PROT 6.4 6.3   ALBUMIN 3.1* 3.0*   BILITOT 1.7* 1.5*   ALKPHOS 154* 140*   AST 26 23   ALT 28 27   ANIONGAP 8 9

## 2023-07-05 NOTE — ANESTHESIA PROCEDURE NOTES
Arterial    Diagnosis: Atrial Flutter    Patient location during procedure: done in OR    Staffing  Authorizing Provider: Kevin Rizzo MD  Performing Provider: Kevin Rizzo MD    Anesthesiologist was present at the time of the procedure.    Preanesthetic Checklist  Completed: patient identified, IV checked, site marked, risks and benefits discussed, surgical consent, monitors and equipment checked, pre-op evaluation, timeout performed and anesthesia consent givenArterial  Skin Prep: chlorhexidine gluconate  Orientation: right  Location: radial    Catheter Size: 20 G  Catheter placement by Ultrasound guidance. Heme positive aspiration all ports.   Vessel Caliber: small, patent  Needle advanced into vessel with real time Ultrasound guidance.Insertion Attempts: 1  Assessment  Dressing: secured with tape and tegaderm  Patient: Tolerated well

## 2023-07-05 NOTE — MEDICAL/APP STUDENT
[facility] Medicine Student   Progress Note  [facility] [place] HOSP MED C    Admit Date: 6/28/2023  Hospital Day: 7  07/05/2023  9:13 AM    SUBJECTIVE:   Ms. Yaneli Steele is a 67 y.o. female with a relevant medical history of CHFrEF (EF 35% with diastolic dysfunction), pAF/AFL s/p DCCV 9/22, and hypertension who is being followed up for Atrial flutter with rapid ventricular response.    Interval history:  No overnight events reported. Patients states she is feeling better than upon presentation, notes that leg swelling has decreased as she is again able to wear her shoes. Endorses some nervousness for ablation scheduled for today, eager to have it done. Has remained NPO since at least midnight. Denies n/v, chest pain, headache, shortness of breath, myalgias.    Review of Systems   Constitutional: Negative.  Negative for fever and malaise/fatigue.   HENT: Negative.     Eyes: Negative.    Respiratory:  Negative for cough and shortness of breath (Improved since admission).    Cardiovascular: Negative.  Negative for chest pain, palpitations and leg swelling (Much improved since admission).   Gastrointestinal: Negative.  Negative for abdominal pain, nausea and vomiting.   Genitourinary: Negative.    Musculoskeletal: Negative.  Negative for myalgias.   Skin: Negative.    Neurological: Negative.  Negative for tingling and headaches.   Psychiatric/Behavioral:  The patient is nervous/anxious (mildly anxious regarding procedure).      Please refer to the H&P for past medical, family, and social history.    OBJECTIVE:     Vital Signs Recent:  Temp: 97.5 °F (36.4 °C) (07/05/23 0823)  Pulse: 72 (07/05/23 0823)  Resp: 18 (07/05/23 0823)  BP: (!) 124/92 (07/05/23 0823)  SpO2: (!) 93 % (07/05/23 0823)  Oxygen Documentation:    Flow (L/min): 2           Device (Oxygen Therapy): room air         Vital Signs Range (Last 24H):  Temp:  [41 °F (5 °C)-98.4 °F (36.9 °C)]   Pulse:  []   Resp:  [16-18]   BP: (120-129)/()   SpO2:  [93  %-99 %]        I & O (Last 24H):  Intake/Output Summary (Last 24 hours) at 7/5/2023 0913  Last data filed at 7/4/2023 1726  Gross per 24 hour   Intake 240 ml   Output --   Net 240 ml        Physical Exam:  Physical Exam  Constitutional:       Appearance: Normal appearance. She is normal weight.   HENT:      Head: Normocephalic and atraumatic.   Eyes:      Extraocular Movements: Extraocular movements intact.      Conjunctiva/sclera: Conjunctivae normal.      Pupils: Pupils are equal, round, and reactive to light.   Cardiovascular:      Rate and Rhythm: Tachycardia present. Rhythm irregular.      Pulses: Normal pulses.      Comments: Irregularly irregular rhythm on auscultation    Pulmonary:      Effort: Pulmonary effort is normal.      Breath sounds: Normal breath sounds.   Abdominal:      General: Abdomen is flat. Bowel sounds are normal.      Palpations: Abdomen is soft.   Musculoskeletal:         General: Normal range of motion.      Cervical back: Normal range of motion.      Comments: Spontaneous movement with no pain   Skin:     General: Skin is warm and dry.   Neurological:      General: No focal deficit present.      Mental Status: She is alert and oriented to person, place, and time. Mental status is at baseline.   Psychiatric:         Mood and Affect: Mood normal.         Behavior: Behavior normal.         Thought Content: Thought content normal.         Judgment: Judgment normal.      Comments: Endorses mild anxiety regarding procedure today         Labs:   Recent Labs   Lab 07/03/23  0236 07/04/23 0449 07/05/23  0330    139 140   K 3.7  3.7 3.9  3.9 4.5  4.5    103 104   CO2 26 28 27   BUN 8 8 11   CREATININE 0.8 0.9 0.8    105 95   CALCIUM 9.0 9.3 9.4   MG 1.8 1.9 1.8     Recent Labs   Lab 06/29/23  1845 06/30/23  0605 07/03/23  0236 07/04/23  0449 07/05/23  0330   ALKPHOS  --    < > 154* 154* 140*   ALT  --    < > 31 28 27   AST  --    < > 25 26 23   ALBUMIN  --    < > 2.9* 3.1*  3.0*   PROT  --    < > 5.8* 6.4 6.3   BILITOT  --    < > 1.5* 1.7* 1.5*   INR 1.5*  --   --   --   --     < > = values in this interval not displayed.     Recent Labs   Lab 07/03/23  0236 07/04/23  0449 07/05/23  0330   WBC 4.42 4.35 5.15   HGB 15.2 16.2* 15.8   HCT 48.5 52.2* 52.1*   * 151 164       Diagnostic Results:  No new imaging, cardio diagnostic procedures.     Scheduled Meds:   hydrOXYzine HCL  10 mg Oral Once    magnesium sulfate IVPB  2 g Intravenous Once    metoprolol tartrate  25 mg Oral Q6H    rivaroxaban  20 mg Oral Daily with dinner     Continuous Infusions:  PRN Meds:sodium chloride 0.9%    ASSESSMENT/PLAN:   Ms. Yaneli Steele is a 67 y.o. female with a relevant medical history of CHFrEF (EF 35% with diastolic dysfunction), pAF/AFL s/p DCCV 9/22, and hypertension who is being followed up for Atrial flutter with rapid ventricular response.    Active Hospital Problems    Diagnosis  POA    *Atrial flutter with rapid ventricular response [I48.92]  Yes    Leukopenia [D72.819]  Unknown    Thrombocytopenia [D69.6]  Unknown    Hypotension [I95.9]  Unknown    Nasal congestion [R09.81]  Unknown    Pancreatic mass [K86.89]  Unknown    Abnormal LFTs (liver function tests) [R79.89]  Yes    Acute on chronic combined systolic and diastolic congestive heart failure [I50.43]  Yes    Essential (primary) hypertension [I10]  Yes      Resolved Hospital Problems   No resolved problems to display.       Atrial flutter with rapid ventricular response   Recurrence of aflutter w/ RVR yesterday which improved with IV metoprolol 5mgAblation planned for today, 7/5  No recent tele recordings, most recent ECG 7/3  Continue metoprolol 25mg q6h  Continue rivaroxaban  Patient intolerance to amiodarone, stopped taking  Acute on chronic combined systolic and diastolic congestive heart failure  BNP: 1,511 on 6/28  Continue metoprolol 25mg q6h  Monitor BP closely  Abnormal LFTs (liver function tests)  Elevated alkaline  phosphatase, bilirubin  Decreased albumin  Bilirubin downtrending (1.5-1.7 from 7/2-7/5)  Alkaline phosphatase downtrending (140 as of 7/5)  AST/ALT in reference ranges  Continue monitoring until discharge tomorrow  VTE Risk Mitigation (From admission, onward)           Ordered     rivaroxaban tablet 20 mg  With dinner         07/02/23 1223                  Discharge planning:    DREW: 7/6/2023   Needs to remain in relatively stable rhythm with no acute episodes of SVT  Monitor for post-procedure recovery  Code Status: Full Code   Is the patient medically ready for discharge?: No   Reason for patient still in hospital (select all that apply): Patient trending condition  Discharge Plan A: Home

## 2023-07-05 NOTE — ASSESSMENT & PLAN NOTE
- stopped home metoprolol and flecainide on admission   - JUNIOR/DCCV 06/29- was started on tikosyn, prolonged QTc- started on amiodarone today as QTc <500- plan for amio 200mg BID for 30d then 200mg qday - patient with intolerance to amiodarone  - Recurrence of aflutter and RVR 07/04- improved with IV metoprolol 5mg  - EP following- ablation today  - continue metoprolol 25mg q6h, continue xarelto  - ECG qday  - Tele

## 2023-07-05 NOTE — SUBJECTIVE & OBJECTIVE
Interval History: Patient was seen and evaluated this am. No issues overnight. Underwent ablation this am.  No nausea, vomiting, chest pain, fevers, chills, shortness of breath.     Objective:     Vital Signs (Most Recent):  Temp: 97.9 °F (36.6 °C) (07/05/23 1706)  Pulse: 89 (07/05/23 1706)  Resp: 16 (07/05/23 1706)  BP: (!) 135/94 (07/05/23 1706)  SpO2: 97 % (07/05/23 1706) Vital Signs (24h Range):  Temp:  [97 °F (36.1 °C)-98.1 °F (36.7 °C)] 97.9 °F (36.6 °C)  Pulse:  [53-99] 89  Resp:  [8-18] 16  SpO2:  [92 %-100 %] 97 %  BP: (120-158)/() 135/94     Weight: 93.9 kg (207 lb)  Body mass index is 32.42 kg/m².    Intake/Output Summary (Last 24 hours) at 7/5/2023 1731  Last data filed at 7/5/2023 1305  Gross per 24 hour   Intake 1000 ml   Output --   Net 1000 ml      Physical Exam  Gen: in NAD, appears stated age  Neuro: AAOx3, motor, sensory, and strength grossly intact BL  HEENT: NTNC, EOMI, PERRL, MMM  CVS: irregularly irregular rhythm (prior to procedure), reg rate, no m/r/g; S1/S2 auscultated with no S3 or S4; capillary refill < 2 sec  Resp: lungs CTAB, no w/r/r; no belabored breathing or accessory muscle use appreciated   Abd: BS+ in all 4 quadrants; NTND, soft to palpation; no organomegaly appreciated   Extrem: pulses full, equal, and regular over all 4 extremities; no UE or LE edema    Significant Labs: All pertinent labs within the past 24 hours have been reviewed.  CBC:   Recent Labs   Lab 07/04/23 0449 07/05/23 0330   WBC 4.35 5.15   HGB 16.2* 15.8   HCT 52.2* 52.1*    164     CMP:   Recent Labs   Lab 07/04/23  0449 07/05/23  0330    140   K 3.9  3.9 4.5  4.5    104   CO2 28 27    95   BUN 8 11   CREATININE 0.9 0.8   CALCIUM 9.3 9.4   PROT 6.4 6.3   ALBUMIN 3.1* 3.0*   BILITOT 1.7* 1.5*   ALKPHOS 154* 140*   AST 26 23   ALT 28 27   ANIONGAP 8 9     Troponin:   No results for input(s): TROPONINI, TROPONINIHS in the last 48 hours.      Significant Imaging: I have reviewed  all pertinent imaging results/findings within the past 24 hours.    TTE:  Summary    JUNIOR to evaluate for left atrial appendage thrombus prior to cardioversion for atrial fibrillation.  Moderately decreased systolic function. The estimated ejection fraction is 35%.  Normal right ventricular size with normal right ventricular systolic function.  Normal appearing left atrial appendage. No thrombus is present in the appendage. Abnormal appendage velocities.  Moderate tricuspid regurgitation.  Mild-to-moderate mitral regurgitation.

## 2023-07-05 NOTE — PROGRESS NOTES
[facility] Medicine  Progress Note    Patient Name: Yaneli Steele  MRN: [id number]  Patient Class: IP- Inpatient   Admission Date: 6/28/2023  Length of Stay: 7 days  Attending Physician: Serena Avilez MD  Primary Care Provider: Jannet Le MD        Subjective:     Principal Problem:Atrial flutter with rapid ventricular response        HPI:  67yoF with CHFrEF (EF 35%, diastolic dysfxn), pAF/AFL s/p DCCV 9/22, HTN, presenting with elevated heart rate associated with mild nausea & fatigue.  Patient was seen at her regularly scheduled EP appointment this morning.  She was noted to have a heart rate of 160 with a wide complex tachycardia and sent to ED.  Denies chest pain or shortness of breath.  Recent hospitalization for AF RVR managed medically.     In ED, 's and gave IV adenosine with no effect. Improved with IV metoprolol and rhythm noted to be atypical atrial flutter.  BP lowered to 85/56 in ED, now improved to 115 systolic. BNP 1511 , lactate 3.3, TBili 2.7, AST 77, ALT 75. She also received IV Lasix 80 mg.       Overview/Hospital Course:  Cardiology consulted- patient underwent DCCV/JUNIOR 06/29. JUNOIR with the following findings:  Summary     JUNIOR to evaluate for left atrial appendage thrombus prior to cardioversion for atrial fibrillation.   Moderately decreased systolic function. The estimated ejection fraction is 35%.   Normal right ventricular size with normal right ventricular systolic function.   Normal appearing left atrial appendage. No thrombus is present in the appendage. Abnormal appendage velocities.   Moderate tricuspid regurgitation.   Mild-to-moderate mitral regurgitation    Started on Tikosyn. Prolonged Qtc 572- patient had received first tikosyn dose PM of 06/29. She had dizziness- hypotension- given back 250cc NS 06/30- elevated lactate 3.1. Repeat lactate improved to normal. MRI pancreas protocol obtained due to hypoechoic focus at pancreatic  "head per US- MRI w/the following results:    1. No identifiable pancreatic mass or ductal dilatation.  Recommend further evaluation of sonographic abnormality with pancreas protocol CT.  2. Cholelithiasis with mural calcification.  No signs of acute cholecystitis.    T bili downtrended from 3 to 2.2. She was also started on amiodarone as tikosyn was stopped- started amiodarone 200mg BID for load. She received 2 doses of amiodarone, but stated that she was feeling "anxious and jittery." She refused further amio dose- it was explained to her that this was the only anti-arrhythmic available with per prolonged Qtc. She continued to decline. Offered anti-anxiety meds- patient declined. Xarelto was also briefly transitioned to eliquis with amiodarone and decreased CrCl. Patient requested to resume xarelto with not taking amiodarone, and so she was transitioned back. She was noted to have declining H:H- but no source of blood loss. WBC and plt noted to be downtrending. Given dose of lasix with concern for fluid-overload. H:H, WBC and plt seemed to improve with diuresis. Likely dilutional in nature.     Patient had episode of SVT vs. Afib/flutter with RVR- -209 max. Given IV adenosine 6mg, then 12mg-  no response. Given IV metoprolol 5mg x1- improvement in HR to 110s. Transitioned to metoprolol 25mg q6h. EP performed ablation 07/05/23.       Interval History: Patient was seen and evaluated this am. No issues overnight. Underwent ablation this am.  No nausea, vomiting, chest pain, fevers, chills, shortness of breath.     Objective:     Vital Signs (Most Recent):  Temp: 97.9 °F (36.6 °C) (07/05/23 1706)  Pulse: 89 (07/05/23 1706)  Resp: 16 (07/05/23 1706)  BP: (!) 135/94 (07/05/23 1706)  SpO2: 97 % (07/05/23 1706) Vital Signs (24h Range):  Temp:  [97 °F (36.1 °C)-98.1 °F (36.7 °C)] 97.9 °F (36.6 °C)  Pulse:  [53-99] 89  Resp:  [8-18] 16  SpO2:  [92 %-100 %] 97 %  BP: (120-158)/() 135/94     Weight: 93.9 kg (207 " lb)  Body mass index is 32.42 kg/m².    Intake/Output Summary (Last 24 hours) at 7/5/2023 1731  Last data filed at 7/5/2023 1305  Gross per 24 hour   Intake 1000 ml   Output --   Net 1000 ml      Physical Exam  Gen: in NAD, appears stated age  Neuro: AAOx3, motor, sensory, and strength grossly intact BL  HEENT: NTNC, EOMI, PERRL, MMM  CVS: irregularly irregular rhythm (prior to procedure), reg rate, no m/r/g; S1/S2 auscultated with no S3 or S4; capillary refill < 2 sec  Resp: lungs CTAB, no w/r/r; no belabored breathing or accessory muscle use appreciated   Abd: BS+ in all 4 quadrants; NTND, soft to palpation; no organomegaly appreciated   Extrem: pulses full, equal, and regular over all 4 extremities; no UE or LE edema    Significant Labs: All pertinent labs within the past 24 hours have been reviewed.  CBC:   Recent Labs   Lab 07/04/23 0449 07/05/23  0330   WBC 4.35 5.15   HGB 16.2* 15.8   HCT 52.2* 52.1*    164     CMP:   Recent Labs   Lab 07/04/23 0449 07/05/23  0330    140   K 3.9  3.9 4.5  4.5    104   CO2 28 27    95   BUN 8 11   CREATININE 0.9 0.8   CALCIUM 9.3 9.4   PROT 6.4 6.3   ALBUMIN 3.1* 3.0*   BILITOT 1.7* 1.5*   ALKPHOS 154* 140*   AST 26 23   ALT 28 27   ANIONGAP 8 9     Troponin:   No results for input(s): TROPONINI, TROPONINIHS in the last 48 hours.      Significant Imaging: I have reviewed all pertinent imaging results/findings within the past 24 hours.    TTE:  Summary     JUNIOR to evaluate for left atrial appendage thrombus prior to cardioversion for atrial fibrillation.   Moderately decreased systolic function. The estimated ejection fraction is 35%.   Normal right ventricular size with normal right ventricular systolic function.   Normal appearing left atrial appendage. No thrombus is present in the appendage. Abnormal appendage velocities.   Moderate tricuspid regurgitation.   Mild-to-moderate mitral regurgitation.      Assessment/Plan:      * Atrial  flutter with rapid ventricular response  - stopped home metoprolol and flecainide on admission   - JUNIOR/DCCV 06/29- was started on tikosyn, prolonged QTc- started on amiodarone today as QTc <500- plan for amio 200mg BID for 30d then 200mg qday - patient with intolerance to amiodarone  - Recurrence of aflutter and RVR 07/04- improved with IV metoprolol 5mg  - EP following- ablation today  - continue metoprolol 25mg q6h, continue xarelto  - ECG qday  - Tele       Thrombocytopenia  - see leukopenia      Leukopenia  - resolved      Nasal congestion  - resolved        Hypotension  - occurred following admission with IV metoprolol push- resolved, now hypertensive    Pancreatic mass  - US of abd w/indeterminate 1.3cm hypoechoic focus in or adjacent to pancreatic head/body- pancreatic mass protocol- MRI vs. CT w/contrast recommended.   - ordered MRI w/contrast- pancreatic protocol- no identifiable mass, but recommended CT pancreas protocol will hold off for now as patient just had contrast exposure- can plan on obtaining via outpatient basis     Abnormal LFTs (liver function tests)  - Elevated TBili, AST/ALT/ALP  - Concern for elevation 2/2 congestive hepatopathy  - RUQ US obtained w/no findings of cholecystitis or ductal dilation; however, indeterminate 1.3cm hypoechoic focus in or adjacent to pancreatic head/body- pancreatic mass protocol- MRI vs. CT w/contrast recommended.   - Repeat LFTs tomorrow am  - MRI pancreas protocol with the following findings:     1. No identifiable pancreatic mass or ductal dilatation.  Recommend further evaluation of sonographic abnormality with pancreas protocol CT.  2. Cholelithiasis with mural calcification.  No signs of acute cholecystitis.    - T bili downtrending daily- 1.5 today, AST:ALT wnl and ALP downtrending- possibly ischemic event 2/2 afib with RVR?   - will continue to trend- arrange CT pancreas protocol on outpatient basis       Atrial flutter        Acute on chronic combined  systolic and diastolic congestive heart failure  Patient is identified as having Combined Systolic and Diastolic heart failure that is Acute on chronic. CHF is currently uncontrolled due to Continued edema of extremities. Latest ECHO performed and demonstrates- . Continue Beta Blocker and Furosemide and monitor clinical status closely. Monitor on telemetry. Patient is on CHF pathway.  Monitor strict Is&Os and daily weights.  Place on fluid restriction of 1.5 L. Cardiology has been consulted. Continue to stress to patient importance of self efficacy and  on diet for CHF. Last BNP reviewed- and noted below     Summary     JUNIOR to evaluate for left atrial appendage thrombus prior to cardioversion for atrial fibrillation.   Moderately decreased systolic function. The estimated ejection fraction is 35%.   Normal right ventricular size with normal right ventricular systolic function.   Normal appearing left atrial appendage. No thrombus is present in the appendage. Abnormal appendage velocities.   Moderate tricuspid regurgitation.   Mild-to-moderate mitral regurgitation    No results for input(s): BNP, BNPTRIAGEBLO in the last 168 hours.  - Repeat echo with reduced EF 25%, CVP 8 , mod-severe TR, severe MR  - Received 120mg IV lasix 06/28, 80mg IV lasix 06/29  - s/p 40mg IV lasix 07/02     Essential (primary) hypertension  - holding home entresto  - continue metoprolol 25mg q6h  - monitor BP closely       VTE Risk Mitigation (From admission, onward)         Ordered     heparin infusion 1,000 units/500 ml in 0.9% NaCl (on sterile field)  As needed (PRN)         07/05/23 1103     heparin (porcine) 2,000 Units in sodium chloride 0.9% 2,000 mL  As needed (PRN)         07/05/23 1103     rivaroxaban tablet 20 mg  With dinner         07/02/23 1223                Discharge Planning   DREW: 7/6/2023     Code Status: Full Code   Is the patient medically ready for discharge?: No    Reason for patient still in hospital  (select all that apply): Patient trending condition  Discharge Plan A: Home                    Serena Avilez MD  Department of Hospital Medicine   [facility] - Cardiology Stepdown

## 2023-07-05 NOTE — NURSING
Pt arrived back to CSU room 307 from EP lab. CLAYS. AAOx4. Pt educated on bedrest and timing of suture removal of BL groin sites. Pt in no acute distress at this time. Bed in lowest position, call light in reach, family at bedside.      1600: sutures removed per orders; pressure dsg applied. CDI. WCTM.

## 2023-07-05 NOTE — ANESTHESIA PROCEDURE NOTES
Intubation    Date/Time: 7/5/2023 10:30 AM  Performed by: Katya Fontenot CRNA  Authorized by: Kevin Rizzo MD     Intubation:     Induction:  Intravenous    Intubated:  Postinduction    Mask Ventilation:  Easy mask    Attempts:  1    Attempted By:  CRNA    Method of Intubation:  Direct    Blade:  Infante 2    Laryngeal View Grade: Grade I - full view of cords      Difficult Airway Encountered?: No      Complications:  None    Airway Device:  Oral endotracheal tube    Airway Device Size:  7.0    Style/Cuff Inflation:  Cuffed    Inflation Amount (mL):  5    Tube secured:  21    Secured at:  The lips    Placement Verified By:  Capnometry and Revisualization with laryngoscopy    Complicating Factors:  None    Findings Post-Intubation:  BS equal bilateral and atraumatic/condition of teeth unchanged

## 2023-07-05 NOTE — CONSULTS
[facility], [place]  JUNIOR Consult      Yaneli Steele  YOB: 1955  Medical Record Number:  [id number]  Attending Physician:  Serena Avilez MD   Date of Admission: 6/28/2023     Hospital Day:  7  Current Principal Problem:  Atrial flutter with rapid ventricular response    History     Cc: atrial fibrillation and atrial flutter 1:1    HPI  Yaneli Steele is a 67 year old female with PMH of atrial fibrillation, tachy mediated cardiomyopathy (recovered EF) who presented to [facility] [place] [facility] with wide complex tachycardia which was ultimately found to be atypical atrial flutter. Surface echo performed with once again decreased ejection fraction to 25%. EP JUNIOR/DCCV last week 6/29/23 for Afib/Aflutter with RVR. Planned to start amiodarone and outpatient PVI, but patient had Atrial flutter 1:1 rate 200s on 7/3/23. Plan for RFA ablation today with JUNIOR for typical vs atypical atrial flutter with transseptal access.      Anticoagulant/antiplatelets: Rivaroxaban 20 mg daily   ECG: Atrial flutter 90s, typical flutter     Dysphagia or odynophagia:  No   Liver Disease, esophageal disease, or known varices:  No   Upper GI Bleeding: No   Snoring:  No   Sleep Apnea:  No   Prior neck surgery or radiation:  No   History of anesthetic difficulties:  No   Family history of anesthetic difficulties:  No   Last oral intake: Yesterday PM   Able to move neck in all directions: Yes   Platelet count: 164k   INR: N/A    JUNIOR 6/2023:   JUNIOR to evaluate for left atrial appendage thrombus prior to cardioversion for atrial fibrillation.  Moderately decreased systolic function. The estimated ejection fraction is 35%.  Normal right ventricular size with normal right ventricular systolic function.  Normal appearing left atrial appendage. No thrombus is present in the appendage. Abnormal appendage velocities.  Moderate tricuspid regurgitation.  Mild-to-moderate mitral regurgitation.    TTE 6/28/23:   The left ventricle is normal  in size with severely decreased systolic function.  The estimated ejection fraction is 25%.  There is left ventricular global hypokinesis.  Normal right ventricular size with mildly reduced right ventricular systolic function.  Severe biatrial enlargement.  Severe mitral regurgitation.  Moderate to severe tricuspid regurgitation.  The estimated PA systolic pressure is 51 mmHg.  There is pulmonary hypertension.  Intermediate central venous pressure (8 mmHg).  Atrial fibrillation observed.    Medications - Outpatient  Prior to Admission medications    Medication Sig Start Date End Date Taking? Authorizing Provider   flecainide (TAMBOCOR) 100 MG Tab Take 1 tablet (100 mg total) by mouth every 12 (twelve) hours. 9/16/22 9/16/23 Yes Joshua Carrillo MD   metoprolol succinate (TOPROL-XL) 50 MG 24 hr tablet Take 1 tablet (50 mg total) by mouth 2 (two) times daily. 6/6/23 8/5/23 Yes Herberth Engel,    rivaroxaban (XARELTO) 20 mg Tab Take 1 tablet (20 mg total) by mouth daily with dinner or evening meal. 1/23/23  Yes Devante Issa MD   sacubitriL-valsartan (ENTRESTO) 49-51 mg per tablet Take 1 tablet by mouth 2 (two) times daily. 6/12/23  Yes Devante Issa MD     Medications - Current  Scheduled Meds:   hydrOXYzine HCL  10 mg Oral Once    magnesium sulfate IVPB  2 g Intravenous Once    metoprolol tartrate  25 mg Oral Q6H    rivaroxaban  20 mg Oral Daily with dinner     Allergies  Review of patient's allergies indicates:   Allergen Reactions    Amiodarone analogues Shortness Of Breath    Penicillins Shortness Of Breath     Past Medical History  Past Medical History:   Diagnosis Date    Chronic systolic heart failure     Essential (primary) hypertension     Paroxysmal atrial fibrillation      Past Surgical History  Past Surgical History:   Procedure Laterality Date    ANGIOGRAM, CORONARY, WITH LEFT HEART CATHETERIZATION      TRANSESOPHAGEAL ECHOCARDIOGRAPHY N/A 6/29/2023    Procedure: ECHOCARDIOGRAM,  TRANSESOPHAGEAL;  Surgeon: Leola Benavides MD;  Location: [facility] EP LAB;  Service: Cardiology;  Laterality: N/A;    TREATMENT OF CARDIAC ARRHYTHMIA N/A 6/29/2023    Procedure: Cardioversion or Defibrillation;  Surgeon: Devante Arevalo MD;  Location: [facility] EP LAB;  Service: Cardiology;  Laterality: N/A;  AF, JUNIOR/DCCV, ANES, DM, ED BED 11     ROS  10 point ROS performed and negative except as stated in HPI     Physical Examination     Vital Signs  Vitals  Temp: 97.5 °F (36.4 °C)  Temp Source: Oral  Pulse: 96  Heart Rate Source: Monitor  Resp: 18  SpO2: 98 %  Pulse Oximetry Type: Intermittent  Flow (L/min): 2  BP: (!) 128/101  MAP (mmHg): 112  BP Location: Left arm  BP Method: Automatic  Patient Position: Lying   24 Hour VS Range    Temp:  [41 °F (5 °C)-98.4 °F (36.9 °C)]   Pulse:  []   Resp:  [16-18]   BP: (120-129)/()   SpO2:  [94 %-99 %]     Intake/Output Summary (Last 24 hours) at 7/5/2023 0808  Last data filed at 7/4/2023 1726  Gross per 24 hour   Intake 240 ml   Output --   Net 240 ml         Physical Exam:   Constitutional: no acute distress  HEENT: NCAT, EOMI, no scleral icterus  Cardiovascular: Irregularly irregular rhythm   Pulmonary: Normal respiratory effort   Abdomen: nontender, non-distended   Neuro: alert and oriented, no focal deficits  Extremities: warm, no edema   MSK: no deformities  Integument: intact, no rashes     Data     Recent Labs   Lab 07/02/23  0400 07/02/23  1728 07/03/23  0236 07/04/23  0449 07/05/23  0330   WBC 3.87* 4.35 4.42 4.35 5.15   HGB 13.7 15.8 15.2 16.2* 15.8   HCT 44.3 50.6* 48.5 52.2* 52.1*   * 144* 131* 151 164      Recent Labs   Lab 06/29/23  1845   INR 1.5*     Recent Labs   Lab 07/01/23  0332 07/02/23  0400 07/03/23  0236 07/04/23  0449 07/05/23  0330    139 140 139 140   K 3.5  3.5 4.3  4.3 3.7  3.7 3.9  3.9 4.5  4.5   CL 97 105 104 103 104   CO2 30* 24 26 28 27   BUN 15 10 8 8 11   CREATININE 1.0 0.9 0.8 0.9 0.8   ANIONGAP 9 10 10 8 9    CALCIUM 8.5* 8.5* 9.0 9.3 9.4        Recent Labs   Lab 07/01/23  0332 07/02/23  0400 07/03/23  0236 07/04/23  0449 07/05/23  0330   PROT 5.7* 5.5* 5.8* 6.4 6.3   ALBUMIN 2.9* 2.8* 2.9* 3.1* 3.0*   BILITOT 2.2* 1.5* 1.5* 1.7* 1.5*   ALKPHOS 173* 145* 154* 154* 140*   AST 22 26 25 26 23   ALT 35 33 31 28 27      Assessment & Plan   67 year old female with PMH of atrial fibrillation, tachy mediated cardiomyopathy (recovered EF) who presented to [facility] with wide complex tachycardia which was ultimately found to be atypical atrial flutter.     Atrial flutter  Tachycardia mediated cardiomyopathy:   To echo lab for JUNIOR and RFA typical vs atypical flutter.   -No absolute contraindications of esophageal stricture, tumor, perforation, laceration,or diverticulum and/or active GI bleed  -The risks, benefits & alternatives of the procedure were explained to the patient.   -The risks of transesophageal echo include but are not limited to:  Dental trauma, esophageal trauma/perforation, bleeding, laryngospasm/brochospasm, aspiration, sore throat/hoarseness, & dislodgement of the endotracheal tube/nasogastric tube (where applicable).    -The risks of moderate sedation include hypotension, respiratory depression, arrhythmias, bronchospasm, & death.    -Informed consent was obtained. The patient is agreeable to proceed with the procedure and all questions and concerns addressed        Miguel Clark MD  [facility]   Cardiovascular Disease PGY-V

## 2023-07-06 VITALS
HEIGHT: 67 IN | TEMPERATURE: 97 F | WEIGHT: 207 LBS | DIASTOLIC BLOOD PRESSURE: 86 MMHG | SYSTOLIC BLOOD PRESSURE: 125 MMHG | BODY MASS INDEX: 32.49 KG/M2 | OXYGEN SATURATION: 98 % | HEART RATE: 55 BPM | RESPIRATION RATE: 17 BRPM

## 2023-07-06 LAB
ALBUMIN SERPL BCP-MCNC: 3.2 G/DL (ref 3.5–5.2)
ALP SERPL-CCNC: 128 U/L (ref 55–135)
ALT SERPL W/O P-5'-P-CCNC: 24 U/L (ref 10–44)
ANION GAP SERPL CALC-SCNC: 8 MMOL/L (ref 8–16)
AST SERPL-CCNC: 25 U/L (ref 10–40)
BASOPHILS # BLD AUTO: 0.06 K/UL (ref 0–0.2)
BASOPHILS NFR BLD: 1.2 % (ref 0–1.9)
BILIRUB SERPL-MCNC: 2.1 MG/DL (ref 0.1–1)
BUN SERPL-MCNC: 11 MG/DL (ref 8–23)
CALCIUM SERPL-MCNC: 9.3 MG/DL (ref 8.7–10.5)
CHLORIDE SERPL-SCNC: 106 MMOL/L (ref 95–110)
CO2 SERPL-SCNC: 24 MMOL/L (ref 23–29)
CREAT SERPL-MCNC: 0.9 MG/DL (ref 0.5–1.4)
DIFFERENTIAL METHOD: ABNORMAL
EOSINOPHIL # BLD AUTO: 0.1 K/UL (ref 0–0.5)
EOSINOPHIL NFR BLD: 2.7 % (ref 0–8)
ERYTHROCYTE [DISTWIDTH] IN BLOOD BY AUTOMATED COUNT: 15.3 % (ref 11.5–14.5)
EST. GFR  (NO RACE VARIABLE): >60 ML/MIN/1.73 M^2
GLUCOSE SERPL-MCNC: 104 MG/DL (ref 70–110)
HCT VFR BLD AUTO: 51.6 % (ref 37–48.5)
HGB BLD-MCNC: 15.5 G/DL (ref 12–16)
IMM GRANULOCYTES # BLD AUTO: 0.02 K/UL (ref 0–0.04)
IMM GRANULOCYTES NFR BLD AUTO: 0.4 % (ref 0–0.5)
LYMPHOCYTES # BLD AUTO: 1.5 K/UL (ref 1–4.8)
LYMPHOCYTES NFR BLD: 29.2 % (ref 18–48)
MAGNESIUM SERPL-MCNC: 1.8 MG/DL (ref 1.6–2.6)
MCH RBC QN AUTO: 27.4 PG (ref 27–31)
MCHC RBC AUTO-ENTMCNC: 30 G/DL (ref 32–36)
MCV RBC AUTO: 91 FL (ref 82–98)
MONOCYTES # BLD AUTO: 0.5 K/UL (ref 0.3–1)
MONOCYTES NFR BLD: 10.4 % (ref 4–15)
NEUTROPHILS # BLD AUTO: 2.9 K/UL (ref 1.8–7.7)
NEUTROPHILS NFR BLD: 56.1 % (ref 38–73)
NRBC BLD-RTO: 0 /100 WBC
PLATELET # BLD AUTO: 142 K/UL (ref 150–450)
PMV BLD AUTO: 11.2 FL (ref 9.2–12.9)
POTASSIUM SERPL-SCNC: 4.5 MMOL/L (ref 3.5–5.1)
PROT SERPL-MCNC: 6.5 G/DL (ref 6–8.4)
RBC # BLD AUTO: 5.66 M/UL (ref 4–5.4)
SODIUM SERPL-SCNC: 138 MMOL/L (ref 136–145)
WBC # BLD AUTO: 5.11 K/UL (ref 3.9–12.7)

## 2023-07-06 PROCEDURE — 99239 HOSP IP/OBS DSCHRG MGMT >30: CPT | Mod: ,,, | Performed by: STUDENT IN AN ORGANIZED HEALTH CARE EDUCATION/TRAINING PROGRAM

## 2023-07-06 PROCEDURE — 99239 PR HOSPITAL DISCHARGE DAY,>30 MIN: ICD-10-PCS | Mod: ,,, | Performed by: STUDENT IN AN ORGANIZED HEALTH CARE EDUCATION/TRAINING PROGRAM

## 2023-07-06 PROCEDURE — 25000003 PHARM REV CODE 250: Performed by: STUDENT IN AN ORGANIZED HEALTH CARE EDUCATION/TRAINING PROGRAM

## 2023-07-06 PROCEDURE — 93010 EKG 12-LEAD: ICD-10-PCS | Mod: ,,, | Performed by: INTERNAL MEDICINE

## 2023-07-06 PROCEDURE — 80053 COMPREHEN METABOLIC PANEL: CPT | Performed by: STUDENT IN AN ORGANIZED HEALTH CARE EDUCATION/TRAINING PROGRAM

## 2023-07-06 PROCEDURE — 36415 COLL VENOUS BLD VENIPUNCTURE: CPT | Performed by: STUDENT IN AN ORGANIZED HEALTH CARE EDUCATION/TRAINING PROGRAM

## 2023-07-06 PROCEDURE — 93005 ELECTROCARDIOGRAM TRACING: CPT

## 2023-07-06 PROCEDURE — 93010 ELECTROCARDIOGRAM REPORT: CPT | Mod: ,,, | Performed by: INTERNAL MEDICINE

## 2023-07-06 PROCEDURE — 83735 ASSAY OF MAGNESIUM: CPT | Performed by: STUDENT IN AN ORGANIZED HEALTH CARE EDUCATION/TRAINING PROGRAM

## 2023-07-06 PROCEDURE — 85025 COMPLETE CBC W/AUTO DIFF WBC: CPT | Performed by: HOSPITALIST

## 2023-07-06 PROCEDURE — 36415 COLL VENOUS BLD VENIPUNCTURE: CPT | Performed by: HOSPITALIST

## 2023-07-06 RX ORDER — METOPROLOL SUCCINATE 100 MG/1
100 TABLET, EXTENDED RELEASE ORAL DAILY
Status: DISCONTINUED | OUTPATIENT
Start: 2023-07-06 | End: 2023-07-06 | Stop reason: HOSPADM

## 2023-07-06 RX ORDER — PANTOPRAZOLE SODIUM 40 MG/1
40 TABLET, DELAYED RELEASE ORAL DAILY
Qty: 30 TABLET | Refills: 0 | Status: SHIPPED | OUTPATIENT
Start: 2023-07-06 | End: 2024-02-22

## 2023-07-06 RX ORDER — METOPROLOL SUCCINATE 100 MG/1
100 TABLET, EXTENDED RELEASE ORAL DAILY
Qty: 30 TABLET | Refills: 11 | Status: SHIPPED | OUTPATIENT
Start: 2023-07-07 | End: 2023-07-31 | Stop reason: SDUPTHER

## 2023-07-06 RX ORDER — FUROSEMIDE 20 MG/1
20 TABLET ORAL DAILY
Qty: 30 TABLET | Refills: 11 | Status: SHIPPED | OUTPATIENT
Start: 2023-07-06 | End: 2024-02-22

## 2023-07-06 RX ADMIN — RIVAROXABAN 20 MG: 20 TABLET, FILM COATED ORAL at 05:07

## 2023-07-06 RX ADMIN — METOPROLOL SUCCINATE 100 MG: 100 TABLET, EXTENDED RELEASE ORAL at 08:07

## 2023-07-06 NOTE — NURSING
Pt's HR consistently sinus mallory in low 50s throughout night shift and this morning. VSS otherwise.  C notified. Instructed to hold scheduled Metop for now if HR remains below 60 bpm once due. WCTM.

## 2023-07-06 NOTE — NURSING
Patient is ready for discharge. Patient stable alert and oriented. IVs removed. No complaints of pain. Discussed discharge plan. Reviewed medications and side effects, appointments, and answered questions with patient. Pt received meds at bedside. Pt waiting for son to arrive.

## 2023-07-06 NOTE — DISCHARGE SUMMARY
Discharge Summary  Hospital Medicine  [facility]      Attending Physician on Discharge: Stanley Rinaldi MD  Hospital Medicine Team: [facility]  Date of Admission:  6/28/2023     Date of Discharge:  07/06/2023   Code status: Full Code    Active Hospital Problems    Diagnosis  POA    *Atrial flutter with rapid ventricular response [I48.92]  Yes    Leukopenia [D72.819]  Unknown    Thrombocytopenia [D69.6]  Unknown    Hypotension [I95.9]  Unknown    Nasal congestion [R09.81]  Unknown    Pancreatic mass [K86.89]  Unknown    Abnormal LFTs (liver function tests) [R79.89]  Yes    Acute on chronic combined systolic and diastolic congestive heart failure [I50.43]  Yes    Essential (primary) hypertension [I10]  Yes      Resolved Hospital Problems   No resolved problems to display.       Consults: EP     History of Present Illness:  67yoF with CHFrEF (EF 35%, diastolic dysfxn), pAF/AFL s/p DCCV 9/22, HTN, presenting with elevated heart rate associated with mild nausea & fatigue.  Patient was seen at her regularly scheduled EP appointment this morning.  She was noted to have a heart rate of 160 with a wide complex tachycardia and sent to ED.  Denies chest pain or shortness of breath.  Recent hospitalization for AF RVR managed medically.      In ED, 's and gave IV adenosine with no effect. Improved with IV metoprolol and rhythm noted to be atypical atrial flutter.  BP lowered to 85/56 in ED, now improved to 115 systolic. BNP 1511 , lactate 3.3, TBili 2.7, AST 77, ALT 75. She also received IV Lasix 80 mg.     Hospital Course:  Cardiology consulted- patient underwent DCCV/JUNIOR 06/29. JUNIOR with the following findings:  Summary     JUNIOR to evaluate for left atrial appendage thrombus prior to cardioversion for atrial fibrillation.  Moderately decreased systolic function. The estimated ejection fraction is 35%.  Normal right ventricular size with normal right ventricular systolic function.  Normal  "appearing left atrial appendage. No thrombus is present in the appendage. Abnormal appendage velocities.  Moderate tricuspid regurgitation.  Mild-to-moderate mitral regurgitation     Started on Tikosyn. Prolonged Qtc 572- patient had received first tikosyn dose PM of 06/29. She had dizziness- hypotension- given back 250cc NS 06/30- elevated lactate 3.1. Repeat lactate improved to normal. MRI pancreas protocol obtained due to hypoechoic focus at pancreatic head per US- MRI w/the following results:     1. No identifiable pancreatic mass or ductal dilatation.  Recommend further evaluation of sonographic abnormality with pancreas protocol CT.  2. Cholelithiasis with mural calcification.  No signs of acute cholecystitis.     T bili downtrended from 3 to 2.2. She was also started on amiodarone as tikosyn was stopped- started amiodarone 200mg BID for load. She received 2 doses of amiodarone, but stated that she was feeling "anxious and jittery." She refused further amio dose- it was explained to her that this was the only anti-arrhythmic available with per prolonged Qtc. She continued to decline. Offered anti-anxiety meds- patient declined. Xarelto was also briefly transitioned to eliquis with amiodarone and decreased CrCl. Patient requested to resume xarelto with not taking amiodarone, and so she was transitioned back. She was noted to have declining H:H- but no source of blood loss. WBC and plt noted to be downtrending. Given dose of lasix with concern for fluid-overload. H:H, WBC and plt seemed to improve with diuresis. Likely dilutional in nature.      EP performed ablation 07/05/23. Converted to sinus bradycardia, EP recommended continuing rivaroxaban and starting metoprolol succinate 100mg daily.    Hospital Course by Problem:  Atrial flutter with rapid ventricular response  - JUNIOR/DCCV 06/29- was started on tikosyn, prolonged QTc- started on amiodarone today as QTc <500- plan for amio 200mg BID for 30d then 200mg " qday - patient with intolerance to amiodarone  - Recurrence of aflutter and RVR 07/04- improved with IV metoprolol 5mg, ultimately underwent ablation on 7/5/22  - continue xarelto  - start metoprolol 100mg Qday  - EP referral at OR       Pancreatic mass  - US of abd w/indeterminate 1.3cm hypoechoic focus in or adjacent to pancreatic head/body- pancreatic mass protocol- MRI vs. CT w/contrast recommended.   - ordered MRI w/contrast- pancreatic protocol- no identifiable mass, but recommended CT pancreas protocol will hold off for now as patient just had contrast exposure- can plan on obtaining via outpatient basis            Acute on chronic combined systolic and diastolic congestive heart failure  Patient is identified as having Combined Systolic and Diastolic heart failure that is Acute on chronic. CHF is currently uncontrolled due to Continued edema of extremities. Latest ECHO performed and demonstrates- . Continue Beta Blocker and Furosemide and monitor clinical status closely. Monitor on telemetry. Patient is on CHF pathway.  Monitor strict Is&Os and daily weights.  Place on fluid restriction of 1.5 L. Cardiology has been consulted. Continue to stress to patient importance of self efficacy and  on diet for CHF. Last BNP reviewed- and noted below      Summary     JUNIOR to evaluate for left atrial appendage thrombus prior to cardioversion for atrial fibrillation.  Moderately decreased systolic function. The estimated ejection fraction is 35%.  Normal right ventricular size with normal right ventricular systolic function.  Normal appearing left atrial appendage. No thrombus is present in the appendage. Abnormal appendage velocities.  Moderate tricuspid regurgitation.  Mild-to-moderate mitral regurgitation       - Repeat echo with reduced EF 25%, CVP 8 , mod-severe TR, severe MR  - Entresto at OR, metoprolol succinate 100mg daily  - Follow up with cardiology for heart failure  - Lasix 20mg daily     Essential  (primary) hypertension  - resume home entresto  - continue metoprolol 25mg q6h  - monitor BP closely     Laboratory Values:  Recent Labs   Lab 07/04/23 0449 07/05/23 0330 07/06/23  0359   WBC 4.35 5.15 5.11   HGB 16.2* 15.8 15.5   HCT 52.2* 52.1* 51.6*    164 142*     Recent Labs   Lab 07/04/23 0449 07/05/23 0330 07/06/23  0756    140 138   K 3.9  3.9 4.5  4.5 4.5    104 106   CO2 28 27 24   BUN 8 11 11   CREATININE 0.9 0.8 0.9    95 104   CALCIUM 9.3 9.4 9.3   MG 1.9 1.8 1.8     Recent Labs   Lab 06/29/23  1845 06/30/23  0605 07/04/23 0449 07/05/23 0330 07/06/23 0756   ALKPHOS  --    < > 154* 140* 128   ALT  --    < > 28 27 24   AST  --    < > 26 23 25   ALBUMIN  --    < > 3.1* 3.0* 3.2*   PROT  --    < > 6.4 6.3 6.5   BILITOT  --    < > 1.7* 1.5* 2.1*   INR 1.5*  --   --   --   --     < > = values in this interval not displayed.      Recent Labs   Lab 06/29/23 2032 07/05/23 1642 07/05/23 2031   POCTGLUCOSE 135* 112* 164*     No results for input(s): CPK, CPKMB, MB, TROPONINI in the last 72 hours.      Microbiology:  Microbiology Results (last 7 days)       Procedure Component Value Units Date/Time    Blood culture [[id number]] Collected: 06/28/23 1836    Order Status: Completed Specimen: Blood from Antecubital, Left Updated: 07/03/23 2012     Blood Culture, Routine No growth after 5 days.    Blood culture [[id number]] Collected: 06/28/23 1832    Order Status: Completed Specimen: Blood Updated: 07/03/23 2012     Blood Culture, Routine No growth after 5 days.            Imaging:  Imaging Results              US Abdomen Limited (Final result)  Result time 06/29/23 08:49:48      Final result by Pete Vallecillo MD (06/29/23 08:49:48)                   Impression:      Cholelithiasis and findings suggestive for gallbladder wall calcification.  No evidence of acute cholecystitis.    No biliary ductal dilatation.    Indeterminate 1.3 cm hypoechoic focus in or adjacent to the  pancreatic head/body.  Recommend contrast enhanced pancreatic mass protocol MRI or CT for additional characterization.    Electronically signed by resident: Miguel Weaver  Date:    06/29/2023  Time:    08:01    Electronically signed by: Pete Vallecillo  Date:    06/29/2023  Time:    08:49               Narrative:    EXAMINATION:  US ABDOMEN LIMITED    CLINICAL HISTORY:  liver eval, elevated lfts, bili;.    TECHNIQUE:  Limited ultrasound of the right upper quadrant of the abdomen including pancreas, liver, gallbladder, common bile duct was performed.    COMPARISON:  None.    FINDINGS:  Pancreas: Hypoechoic focus in or adjacent to the pancreatic head/body measuring 1.3 x 0.7 x 0.4 cm.  Nonenlarged peripancreatic lymph node measuring 6 mm in short axis.    Liver: Normal in size, measuring 13.6 cm. Homogeneous echotexture. No focal hepatic lesions. Hepatorenal index 1.0.    Gallbladder: 1.5 cm gallstone near the gallbladder neck.  Diffuse hyperechogenicity of the gallbladder wall demonstrating posterior acoustic shadowing suggestive for wall calcification.  No wall thickening, hypervascularity, or pericholecystic fluid.  No sonographic Fields sign.    Biliary system: The common duct is not dilated, measuring 2 mm.  No intrahepatic ductal dilatation.    Spleen: Normal in size with a homogeneous echotexture, measuring 10.1 x 3.5 cm.    Miscellaneous: No ascites.                                       X-Ray Chest AP Portable (Final result)  Result time 06/28/23 11:18:16      Final result by Galo Cohen MD (06/28/23 11:18:16)                   Impression:      No convincing evidence of acute detrimental change relative prior radiograph performed 06/01/2023.      Electronically signed by: Galo Cohen  Date:    06/28/2023  Time:    11:18               Narrative:    EXAMINATION:  XR CHEST AP PORTABLE    CLINICAL HISTORY:  Chest Pain;    TECHNIQUE:  Single frontal view of the chest was  performed.    COMPARISON:  Chest radiograph 06/01/2023    FINDINGS:  Assessment of the chest limited due to overlying support apparatus.  Cardiac monitoring leads are noted.    Grossly unchanged cardiomediastinal contours.  Mildly prominent interstitial markings, relatively similar to 06/01/2023, 15:58 hours radiograph.  Findings could relate to mild degree of pulmonary vascular congestion/interstitial edema.    No new focal airspace consolidation is identified.    No definite pneumothorax or large volume pleural effusion.  No acute change in the visualized abdomen osseous soft tissue structures also appear without definite acute change.                                            Cardiac:  ECG Results              EKG 12-lead (Final result)  Result time 06/29/23 10:59:18      Final result by Interface, Lab In [facility] (06/29/23 10:59:18)                   Narrative:    Test Reason :     Vent. Rate : 113 BPM     Atrial Rate : 156 BPM     P-R Int : 000 ms          QRS Dur : 092 ms      QT Int : 370 ms       P-R-T Axes : 000 080 -45 degrees     QTc Int : 507 ms    Atrial fibrillation with rapid ventricular response  ST and T wave abnormality, consider inferolateral ischemia  Abnormal ECG  When compared with ECG of 28-JUN-2023 09:45,  No significant change was found  Confirmed by Sharona Brown MD ([id number]) on 6/29/2023 10:59:09 AM    Referred By: [email or web address]   SELF           Confirmed By:Sharona Brown MD                                     EKG 12-lead (Final result)  Result time 06/28/23 14:05:47      Final result by Interface, Lab In [facility] (06/28/23 14:05:47)                   Narrative:    Test Reason : I48.91,    Vent. Rate : 116 BPM     Atrial Rate : 312 BPM     P-R Int : 000 ms          QRS Dur : 104 ms      QT Int : 354 ms       P-R-T Axes : 000 -37 -38 degrees     QTc Int : 492 ms    Atrial flutter with variable A-V block vs atrial fibrillation  Left axis deviation  Abnormal ECG  When compared with ECG of 28-JUN-2023  09:43,  Wide complex tachycardia no longer present  Confirmed by Sharona Brown MD ([id number]) on 6/28/2023 2:05:40 PM    Referred By: [email or web address]   SELF           Confirmed By:Sharona Brown MD                                     EKG 12-lead (Final result)  Result time 06/28/23 14:06:30      Final result by Interface, Lab In [facility] (06/28/23 14:06:30)                   Narrative:    Test Reason : R00.0,    Vent. Rate : 152 BPM     Atrial Rate : 152 BPM     P-R Int : 112 ms          QRS Dur : 166 ms      QT Int : 304 ms       P-R-T Axes : -29 267 186 degrees     QTc Int : 483 ms    Wide complex tachycardia  Abnormal ECG  When compared with ECG of 28-JUN-2023 08:58,  No significant change was found  Confirmed by Sharona Brown MD ([id number]) on 6/28/2023 2:06:16 PM    Referred By: [email or web address]   SELF           Confirmed By:Sharona Brown MD                                     EKG 12-lead (Final result)  Result time 06/28/23 09:52:46      Final result by Interface, Lab In [facility] (06/28/23 09:52:46)                   Narrative:    Test Reason : R07.9,    Vent. Rate : 158 BPM     Atrial Rate : 157 BPM     P-R Int : 000 ms          QRS Dur : 134 ms      QT Int : 270 ms       P-R-T Axes : 000 259 117 degrees     QTc Int : 437 ms    Wide QRS tachycardia  Right superior axis deviation  Nonspecific intraventricular block  Inferior infarct ,age undetermined vs due to conduction  Cannot rule out Anteroseptal infarct ,age undetermined  T wave abnormality, consider lateral ischemia  Abnormal ECG  When compared with ECG of 28-JUN-2023 08:01,  No change  Confirmed by Moreno ROBERTSON MD ([id number]) on 6/28/2023 9:52:34 AM    Referred By: MARY   SELF           Confirmed By:Moreno ROBERTSON MD                                    Results for orders placed during the hospital encounter of 06/28/23    Echo    Interpretation Summary  · The left ventricle is normal in size with severely decreased systolic function.  · The estimated ejection fraction is 25%.  · There is  left ventricular global hypokinesis.  · Normal right ventricular size with mildly reduced right ventricular systolic function.  · Severe biatrial enlargement.  · Severe mitral regurgitation.  · Moderate to severe tricuspid regurgitation.  · The estimated PA systolic pressure is 51 mmHg.  · There is pulmonary hypertension.  · Intermediate central venous pressure (8 mmHg).  · Atrial fibrillation observed.        Procedures:   Procedure(s) (LRB):  Ablation, Atrial Flutter, Typical (Bilateral)  ECHOCARDIOGRAM, TRANSESOPHAGEAL (N/A)        Current Discharge Medication List        START taking these medications    Details   pantoprazole (PROTONIX) 40 MG tablet Take 1 tablet (40 mg total) by mouth once daily.  Qty: 30 tablet, Refills: 0           CONTINUE these medications which have CHANGED    Details   metoprolol succinate (TOPROL-XL) 100 MG 24 hr tablet Take 1 tablet (100 mg total) by mouth once daily.  Qty: 30 tablet, Refills: 11    Comments: .           CONTINUE these medications which have NOT CHANGED    Details   rivaroxaban (XARELTO) 20 mg Tab Take 1 tablet (20 mg total) by mouth daily with dinner or evening meal.  Qty: 90 tablet, Refills: 3    Associated Diagnoses: Paroxysmal atrial fibrillation; Atrial fibrillation with rapid ventricular response      sacubitriL-valsartan (ENTRESTO) 49-51 mg per tablet Take 1 tablet by mouth 2 (two) times daily.  Qty: 180 tablet, Refills: 3           STOP taking these medications       flecainide (TAMBOCOR) 100 MG Tab Comments:   Reason for Stopping:                 Discharge Diet:  cardiac with Normal Fluid intake of 1500 - 2000 mL per day    Activity: activity as tolerated    Discharge Condition: Good    Disposition: Home or Self Care    Follow up:     Follow-up Information       Devante Arevalo MD Follow up in 3 month(s).    Specialties: Electrophysiology, Cardiovascular Disease, Cardiology  [facility] information:  [address] [facility] [id number] [phone]                Jannet Le MD Follow up on 7/13/2023.    Specialty: Internal Medicine  Why: @2:00  Contact information:  [address] [place] [address] [phone]                             Tests pending at the time of discharge: none        Time spent  on the discharge of the patient including review of hospital course with the patient. reviewing discharge medications and arranging follow-up care: >30 mins    Discharge examination: Patient was seen and examined on the date of discharge and determined to be suitable for discharge.        Stanley Rinaldi M.D.  Department of Hospital Medicine  [facility]

## 2023-07-06 NOTE — NURSING
Pt's HR currently in the 50s. Metoprolol orders clarified with Dr. Choe and Dr. Brandon XIONG to give metoprolol at this time. Will continue to monitor.

## 2023-07-06 NOTE — BRIEF OP NOTE
Patient seen and examined. POD#1 s/p CTI RFA. No acute events overnight. Post-ablation ECG, and telemetry reviewed with no clinically significant arrhythmias noted. Tegaderm removed from groin(s) without hematoma or bleeding. Activity restrictions, precautions, medications, and follow up discussed with patient. EP will sign off.     Cristian Silva MD, PGY8  Electrophysiology

## 2023-07-06 NOTE — PLAN OF CARE
W scheduled pcp  Follow up with Jannet Le MD  Thursday Jul 13, 2023  @2:00 [address] [facility]   [place] [address]  [phone]

## 2023-07-06 NOTE — PLAN OF CARE
07/06/23 1302   Final Note   Assessment Type Final Discharge Note   Anticipated Discharge Disposition Home   What phone number can be called within the next 1-3 days to see how you are doing after discharge? [phone]   Hospital Resources/Appts/Education Provided Provided patient/caregiver with written discharge plan information;Appointments scheduled and added to AVS;Appointments scheduled by Navigator/Coordinator   Post-Acute Status   Discharge Delays None known at this time     Patient discharging home /self care.     Daniella Enrique RN    [phone]    Future Appointments   Date Time Provider Department Center   7/31/2023  8:40 AM Devante Issa MD [facility] CARDIO [facility]   7/31/2023 10:30 AM [facility]allos [facility]

## 2023-07-06 NOTE — MEDICAL/APP STUDENT
[facility] Medicine Student   Progress Note  [facility] [place] [facility]P MED C    Admit Date: 6/28/2023  Hospital Day: 8  07/06/2023  9:02 AM    SUBJECTIVE:   Ms. Yaneli Steele is a 67 y.o. female with a relevant medical history of CHFrEF (EF 35%, diastolic dysfxn), pAF/AFL s/p DCCV 9/22, HTN who is being followed up for Atrial flutter with rapid ventricular response. She is POD#1 s/p CTI RFA.    Interval history:   Patient reports night went well. She slept better than in recent nights, which she attributes to less anxiety regarding the procedure/her condition as well as improved shortness of breath. She denies pain at the incision site, SoB, nausea/vomiting, headaches.       Review of Systems   Constitutional: Negative.    HENT: Negative.     Eyes: Negative.    Respiratory:  Negative for cough and shortness of breath (much improved since admission).    Cardiovascular:  Negative for chest pain, palpitations, orthopnea (much improved since admission) and leg swelling.   Gastrointestinal: Negative.  Negative for nausea and vomiting.   Genitourinary: Negative.    Musculoskeletal: Negative.    Skin: Negative.    Neurological: Negative.    Endo/Heme/Allergies: Negative.    Psychiatric/Behavioral: Negative.       Please refer to the H&P for past medical, family, and social history.    OBJECTIVE:     Vital Signs Recent:  Temp: 97.5 °F (36.4 °C) (07/06/23 0700)  Pulse: (!) 55 (07/06/23 0700)  Resp: 17 (07/06/23 0847)  BP: 125/86 (07/06/23 0847)  SpO2: 98 % (07/06/23 0847)  Oxygen Documentation:    Flow (L/min): 2           Device (Oxygen Therapy): room air         Vital Signs Range (Last 24H):  Temp:  [97 °F (36.1 °C)-98.1 °F (36.7 °C)]   Pulse:  [53-89]   Resp:  [8-23]   BP: (110-158)/()   SpO2:  [92 %-100 %]        I & O (Last 24H):  Intake/Output Summary (Last 24 hours) at 7/6/2023 0902  Last data filed at 7/5/2023 1305  Gross per 24 hour   Intake 1000 ml   Output --   Net 1000 ml        Physical Exam:  Physical  Exam  Constitutional:       General: She is not in acute distress.     Appearance: Normal appearance. She is normal weight.   HENT:      Head: Normocephalic and atraumatic.   Eyes:      Extraocular Movements: Extraocular movements intact.      Conjunctiva/sclera: Conjunctivae normal.      Pupils: Pupils are equal, round, and reactive to light.   Cardiovascular:      Rate and Rhythm: Regular rhythm. Bradycardia present.      Pulses: Normal pulses.      Heart sounds: Normal heart sounds.   Pulmonary:      Effort: Pulmonary effort is normal. No respiratory distress.      Breath sounds: Normal breath sounds. No wheezing or rales.   Abdominal:      General: Abdomen is flat.      Palpations: Abdomen is soft.   Musculoskeletal:         General: Swelling present. No tenderness. Normal range of motion.      Cervical back: Normal range of motion and neck supple.      Right lower leg: Edema present.      Left lower leg: Edema present.      Comments: Spontaneous movement without pain or weakness, 2+ pitting edema bilaterally R>L but pt reports improvement since admission   Skin:     General: Skin is warm and dry.   Neurological:      General: No focal deficit present.      Mental Status: She is alert and oriented to person, place, and time. Mental status is at baseline.   Psychiatric:         Mood and Affect: Mood normal.         Behavior: Behavior normal.         Thought Content: Thought content normal.         Judgment: Judgment normal.      Comments: Endorses much improved mood and decreased anxiety compared to admission       Labs:   Recent Labs   Lab 07/04/23  0449 07/05/23  0330    140   K 3.9  3.9 4.5  4.5    104   CO2 28 27   BUN 8 11   CREATININE 0.9 0.8    95   CALCIUM 9.3 9.4   MG 1.9 1.8     Recent Labs   Lab 06/29/23  1845 06/30/23  0605 07/03/23  0236 07/04/23  0449 07/05/23  0330   ALKPHOS  --    < > 154* 154* 140*   ALT  --    < > 31 28 27   AST  --    < > 25 26 23   ALBUMIN  --    < > 2.9*  3.1* 3.0*   PROT  --    < > 5.8* 6.4 6.3   BILITOT  --    < > 1.5* 1.7* 1.5*   INR 1.5*  --   --   --   --     < > = values in this interval not displayed.     Recent Labs   Lab 07/04/23  0449 07/05/23  0330 07/06/23  0359   WBC 4.35 5.15 5.11   HGB 16.2* 15.8 15.5   HCT 52.2* 52.1* 51.6*    164 142*       Diagnostic Results:  Post-ablation ECG performed 7/6:  Sinus bradycardia   Possible Anterior infarct (cited on or before 05-JUL-2023)   Abnormal ECG   When compared with ECG of 05-JUL-2023 13:31,   Premature supraventricular complexes are no longer Present   T wave inversion less evident in Inferior leads   Confirmed by Moreno ROBERTSON MD ([id number]) on 7/6/2023 8:49:06 AM      Scheduled Meds:   hydrOXYzine HCL  10 mg Oral Once    metoprolol succinate  100 mg Oral Daily    rivaroxaban  20 mg Oral Daily with dinner     Continuous Infusions:  PRN Meds:acetaminophen, heparin (porcine), heparin (porcine), LIDOcaine (PF) 10 mg/ml (1%), sodium chloride 0.9%    ASSESSMENT/PLAN:   Ms. Yaneli Steele is a 67 y.o. female with a relevant medical history of CHFrEF (EF 35%, diastolic dysfxn), pAF/AFL s/p DCCV 9/22, HTN  who is being followed up for Atrial flutter with rapid ventricular response, POD#1 s/p CTI RFA    Active Hospital Problems    Diagnosis  POA    *Atrial flutter with rapid ventricular response [I48.92]  Yes    Leukopenia [D72.819]  Unknown    Thrombocytopenia [D69.6]  Unknown    Hypotension [I95.9]  Unknown    Nasal congestion [R09.81]  Unknown    Pancreatic mass [K86.89]  Unknown    Abnormal LFTs (liver function tests) [R79.89]  Yes    Acute on chronic combined systolic and diastolic congestive heart failure [I50.43]  Yes    Essential (primary) hypertension [I10]  Yes      Resolved Hospital Problems   No resolved problems to display.       Atrial flutter with rapid ventricular response   POD#1 s/p CTI RFA,   Heart rhythm normalized on post-ablation ECG, stable  Follow-up with electrophysiology  outpatient  Follow-up with cardiology outpatient  Initiate metoprolol succinate 100mg daily PO  Continue rivaroxaban 20mg daily PO  Acute on chronic combined systolic and diastolic congestive heart failure  Most recent JUNIOR results:  Estimated LV ejection fraction: 35%, moderately decreased LV systolic function  Normal RV size and RV systolic function  Normal appearing LA appendage with no thrombus  Moderate tricuspid regurgitation  Mild-to-moderate mitral regurgitation  Follow-up with cardiology outpatient  Continue beta-blocker therapy, furosemide PO PRN  Low-sodium diet modifications, fluid restrictions  Essential (primary) hypertension  Resume home Entresto  Monitor BP closely  VTE Risk Mitigation (From admission, onward)           Ordered     heparin infusion 1,000 units/500 ml in 0.9% NaCl (on sterile field)  As needed (PRN)         07/05/23 1103     heparin (porcine) 2,000 Units in sodium chloride 0.9% 2,000 mL  As needed (PRN)         07/05/23 1103     rivaroxaban tablet 20 mg  With dinner         07/02/23 1223                  Discharge planning:   DREW: 7/6/2023, today  Code Status: Full Code   Is the patient medically ready for discharge?: No  Reason for patient still in hospital (select all that apply): Patient trending condition  Discharge Plan A: Home

## 2023-07-06 NOTE — NURSING
Patient HR 58. Notified PA, agreed to hold dose till next due at 0600. Will continue to monitor.      Per secure chat with cross cover Ms. Carter, agreed to hold midnight dose of metoprolol due to HR 65 and SBP of 110. Will hold med until 4 and notify team if HR rises to 70s-80s.

## 2023-07-06 NOTE — DISCHARGE INSTRUCTIONS
"Medications:  Make sure to continue taking your blood thinner rivaroxaban (trade name: Xarelto) after your procedure as you would normally take  Take pantoprazole (trade name: Protonix) nightly for at least 30 days after your procedure. This is to protect your esophagus during the post-operative period.  If given a prescription of furosemide (trade name: Lasix), which is a diuretic (fluid pill), you can take it daily or twice daily as needed for fluid retention or shortness of breath following your ablation  You may experience chest discomfort (also known as "pericarditis") with deep breathes, coughing, and/or laying flat which is typically normal following your procedure. If this occurs, you can take ibuprofen (Motrin) 800 mg every 8 hours for 2-3 days. If the chest pain is persistent or severe please visit the nearest emergency department.    Groin site management, precautions, and restrictions:  Keep the groin site(s) clean and dry. You do not need to apply ointments or bandages to the area. Your bandages should have been removed the morning after your ablation. If they have not been removed, make sure to remove them.  If oozing from groin site occurs, apply pressure without letting up for 15 minutes and lay flat for 1 hour. If bleeding has resolved, you can continue to monitor. If the bleeding continues or there is significant swelling or pain in the groin area, please visit the nearest ER for evaluation and treatment. DO NOT STOP TAKING YOUR BLOOD THINNER UNLESS INSTRUCTED BY A PHYSICIAN.   Do not take baths or submerge your groin area or at least 1 week or when the puncture sites in your groin have completely healed  Do not lift anything over 10 lbs for the first week after your procedure, and avoid strenuous activity during this time to allow for the groin sites to heal. After 1 week, there are no activity restrictions.  Please contact the electrophysiology clinic or go to the ER if you experience: severe " chest pain, shortness of breath, bleeding or swelling of the groin sites, or any other concerns.

## 2023-07-06 NOTE — PLAN OF CARE
CHW met with patient/family at bedside. Patient experience rounding completed and reviewed the following.     Do you know your discharge plan? Yes    If yes, what is the plan? (Home)     Have you discussed your needs and preferences with your SW/CM? Yes     If you are discharging home, do you have help at home? Yes   Do you think you will need help additional at home at discharge? No     Do you currently have difficulty keeping up with bills, affording medicine or buying food?  No    Assigned SW/CM notified of any patient/family needs or concerns. Appropriate resources provided to address patient's needs. No needs

## 2023-07-10 NOTE — PHYSICIAN QUERY
5PT Name: Yaneli Steele  MR #: [id number]    DOCUMENTATION CLARIFICATION     CDS/: SUMMER Medellin, RN, CCDS               Contact information: [email or web address]  This form is a permanent document in the medical record.     Query Date: July 10, 2023    By submitting this query, we are merely seeking further clarification of documentation. Please utilize your independent clinical judgment when addressing the question(s) below.    The Medical Record contains the following:    Indicators Supporting Clinical Findings Location in Medical Record   X   Atrial Flutter Atrial flutter with rapid ventricular response    PMH:  pAF/AFL s/p DCCV 9/22    In ED, 's and gave IV adenosine with no effect. Improved with IV metoprolol and rhythm noted to be atypical atrial flutter    Procedure(s) (LRB):  Ablation, Atrial Flutter, Typical (Bilateral)    Atypical Atrial Flutter    EP JUNIOR/DCCV last week 6/29/23 for Afib/Aflutter with RVR. Planned to start amiodarone and outpatient PVI, but patient had Atrial flutter 1:1 rate 200s on 7/3/23. Plan for RFA ablation today with JUNIOR for typical vs atypical atrial flutter with transseptal access.      Anticoagulant/antiplatelets: Rivaroxaban 20 mg daily   ECG: Atrial flutter 90s, typical flutter    Developed SVT this morning. No effect from Adenosine 6 mg x1 and 12 mg x1. HR slowed to 100s after IV Metoprolol 5 mg x1 and revealed typical atrial flutter with 2:1 conduction. Initial rhythm consistent with typical atrial flutter with 1:1 conduction   DCS: Dr. Rinaldi 7/6                    Arrhythmia: Dr. Arevalo 7/5    Cards: Dr. Brito 7/5                Arrhythmia: Dr. Trujillo 7/4   X EKG Results Vent. Rate : 113 BPM     Atrial Rate : 156 BPM      P-R Int : 000 ms          QRS Dur : 092 ms       QT Int : 370 ms       P-R-T Axes : 000 080 -45 degrees      QTc Int : 507 ms     Atrial fibrillation with rapid ventricular response   ST and T wave abnormality, consider  inferolateral ischemia    Vent. Rate : 100 BPM     Atrial Rate : 198 BPM      P-R Int : 000 ms          QRS Dur : 092 ms       QT Int : 386 ms       P-R-T Axes : -23 092 -43 degrees      QTc Int : 497 ms     Atrial flutter with variable A-V block   Rightward axis   Abnormal R wave progression   Nonspecific ST and/or T wave abnormalities     Vent. Rate : 101 BPM     Atrial Rate : 202 BPM      P-R Int : 000 ms          QRS Dur : 084 ms       QT Int : 380 ms       P-R-T Axes : 103 -21 053 degrees      QTc Int : 492 ms     Program found technically poor ECG   Atrial flutter with 2:1 A-V conduction   Low anterior forces and R wave progression-cannot exclude Anterior infarct   ,age undetermined   ST and T wave abnormality, consider lateral ischemia    EK/28                EK/3   X Medication JUNIOR/DCCV - was started on tikosyn, prolonged QTc- started on amiodarone today as QTc <500- plan for amio 200mg BID for 30d then 200mg qday - patient with intolerance to amiodarone    - Recurrence of aflutter and RVR - improved with IV metoprolol 5mg, ultimately underwent ablation on 22   DCS: Dr. Rinaldi    X Treatment EP performed ablation 23. Converted to sinus bradycardia   DCS: Dr. Rinaldi    X Other noted to have a heart rate of 160 with a wide complex tachycardia and sent to ED  Recent hospitalization for AF RVR managed medically   DCS: Dr. Rinaldi      The clinical guidelines noted are only a system guideline. It does not replace the provider's clinical judgment.    Provider, please clarify conflicting type of atrial flutter.    [ x  ] Typical (Type I) - Diagnosis based on morphological criteria on EKG when the cavo-tricuspid isthmus region is involved   [   ] Atypical (Type II) - Diagnosis based on morphological criteria on EKG (not meeting typical atrial flutter criteria because the cavo-tricuspid isthmus region is not involved)   [   ] Other cardiac diagnosis (please specify):_______________        Please document in your progress notes daily for the duration of treatment until resolved, and include in your discharge summary.    Reference:    GUTIERREZ Barkley MD, FACC, RS, PARESH Mendosa MD, [facility], [facility], ALEX Jenkins MD, [facility], [facility], & VANESSA Gill MD. (2019, May 27). Overview of atrial flutter (MANUEL Gomez MD, Ed.). Retrieved October 22, 2020, from [email or web address]-ey-lxfjoy-flutter?search=atrial%20flutter&source=search_result&selectedTitle=1~150&usage_type=default&display_rank=1    Form No. [id number]

## 2023-07-31 ENCOUNTER — TELEPHONE (OUTPATIENT)
Dept: ELECTROPHYSIOLOGY | Facility: CLINIC | Age: 68
End: 2023-07-31
Payer: COMMERCIAL

## 2023-07-31 ENCOUNTER — OFFICE VISIT (OUTPATIENT)
Dept: CARDIOLOGY | Facility: CLINIC | Age: 68
End: 2023-07-31
Payer: COMMERCIAL

## 2023-07-31 VITALS
BODY MASS INDEX: 32.39 KG/M2 | SYSTOLIC BLOOD PRESSURE: 140 MMHG | RESPIRATION RATE: 18 BRPM | OXYGEN SATURATION: 99 % | HEART RATE: 100 BPM | WEIGHT: 206.38 LBS | HEIGHT: 67 IN | DIASTOLIC BLOOD PRESSURE: 92 MMHG

## 2023-07-31 DIAGNOSIS — G47.33 OSA (OBSTRUCTIVE SLEEP APNEA): ICD-10-CM

## 2023-07-31 DIAGNOSIS — I77.810 AORTIC ROOT DILATATION: ICD-10-CM

## 2023-07-31 DIAGNOSIS — I50.9 ACUTE ON CHRONIC CONGESTIVE HEART FAILURE, UNSPECIFIED HEART FAILURE TYPE: ICD-10-CM

## 2023-07-31 DIAGNOSIS — E66.9 NON MORBID OBESITY, UNSPECIFIED OBESITY TYPE: ICD-10-CM

## 2023-07-31 DIAGNOSIS — I48.19 PERSISTENT ATRIAL FIBRILLATION: Primary | ICD-10-CM

## 2023-07-31 DIAGNOSIS — Z79.01 CURRENT USE OF ANTICOAGULANT THERAPY: ICD-10-CM

## 2023-07-31 DIAGNOSIS — I10 ESSENTIAL (PRIMARY) HYPERTENSION: ICD-10-CM

## 2023-07-31 DIAGNOSIS — I48.91 ATRIAL FIBRILLATION, UNSPECIFIED TYPE: ICD-10-CM

## 2023-07-31 PROCEDURE — 3008F PR BODY MASS INDEX (BMI) DOCUMENTED: ICD-10-PCS | Mod: CPTII,S$GLB,, | Performed by: INTERNAL MEDICINE

## 2023-07-31 PROCEDURE — 99215 OFFICE O/P EST HI 40 MIN: CPT | Mod: S$GLB,,, | Performed by: INTERNAL MEDICINE

## 2023-07-31 PROCEDURE — 1159F PR MEDICATION LIST DOCUMENTED IN MEDICAL RECORD: ICD-10-PCS | Mod: CPTII,S$GLB,, | Performed by: INTERNAL MEDICINE

## 2023-07-31 PROCEDURE — 3077F SYST BP >= 140 MM HG: CPT | Mod: CPTII,S$GLB,, | Performed by: INTERNAL MEDICINE

## 2023-07-31 PROCEDURE — 1160F RVW MEDS BY RX/DR IN RCRD: CPT | Mod: CPTII,S$GLB,, | Performed by: INTERNAL MEDICINE

## 2023-07-31 PROCEDURE — 1111F DSCHRG MED/CURRENT MED MERGE: CPT | Mod: CPTII,S$GLB,, | Performed by: INTERNAL MEDICINE

## 2023-07-31 PROCEDURE — 1160F PR REVIEW ALL MEDS BY PRESCRIBER/CLIN PHARMACIST DOCUMENTED: ICD-10-PCS | Mod: CPTII,S$GLB,, | Performed by: INTERNAL MEDICINE

## 2023-07-31 PROCEDURE — 4010F PR ACE/ARB THEARPY RXD/TAKEN: ICD-10-PCS | Mod: CPTII,S$GLB,, | Performed by: INTERNAL MEDICINE

## 2023-07-31 PROCEDURE — 99999 PR PBB SHADOW E&M-EST. PATIENT-LVL V: CPT | Mod: PBBFAC,,, | Performed by: INTERNAL MEDICINE

## 2023-07-31 PROCEDURE — 3080F PR MOST RECENT DIASTOLIC BLOOD PRESSURE >= 90 MM HG: ICD-10-PCS | Mod: CPTII,S$GLB,, | Performed by: INTERNAL MEDICINE

## 2023-07-31 PROCEDURE — 1101F PR PT FALLS ASSESS DOC 0-1 FALLS W/OUT INJ PAST YR: ICD-10-PCS | Mod: CPTII,S$GLB,, | Performed by: INTERNAL MEDICINE

## 2023-07-31 PROCEDURE — 1126F PR PAIN SEVERITY QUANTIFIED, NO PAIN PRESENT: ICD-10-PCS | Mod: CPTII,S$GLB,, | Performed by: INTERNAL MEDICINE

## 2023-07-31 PROCEDURE — 1101F PT FALLS ASSESS-DOCD LE1/YR: CPT | Mod: CPTII,S$GLB,, | Performed by: INTERNAL MEDICINE

## 2023-07-31 PROCEDURE — 3080F DIAST BP >= 90 MM HG: CPT | Mod: CPTII,S$GLB,, | Performed by: INTERNAL MEDICINE

## 2023-07-31 PROCEDURE — 3077F PR MOST RECENT SYSTOLIC BLOOD PRESSURE >= 140 MM HG: ICD-10-PCS | Mod: CPTII,S$GLB,, | Performed by: INTERNAL MEDICINE

## 2023-07-31 PROCEDURE — 1126F AMNT PAIN NOTED NONE PRSNT: CPT | Mod: CPTII,S$GLB,, | Performed by: INTERNAL MEDICINE

## 2023-07-31 PROCEDURE — 3008F BODY MASS INDEX DOCD: CPT | Mod: CPTII,S$GLB,, | Performed by: INTERNAL MEDICINE

## 2023-07-31 PROCEDURE — 3288F FALL RISK ASSESSMENT DOCD: CPT | Mod: CPTII,S$GLB,, | Performed by: INTERNAL MEDICINE

## 2023-07-31 PROCEDURE — 4010F ACE/ARB THERAPY RXD/TAKEN: CPT | Mod: CPTII,S$GLB,, | Performed by: INTERNAL MEDICINE

## 2023-07-31 PROCEDURE — 3044F PR MOST RECENT HEMOGLOBIN A1C LEVEL <7.0%: ICD-10-PCS | Mod: CPTII,S$GLB,, | Performed by: INTERNAL MEDICINE

## 2023-07-31 PROCEDURE — 3288F PR FALLS RISK ASSESSMENT DOCUMENTED: ICD-10-PCS | Mod: CPTII,S$GLB,, | Performed by: INTERNAL MEDICINE

## 2023-07-31 PROCEDURE — 99215 PR OFFICE/OUTPT VISIT, EST, LEVL V, 40-54 MIN: ICD-10-PCS | Mod: S$GLB,,, | Performed by: INTERNAL MEDICINE

## 2023-07-31 PROCEDURE — 99999 PR PBB SHADOW E&M-EST. PATIENT-LVL V: ICD-10-PCS | Mod: PBBFAC,,, | Performed by: INTERNAL MEDICINE

## 2023-07-31 PROCEDURE — 3044F HG A1C LEVEL LT 7.0%: CPT | Mod: CPTII,S$GLB,, | Performed by: INTERNAL MEDICINE

## 2023-07-31 PROCEDURE — 1111F PR DISCHARGE MEDS RECONCILED W/ CURRENT OUTPATIENT MED LIST: ICD-10-PCS | Mod: CPTII,S$GLB,, | Performed by: INTERNAL MEDICINE

## 2023-07-31 PROCEDURE — 1159F MED LIST DOCD IN RCRD: CPT | Mod: CPTII,S$GLB,, | Performed by: INTERNAL MEDICINE

## 2023-07-31 RX ORDER — METOPROLOL SUCCINATE 200 MG/1
200 TABLET, EXTENDED RELEASE ORAL DAILY
Qty: 90 TABLET | Refills: 3 | Status: SHIPPED | OUTPATIENT
Start: 2023-07-31 | End: 2024-07-30

## 2023-07-31 NOTE — PROGRESS NOTES
CARDIOVASCULAR CONSULTATION    REASON FOR CONSULT:   Yaneli Steele is a 67 y.o. female who presents for PAF, CMP.    EP: [facility]  HISTORY OF PRESENT ILLNESS:   The patient returns for follow-up.  In the interim since her last office visit, she was hospitalized with recurrent atrial fibrillation and flutter.  She underwent JUNIOR guided cardioversion, as well as atrial flutter ablation.  She apparently was intolerant of flecainide with QRS widening, as well as amiodarone and Tikosyn, and is now off of antiarrhythmic treatment.  She appears to be back in atrial fibrillation with controlled ventricular response on electrocardiography and examination today.  She is unaware of the arrhythmia and is only noting it based on her Apple watch suggesting recurrence approximately 1 week ago.  She reports adherence with her Xarelto.  She denies angina, dyspnea, palpitations, or syncope.  There has been no PND, orthopnea, melena, hematuria, or claudication symptoms.  Extensive records were reviewed prior to this visit.    CARDIOVASCULAR HISTORY:   NICM, normal cors (cath 2/2018)    PAF on Xarelto s/p JUNIOR/DCCV 2/23/18 and 9/6/22   PAFl s/p ablation 7/5/23 ([facility])    Hx tachymyopathy with recovery of EF in SR.    Asc ao dil, 4.3cm (CTA Chest 2/22/18)    PAST MEDICAL HISTORY:     Past Medical History:   Diagnosis Date    Chronic systolic heart failure     Essential (primary) hypertension     Paroxysmal atrial fibrillation        PAST SURGICAL HISTORY:     Past Surgical History:   Procedure Laterality Date    ABLATION, ATRIAL FLUTTER, TYPICAL Bilateral 7/5/2023    Procedure: Ablation, Atrial Flutter, Typical;  Surgeon: Devante Arevalo MD;  Location: [facility] EP LAB;  Service: Cardiology;  Laterality: Bilateral;  AF, RFA CTI/PVI, ANES, RICHARD, JUNIOR (CX IF SR), MB, 307    ANGIOGRAM, CORONARY, WITH LEFT HEART CATHETERIZATION      TRANSESOPHAGEAL ECHOCARDIOGRAPHY N/A 6/29/2023    Procedure: ECHOCARDIOGRAM, TRANSESOPHAGEAL;  Surgeon:  Leola Benavides MD;  Location: [facility] EP LAB;  Service: Cardiology;  Laterality: N/A;    TRANSESOPHAGEAL ECHOCARDIOGRAPHY N/A 7/5/2023    Procedure: ECHOCARDIOGRAM, TRANSESOPHAGEAL;  Surgeon: Popeye Brito MD;  Location: [facility] EP LAB;  Service: Cardiology;  Laterality: N/A;    TREATMENT OF CARDIAC ARRHYTHMIA N/A 6/29/2023    Procedure: Cardioversion or Defibrillation;  Surgeon: Devante Arevalo MD;  Location: [facility] EP LAB;  Service: Cardiology;  Laterality: N/A;  AF, JUNIOR/DCCV, ANES, DM, ED BED 11       ALLERGIES AND MEDICATION:     Review of patient's allergies indicates:   Allergen Reactions    Amiodarone analogues Shortness Of Breath    Penicillins Shortness Of Breath        Medication List            Accurate as of July 31, 2023  9:19 AM. If you have any questions, ask your nurse or doctor.                CONTINUE taking these medications      ENTRESTO 49-51 mg per tablet  Generic drug: sacubitriL-valsartan  Take 1 tablet by mouth 2 (two) times daily.     furosemide 20 MG tablet  Commonly known as: LASIX  Take 1 tablet (20 mg total) by mouth once daily.     metoprolol succinate 100 MG 24 hr tablet  Commonly known as: TOPROL-XL  Take 1 tablet (100 mg total) by mouth once daily.     pantoprazole 40 MG tablet  Commonly known as: PROTONIX  Take 1 tablet (40 mg total) by mouth once daily.     rivaroxaban 20 mg Tab  Commonly known as: XARELTO  Take 1 tablet (20 mg total) by mouth daily with dinner or evening meal.              SOCIAL HISTORY:     Social History     Socioeconomic History    Marital status: Single   Tobacco Use    Smoking status: Former     Current packs/day: 0.00    Smokeless tobacco: Former    Tobacco comments:     Pt states she quit more than 30 years ago   Substance and Sexual Activity    Alcohol use: No    Drug use: No    Sexual activity: Not Currently     Social Determinants of Health     Financial Resource Strain: Low Risk  (6/29/2023)    Overall Financial Resource Strain (CARDIA)      Difficulty of Paying Living Expenses: Not hard at all   Food Insecurity: No Food Insecurity (6/29/2023)    Hunger Vital Sign     Worried About Running Out of Food in the Last Year: Never true     Ran Out of Food in the Last Year: Never true   Transportation Needs: No Transportation Needs (6/29/2023)    PRAPARE - Transportation     Lack of Transportation (Medical): No     Lack of Transportation (Non-Medical): No   Physical Activity: Insufficiently Active (6/29/2023)    Exercise Vital Sign     Days of Exercise per Week: 3 days     Minutes of Exercise per Session: 30 min   Stress: No Stress Concern Present (6/29/2023)    [employer] [place] [employer] - Occupational Stress Questionnaire     Feeling of Stress : Not at all   Social Connections: Moderately Integrated (6/29/2023)    Social Connection and Isolation Panel [NHANES]     Frequency of Communication with Friends and Family: More than three times a week     Frequency of Social Gatherings with Friends and Family: More than three times a week     Attends Muslim Services: More than 4 times per year     Active Member of Clubs or Organizations: Yes     Attends Club or Organization Meetings: More than 4 times per year     Marital Status: Never    Housing Stability: Low Risk  (6/29/2023)    Housing Stability Vital Sign     Unable to Pay for Housing in the Last Year: No     Number of Places Lived in the Last Year: 1     Unstable Housing in the Last Year: No       FAMILY HISTORY:     Family History   Problem Relation Age of Onset    No Known Problems Mother     No Known Problems Father        REVIEW OF SYSTEMS:   Review of Systems   Constitutional:  Negative for chills, diaphoresis and fever.   HENT:  Negative for nosebleeds.    Eyes:  Negative for blurred vision, double vision and photophobia.   Respiratory:  Negative for hemoptysis, shortness of breath and wheezing.    Cardiovascular:  Negative for chest pain, palpitations, orthopnea, claudication,  "leg swelling and PND.   Gastrointestinal:  Negative for abdominal pain, blood in stool, heartburn, melena, nausea and vomiting.   Genitourinary:  Negative for flank pain and hematuria.   Musculoskeletal:  Negative for falls, myalgias and neck pain.   Skin:  Negative for rash.   Neurological:  Negative for dizziness, seizures, loss of consciousness, weakness and headaches.   Endo/Heme/Allergies:  Negative for polydipsia. Does not bruise/bleed easily.   Psychiatric/Behavioral:  Negative for depression and memory loss. The patient is not nervous/anxious.        PHYSICAL EXAM:     Vitals:    07/31/23 0841   BP: (!) 140/92   Pulse: 100   Resp: 18    Body mass index is 32.32 kg/m².  Weight: 93.6 kg (206 lb 5.6 oz)   Height: 5' 7" (170.2 cm)     Physical Exam  Vitals reviewed.   Constitutional:       General: She is not in acute distress.     Appearance: She is well-developed. She is obese. She is not ill-appearing, toxic-appearing or diaphoretic.   HENT:      Head: Normocephalic and atraumatic.   Eyes:      General: No scleral icterus.     Extraocular Movements: Extraocular movements intact.      Conjunctiva/sclera: Conjunctivae normal.      Pupils: Pupils are equal, round, and reactive to light.   Neck:      Thyroid: No thyromegaly.      Vascular: Normal carotid pulses. No carotid bruit or JVD.      Trachea: Trachea normal.   Cardiovascular:      Rate and Rhythm: Normal rate. Rhythm irregularly irregular.      Pulses:           Carotid pulses are 2+ on the right side and 2+ on the left side.     Heart sounds: S1 normal and S2 normal. No murmur heard.     No friction rub. No gallop.   Pulmonary:      Effort: Pulmonary effort is normal. No respiratory distress.      Breath sounds: Normal breath sounds. No stridor. No wheezing, rhonchi or rales.   Chest:      Chest wall: No tenderness.   Abdominal:      General: There is no distension.      Palpations: Abdomen is soft.   Musculoskeletal:         General: No swelling or " tenderness. Normal range of motion.      Cervical back: Normal range of motion and neck supple. No edema or rigidity.      Right lower leg: No edema.      Left lower leg: No edema.   Feet:      Right foot:      Skin integrity: No ulcer.      Left foot:      Skin integrity: No ulcer.   Skin:     General: Skin is warm and dry.      Coloration: Skin is not jaundiced.   Neurological:      General: No focal deficit present.      Mental Status: She is alert and oriented to person, place, and time.      Cranial Nerves: No cranial nerve deficit.   Psychiatric:         Mood and Affect: Mood normal.         Speech: Speech normal.         Behavior: Behavior normal. Behavior is cooperative.         DATA:   EKG: (personally reviewed tracing(s))  7/6/23 SR 55, PRWP/LAD, NSSTTW changes  7/31/23 , PRWP, NSSTTW changes    Laboratory:  CBC:  Recent Labs   Lab 07/04/23  0449 07/05/23  0330 07/06/23  0359   WBC 4.35 5.15 5.11   Hemoglobin 16.2 H 15.8 15.5   Hematocrit 52.2 H 52.1 H 51.6 H   Platelets 151 164 142 L         CHEMISTRIES:  Recent Labs   Lab 06/16/23  0948 06/28/23  1059 07/04/23  0449 07/05/23  0330 07/06/23  0756   Glucose  --    < > 105 95 104   Sodium 139   < > 139 140 138   Potassium 4.5   < > 3.9  3.9 4.5  4.5 4.5   BUN 16.4   < > 8 11 11   Creatinine 1.18 H   < > 0.9 0.8 0.9   eGFR  51 L  --   --   --   --    Calcium 9.5   < > 9.3 9.4 9.3   Magnesium  --    < > 1.9 1.8 1.8    < > = values in this interval not displayed.         CARDIAC BIOMARKERS:  Recent Labs   Lab 06/28/23  1059   Troponin I 0.009         COAGS:  Recent Labs   Lab 07/19/22  1119 06/29/23  1845   INR 1.0 1.5 H         LIPIDS/LFTS:  Recent Labs   Lab 07/04/23  0449 07/05/23  0330 07/06/23  0756   AST 26 23 25   ALT 28 27 24       POC labs 07/19/2022   INR 1.0   Troponin 0.01   AST 33   ALT 19   Creatinine 0.9   Potassium 3.8   Hemoglobin 15.3   Platelets 200    Lab Results   Component Value Date    TSH 1.481 07/02/2023          Cardiovascular Testing:  JUNIOR 7/5/23  There is moderate left ventricular global hypokinesis. The estimated ejection fraction is 35%.  Normal right ventricular systolic function.  Biatrial enlargement.  Moderate mitral regurgitation.  Mild tricuspid regurgitation.  Normal appearing left atrial appendage. No thrombus is present in the appendage.    Echo 6/28/23  The left ventricle is normal in size with severely decreased systolic function.  The estimated ejection fraction is 25%.  There is left ventricular global hypokinesis.  Normal right ventricular size with mildly reduced right ventricular systolic function.  Severe biatrial enlargement.  Severe mitral regurgitation.  Moderate to severe tricuspid regurgitation.  The estimated PA systolic pressure is 51 mmHg.  There is pulmonary hypertension.  Intermediate central venous pressure (8 mmHg).  Atrial fibrillation observed.    Echo 9/14/22 (EF 25% on JUNIOR 9/6/22)  The left ventricle is normal in size with normal systolic function. The estimated ejection fraction is 55%.  Mild right ventricular enlargement with normal right ventricular systolic function.  Grade II left ventricular diastolic dysfunction.  Biatrial enlargement.  Mild-to-moderate mitral regurgitation.  The estimated PA systolic pressure is 35 mmHg.  Intermediate central venous pressure (8 mmHg).    JUNIOR/DCCV 9/6/22  Severe biventricular dysfxn, LVEF 25%  Global hypokinesis  Biatrial enlargement  No LA/ABBIE thrombus  Normal valves  Mod MR/TR  Successful DCCV AF->SR 65 BPM 200J x1  Plan:  Cont med rx  Cont Xarelto 20mg qhs  Start entresto 24/26mg bid  Home today  Follow up with Dr. Issa as planned  Repeat echo in 3 months for reassessment of LVEF    Cath 2/26/18  B. Summary/Post-Operative Diagnosis     Normal coronary arteries.     Systolic dysfunction.     Severe mitral regurgitation.     Mildly elevated right and left Filling Pressures.     Mild Pulmonary Hypertension.   D. Hemodynamic Results    Ejection Fraction: 25%   Global LV Function: severely depressed   PA: 45   PW:  (26)   CO_THERM: 3.92   RV: 44   RA:  (18)   LVEDP: 13   E. Angiographic Results      - Left Main Coronary Artery:              The LM is normal. There is YULIYA 3 flow.      - Left Anterior Descending Artery:              The LAD is normal. There is YULIYA 3 flow.      - Left Circumflex Artery:              The LCX is normal. There is YULIYA 3 flow.      - Right Coronary Artery:              The RCA is normal. There is YULIYA 3 flow.      - Common Femoral Artery:              The right CFA is normal.     ASSESSMENT:   # PAF s/p JUNIOR/DCCV 2/23/18 and 9/6/22 in back in AF with controlled VR, on Xarelto.  Following with Dr. Polk.  ?intol of amio/tikosyn/flecainide.  # PAFl s/p ablation 6/2023 ([facility])  # NICM, cath 2/2018 with normal cors.  EF back down to 35% on echo 6/2023, ?tachymyopathy, appears euvolemic.    # HTN, uncontrolled  # ?REZA, prev referred to sleep med.  # BMI 32, down 3 unit(s) vs last OV  # Asc ao dil, 4.3cm (CTA Chest 2/22/18)    PLAN:   Cont med rx  Cont Xarelto 20mg qd  Inc toprol 200mg qd  Refer back to Dr. Polk for possible AF ablation.  Message sent to his office to expedite appt.  Re-refer to sleep med, ?REZA (againx2)  RTC 3 months (Oct 2023), eventual titration of entresto +/- addition of aldactone       Devante Issa MD, FACC

## 2023-07-31 NOTE — LETTER
July 31, 2023        Jannet eL MD  [address]  [place] [address]             [facility] [place] - Cardiology  [address]  [place] [address]  Phone: [phone]   Patient: Yaneli Steele   MR Number: [id number]   YOB: 1955   Date of Visit: 7/31/2023       Dear Dr. Le:    Thank you for referring Yaneli Steele to me for evaluation. Attached you will find relevant portions of my assessment and plan of care.    If you have questions, please do not hesitate to call me. I look forward to following Yaneli Steele along with you.    Sincerely,      Devante Issa MD            CC  No Recipients    Enclosure

## 2023-08-04 NOTE — ASSESSMENT & PLAN NOTE
Well controlled, continue home medications and monitor blood pressure, adjust as needed.    Pelvic ultrasound was ordered however you refused the ultrasound.  Please follow-up with your OB/Gyn doctor as soon as possible.        Miscarriage  A miscarriage is the loss of pregnancy before the 20th week. Most miscarriages happen during the first 3 months of pregnancy. Sometimes, a miscarriage can happen before a woman knows that she is pregnant.    Having a miscarriage can be an emotional experience. If you have had a miscarriage, talk with your health care provider about any questions you may have about the loss of your baby, the grieving process, and your plans for future pregnancy.    What are the causes?  Many times, the cause of a miscarriage is not known.    What increases the risk?  The following factors may make a pregnant woman more likely to have a miscarriage:    Certain medical conditions    Conditions that affect the hormone balance in the body, such as thyroid disease or polycystic ovary syndrome.  Diabetes.  Autoimmune disorders.  Infections.  Bleeding disorders.  Obesity.  Lifestyle factors    Using products with tobacco or nicotine in them or being exposed to tobacco smoke.  Having alcohol.  Having large amounts of caffeine.  Recreational drug use.  Problems with reproductive organs or structures    Cervical insufficiency. This is when the lowest part of the uterus (cervix) opens and thins before pregnancy is at term.  Having a condition called Asherman syndrome. This syndrome causes scarring in the uterus or causes the uterus to be abnormal in structure.  Fibrous growths, called fibroids, in the uterus.  Congenital abnormalities. These problems are present at birth.  Infection of the cervix or uterus.  Personal or medical history    Injury (trauma).  Having had a miscarriage before.  Being younger than age 18 or older than age 35.  Exposure to harmful substances in the environment. This may include radiation or heavy metals, such as lead.  Use of certain medicines.  What are the signs or symptoms?  Symptoms of this condition include:  Vaginal bleeding or spotting, with or without cramps or pain.  Pain or cramping in the abdomen or lower back.  Fluid or tissue coming out of the vagina.  How is this diagnosed?  This condition may be diagnosed based on:  A physical exam.  Ultrasound.  Lab tests, such as blood tests, urine tests, or swabs for infection.  How is this treated?  Treatment for a miscarriage is sometimes not needed if all the pregnancy tissue that was in the uterus comes out on its own, and there are no other problems such as infection or heavy bleeding.    In other cases, this condition may be treated with:  Dilation and curettage (D&C). In this procedure, the cervix is stretched open and any remaining pregnancy tissue is removed from the lining of the uterus (endometrium).  Medicines. These may include:  Antibiotic medicine, to treat infection.  Medicine to help any remaining pregnancy tissue come out of the body.  Medicine to reduce (contract) the size of the uterus. These medicines may be given if there is a lot of bleeding.  If you have Rh-negative blood, you may be given an injection of a medicine called Rho(D) immune globulin. This medicine helps prevent problems with future pregnancies.    Follow these instructions at home:  Medicines    Take over-the-counter and prescription medicines only as told by your health care provider.  If you were prescribed antibiotic medicine, take it as told by your health care provider. Do not stop taking the antibiotic even if you start to feel better.  Activity    Rest as told by your health care provider. Ask your health care provider what activities are safe for you.  Have someone help with home and family responsibilities during this time.  General instructions      Monitor how much tissue or blood clot material comes out of the vagina.  Do not have sex, douche, or put anything, such as tampons, in your vagina until your health care provider says it is okay.  To help you and your partner with the grieving process, talk with your health care provider or get counseling.  When you are ready, meet with your health care provider to discuss any important steps you should take for your health. Also, discuss steps you should take to have a healthy pregnancy in the future.  Keep all follow-up visits. This is important.  Where to find more information  The American College of Obstetricians and Gynecologists: acog.org  [place] Department of Health and Human Services Office of Women's Health: hrsa.gov/office-womens-health  Contact a health care provider if:  You have a fever or chills.  There is bad-smelling fluid coming from the vagina.  You have more bleeding instead of less.  Tissue or blood clots come out of your vagina.  Get help right away if:  You have severe cramps or pain in your back or abdomen.  Heavy bleeding soaks through 2 large sanitary pads an hour for more than 2 hours.  You become light-headed or weak.  You faint.  You feel sad, and your sadness takes over your thoughts.  You think about hurting yourself.  If you ever feel like you may hurt yourself or others, or have thoughts about taking your own life, get help right away. Go to your nearest emergency department or:  Call your local emergency services (731 in the [place]  Call a suicide crisis helpline, such as the National Suicide Prevention Lifeline at 1-629.132.6429 or 877 in the [place] This is open 24 hours a day in the [place]  Text the Crisis Text Line at 706300 (in the [place]  Summary  Most miscarriages happen in the first 3 months of pregnancy. Sometimes miscarriage happens before a woman knows that she is pregnant.  Follow instructions from your health care provider about medicines and activity.  To help you and your partner with grieving, talk with your health care provider or get counseling.  Keep all follow-up visits.  This information is not intended to replace advice given to you by your health care provider. Make sure you discuss any questions you have with your health care provider.    Document Revised: 07/13/2022 Document Reviewed: 06/18/2021  [employer] Patient Education © 2023 [employer]

## 2023-08-22 NOTE — PROGRESS NOTES
Ms. Steele is a patient of Dr. Polk and was last seen in clinic 6/23/2023.      Subjective:   Patient ID:  Yaneli Steele is a 67 y.o. female who presents for follow up of Atrial Flutter and Atrial Fibrillation  .     HPI:    Ms. Steele is a 67 y.o. female with pAF, tachy-induced CM, insulin resistance here for follow up after atrial flutter ablation.    Background:    pAF ([id number] = 4 [CHF, age, HTN, F])  Tachy-induced CHF with recovered EF (25% > 55%)  Insulin resistance    Patient originally diagnosed with atrial fibrillation in 2018. Patient was in RVR at the time presented to hospital. Found to have new onset HFrEF, LHC clean, cardioverted and EF improved. After that felt fine until a few months ago. Palpitations occurred again. EF dropped DCCV and referred to [facility] EP. On Xarelto 20mg entresto and coreg.      After JUNIOR/DCCV 9/2022, LVEF was better. Flecainide started 9/16/22.  Didn't start flecainide immediately; just started prior to seeing Dr. Issa, at which time she c/o LEs swelling. At his office Oct 6, likely AFL (typical vs. atypical?) with 2:1 conduction. . BB increased.  Since then, she feels better now. [employer] Watch tells her HR is in 40s.    6/28/23:  Last visit 10/19/23 > plan was for ECG > NSR (cont management) vs. AF/AFL (recommend PVI). Since last visit she was admitted to [facility] for AF/AFL with RVR & ADFH earlier this month > converted to NSR 6/5/23 > switched BB to Toprol (50 mg BID) & continued on home flecainide (100 mg BID). Was unable to tolerate amiodarone secondary to hypotension issues. She is anticoagulated with Xarelto. No bleeding issues. Renal function stable and wnl.  Patient presents with WCT at 157 bpm. She reports nausea and generalized fatigue. HD stable, and sating well.   - Transfer to the ED for management  - Discussed with cardiology ED fellow    Update (08/25/2023):    6/28/23: In ED, 's and gave IV adenosine with no effect.  "Improved with IV metoprolol and rhythm noted to be atypical atrial flutter.    "Started on Tikosyn. Prolonged Qtc 572  She was also started on amiodarone as tikosyn was stopped- started amiodarone 200mg BID for load. She received 2 doses of amiodarone, but stated that she was feeling "anxious and jittery." She refused further amio dose- it was explained to her that this was the only anti-arrhythmic available with per prolonged QTc. She continued to decline. Offered anti-anxiety meds- patient declined. Xarelto was also briefly transitioned to eliquis with amiodarone and decreased CrCl. Patient requested to resume xarelto with not taking amiodarone, and so she was transitioned back. She was noted to have declining H:H- but no source of blood loss. WBC and plt noted to be downtrending. Given dose of lasix with concern for fluid-overload. H:H, WBC and plt seemed to improve with diuresis. Likely dilutional in nature."     EP performed CTI ablation 07/05/23. Converted to sinus bradycardia, EP recommended continuing rivaroxaban and starting metoprolol succinate 100mg daily.    7/5/2023:    Typcial CCW atrial flutter    CTI ablation.    3D mapping performed with [employer].    No arrhythmias induced with burst pacing or programmed stimulation after CTI ablation    She apparently was intolerant of flecainide with QRS widening, as well as amiodarone and Tikosyn, and is now off of antiarrhythmic treatment.     Today she says she feels significantly better since her hospitalization. She did not feel herself go back into AF but her Smartwatch identified AF around July 14 and has been persistent since then. No cardiac complaints. No CP, HARPER, palps, LH, syncope reported.     She is currently taking xarelto 20mg daily for stroke prophylaxis and denies significant bleeding episodes. She is currently being treated with toprol 200mg daily for HR control. Kidney function is stable, with a creatinine of 0.9 on 7/6/2023.    I have personally " "reviewed the patient's EKG today, which shows AF at 87bpm.    Relevant Cardiac Test Results:    JUNIRO (7/5/2023):  There is moderate left ventricular global hypokinesis. The estimated ejection fraction is 35%.  Normal right ventricular systolic function.  Biatrial enlargement.  Moderate mitral regurgitation.  Mild tricuspid regurgitation.  Normal appearing left atrial appendage. No thrombus is present in the appendage.    Current Outpatient Medications   Medication Sig    furosemide (LASIX) 20 MG tablet Take 1 tablet (20 mg total) by mouth once daily.    metoprolol succinate (TOPROL-XL) 200 MG 24 hr tablet Take 1 tablet (200 mg total) by mouth once daily.    pantoprazole (PROTONIX) 40 MG tablet Take 1 tablet (40 mg total) by mouth once daily.    rivaroxaban (XARELTO) 20 mg Tab Take 1 tablet (20 mg total) by mouth daily with dinner or evening meal.    sacubitriL-valsartan (ENTRESTO) 49-51 mg per tablet Take 1 tablet by mouth 2 (two) times daily.     No current facility-administered medications for this visit.       Review of Systems   Constitutional: Negative for malaise/fatigue.   Cardiovascular:  Negative for chest pain, dyspnea on exertion, irregular heartbeat, leg swelling and palpitations.   Respiratory:  Negative for shortness of breath.    Hematologic/Lymphatic: Negative for bleeding problem.   Skin:  Negative for rash.   Musculoskeletal:  Negative for myalgias.   Gastrointestinal:  Negative for hematemesis, hematochezia and nausea.   Genitourinary:  Negative for hematuria.   Neurological:  Negative for light-headedness.   Psychiatric/Behavioral:  Negative for altered mental status.    Allergic/Immunologic: Negative for persistent infections.       Objective:          /78   Pulse 87   Ht 5' 7" (1.702 m)   Wt 93 kg (205 lb 0.4 oz)   BMI 32.11 kg/m²     Physical Exam  Vitals and nursing note reviewed.   Constitutional:       Appearance: Normal appearance. She is well-developed.   HENT:      Head: " Normocephalic.      Nose: Nose normal.   Eyes:      Pupils: Pupils are equal, round, and reactive to light.   Cardiovascular:      Rate and Rhythm: Normal rate. Rhythm irregularly irregular.   Pulmonary:      Effort: No respiratory distress.      Breath sounds: Normal breath sounds.   Musculoskeletal:         General: Normal range of motion.   Skin:     General: Skin is warm and dry.      Findings: No erythema.   Neurological:      Mental Status: She is alert and oriented to person, place, and time.   Psychiatric:         Speech: Speech normal.         Behavior: Behavior normal.           Lab Results   Component Value Date     07/06/2023    K 4.5 07/06/2023    MG 1.8 07/06/2023    BUN 11 07/06/2023    CREATININE 0.9 07/06/2023    ALT 24 07/06/2023    AST 25 07/06/2023    HGB 15.5 07/06/2023    HCT 51.6 (H) 07/06/2023    TSH 1.481 07/02/2023    LDLCALC 84.4 02/23/2018       Recent Labs   Lab 07/19/22  1119 06/29/23  1845   INR 1.0 1.5 H       Assessment:     1. Paroxysmal atrial fibrillation    2. Typical atrial flutter    3. Essential (primary) hypertension    4. Obesity (BMI 30-39.9)    5. Status post catheter ablation of atrial flutter      Plan:     In summary, Ms. Steele is a 67 y.o. female with pAF, tachy-induced CM, insulin resistance here for follow up after atrial flutter ablation.  She is 6 weeks s/p RFA of CTI. Back in AF per smartwatch around 7/14 and confirmed at cardiology appt 7/31. She is asymptomatic but has a history of arrhythmia-induced CM. Last JUNIOR showed EF 35%. Limited anti-arrhythmic medication options (failed flecainide, QTc widening with tikosyn, did not tolerate amiodarone). Recommend PVI. Risks, benefits, and alternatives reviewed with pt and she would like to proceed. Will confirm with Dr. Polk. CHADSVASc 3 on xarelto.    Likely PVI  Continue current meds  RTC as scheduled following procedure    *A copy of this note has been sent to Dr. Polk*    Follow up as scheduled  following procedure.    ------------------------------------------------------------------    DENITA Lawson, NP-C  Cardiac Electrophysiology

## 2023-08-24 ENCOUNTER — TELEPHONE (OUTPATIENT)
Dept: ELECTROPHYSIOLOGY | Facility: CLINIC | Age: 68
End: 2023-08-24
Payer: COMMERCIAL

## 2023-08-25 ENCOUNTER — HOSPITAL ENCOUNTER (OUTPATIENT)
Dept: CARDIOLOGY | Facility: CLINIC | Age: 68
Discharge: HOME | End: 2023-08-25
Payer: COMMERCIAL

## 2023-08-25 ENCOUNTER — OFFICE VISIT (OUTPATIENT)
Dept: ELECTROPHYSIOLOGY | Facility: CLINIC | Age: 68
End: 2023-08-25
Payer: COMMERCIAL

## 2023-08-25 VITALS
HEART RATE: 87 BPM | SYSTOLIC BLOOD PRESSURE: 138 MMHG | DIASTOLIC BLOOD PRESSURE: 78 MMHG | WEIGHT: 205 LBS | BODY MASS INDEX: 32.18 KG/M2 | HEIGHT: 67 IN

## 2023-08-25 DIAGNOSIS — I48.0 PAROXYSMAL ATRIAL FIBRILLATION: Primary | Chronic | ICD-10-CM

## 2023-08-25 DIAGNOSIS — I10 ESSENTIAL (PRIMARY) HYPERTENSION: ICD-10-CM

## 2023-08-25 DIAGNOSIS — I48.19 PERSISTENT ATRIAL FIBRILLATION: ICD-10-CM

## 2023-08-25 DIAGNOSIS — I48.3 TYPICAL ATRIAL FLUTTER: ICD-10-CM

## 2023-08-25 DIAGNOSIS — Z98.890 STATUS POST CATHETER ABLATION OF ATRIAL FLUTTER: ICD-10-CM

## 2023-08-25 DIAGNOSIS — I48.0 PAROXYSMAL ATRIAL FIBRILLATION: Chronic | ICD-10-CM

## 2023-08-25 DIAGNOSIS — E66.9 OBESITY (BMI 30-39.9): ICD-10-CM

## 2023-08-25 PROCEDURE — 93010 RHYTHM STRIP: ICD-10-PCS | Mod: S$GLB,,, | Performed by: INTERNAL MEDICINE

## 2023-08-25 PROCEDURE — 1160F RVW MEDS BY RX/DR IN RCRD: CPT | Mod: CPTII,S$GLB,, | Performed by: NURSE PRACTITIONER

## 2023-08-25 PROCEDURE — 3075F PR MOST RECENT SYSTOLIC BLOOD PRESS GE 130-139MM HG: ICD-10-PCS | Mod: CPTII,S$GLB,, | Performed by: NURSE PRACTITIONER

## 2023-08-25 PROCEDURE — 93005 RHYTHM STRIP: ICD-10-PCS | Mod: S$GLB,,, | Performed by: INTERNAL MEDICINE

## 2023-08-25 PROCEDURE — 93005 ELECTROCARDIOGRAM TRACING: CPT | Mod: S$GLB,,, | Performed by: INTERNAL MEDICINE

## 2023-08-25 PROCEDURE — 1159F MED LIST DOCD IN RCRD: CPT | Mod: CPTII,S$GLB,, | Performed by: NURSE PRACTITIONER

## 2023-08-25 PROCEDURE — 99999 PR PBB SHADOW E&M-EST. PATIENT-LVL III: CPT | Mod: PBBFAC,,, | Performed by: NURSE PRACTITIONER

## 2023-08-25 PROCEDURE — 1159F PR MEDICATION LIST DOCUMENTED IN MEDICAL RECORD: ICD-10-PCS | Mod: CPTII,S$GLB,, | Performed by: NURSE PRACTITIONER

## 2023-08-25 PROCEDURE — 3288F PR FALLS RISK ASSESSMENT DOCUMENTED: ICD-10-PCS | Mod: CPTII,S$GLB,, | Performed by: NURSE PRACTITIONER

## 2023-08-25 PROCEDURE — 1126F PR PAIN SEVERITY QUANTIFIED, NO PAIN PRESENT: ICD-10-PCS | Mod: CPTII,S$GLB,, | Performed by: NURSE PRACTITIONER

## 2023-08-25 PROCEDURE — 3078F PR MOST RECENT DIASTOLIC BLOOD PRESSURE < 80 MM HG: ICD-10-PCS | Mod: CPTII,S$GLB,, | Performed by: NURSE PRACTITIONER

## 2023-08-25 PROCEDURE — 99214 PR OFFICE/OUTPT VISIT, EST, LEVL IV, 30-39 MIN: ICD-10-PCS | Mod: S$GLB,,, | Performed by: NURSE PRACTITIONER

## 2023-08-25 PROCEDURE — 3288F FALL RISK ASSESSMENT DOCD: CPT | Mod: CPTII,S$GLB,, | Performed by: NURSE PRACTITIONER

## 2023-08-25 PROCEDURE — 3008F BODY MASS INDEX DOCD: CPT | Mod: CPTII,S$GLB,, | Performed by: NURSE PRACTITIONER

## 2023-08-25 PROCEDURE — 3075F SYST BP GE 130 - 139MM HG: CPT | Mod: CPTII,S$GLB,, | Performed by: NURSE PRACTITIONER

## 2023-08-25 PROCEDURE — 1160F PR REVIEW ALL MEDS BY PRESCRIBER/CLIN PHARMACIST DOCUMENTED: ICD-10-PCS | Mod: CPTII,S$GLB,, | Performed by: NURSE PRACTITIONER

## 2023-08-25 PROCEDURE — 3044F PR MOST RECENT HEMOGLOBIN A1C LEVEL <7.0%: ICD-10-PCS | Mod: CPTII,S$GLB,, | Performed by: NURSE PRACTITIONER

## 2023-08-25 PROCEDURE — 99214 OFFICE O/P EST MOD 30 MIN: CPT | Mod: S$GLB,,, | Performed by: NURSE PRACTITIONER

## 2023-08-25 PROCEDURE — 1101F PR PT FALLS ASSESS DOC 0-1 FALLS W/OUT INJ PAST YR: ICD-10-PCS | Mod: CPTII,S$GLB,, | Performed by: NURSE PRACTITIONER

## 2023-08-25 PROCEDURE — 4010F PR ACE/ARB THEARPY RXD/TAKEN: ICD-10-PCS | Mod: CPTII,S$GLB,, | Performed by: NURSE PRACTITIONER

## 2023-08-25 PROCEDURE — 93010 ELECTROCARDIOGRAM REPORT: CPT | Mod: S$GLB,,, | Performed by: INTERNAL MEDICINE

## 2023-08-25 PROCEDURE — 99999 PR PBB SHADOW E&M-EST. PATIENT-LVL III: ICD-10-PCS | Mod: PBBFAC,,, | Performed by: NURSE PRACTITIONER

## 2023-08-25 PROCEDURE — 3078F DIAST BP <80 MM HG: CPT | Mod: CPTII,S$GLB,, | Performed by: NURSE PRACTITIONER

## 2023-08-25 PROCEDURE — 3044F HG A1C LEVEL LT 7.0%: CPT | Mod: CPTII,S$GLB,, | Performed by: NURSE PRACTITIONER

## 2023-08-25 PROCEDURE — 3008F PR BODY MASS INDEX (BMI) DOCUMENTED: ICD-10-PCS | Mod: CPTII,S$GLB,, | Performed by: NURSE PRACTITIONER

## 2023-08-25 PROCEDURE — 1126F AMNT PAIN NOTED NONE PRSNT: CPT | Mod: CPTII,S$GLB,, | Performed by: NURSE PRACTITIONER

## 2023-08-25 PROCEDURE — 4010F ACE/ARB THERAPY RXD/TAKEN: CPT | Mod: CPTII,S$GLB,, | Performed by: NURSE PRACTITIONER

## 2023-08-25 PROCEDURE — 1101F PT FALLS ASSESS-DOCD LE1/YR: CPT | Mod: CPTII,S$GLB,, | Performed by: NURSE PRACTITIONER

## 2023-08-25 NOTE — Clinical Note
Pt with AF/AFL hx and arrhythmia-induced CM now 6 weeks s/p RFA of CTI. Back in AF per smartwatch around 7/14 and confirmed at cardiology appt 7/31. She is asymptomatic but has a history of arrhythmia-induced CM. Last JUNIOR showed EF 35%. Limited AAD options (failed flecainide, QTc widening with tikosyn, did not tolerate amiodarone). She would like to proceed with PVI if you agree. Thanks.

## 2023-08-28 ENCOUNTER — TELEPHONE (OUTPATIENT)
Dept: ELECTROPHYSIOLOGY | Facility: CLINIC | Age: 68
End: 2023-08-28
Payer: COMMERCIAL

## 2023-08-28 RX ORDER — SACUBITRIL AND VALSARTAN 49; 51 MG/1; MG/1
1 TABLET, FILM COATED ORAL 2 TIMES DAILY
Qty: 180 TABLET | Refills: 3 | Status: SHIPPED | OUTPATIENT
Start: 2023-08-28 | End: 2023-09-09 | Stop reason: SDUPTHER

## 2023-08-28 NOTE — TELEPHONE ENCOUNTER
----- Message from Dasia Corral sent at 8/28/2023 11:47 AM CDT -----  Regarding: Self/  [phone]  Type: RX Refill Request    Who Called:  Patient    Refill or New Rx:  Refill    RX Name and Strength:  sacubitriL-valsartan (ENTRESTO) 49-51 mg per tablet    Preferred Pharmacy with phone number:  [facility] [address] [place], [place] - [address] [facility]    Local or Mail Order:  Local    Ordering Provider:  GAGAN Issa    Would the patient rather a call back or a response via [facility]?  Call back    Best Call Back Number:  [phone]    Additional Information:   Pharmacy is needing a PA for medication.  Thank you

## 2023-08-28 NOTE — TELEPHONE ENCOUNTER
----- Message from Dasia Corral sent at 8/28/2023 11:47 AM CDT -----  Regarding: Self/  [phone]  Type: RX Refill Request    Who Called:  Patient    Refill or New Rx:  Refill    RX Name and Strength:  sacubitriL-valsartan (ENTRESTO) 49-51 mg per tablet    Preferred Pharmacy with phone number:  [facility] [address] [place], [place] - [address] [facility]    Local or Mail Order:  Local    Ordering Provider:  GAGAN Issa    Would the patient rather a call back or a response via [facility]?  Call back    Best Call Back Number:  [phone]    Additional Information:   Pharmacy is needing a PA for medication.  Thank you

## 2023-08-29 ENCOUNTER — TELEPHONE (OUTPATIENT)
Dept: ELECTROPHYSIOLOGY | Facility: CLINIC | Age: 68
End: 2023-08-29
Payer: COMMERCIAL

## 2023-08-29 DIAGNOSIS — I48.0 PAROXYSMAL ATRIAL FIBRILLATION: Primary | Chronic | ICD-10-CM

## 2023-08-29 NOTE — TELEPHONE ENCOUNTER
----- Message from Nathaniel Whiteside MA sent at 8/28/2023  3:40 PM CDT -----  Regarding: FW: Returning Call    ----- Message -----  From: Herb Solomon  Sent: 8/28/2023   3:25 PM CDT  To: Felicitas FUENTES Staff  Subject: Returning Call                                   Pt returning call.     Contact @ [phone]    Thanks

## 2023-09-01 ENCOUNTER — PATIENT MESSAGE (OUTPATIENT)
Dept: ELECTROPHYSIOLOGY | Facility: CLINIC | Age: 68
End: 2023-09-01
Payer: COMMERCIAL

## 2023-09-11 RX ORDER — SACUBITRIL AND VALSARTAN 49; 51 MG/1; MG/1
1 TABLET, FILM COATED ORAL 2 TIMES DAILY
Qty: 180 TABLET | Refills: 3 | Status: SHIPPED | OUTPATIENT
Start: 2023-09-11 | End: 2023-11-07

## 2023-09-11 NOTE — TELEPHONE ENCOUNTER
Please call patient to schedule follow up Office Visit with testing as previously ordered if not yet complete.

## 2023-10-13 ENCOUNTER — HOSPITAL ENCOUNTER (OUTPATIENT)
Dept: RADIOLOGY | Facility: HOSPITAL | Age: 68
Discharge: HOME | End: 2023-10-13
Attending: INTERNAL MEDICINE
Payer: MEDICARE

## 2023-10-13 DIAGNOSIS — I48.0 PAROXYSMAL ATRIAL FIBRILLATION: ICD-10-CM

## 2023-10-13 PROCEDURE — 71275 CT ANGIOGRAPHY CHEST: CPT | Mod: 26,,, | Performed by: RADIOLOGY

## 2023-10-13 PROCEDURE — 25500020 PHARM REV CODE 255: Performed by: INTERNAL MEDICINE

## 2023-10-13 PROCEDURE — 71275 CT ANGIOGRAPHY CHEST: CPT | Mod: TC

## 2023-10-13 PROCEDURE — 71275 CTA CHEST NON CORONARY (PE STUDIES): ICD-10-PCS | Mod: 26,,, | Performed by: RADIOLOGY

## 2023-10-13 RX ADMIN — IOHEXOL 100 ML: 350 INJECTION, SOLUTION INTRAVENOUS at 09:10

## 2023-10-20 ENCOUNTER — HOSPITAL ENCOUNTER (OUTPATIENT)
Dept: RADIOLOGY | Facility: HOSPITAL | Age: 68
Discharge: HOME | End: 2023-10-20
Attending: INTERNAL MEDICINE
Payer: MEDICARE

## 2023-10-20 ENCOUNTER — TELEPHONE (OUTPATIENT)
Dept: ELECTROPHYSIOLOGY | Facility: CLINIC | Age: 68
End: 2023-10-20
Payer: COMMERCIAL

## 2023-10-20 DIAGNOSIS — I48.0 PAROXYSMAL ATRIAL FIBRILLATION: ICD-10-CM

## 2023-10-20 DIAGNOSIS — I48.0 PAROXYSMAL ATRIAL FIBRILLATION: Primary | Chronic | ICD-10-CM

## 2023-10-20 PROCEDURE — 71275 CT ANGIOGRAPHY CHEST: CPT | Mod: 26,,, | Performed by: STUDENT IN AN ORGANIZED HEALTH CARE EDUCATION/TRAINING PROGRAM

## 2023-10-20 PROCEDURE — 25500020 PHARM REV CODE 255: Performed by: INTERNAL MEDICINE

## 2023-10-20 PROCEDURE — 71275 CTA CHEST NON CORONARY (PE STUDIES): ICD-10-PCS | Mod: 26,,, | Performed by: STUDENT IN AN ORGANIZED HEALTH CARE EDUCATION/TRAINING PROGRAM

## 2023-10-20 PROCEDURE — 71275 CT ANGIOGRAPHY CHEST: CPT | Mod: TC

## 2023-10-20 RX ADMIN — IOHEXOL 100 ML: 350 INJECTION, SOLUTION INTRAVENOUS at 08:10

## 2023-10-20 NOTE — TELEPHONE ENCOUNTER
Per Dr Polk the CTA needs to be repeated due to the poor images, spoke with pt and she will go today at 7:15pm to have CT repeated

## 2023-10-23 ENCOUNTER — TELEPHONE (OUTPATIENT)
Dept: ELECTROPHYSIOLOGY | Facility: CLINIC | Age: 68
End: 2023-10-23
Payer: COMMERCIAL

## 2023-10-23 NOTE — TELEPHONE ENCOUNTER
Spoke to patient    CONFIRMED procedure arrival time of 9am    Reiterated instructions including:  -Directions to check in desk  -NPO after midnight night prior to procedure  -High importance of HOLDING n/a, reviewed with pt that she should take her xarelto as usual today with her evening meal  -Pre-procedure LABS reviewed no alerts noted   -Confirmed absence or presence of implanted device/stimulator none  -Confirmed no fever, cough, or shortness of breath in the past 30 days  -Confirmed no redness, rash, irritation, or yeast infection to groin area.   --Do not wear mascara day of procedure  -Bathe night prior and morning prior to procedure with Hibiclens solution or an antibacterial soap  -Reviewed current visitor policy    Patient verbalized understanding of above and appreciated the call.

## 2023-10-24 ENCOUNTER — HOSPITAL ENCOUNTER (OUTPATIENT)
Facility: HOSPITAL | Age: 68
Discharge: HOME | End: 2023-10-25
Attending: INTERNAL MEDICINE | Admitting: INTERNAL MEDICINE
Payer: MEDICARE

## 2023-10-24 ENCOUNTER — HOSPITAL ENCOUNTER (OUTPATIENT)
Dept: CARDIOLOGY | Facility: HOSPITAL | Age: 68
Discharge: HOME | End: 2023-10-24
Attending: INTERNAL MEDICINE | Admitting: INTERNAL MEDICINE
Payer: MEDICARE

## 2023-10-24 ENCOUNTER — ANESTHESIA (OUTPATIENT)
Dept: MEDSURG UNIT | Facility: HOSPITAL | Age: 68
End: 2023-10-24
Payer: MEDICARE

## 2023-10-24 ENCOUNTER — ANESTHESIA EVENT (OUTPATIENT)
Dept: MEDSURG UNIT | Facility: HOSPITAL | Age: 68
End: 2023-10-24
Payer: MEDICARE

## 2023-10-24 DIAGNOSIS — I48.0 PAROXYSMAL ATRIAL FIBRILLATION: ICD-10-CM

## 2023-10-24 DIAGNOSIS — I48.91 AF (ATRIAL FIBRILLATION): ICD-10-CM

## 2023-10-24 DIAGNOSIS — I48.91 ATRIAL FIBRILLATION: ICD-10-CM

## 2023-10-24 DIAGNOSIS — I48.0 PAROXYSMAL ATRIAL FIBRILLATION: Chronic | ICD-10-CM

## 2023-10-24 DIAGNOSIS — I49.9 ARRHYTHMIA: ICD-10-CM

## 2023-10-24 LAB
ASCENDING AORTA: 3.6 CM
BSA FOR ECHO PROCEDURE: 2.09 M2
EJECTION FRACTION: 35 %

## 2023-10-24 PROCEDURE — 25000003 PHARM REV CODE 250: Performed by: STUDENT IN AN ORGANIZED HEALTH CARE EDUCATION/TRAINING PROGRAM

## 2023-10-24 PROCEDURE — D9220A PRA ANESTHESIA: Mod: ANES,,, | Performed by: ANESTHESIOLOGY

## 2023-10-24 PROCEDURE — C1894 INTRO/SHEATH, NON-LASER: HCPCS | Performed by: INTERNAL MEDICINE

## 2023-10-24 PROCEDURE — 27201423 OPTIME MED/SURG SUP & DEVICES STERILE SUPPLY: Performed by: INTERNAL MEDICINE

## 2023-10-24 PROCEDURE — 99234 HOSP IP/OBS SM DT SF/LOW 45: CPT | Mod: 25,,, | Performed by: INTERNAL MEDICINE

## 2023-10-24 PROCEDURE — 93656 COMPRE EP EVAL ABLTJ ATR FIB: CPT | Performed by: INTERNAL MEDICINE

## 2023-10-24 PROCEDURE — 93010 ELECTROCARDIOGRAM REPORT: CPT | Mod: ,,, | Performed by: INTERNAL MEDICINE

## 2023-10-24 PROCEDURE — D9220A PRA ANESTHESIA: ICD-10-PCS | Mod: ANES,,, | Performed by: ANESTHESIOLOGY

## 2023-10-24 PROCEDURE — 93005 ELECTROCARDIOGRAM TRACING: CPT | Mod: 59

## 2023-10-24 PROCEDURE — 93325 DOPPLER ECHO COLOR FLOW MAPG: CPT

## 2023-10-24 PROCEDURE — 93655 ICAR CATH ABLTJ DSCRT ARRHYT: CPT | Mod: ,,, | Performed by: INTERNAL MEDICINE

## 2023-10-24 PROCEDURE — 93656 COMPRE EP EVAL ABLTJ ATR FIB: CPT | Mod: ,,, | Performed by: INTERNAL MEDICINE

## 2023-10-24 PROCEDURE — D9220A PRA ANESTHESIA: Mod: CRNA,,, | Performed by: NURSE ANESTHETIST, CERTIFIED REGISTERED

## 2023-10-24 PROCEDURE — 93320 TRANSESOPHAGEAL ECHO (TEE) (CUPID ONLY): ICD-10-PCS | Mod: 26,,, | Performed by: INTERNAL MEDICINE

## 2023-10-24 PROCEDURE — 93320 DOPPLER ECHO COMPLETE: CPT | Mod: 26,,, | Performed by: INTERNAL MEDICINE

## 2023-10-24 PROCEDURE — 36620 INSERTION CATHETER ARTERY: CPT | Mod: 59,,, | Performed by: ANESTHESIOLOGY

## 2023-10-24 PROCEDURE — 63600175 PHARM REV CODE 636 W HCPCS: Performed by: NURSE ANESTHETIST, CERTIFIED REGISTERED

## 2023-10-24 PROCEDURE — 92960 CARDIOVERSION ELECTRIC EXT: CPT | Mod: 59 | Performed by: INTERNAL MEDICINE

## 2023-10-24 PROCEDURE — C1730 CATH, EP, 19 OR FEW ELECT: HCPCS | Performed by: INTERNAL MEDICINE

## 2023-10-24 PROCEDURE — 93656 PR ELECTROPHYS EVAL, COMPREHEN, W/ABLATION OF ATRIAL FIBR: ICD-10-PCS | Mod: ,,, | Performed by: INTERNAL MEDICINE

## 2023-10-24 PROCEDURE — 93655 PR ABLATE ARRHYTHMIA ADD ON: ICD-10-PCS | Mod: ,,, | Performed by: INTERNAL MEDICINE

## 2023-10-24 PROCEDURE — C1893 INTRO/SHEATH, FIXED,NON-PEEL: HCPCS | Performed by: INTERNAL MEDICINE

## 2023-10-24 PROCEDURE — 93010 EKG 12-LEAD: ICD-10-PCS | Mod: ,,, | Performed by: INTERNAL MEDICINE

## 2023-10-24 PROCEDURE — 93312 TRANSESOPHAGEAL ECHO (TEE) (CUPID ONLY): ICD-10-PCS | Mod: 26,,, | Performed by: INTERNAL MEDICINE

## 2023-10-24 PROCEDURE — C1769 GUIDE WIRE: HCPCS | Performed by: INTERNAL MEDICINE

## 2023-10-24 PROCEDURE — 93325 TRANSESOPHAGEAL ECHO (TEE) (CUPID ONLY): ICD-10-PCS | Mod: 26,,, | Performed by: INTERNAL MEDICINE

## 2023-10-24 PROCEDURE — C1753 CATH, INTRAVAS ULTRASOUND: HCPCS | Performed by: INTERNAL MEDICINE

## 2023-10-24 PROCEDURE — 92960 PR CARDIOVERSION, ELECTIVE;EXTERN: ICD-10-PCS | Mod: 59,,, | Performed by: INTERNAL MEDICINE

## 2023-10-24 PROCEDURE — 36620 PR INSERT CATH,ART,PERCUT,SHORTTERM: ICD-10-PCS | Mod: 59,,, | Performed by: ANESTHESIOLOGY

## 2023-10-24 PROCEDURE — 37000008 HC ANESTHESIA 1ST 15 MINUTES: Performed by: INTERNAL MEDICINE

## 2023-10-24 PROCEDURE — 93655 ICAR CATH ABLTJ DSCRT ARRHYT: CPT | Performed by: INTERNAL MEDICINE

## 2023-10-24 PROCEDURE — C1766 INTRO/SHEATH,STRBLE,NON-PEEL: HCPCS | Performed by: INTERNAL MEDICINE

## 2023-10-24 PROCEDURE — 99234 PR OBSERV/HOSP SAME DATE,LEVL III: ICD-10-PCS | Mod: 25,,, | Performed by: INTERNAL MEDICINE

## 2023-10-24 PROCEDURE — 93312 ECHO TRANSESOPHAGEAL: CPT | Mod: 26,,, | Performed by: INTERNAL MEDICINE

## 2023-10-24 PROCEDURE — 25500020 PHARM REV CODE 255: Performed by: INTERNAL MEDICINE

## 2023-10-24 PROCEDURE — 93325 DOPPLER ECHO COLOR FLOW MAPG: CPT | Mod: 26,,, | Performed by: INTERNAL MEDICINE

## 2023-10-24 PROCEDURE — 37000009 HC ANESTHESIA EA ADD 15 MINS: Performed by: INTERNAL MEDICINE

## 2023-10-24 PROCEDURE — C1733 CATH, EP, OTHR THAN COOL-TIP: HCPCS | Performed by: INTERNAL MEDICINE

## 2023-10-24 PROCEDURE — D9220A PRA ANESTHESIA: ICD-10-PCS | Mod: CRNA,,, | Performed by: NURSE ANESTHETIST, CERTIFIED REGISTERED

## 2023-10-24 PROCEDURE — 25000003 PHARM REV CODE 250: Performed by: NURSE ANESTHETIST, CERTIFIED REGISTERED

## 2023-10-24 PROCEDURE — 25000003 PHARM REV CODE 250: Performed by: INTERNAL MEDICINE

## 2023-10-24 PROCEDURE — 63600175 PHARM REV CODE 636 W HCPCS: Performed by: INTERNAL MEDICINE

## 2023-10-24 PROCEDURE — 92960 CARDIOVERSION ELECTRIC EXT: CPT | Mod: 59,,, | Performed by: INTERNAL MEDICINE

## 2023-10-24 RX ORDER — ONDANSETRON 2 MG/ML
4 INJECTION INTRAMUSCULAR; INTRAVENOUS DAILY PRN
Status: DISCONTINUED | OUTPATIENT
Start: 2023-10-24 | End: 2023-10-25 | Stop reason: HOSPADM

## 2023-10-24 RX ORDER — SUCCINYLCHOLINE CHLORIDE 20 MG/ML
INJECTION INTRAMUSCULAR; INTRAVENOUS
Status: DISCONTINUED | OUTPATIENT
Start: 2023-10-24 | End: 2023-10-24

## 2023-10-24 RX ORDER — FENTANYL CITRATE 50 UG/ML
INJECTION, SOLUTION INTRAMUSCULAR; INTRAVENOUS
Status: DISCONTINUED | OUTPATIENT
Start: 2023-10-24 | End: 2023-10-24

## 2023-10-24 RX ORDER — ACETAMINOPHEN 325 MG/1
650 TABLET ORAL EVERY 4 HOURS PRN
Status: DISCONTINUED | OUTPATIENT
Start: 2023-10-24 | End: 2023-10-25 | Stop reason: HOSPADM

## 2023-10-24 RX ORDER — LIDOCAINE HYDROCHLORIDE 20 MG/ML
INJECTION, SOLUTION INFILTRATION; PERINEURAL
Status: DISCONTINUED | OUTPATIENT
Start: 2023-10-24 | End: 2023-10-25 | Stop reason: HOSPADM

## 2023-10-24 RX ORDER — PROPOFOL 10 MG/ML
VIAL (ML) INTRAVENOUS
Status: DISCONTINUED | OUTPATIENT
Start: 2023-10-24 | End: 2023-10-24

## 2023-10-24 RX ORDER — HEPARIN SOD,PORCINE/0.9 % NACL 1000/500ML
INTRAVENOUS SOLUTION INTRAVENOUS
Status: DISCONTINUED | OUTPATIENT
Start: 2023-10-24 | End: 2023-10-25 | Stop reason: HOSPADM

## 2023-10-24 RX ORDER — HEPARIN SODIUM 10000 [USP'U]/100ML
INJECTION, SOLUTION INTRAVENOUS CONTINUOUS PRN
Status: DISCONTINUED | OUTPATIENT
Start: 2023-10-24 | End: 2023-10-24

## 2023-10-24 RX ORDER — LIDOCAINE HYDROCHLORIDE 10 MG/ML
INJECTION, SOLUTION EPIDURAL; INFILTRATION; INTRACAUDAL; PERINEURAL
Status: DISCONTINUED | OUTPATIENT
Start: 2023-10-24 | End: 2023-10-24

## 2023-10-24 RX ORDER — PANTOPRAZOLE SODIUM 40 MG/1
40 TABLET, DELAYED RELEASE ORAL DAILY
Status: DISCONTINUED | OUTPATIENT
Start: 2023-10-25 | End: 2023-10-25 | Stop reason: HOSPADM

## 2023-10-24 RX ORDER — HEPARIN SODIUM 1000 [USP'U]/ML
INJECTION, SOLUTION INTRAVENOUS; SUBCUTANEOUS
Status: DISCONTINUED | OUTPATIENT
Start: 2023-10-24 | End: 2023-10-24

## 2023-10-24 RX ORDER — IODIXANOL 320 MG/ML
INJECTION, SOLUTION INTRAVASCULAR
Status: DISCONTINUED | OUTPATIENT
Start: 2023-10-24 | End: 2023-10-25 | Stop reason: HOSPADM

## 2023-10-24 RX ORDER — METOPROLOL SUCCINATE 100 MG/1
200 TABLET, EXTENDED RELEASE ORAL DAILY
Status: DISCONTINUED | OUTPATIENT
Start: 2023-10-25 | End: 2023-10-25 | Stop reason: HOSPADM

## 2023-10-24 RX ORDER — AMIODARONE HYDROCHLORIDE 200 MG/1
400 TABLET ORAL 2 TIMES DAILY
Status: DISCONTINUED | OUTPATIENT
Start: 2023-10-24 | End: 2023-10-25 | Stop reason: HOSPADM

## 2023-10-24 RX ORDER — FENTANYL CITRATE 50 UG/ML
25 INJECTION, SOLUTION INTRAMUSCULAR; INTRAVENOUS EVERY 5 MIN PRN
Status: DISCONTINUED | OUTPATIENT
Start: 2023-10-24 | End: 2023-10-25 | Stop reason: HOSPADM

## 2023-10-24 RX ORDER — PROTAMINE SULFATE 10 MG/ML
INJECTION, SOLUTION INTRAVENOUS
Status: DISCONTINUED | OUTPATIENT
Start: 2023-10-24 | End: 2023-10-24

## 2023-10-24 RX ADMIN — LIDOCAINE HYDROCHLORIDE 50 MG: 10 SOLUTION INTRAVENOUS at 12:10

## 2023-10-24 RX ADMIN — RIVAROXABAN 20 MG: 20 TABLET, FILM COATED ORAL at 08:10

## 2023-10-24 RX ADMIN — SODIUM CHLORIDE 0.2 MCG/KG/MIN: 9 INJECTION, SOLUTION INTRAVENOUS at 12:10

## 2023-10-24 RX ADMIN — PROPOFOL 10 MG: 10 INJECTION, EMULSION INTRAVENOUS at 12:10

## 2023-10-24 RX ADMIN — HEPARIN SODIUM 19000 UNITS: 1000 INJECTION, SOLUTION INTRAVENOUS; SUBCUTANEOUS at 02:10

## 2023-10-24 RX ADMIN — GLYCOPYRROLATE 0.2 MG: 0.2 INJECTION, SOLUTION INTRAMUSCULAR; INTRAVENOUS at 01:10

## 2023-10-24 RX ADMIN — SUCCINYLCHOLINE CHLORIDE 120 MG: 20 INJECTION, SOLUTION INTRAMUSCULAR; INTRAVENOUS at 12:10

## 2023-10-24 RX ADMIN — HEPARIN SODIUM AND DEXTROSE 12 UNITS/KG/HR: 10000; 5 INJECTION INTRAVENOUS at 02:10

## 2023-10-24 RX ADMIN — FENTANYL CITRATE 100 MCG: 50 INJECTION, SOLUTION INTRAMUSCULAR; INTRAVENOUS at 12:10

## 2023-10-24 RX ADMIN — SODIUM CHLORIDE: 0.9 INJECTION, SOLUTION INTRAVENOUS at 11:10

## 2023-10-24 RX ADMIN — AMIODARONE HYDROCHLORIDE 400 MG: 200 TABLET ORAL at 08:10

## 2023-10-24 RX ADMIN — SACUBITRIL AND VALSARTAN 1 TABLET: 49; 51 TABLET, FILM COATED ORAL at 08:10

## 2023-10-24 RX ADMIN — PROTAMINE SULFATE 100 MG: 10 INJECTION, SOLUTION INTRAVENOUS at 03:10

## 2023-10-24 NOTE — HPI
Ms. Steele is a 67 y.o. female with pAF, tachy-induced CM, insulin resistance here for management of AF with cryo ablation. Most recent EF 35%.     Background:  pAF ([id number] = 4 [CHF, age, HTN, F])  Tachy-induced CHF with recovered EF (25% > 55%)     Patient originally diagnosed with atrial fibrillation in 2018. Patient was in RVR at the time presented to hospital. Found to have new onset HFrEF, LHC clean, cardioverted and EF improved. After that felt fine until a few months ago. Palpitations occurred again. EF dropped DCCV and referred to [facility] EP. On Xarelto 20mg entresto and coreg.      After JUNIOR/DCCV 9/2022, LVEF was better. Flecainide started 9/16/22.  Didn't start flecainide immediately; just started prior to seeing Dr. Issa, at which time she c/o LEs swelling. At his office Oct 6, likely AFL (typical vs. atypical?) with 2:1 conduction. . BB increased.     In June 2023 she was admitted to [facility] for AF/AFL with RVR & ADFH h > converted to NSR 6/5/23 > switched BB to Toprol (50 mg BID). Underwent CTI ablation 7/5/23.    She has been intolerant of flecainide with QRS widening, as well as amiodarone and Tikosyn, and is now off of antiarrhythmic treatment.       She is currently taking xarelto 20mg daily for stroke prophylaxis.     EKG today, which shows AF at 88bpm.

## 2023-10-24 NOTE — NURSING TRANSFER
Nursing Transfer Note      10/24/2023   6:26 PM    Nurse giving handoff:raul rn   Nurse receiving handoff:kristen csu rn     Reason patient is being transferred: nori/c critieria met     Transfer To: u 321    Transfer via stretcher    Transfer with cardiac monitoring and registered patient on monitor with dunia and verified can see patient on monitor     Transported by escort with ticket to ride and also let transport know that patient needs to lay flat     Transfer Vital Signs:  Blood Pressure:see epic   Heart Rate:see epic   O2:room air   Temperature:see epic   Respirations:see epic     Telemetry: yes   Order for Tele Monitor? Yes    Additional Lines: purwick intact     4eyes on Skin: yes    Medicines sent: silvadene cream     Any special needs or follow-up needed: also went over suture and stopcock removal time with nurse     Patient belongings transferred with patient: Yes belongings from [facility]     Chart send with patient: Yes    Notified: son    Patient reassessed at: see epic  (date, time)  1  Upon arrival to floor: to room no complaints no distress noted.

## 2023-10-24 NOTE — SUBJECTIVE & OBJECTIVE
Past Medical History:   Diagnosis Date    Chronic systolic heart failure     Essential (primary) hypertension     Paroxysmal atrial fibrillation        Past Surgical History:   Procedure Laterality Date    ABLATION, ATRIAL FLUTTER, TYPICAL Bilateral 7/5/2023    Procedure: Ablation, Atrial Flutter, Typical;  Surgeon: Devante Arevalo MD;  Location: [facility] EP LAB;  Service: Cardiology;  Laterality: Bilateral;  AF, RFA CTI/PVI, ANES, RICHARD, JUNIOR (CX IF SR), MB, 307    ANGIOGRAM, CORONARY, WITH LEFT HEART CATHETERIZATION      TRANSESOPHAGEAL ECHOCARDIOGRAPHY N/A 6/29/2023    Procedure: ECHOCARDIOGRAM, TRANSESOPHAGEAL;  Surgeon: Leola Benavides MD;  Location: [facility] EP LAB;  Service: Cardiology;  Laterality: N/A;    TRANSESOPHAGEAL ECHOCARDIOGRAPHY N/A 7/5/2023    Procedure: ECHOCARDIOGRAM, TRANSESOPHAGEAL;  Surgeon: Popeye Brito MD;  Location: [facility] EP LAB;  Service: Cardiology;  Laterality: N/A;    TREATMENT OF CARDIAC ARRHYTHMIA N/A 6/29/2023    Procedure: Cardioversion or Defibrillation;  Surgeon: Devante Arevalo MD;  Location: [facility] EP LAB;  Service: Cardiology;  Laterality: N/A;  AF, JUNIOR/DCCV, ANES, DM, ED BED 11       Review of patient's allergies indicates:   Allergen Reactions    Amiodarone analogues Shortness Of Breath    Penicillins Shortness Of Breath       No current facility-administered medications on file prior to encounter.     Current Outpatient Medications on File Prior to Encounter   Medication Sig    furosemide (LASIX) 20 MG tablet Take 1 tablet (20 mg total) by mouth once daily.    metoprolol succinate (TOPROL-XL) 200 MG 24 hr tablet Take 1 tablet (200 mg total) by mouth once daily.    pantoprazole (PROTONIX) 40 MG tablet Take 1 tablet (40 mg total) by mouth once daily.    rivaroxaban (XARELTO) 20 mg Tab Take 1 tablet (20 mg total) by mouth daily with dinner or evening meal.     Family History       Problem Relation (Age of Onset)    No Known Problems Mother, Father          Tobacco Use     Smoking status: Former    Smokeless tobacco: Former    Tobacco comments:     Pt states she quit more than 30 years ago   Substance and Sexual Activity    Alcohol use: No    Drug use: No    Sexual activity: Not Currently     Review of Systems   All other systems reviewed and are negative.    Objective:     Vital Signs (Most Recent):    Vital Signs (24h Range):  BP: ()/()   Arterial Line BP: ()/()           There is no height or weight on file to calculate BMI.             Physical Exam  Vitals and nursing note reviewed.   Constitutional:       Appearance: Normal appearance.   Cardiovascular:      Rate and Rhythm: Normal rate and regular rhythm.      Pulses: Normal pulses.   Pulmonary:      Effort: Pulmonary effort is normal.   Musculoskeletal:      Right lower leg: No edema.      Left lower leg: No edema.   Skin:     General: Skin is warm.   Neurological:      Mental Status: She is alert.            Significant Labs: All pertinent lab results from the last 24 hours have been reviewed.

## 2023-10-24 NOTE — PLAN OF CARE
Problem: Adult Inpatient Plan of Care  Goal: Plan of Care Review  Outcome: Ongoing, Progressing  Goal: Patient-Specific Goal (Individualized)  Outcome: Ongoing, Progressing  Goal: Optimal Comfort and Wellbeing  Outcome: Ongoing, Progressing  Goal: Readiness for Transition of Care  Outcome: Ongoing, Progressing     Problem: Fall Injury Risk  Goal: Absence of Fall and Fall-Related Injury  Outcome: Ongoing, Progressing   Plan of care discussed with patient. Patient is free of fall/trauma/injury. Denies CP, SOB, or pain/discomfort. All questions addressed. Will continue to monitor

## 2023-10-24 NOTE — CONSULTS
[facility], [address]  JUNIOR Consult      Yaneli Steele  YOB: 1955  Medical Record Number:  [id number]  Attending Physician:  Ramirez Polk MD   Date of Admission: 10/24/2023       Hospital Day:  0  Current Principal Problem:  Paroxysmal atrial fibrillation      History     Cc: Atrial fibrillation    HPI  Ms. Steele is a 67 y.o. female with pAF, tachy-induced CM, insulin resistance here for management of AF with cryo ablation. Most recent EF 35%.     Background:  pAF ([id number] = 4 [CHF, age, HTN, F])  Tachy-induced CHF with recovered EF (25% > 55%)     Patient originally diagnosed with atrial fibrillation in 2018. Patient was in RVR at the time presented to hospital. Found to have new onset HFrEF, LHC clean, cardioverted and EF improved. After that felt fine until a few months ago. Palpitations occurred again. EF dropped DCCV and referred to [facility] EP. On Xarelto 20mg entresto and coreg.      After JUNIOR/DCCV 9/2022, LVEF was better. Flecainide started 9/16/22.  Didn't start flecainide immediately; just started prior to seeing Dr. Issa, at which time she c/o LEs swelling. At his office Oct 6, likely AFL (typical vs. atypical?) with 2:1 conduction. . BB increased.     In June 2023 she was admitted to [facility] for AF/AFL with RVR & ADFH h > converted to NSR 6/5/23 > switched BB to Toprol (50 mg BID). Underwent CTI ablation 7/5/23.     She has been intolerant of flecainide with QRS widening, as well as amiodarone and Tikosyn, and is now off of antiarrhythmic treatment.       She is currently taking xarelto 20mg daily for stroke prophylaxis.      EKG today, which shows AF at 88bpm.    Today, she feels well and has no complaints.       Anticoagulant/antiplatelets: rivaroxaban  ECG: atrail fib rate 88bpm     Dysphagia or odynophagia:  no  Liver Disease, esophageal disease, or known varices:  no  Upper GI Bleeding: no  Snoring:  no  Sleep Apnea:  no  Prior neck  surgery or radiation:  no  History of anesthetic difficulties:  no  Family history of anesthetic difficulties:  no  Last oral intake: last pm   Able to move neck in all directions: yes  Platelet count: 215K  INR: 1.5        Medications - Outpatient  Prior to Admission medications    Medication Sig Start Date End Date Taking? Authorizing Provider   furosemide (LASIX) 20 MG tablet Take 1 tablet (20 mg total) by mouth once daily. 7/6/23 7/5/24 Yes Stanley Rinaldi MD   metoprolol succinate (TOPROL-XL) 200 MG 24 hr tablet Take 1 tablet (200 mg total) by mouth once daily. 7/31/23 7/30/24 Yes Devante Issa MD   pantoprazole (PROTONIX) 40 MG tablet Take 1 tablet (40 mg total) by mouth once daily. 7/6/23 10/24/23 Yes Stanley Rinaldi MD   rivaroxaban (XARELTO) 20 mg Tab Take 1 tablet (20 mg total) by mouth daily with dinner or evening meal. 1/23/23  Yes Devante Issa MD   sacubitriL-valsartan (ENTRESTO) 49-51 mg per tablet Take 1 tablet by mouth 2 (two) times daily. 9/11/23  Yes Devante Issa MD         Medications - Current  Scheduled Meds:  Continuous Infusions:  PRN Meds:.      Allergies  Review of patient's allergies indicates:   Allergen Reactions    Amiodarone analogues Shortness Of Breath    Penicillins Shortness Of Breath         Past Medical History  Past Medical History:   Diagnosis Date    Chronic systolic heart failure     Essential (primary) hypertension     Paroxysmal atrial fibrillation          Past Surgical History  Past Surgical History:   Procedure Laterality Date    ABLATION, ATRIAL FLUTTER, TYPICAL Bilateral 7/5/2023    Procedure: Ablation, Atrial Flutter, Typical;  Surgeon: Devante Arevalo MD;  Location: [facility] EP LAB;  Service: Cardiology;  Laterality: Bilateral;  AF, RFA CTI/PVI, ANES, RICHARD, JUNIOR (CX IF SR), MB, 307    ANGIOGRAM, CORONARY, WITH LEFT HEART CATHETERIZATION      TRANSESOPHAGEAL ECHOCARDIOGRAPHY N/A 6/29/2023    Procedure: ECHOCARDIOGRAM, TRANSESOPHAGEAL;  Surgeon:  Leola Benavides MD;  Location: [facility] EP LAB;  Service: Cardiology;  Laterality: N/A;    TRANSESOPHAGEAL ECHOCARDIOGRAPHY N/A 7/5/2023    Procedure: ECHOCARDIOGRAM, TRANSESOPHAGEAL;  Surgeon: Popeye Brito MD;  Location: [facility] EP LAB;  Service: Cardiology;  Laterality: N/A;    TREATMENT OF CARDIAC ARRHYTHMIA N/A 6/29/2023    Procedure: Cardioversion or Defibrillation;  Surgeon: Devante Arevalo MD;  Location: [facility] EP LAB;  Service: Cardiology;  Laterality: N/A;  AF, JUNIOR/DCCV, ANES, DM, ED BED 11         Social History  Social History     Socioeconomic History    Marital status: Single   Tobacco Use    Smoking status: Former    Smokeless tobacco: Former    Tobacco comments:     Pt states she quit more than 30 years ago   Substance and Sexual Activity    Alcohol use: No    Drug use: No    Sexual activity: Not Currently     Social Determinants of Health     Financial Resource Strain: Low Risk  (6/29/2023)    Overall Financial Resource Strain (CARDIA)     Difficulty of Paying Living Expenses: Not hard at all   Food Insecurity: No Food Insecurity (6/29/2023)    Hunger Vital Sign     Worried About Running Out of Food in the Last Year: Never true     Ran Out of Food in the Last Year: Never true   Transportation Needs: No Transportation Needs (6/29/2023)    PRAPARE - Transportation     Lack of Transportation (Medical): No     Lack of Transportation (Non-Medical): No   Physical Activity: Insufficiently Active (6/29/2023)    Exercise Vital Sign     Days of Exercise per Week: 3 days     Minutes of Exercise per Session: 30 min   Stress: No Stress Concern Present (6/29/2023)    [employer] [place] of [employer]ational [employer] - Occupational Stress Questionnaire     Feeling of Stress : Not at all   Social Connections: Moderately Integrated (6/29/2023)    Social Connection and Isolation Panel [NHANES]     Frequency of Communication with Friends and Family: More than three times a week     Frequency of Social Gatherings with Friends  "and Family: More than three times a week     Attends Anglican Services: More than 4 times per year     Active Member of Clubs or Organizations: Yes     Attends Club or Organization Meetings: More than 4 times per year     Marital Status: Never    Housing Stability: Low Risk  (6/29/2023)    Housing Stability Vital Sign     Unable to Pay for Housing in the Last Year: No     Number of Places Lived in the Last Year: 1     Unstable Housing in the Last Year: No         ROS  10 point ROS performed and negative except as stated in HPI     Physical Examination         Vital Signs             24 Hour VS Range       No intake or output data in the 24 hours ending 10/24/23 0937      Physical Exam:   Constitutional: no acute distress  HEENT: NCAT, EOMI, no scleral icterus  Cardiovascular: Regular rate and rhythm  Pulmonary: Normal respiratory effort   Abdomen: nontender, non-distended   Neuro: alert and oriented, no focal deficits  Extremities: warm, no edema   MSK: no deformities  Integument: intact, no rashes       Data       No results for input(s): "WBC", "HGB", "HCT", "PLT" in the last 168 hours.     No results for input(s): "PROTIME", "INR" in the last 168 hours.     No results for input(s): "NA", "K", "CL", "CO2", "BUN", "CREATININE", "ANIONGAP", "CALCIUM" in the last 168 hours.     No results for input(s): "PROT", "ALBUMIN", "BILITOT", "ALKPHOS", "AST", "ALT" in the last 168 hours.     No results for input(s): "TROPONINI" in the last 168 hours.     BNP (pg/mL)   Date Value   06/28/2023 1,511 (H)   06/01/2023 745 (H)       No results for input(s): "LABBLOO" in the last 168 hours.         Assessment & Plan     Atrial fibrillation  Proceed with JUNIOR for cryo ablation           -No absolute contraindications of esophageal stricture, tumor, perforation, laceration,or diverticulum and/or active GI bleed  -The risks, benefits & alternatives of the procedure were explained to the patient.   -The risks of transesophageal " echo include but are not limited to:  Dental trauma, esophageal trauma/perforation, bleeding, laryngospasm/brochospasm, aspiration, sore throat/hoarseness, & dislodgement of the endotracheal tube/nasogastric tube (where applicable).    -The risks of moderate sedation include hypotension, respiratory depression, arrhythmias, bronchospasm, & death.    -Informed consent was obtained. The patient is agreeable to proceed with the procedure and all questions and concerns addressed    Rashid Howell MD  [facility]   Cardiovascular Disease PGY-V

## 2023-10-24 NOTE — NURSING
Patient admitted to CSU. Patient arrived to floor from EP no evidence of distress; patient AAO x4 at this time. VSS. Patient placed on tele. Vital signs obtained. Patient voices no complaints at this time. Plan of care initiated with patient. Bed in lowest position, locked, SR up x2, call bell in reach. Will continue to monitor patient.  Son at bedside. Pt on bedrest. Bilateral groin Stopcock in place and pt lying flat until 1945.

## 2023-10-24 NOTE — H&P
[facility] - Short Stay Cardiac Unit  Cardiac Electrophysiology  History and Physical     Admission Date: 10/24/2023  Code Status: Prior   Attending Provider: Ramirez Polk MD   Principal Problem:Paroxysmal atrial fibrillation    Subjective:     Chief Complaint:  AF     HPI:  Ms. Steele is a 67 y.o. female with pAF, tachy-induced CM, insulin resistance here for management of AF with cryo ablation. Most recent EF 35%.     Background:  pAF ([id number] = 4 [CHF, age, HTN, F])  Tachy-induced CHF with recovered EF (25% > 55%)     Patient originally diagnosed with atrial fibrillation in 2018. Patient was in RVR at the time presented to hospital. Found to have new onset HFrEF, LHC clean, cardioverted and EF improved. After that felt fine until a few months ago. Palpitations occurred again. EF dropped DCCV and referred to [facility] EP. On Xarelto 20mg entresto and coreg.      After JUNIOR/DCCV 9/2022, LVEF was better. Flecainide started 9/16/22.  Didn't start flecainide immediately; just started prior to seeing Dr. Issa, at which time she c/o LEs swelling. At his office Oct 6, likely AFL (typical vs. atypical?) with 2:1 conduction. . BB increased.     In June 2023 she was admitted to [facility] for AF/AFL with RVR & ADFH h > converted to NSR 6/5/23 > switched BB to Toprol (50 mg BID). Underwent CTI ablation 7/5/23.    She has been intolerant of flecainide with QRS widening, as well as amiodarone and Tikosyn, and is now off of antiarrhythmic treatment.       She is currently taking xarelto 20mg daily for stroke prophylaxis.     EKG today, which shows AF at 88bpm.        Past Medical History:   Diagnosis Date    Chronic systolic heart failure     Essential (primary) hypertension     Paroxysmal atrial fibrillation        Past Surgical History:   Procedure Laterality Date    ABLATION, ATRIAL FLUTTER, TYPICAL Bilateral 7/5/2023    Procedure: Ablation, Atrial Flutter, Typical;  Surgeon: Devante Arevalo MD;   Location: [facility] EP LAB;  Service: Cardiology;  Laterality: Bilateral;  AF, RFA CTI/PVI, ANES, RICHARD, JUNIOR (CX IF SR), MB, 307    ANGIOGRAM, CORONARY, WITH LEFT HEART CATHETERIZATION      TRANSESOPHAGEAL ECHOCARDIOGRAPHY N/A 6/29/2023    Procedure: ECHOCARDIOGRAM, TRANSESOPHAGEAL;  Surgeon: Leola Benavides MD;  Location: [facility] EP LAB;  Service: Cardiology;  Laterality: N/A;    TRANSESOPHAGEAL ECHOCARDIOGRAPHY N/A 7/5/2023    Procedure: ECHOCARDIOGRAM, TRANSESOPHAGEAL;  Surgeon: Popeye Brito MD;  Location: [facility] EP LAB;  Service: Cardiology;  Laterality: N/A;    TREATMENT OF CARDIAC ARRHYTHMIA N/A 6/29/2023    Procedure: Cardioversion or Defibrillation;  Surgeon: Devante Arevalo MD;  Location: [facility] EP LAB;  Service: Cardiology;  Laterality: N/A;  AF, JUNIOR/DCCV, ANES, DM, ED BED 11       Review of patient's allergies indicates:   Allergen Reactions    Amiodarone analogues Shortness Of Breath    Penicillins Shortness Of Breath       No current facility-administered medications on file prior to encounter.     Current Outpatient Medications on File Prior to Encounter   Medication Sig    furosemide (LASIX) 20 MG tablet Take 1 tablet (20 mg total) by mouth once daily.    metoprolol succinate (TOPROL-XL) 200 MG 24 hr tablet Take 1 tablet (200 mg total) by mouth once daily.    pantoprazole (PROTONIX) 40 MG tablet Take 1 tablet (40 mg total) by mouth once daily.    rivaroxaban (XARELTO) 20 mg Tab Take 1 tablet (20 mg total) by mouth daily with dinner or evening meal.     Family History       Problem Relation (Age of Onset)    No Known Problems Mother, Father          Tobacco Use    Smoking status: Former    Smokeless tobacco: Former    Tobacco comments:     Pt states she quit more than 30 years ago   Substance and Sexual Activity    Alcohol use: No    Drug use: No    Sexual activity: Not Currently     Review of Systems   All other systems reviewed and are negative.    Objective:     Vital Signs (Most  Recent):    Vital Signs (24h Range):  BP: ()/()   Arterial Line BP: ()/()           There is no height or weight on file to calculate BMI.             Physical Exam  Vitals and nursing note reviewed.   Constitutional:       Appearance: Normal appearance.   Cardiovascular:      Rate and Rhythm: Normal rate and regular rhythm.      Pulses: Normal pulses.   Pulmonary:      Effort: Pulmonary effort is normal.   Musculoskeletal:      Right lower leg: No edema.      Left lower leg: No edema.   Skin:     General: Skin is warm.   Neurological:      Mental Status: She is alert.            Significant Labs: All pertinent lab results from the last 24 hours have been reviewed.      Assessment and Plan:     * Paroxysmal atrial fibrillation  69 yo female with a history of typical AFl s/p CTI RFA, tachycardia induced CMP, PAF here for cryo ablation    - plan for cryo ablation  - JUNIOR  - PPI x 30 days post-procedure    Anticoagulation: rivaroxaban  EF (most recent): 35%  AAD/AVN agents: Toprol    The risks, benefits and alternatives of the procedure were explained to the patient, patient's family and/or surrogate decision maker. Risks include (but not limited to) bleeding, hematoma, infection, pain, vascular damage, damage to structures surrounding the vasculature, myocardial damage [perforation, valvular damage], cardiac tamponade, phrenic nerve damage, pulmonary vein stenosis, atrioesophageal (AE) fistula, CVA, MI, and death. Patient is understanding that repeat ablations may be required. All questions were answered. Patient is understanding of these risks, and would like to proceed. Consents signed.        Deidra Baldwin MD  Cardiac Electrophysiology  [facility] - Short Stay Cardiac Unit

## 2023-10-24 NOTE — PROGRESS NOTES
Patient admitted to recovery see [facility] for complete assessment pacu bcg's maintained safety measures verified patient instructed on pain scale and patient verbalized understanding. Called for ekg and it was done and in chart. Also called patient's son and updated on patient locaiton with the permission of suzanne. 445pm dr. Polk here speaking with suzanne and looked at post op ekg.

## 2023-10-24 NOTE — ASSESSMENT & PLAN NOTE
67 yo female with a history of typical AFl s/p CTI RFA, tachycardia induced CMP, PAF here for cryo ablation    - plan for cryo ablation  - JUNIOR  - PPI x 30 days post-procedure    Anticoagulation: rivaroxaban  EF (most recent): 35%  AAD/AVN agents: Toprol    The risks, benefits and alternatives of the procedure were explained to the patient, patient's family and/or surrogate decision maker. Risks include (but not limited to) bleeding, hematoma, infection, pain, vascular damage, damage to structures surrounding the vasculature, myocardial damage [perforation, valvular damage], cardiac tamponade, phrenic nerve damage, pulmonary vein stenosis, atrioesophageal (AE) fistula, CVA, MI, and death. Patient is understanding that repeat ablations may be required. All questions were answered. Patient is understanding of these risks, and would like to proceed. Consents signed.

## 2023-10-24 NOTE — ANESTHESIA PREPROCEDURE EVALUATION
10/24/2023  Yaneli Steele is a 68 y.o., female with atrial fibrillation here for PVI.    Pre-operative evaluation for Procedure(s) (LRB):  ABLATION, ARRHYTHMOGENIC FOCUS, FOR ATRIAL FIBRILLATION AFTER PULMONARY VEIN ISOLATION (N/A)  ECHOCARDIOGRAM, TRANSESOPHAGEAL (N/A)        Patient Active Problem List   Diagnosis    Non-rheumatic mitral regurgitation    Non-rheumatic tricuspid valve insufficiency    Paroxysmal atrial fibrillation    Essential (primary) hypertension    Severe obesity (BMI 35.0-39.9) with comorbidity    Tachycardia induced cardiomyopathy    Elevated brain natriuretic peptide (BNP) level    Acute on chronic combined systolic and diastolic congestive heart failure    Obesity (BMI 30-39.9)    Atrial flutter    Atrial flutter with rapid ventricular response    Abnormal LFTs (liver function tests)    Pancreatic mass    Hypotension    Nasal congestion    Leukopenia    Thrombocytopenia       Review of patient's allergies indicates:   Allergen Reactions    Amiodarone analogues Shortness Of Breath    Penicillins Shortness Of Breath       No current facility-administered medications on file prior to encounter.     Current Outpatient Medications on File Prior to Encounter   Medication Sig Dispense Refill    furosemide (LASIX) 20 MG tablet Take 1 tablet (20 mg total) by mouth once daily. 30 tablet 11    metoprolol succinate (TOPROL-XL) 200 MG 24 hr tablet Take 1 tablet (200 mg total) by mouth once daily. 90 tablet 3    pantoprazole (PROTONIX) 40 MG tablet Take 1 tablet (40 mg total) by mouth once daily. 30 tablet 0    rivaroxaban (XARELTO) 20 mg Tab Take 1 tablet (20 mg total) by mouth daily with dinner or evening meal. 90 tablet 3       Past Surgical History:   Procedure Laterality Date    ABLATION, ATRIAL FLUTTER, TYPICAL Bilateral 07/05/2023    Procedure: Ablation,  Atrial Flutter, Typical;  Surgeon: Devante Arevalo MD;  Location: [facility] EP LAB;  Service: Cardiology;  Laterality: Bilateral;  AF, RFA CTI/PVI, ANES, RICHARD, JUNIOR (CX IF SR), MB, 307    ANGIOGRAM, CORONARY, WITH LEFT HEART CATHETERIZATION      TONSILLECTOMY      TRANSESOPHAGEAL ECHOCARDIOGRAPHY N/A 06/29/2023    Procedure: ECHOCARDIOGRAM, TRANSESOPHAGEAL;  Surgeon: Leola Benavides MD;  Location: [facility] EP LAB;  Service: Cardiology;  Laterality: N/A;    TRANSESOPHAGEAL ECHOCARDIOGRAPHY N/A 07/05/2023    Procedure: ECHOCARDIOGRAM, TRANSESOPHAGEAL;  Surgeon: Popeye Brito MD;  Location: [facility] EP LAB;  Service: Cardiology;  Laterality: N/A;    TREATMENT OF CARDIAC ARRHYTHMIA N/A 06/29/2023    Procedure: Cardioversion or Defibrillation;  Surgeon: Devante Arevalo MD;  Location: [facility] EP LAB;  Service: Cardiology;  Laterality: N/A;  AF, JUNIOR/DCCV, ANES, DM, ED BED 11       Social History     Socioeconomic History    Marital status: Single   Tobacco Use    Smoking status: Former    Smokeless tobacco: Former    Tobacco comments:     Pt states she quit more than 30 years ago   Substance and Sexual Activity    Alcohol use: No    Drug use: No    Sexual activity: Not Currently     Social Determinants of Health     Financial Resource Strain: Low Risk  (6/29/2023)    Overall Financial Resource Strain (CARDIA)     Difficulty of Paying Living Expenses: Not hard at all   Food Insecurity: No Food Insecurity (6/29/2023)    Hunger Vital Sign     Worried About Running Out of Food in the Last Year: Never true     Ran Out of Food in the Last Year: Never true   Transportation Needs: No Transportation Needs (6/29/2023)    PRAPARE - Transportation     Lack of Transportation (Medical): No     Lack of Transportation (Non-Medical): No   Physical Activity: Insufficiently Active (6/29/2023)    Exercise Vital Sign     Days of Exercise per Week: 3 days     Minutes of Exercise per Session: 30 min   Stress: No Stress Concern  "Present (6/29/2023)    [facility] [place] of Occupational Health - Occupational Stress Questionnaire     Feeling of Stress : Not at all   Social Connections: Moderately Integrated (6/29/2023)    Social Connection and Isolation Panel [NHANES]     Frequency of Communication with Friends and Family: More than three times a week     Frequency of Social Gatherings with Friends and Family: More than three times a week     Attends Anabaptism Services: More than 4 times per year     Active Member of Clubs or Organizations: Yes     Attends Club or Organization Meetings: More than 4 times per year     Marital Status: Never    Housing Stability: Low Risk  (6/29/2023)    Housing Stability Vital Sign     Unable to Pay for Housing in the Last Year: No     Number of Places Lived in the Last Year: 1     Unstable Housing in the Last Year: No         CBC: No results for input(s): "WBC", "RBC", "HGB", "HCT", "PLT", "MCV", "MCH", "MCHC" in the last 72 hours.    CMP: No results for input(s): "NA", "K", "CL", "CO2", "BUN", "CREATININE", "GLU", "MG", "PHOS", "CALCIUM", "ALBUMIN", "PROT", "ALKPHOS", "ALT", "AST", "BILITOT" in the last 72 hours.    INR  No results for input(s): "PT", "INR", "PROTIME", "APTT" in the last 72 hours.          2D Echo:  Results for orders placed or performed during the hospital encounter of 07/18/18   2D echo with color flow doppler   Result Value Ref Range    EF + QEF 55 55 - 65    Mitral Valve Regurgitation MILD TO MODERATE     Diastolic Dysfunction Yes (A)     Est. PA Systolic Pressure 42.34 (A)     Pericardial Effusion NONE     Mitral Valve Mobility NORMAL     Tricuspid Valve Regurgitation MILD            Pre-op Assessment    I have reviewed the Patient Summary Reports.     I have reviewed the Nursing Notes.    I have reviewed the Medications.     Review of Systems  Anesthesia Hx:  No problems with previous Anesthesia    Hematology/Oncology:  Hematology Normal   Oncology Normal "     EENT/Dental:EENT/Dental Normal   Cardiovascular:   Hypertension Dysrhythmias atrial fibrillation CHF    Pulmonary:  Pulmonary Normal    Renal/:  Renal/ Normal     Hepatic/GI:  Hepatic/GI Normal    Musculoskeletal:  Musculoskeletal Normal    Neurological:  Neurology Normal    Endocrine:  Endocrine Normal    Dermatological:  Skin Normal    Psych:  Psychiatric Normal           Physical Exam  General: Well nourished    Airway:  Mallampati: II   Mouth Opening: Normal  TM Distance: Normal  Tongue: Normal  Neck ROM: Normal ROM    Dental:  Intact    Chest/Lungs:  Clear to auscultation, Normal Respiratory Rate    Heart:  Rate: Normal  Sounds: Normal        Anesthesia Plan  Type of Anesthesia, risks & benefits discussed:    Anesthesia Type: Gen ETT  Intra-op Monitoring Plan: Standard ASA Monitors and Art Line  Post Op Pain Control Plan: multimodal analgesia and IV/PO Opioids PRN  Induction:  IV  Airway Plan: Direct, Post-Induction  Informed Consent: Informed consent signed with the Patient and all parties understand the risks and agree with anesthesia plan.  All questions answered. Patient consented to blood products? Yes  ASA Score: 3  Day of Surgery Review of History & Physical: H&P Update referred to the surgeon/provider.  Anesthesia Plan Notes: Discussed plan for General endotracheal anesthesia    Ready For Surgery From Anesthesia Perspective.     .

## 2023-10-24 NOTE — ANESTHESIA PROCEDURE NOTES
Intubation    Date/Time: 10/24/2023 12:13 PM    Performed by: Aj Ayoub CRNA  Authorized by: Iron Moon MD    Intubation:     Induction:  Intravenous    Intubated:  Postinduction    Mask Ventilation:  Easy mask    Attempts:  1    Attempted By:  CRNA    Method of Intubation:  Video laryngoscopy    Blade:  [place] 3    Laryngeal View Grade: Grade I - full view of cords      Difficult Airway Encountered?: No      Complications:  None    Airway Device:  Oral endotracheal tube    Airway Device Size:  7.5    Style/Cuff Inflation:  Cuffed (inflated to minimal occlusive pressure)    Inflation Amount (mL):  8    Tube secured:  21    Secured at:  The lips    Placement Verified By:  Capnometry and Fiber optic visualization    Complicating Factors:  None    Findings Post-Intubation:  BS equal bilateral and atraumatic/condition of teeth unchanged

## 2023-10-24 NOTE — TRANSFER OF CARE
"Anesthesia Transfer of Care Note    Patient: Yaneli Steele    Procedure(s) Performed: Procedure(s) (LRB):  ABLATION, ARRHYTHMOGENIC FOCUS, FOR ATRIAL FIBRILLATION AFTER PULMONARY VEIN ISOLATION (N/A)  ECHOCARDIOGRAM, TRANSESOPHAGEAL (N/A)  Cardioversion or Defibrillation    Patient location: PACU    Anesthesia Type: general    Transport from OR: Transported from OR on 6-10 L/min O2 by face mask with adequate spontaneous ventilation    Post pain: adequate analgesia    Post assessment: no apparent anesthetic complications    Post vital signs: stable    Level of consciousness: awake and alert    Nausea/Vomiting: no nausea/vomiting    Complications: none    Transfer of care protocol was followed      Last vitals:   Visit Vitals  BP (!) 169/110 (BP Location: Left arm, Patient Position: Lying)   Pulse 70   Temp 36.4 °C (97.6 °F) (Temporal)   Resp 16   Ht 5' 7.5" (1.715 m)   Wt 91.6 kg (202 lb)   SpO2 100%   Breastfeeding No   BMI 31.17 kg/m²     "

## 2023-10-24 NOTE — PROGRESS NOTES
Called kristen barker to let her know patient needs rivaroxaban due 1900 she verbalized understanding.

## 2023-10-24 NOTE — ANESTHESIA PROCEDURE NOTES
Arterial    Diagnosis: atrial fibrillation    Patient location during procedure: done in OR  Procedure start time: 10/24/2023 12:26 PM  Procedure end time: 10/24/2023 12:30 PM    Staffing  Authorizing Provider: Iron Moon MD  Performing Provider: Iron Moon MD    Staffing  Performed by: Iron Moon MD  Authorized by: Iron Moon MD    Anesthesiologist was present at the time of the procedure.    Preanesthetic Checklist  Completed: patient identified, IV checked, risks and benefits discussed, surgical consent, monitors and equipment checked, pre-op evaluation, timeout performed and anesthesia consent givenArterial  Skin Prep: chlorhexidine gluconate  Local Infiltration: none  Orientation: right  Location: radial    Catheter Size: 20 G  Catheter placement by Ultrasound guidance. Heme positive aspiration all ports.   Vessel Caliber: small, patent, compressibility normal  Needle advanced into vessel with real time Ultrasound guidance.  Sterile sheath used.Insertion Attempts: 1  Assessment  Dressing: secured with tape and tegaderm  Patient: Tolerated well

## 2023-10-25 VITALS
HEIGHT: 68 IN | BODY MASS INDEX: 30.62 KG/M2 | OXYGEN SATURATION: 94 % | SYSTOLIC BLOOD PRESSURE: 130 MMHG | DIASTOLIC BLOOD PRESSURE: 78 MMHG | RESPIRATION RATE: 18 BRPM | HEART RATE: 52 BPM | TEMPERATURE: 98 F | WEIGHT: 202 LBS

## 2023-10-25 LAB
CREAT SERPL-MCNC: 0.9 MG/DL (ref 0.5–1.4)
EST. GFR  (NO RACE VARIABLE): >60 ML/MIN/1.73 M^2

## 2023-10-25 PROCEDURE — 25000003 PHARM REV CODE 250: Performed by: STUDENT IN AN ORGANIZED HEALTH CARE EDUCATION/TRAINING PROGRAM

## 2023-10-25 PROCEDURE — 36415 COLL VENOUS BLD VENIPUNCTURE: CPT | Performed by: INTERNAL MEDICINE

## 2023-10-25 PROCEDURE — 82565 ASSAY OF CREATININE: CPT | Performed by: INTERNAL MEDICINE

## 2023-10-25 RX ORDER — AMIODARONE HYDROCHLORIDE 400 MG/1
200 TABLET ORAL 2 TIMES DAILY
Qty: 84 TABLET | Refills: 11 | Status: SHIPPED | OUTPATIENT
Start: 2023-10-25 | End: 2024-02-14

## 2023-10-25 RX ADMIN — AMIODARONE HYDROCHLORIDE 400 MG: 200 TABLET ORAL at 08:10

## 2023-10-25 RX ADMIN — PANTOPRAZOLE SODIUM 40 MG: 40 TABLET, DELAYED RELEASE ORAL at 08:10

## 2023-10-25 RX ADMIN — SACUBITRIL AND VALSARTAN 1 TABLET: 49; 51 TABLET, FILM COATED ORAL at 08:10

## 2023-10-25 RX ADMIN — METOPROLOL SUCCINATE 200 MG: 100 TABLET, EXTENDED RELEASE ORAL at 08:10

## 2023-10-25 NOTE — NURSING
Patient is ready for discharge. Patient stable alert and oriented. IVs and telemetry removed. No complaints of pain. Discussed discharge plan. Reviewed medications and side effects, appointments, and answered questions with patient and family. RX given to patient.

## 2023-10-25 NOTE — DISCHARGE INSTRUCTIONS
"Medications:  Make sure to continue taking your blood thinner rivaroxaban (trade name: Xarelto) after your procedure as you would normally take  Take pantoprazole (trade name: Protonix) nightly for at least 30 days after your procedure. This is to protect your esophagus during the post-operative period.  If given a prescription of furosemide (trade name: Lasix), which is a diuretic (fluid pill), you can take it daily or twice daily as needed for fluid retention or shortness of breath following your ablation  You may experience chest discomfort (also known as "pericarditis") with deep breathes, coughing, and/or laying down which is typically normal following your procedure. If this occurs, you can take ibuprofen (Motrin) 800 mg every 8 hours for 2-3 days. If the chest pain is persistent or severe please visit the nearest emergency department.    Groin site management, precautions, and restrictions:  Remove the bandages over your groin area the morning after your procedure. You can shower after you remove these bandages. Keep the groin sites clean and dry. You do not need to apply ointments or bandages to the area.   If oozing from groin site occurs, apply pressure without letting up for 15 minutes and lay flat for 1 hour. If bleeding has resolved, you can continue to monitor. If the bleeding continues or there is significant swelling or pain in the groin area, please visit the nearest ER for evaluation and treatment. DO NOT STOP TAKING YOUR BLOOD THINNER UNLESS INSTRUCTED BY A PHYSICIAN.   Do not take baths or submerge your groin area or at least 1 week or when the puncture sites in your groin have completely healed  Do not lift anything over 10 lbs for the first week after your procedure, and avoid strenuous activity during this time to allow for the groin sites to heal. After 1 week, there are no activity restrictions.  Please contact the electrophysiology clinic or go to the ER if you experience: severe chest " pain, shortness of breath, bleeding or swelling of the groin sites, or any other concerns.

## 2023-10-25 NOTE — DISCHARGE SUMMARY
[facility] - Cardiology Stepdown  Cardiac Electrophysiology  Discharge Summary      Patient Name: Yaneli Steele  MRN: [id number]  Admission Date: 10/24/2023  Hospital Length of Stay: 0 days  Discharge Date and Time:  10/25/2023 7:19 AM  Attending Physician: Ramirez Polk MD    Discharging Provider: Deidra Baldwin MD  Primary Care Physician: Jannet Le MD    HPI:   Ms. Steele is a 67 y.o. female with pAF, tachy-induced CM, insulin resistance here for management of AF with cryo ablation. Most recent EF 35%.     Background:  pAF ([id number]-CMFk = 4 [CHF, age, HTN, F])  Tachy-induced CHF with recovered EF (25% > 55%)     Patient originally diagnosed with atrial fibrillation in 2018. Patient was in RVR at the time presented to hospital. Found to have new onset HFrEF, LHC clean, cardioverted and EF improved. After that felt fine until a few months ago. Palpitations occurred again. EF dropped DCCV and referred to [facility]. On Xarelto 20mg entresto and coreg.      After JUNIOR/DCCV 9/2022, LVEF was better. Flecainide started 9/16/22.  Didn't start flecainide immediately; just started prior to seeing Dr. Issa, at which time she c/o LEs swelling. At his office Oct 6, likely AFL (typical vs. atypical?) with 2:1 conduction. . BB increased.     In June 2023 she was admitted to [facility] for AF/AFL with RVR & ADFH h > converted to NSR 6/5/23 > switched BB to Toprol (50 mg BID). Underwent CTI ablation 7/5/23.    She has been intolerant of flecainide with QRS widening, as well as amiodarone and Tikosyn, and is now off of antiarrhythmic treatment.       She is currently taking xarelto 20mg daily for stroke prophylaxis.     EKG today, which shows AF at 88bpm.        Procedure(s) (LRB):  ABLATION, ARRHYTHMOGENIC FOCUS, FOR ATRIAL FIBRILLATION AFTER PULMONARY VEIN ISOLATION (N/A)  ECHOCARDIOGRAM, TRANSESOPHAGEAL (N/A)  Cardioversion or Defibrillation     Indwelling Lines/Drains at time of  discharge:  Lines/Drains/Airways     Drain  Duration           Female External Urinary Catheter 10/24/23 1720 <1 day                Hospital Course:  Patient underwent successful cryo-PVI for treatment of atrial fibrillation. No evidence of intra- or post-procedure complications. Post-ablation ECG shows NSR, and no acute abnormalities.     EP medications at discharge:   Antiarrhythmics and/or AVN agents: stop flecainide and load with amiodarone.    Continue home PPI x at least 30 days.    Patient was instructed to continue their oral anticoagulation as previously prescribed   Patient was instructed to take ibuprofen 800 mg TID x 3 days for pericarditis.     Groin access sites without hematoma or bleeding. Activity restrictions given to patient. Instructed to seek medical attention for shortness of breath, chest discomfort not alleviated with NSAIDs, bleeding/hematoma formation at the access sites, acute onset of neurologic symptoms, N/V, or hematemesis. At discharge the patient denied CP, SOB, access site bleeding/hematoma, or any other complaints or evidence of complications.    ----------------------------------------------------------------------------------------      Goals of Care Treatment Preferences:  Code Status: Full Code      Consults:     Significant Diagnostic Studies: N/A    Pending Diagnostic Studies:     Procedure Component Value Units Date/Time    Transesophageal echo (JUNIOR) [[id number]]     Order Status: Sent Lab Status: No result           Final Active Diagnoses:    Diagnosis Date Noted POA    PRINCIPAL PROBLEM:  Paroxysmal atrial fibrillation [I48.0] 07/19/2022 Yes     Chronic      Problems Resolved During this Admission:     No new Assessment & Plan notes have been filed under this hospital service since the last note was generated.  Service: Arrhythmia      Discharged Condition: stable    Disposition:     Follow Up:   Follow-up Information     Ramirez Polk MD Follow up in 4 month(s).     Specialties: Electrophysiology, Cardiology  Contact information:  [address]  [facility] [address]  [phone]                       Patient Instructions:   No discharge procedures on file.  Medications:  Reconciled Home Medications:      Medication List      START taking these medications    amiodarone 400 MG tablet  Commonly known as: PACERONE  Take 0.5 tablets (200 mg total) by mouth 2 (two) times daily.        CONTINUE taking these medications    ENTRESTO 49-51 mg per tablet  Generic drug: sacubitriL-valsartan  Take 1 tablet by mouth 2 (two) times daily.     furosemide 20 MG tablet  Commonly known as: LASIX  Take 1 tablet (20 mg total) by mouth once daily.     metoprolol succinate 200 MG 24 hr tablet  Commonly known as: TOPROL-XL  Take 1 tablet (200 mg total) by mouth once daily.     pantoprazole 40 MG tablet  Commonly known as: PROTONIX  Take 1 tablet (40 mg total) by mouth once daily.     rivaroxaban 20 mg Tab  Commonly known as: XARELTO  Take 1 tablet (20 mg total) by mouth daily with dinner or evening meal.            Time spent on the discharge of patient: 45 minutes    Deidra Baldwin MD  Cardiac Electrophysiology  [facility] - Cardiology Stepdown

## 2023-10-25 NOTE — HOSPITAL COURSE
Patient underwent successful cryo-PVI for treatment of atrial fibrillation. No evidence of intra- or post-procedure complications. Post-ablation ECG shows NSR, and no acute abnormalities.     EP medications at discharge:  Antiarrhythmics and/or AVN agents: stop flecainide and load with amiodarone.   Continue home PPI x at least 30 days.   Patient was instructed to continue their oral anticoagulation as previously prescribed  Patient was instructed to take ibuprofen 800 mg TID x 3 days for pericarditis.     Groin access sites without hematoma or bleeding. Activity restrictions given to patient. Instructed to seek medical attention for shortness of breath, chest discomfort not alleviated with NSAIDs, bleeding/hematoma formation at the access sites, acute onset of neurologic symptoms, N/V, or hematemesis. At discharge the patient denied CP, SOB, access site bleeding/hematoma, or any other complaints or evidence of complications.    ----------------------------------------------------------------------------------------

## 2023-10-26 LAB
POC ACTIVATED CLOTTING TIME K: 155 SEC (ref 74–137)
POC ACTIVATED CLOTTING TIME K: 510 SEC (ref 74–137)
POC ACTIVATED CLOTTING TIME K: 576 SEC (ref 74–137)
POC ACTIVATED CLOTTING TIME K: 582 SEC (ref 74–137)
SAMPLE: ABNORMAL

## 2023-10-26 NOTE — ANESTHESIA POSTPROCEDURE EVALUATION
Anesthesia Post Evaluation    Patient: Yaneli Steele    Procedure(s) Performed: Procedure(s) (LRB):  ABLATION, ARRHYTHMOGENIC FOCUS, FOR ATRIAL FIBRILLATION AFTER PULMONARY VEIN ISOLATION (N/A)  ECHOCARDIOGRAM, TRANSESOPHAGEAL (N/A)  Cardioversion or Defibrillation    Final Anesthesia Type: general      Patient location during evaluation: PACU  Patient participation: Yes- Able to Participate  Level of consciousness: awake and alert  Post-procedure vital signs: reviewed and stable  Pain management: adequate  Airway patency: patent    PONV status at discharge: No PONV  Anesthetic complications: no      Cardiovascular status: blood pressure returned to baseline  Respiratory status: unassisted  Hydration status: euvolemic  Follow-up not needed.          Vitals Value Taken Time   /78 10/25/23 1152   Temp 36.6 °C (97.9 °F) 10/25/23 1152   Pulse 52 10/25/23 1200   Resp 18 10/25/23 1152   SpO2 94 % 10/25/23 1152         No case tracking events are documented in the log.      Pain/Brigette Score: Brigette Score: 9 (10/24/2023  5:15 PM)

## 2023-11-07 ENCOUNTER — OFFICE VISIT (OUTPATIENT)
Dept: CARDIOLOGY | Facility: CLINIC | Age: 68
End: 2023-11-07
Payer: COMMERCIAL

## 2023-11-07 VITALS
DIASTOLIC BLOOD PRESSURE: 87 MMHG | SYSTOLIC BLOOD PRESSURE: 141 MMHG | RESPIRATION RATE: 17 BRPM | HEIGHT: 67 IN | WEIGHT: 203.13 LBS | BODY MASS INDEX: 31.88 KG/M2 | HEART RATE: 46 BPM

## 2023-11-07 DIAGNOSIS — I77.810 AORTIC ROOT DILATATION: ICD-10-CM

## 2023-11-07 DIAGNOSIS — Z79.899 ENCOUNTER FOR MONITORING AMIODARONE THERAPY: ICD-10-CM

## 2023-11-07 DIAGNOSIS — I48.0 PAROXYSMAL ATRIAL FIBRILLATION: Primary | ICD-10-CM

## 2023-11-07 DIAGNOSIS — G47.33 OSA (OBSTRUCTIVE SLEEP APNEA): ICD-10-CM

## 2023-11-07 DIAGNOSIS — E66.9 NON MORBID OBESITY, UNSPECIFIED OBESITY TYPE: ICD-10-CM

## 2023-11-07 DIAGNOSIS — Z51.81 ENCOUNTER FOR MONITORING AMIODARONE THERAPY: ICD-10-CM

## 2023-11-07 DIAGNOSIS — I10 ESSENTIAL (PRIMARY) HYPERTENSION: ICD-10-CM

## 2023-11-07 DIAGNOSIS — Z79.01 CURRENT USE OF ANTICOAGULANT THERAPY: ICD-10-CM

## 2023-11-07 DIAGNOSIS — I50.43 ACUTE ON CHRONIC COMBINED SYSTOLIC AND DIASTOLIC CONGESTIVE HEART FAILURE: ICD-10-CM

## 2023-11-07 DIAGNOSIS — I42.8 NONISCHEMIC CARDIOMYOPATHY: ICD-10-CM

## 2023-11-07 PROCEDURE — 1101F PT FALLS ASSESS-DOCD LE1/YR: CPT | Mod: CPTII,S$GLB,, | Performed by: INTERNAL MEDICINE

## 2023-11-07 PROCEDURE — 1126F PR PAIN SEVERITY QUANTIFIED, NO PAIN PRESENT: ICD-10-PCS | Mod: CPTII,S$GLB,, | Performed by: INTERNAL MEDICINE

## 2023-11-07 PROCEDURE — 4010F PR ACE/ARB THEARPY RXD/TAKEN: ICD-10-PCS | Mod: CPTII,S$GLB,, | Performed by: INTERNAL MEDICINE

## 2023-11-07 PROCEDURE — 3077F PR MOST RECENT SYSTOLIC BLOOD PRESSURE >= 140 MM HG: ICD-10-PCS | Mod: CPTII,S$GLB,, | Performed by: INTERNAL MEDICINE

## 2023-11-07 PROCEDURE — 1101F PR PT FALLS ASSESS DOC 0-1 FALLS W/OUT INJ PAST YR: ICD-10-PCS | Mod: CPTII,S$GLB,, | Performed by: INTERNAL MEDICINE

## 2023-11-07 PROCEDURE — 99999 PR PBB SHADOW E&M-EST. PATIENT-LVL V: ICD-10-PCS | Mod: PBBFAC,,, | Performed by: INTERNAL MEDICINE

## 2023-11-07 PROCEDURE — 3044F PR MOST RECENT HEMOGLOBIN A1C LEVEL <7.0%: ICD-10-PCS | Mod: CPTII,S$GLB,, | Performed by: INTERNAL MEDICINE

## 2023-11-07 PROCEDURE — 1160F PR REVIEW ALL MEDS BY PRESCRIBER/CLIN PHARMACIST DOCUMENTED: ICD-10-PCS | Mod: CPTII,S$GLB,, | Performed by: INTERNAL MEDICINE

## 2023-11-07 PROCEDURE — 1159F PR MEDICATION LIST DOCUMENTED IN MEDICAL RECORD: ICD-10-PCS | Mod: CPTII,S$GLB,, | Performed by: INTERNAL MEDICINE

## 2023-11-07 PROCEDURE — 3288F FALL RISK ASSESSMENT DOCD: CPT | Mod: CPTII,S$GLB,, | Performed by: INTERNAL MEDICINE

## 2023-11-07 PROCEDURE — 3077F SYST BP >= 140 MM HG: CPT | Mod: CPTII,S$GLB,, | Performed by: INTERNAL MEDICINE

## 2023-11-07 PROCEDURE — 1159F MED LIST DOCD IN RCRD: CPT | Mod: CPTII,S$GLB,, | Performed by: INTERNAL MEDICINE

## 2023-11-07 PROCEDURE — 3079F PR MOST RECENT DIASTOLIC BLOOD PRESSURE 80-89 MM HG: ICD-10-PCS | Mod: CPTII,S$GLB,, | Performed by: INTERNAL MEDICINE

## 2023-11-07 PROCEDURE — 1160F RVW MEDS BY RX/DR IN RCRD: CPT | Mod: CPTII,S$GLB,, | Performed by: INTERNAL MEDICINE

## 2023-11-07 PROCEDURE — 4010F ACE/ARB THERAPY RXD/TAKEN: CPT | Mod: CPTII,S$GLB,, | Performed by: INTERNAL MEDICINE

## 2023-11-07 PROCEDURE — 1126F AMNT PAIN NOTED NONE PRSNT: CPT | Mod: CPTII,S$GLB,, | Performed by: INTERNAL MEDICINE

## 2023-11-07 PROCEDURE — 3044F HG A1C LEVEL LT 7.0%: CPT | Mod: CPTII,S$GLB,, | Performed by: INTERNAL MEDICINE

## 2023-11-07 PROCEDURE — 3008F BODY MASS INDEX DOCD: CPT | Mod: CPTII,S$GLB,, | Performed by: INTERNAL MEDICINE

## 2023-11-07 PROCEDURE — 93000 ELECTROCARDIOGRAM COMPLETE: CPT | Mod: S$GLB,,, | Performed by: INTERNAL MEDICINE

## 2023-11-07 PROCEDURE — 99214 PR OFFICE/OUTPT VISIT, EST, LEVL IV, 30-39 MIN: ICD-10-PCS | Mod: S$GLB,,, | Performed by: INTERNAL MEDICINE

## 2023-11-07 PROCEDURE — 99214 OFFICE O/P EST MOD 30 MIN: CPT | Mod: S$GLB,,, | Performed by: INTERNAL MEDICINE

## 2023-11-07 PROCEDURE — 93000 EKG 12-LEAD: ICD-10-PCS | Mod: S$GLB,,, | Performed by: INTERNAL MEDICINE

## 2023-11-07 PROCEDURE — 99999 PR PBB SHADOW E&M-EST. PATIENT-LVL V: CPT | Mod: PBBFAC,,, | Performed by: INTERNAL MEDICINE

## 2023-11-07 PROCEDURE — 3079F DIAST BP 80-89 MM HG: CPT | Mod: CPTII,S$GLB,, | Performed by: INTERNAL MEDICINE

## 2023-11-07 PROCEDURE — 3008F PR BODY MASS INDEX (BMI) DOCUMENTED: ICD-10-PCS | Mod: CPTII,S$GLB,, | Performed by: INTERNAL MEDICINE

## 2023-11-07 PROCEDURE — 3288F PR FALLS RISK ASSESSMENT DOCUMENTED: ICD-10-PCS | Mod: CPTII,S$GLB,, | Performed by: INTERNAL MEDICINE

## 2023-11-07 RX ORDER — SACUBITRIL AND VALSARTAN 97; 103 MG/1; MG/1
1 TABLET, FILM COATED ORAL 2 TIMES DAILY
Qty: 180 TABLET | Refills: 3 | Status: SHIPPED | OUTPATIENT
Start: 2023-11-07

## 2023-11-07 NOTE — LETTER
November 7, 2023        Jannet Le MD  [address]  [place] [address]             [facility] [place] - Cardiology  [address]  [address]  [place] [address]  Phone: [phone]   Patient: Yaneli Steele   MR Number: [id number]   YOB: 1955   Date of Visit: 11/7/2023       Dear Dr. Le:    Thank you for referring Yaneli Steele to me for evaluation. Attached you will find relevant portions of my assessment and plan of care.    If you have questions, please do not hesitate to call me. I look forward to following Yaneli Steele along with you.    Sincerely,      Devante Issa MD            CC  Ramirez Polk MD    [facility]

## 2023-11-07 NOTE — PROGRESS NOTES
CARDIOVASCULAR CONSULTATION    REASON FOR CONSULT:   Yaneli Steele is a 68 y.o. female who presents for PAF, CMP.    EP: [facility]  HISTORY OF PRESENT ILLNESS:   The patient returns for follow-up.  In the interim since her last visit in July, she underwent pulmonary vein isolation for her atrial fibrillation in October with Dr. Polk.  She denies intercurrent angina, dyspnea, palpitations, or syncope.  There has been no PND, orthopnea, melena, hematuria, or claudication symptoms.  It appears she is back on amiodarone, being managed by Dr. Polk.  She continues to take Xarelto.    CARDIOVASCULAR HISTORY:   NICM, normal cors (cath 2/2018)    PAF on Xarelto s/p JUNIOR/DCCV 2/23/18 and 9/6/22   PAFl s/p ablation 7/5/23 ([facility])  PVI 10/24/23 ([facility])    Hx tachymyopathy with recovery of EF in SR.    Asc ao dil, 4.3cm (CTA Chest 2/22/18)    PAST MEDICAL HISTORY:     Past Medical History:   Diagnosis Date    Chronic systolic heart failure     Essential (primary) hypertension     Paroxysmal atrial fibrillation        PAST SURGICAL HISTORY:     Past Surgical History:   Procedure Laterality Date    ABLATION, ATRIAL FIBRILLATION, CRYO N/A 10/24/2023    Procedure: ABLATION, ARRHYTHMOGENIC FOCUS, FOR ATRIAL FIBRILLATION AFTER PULMONARY VEIN ISOLATION;  Surgeon: Ramirez Polk MD;  Location: [facility] EP LAB;  Service: Cardiology;  Laterality: N/A;  AF, PVI, CRYO, JUNIOR, RICHARD, GEN, DM, 3prep    ABLATION, ATRIAL FLUTTER, TYPICAL Bilateral 07/05/2023    Procedure: Ablation, Atrial Flutter, Typical;  Surgeon: Devante Arevalo MD;  Location: [facility] EP LAB;  Service: Cardiology;  Laterality: Bilateral;  AF, RFA CTI/PVI, ANES, RICHARD, JUNIOR (CX IF SR), MB, 307    ABLATION, ATRIAL FLUTTER, TYPICAL  10/24/2023    Procedure: Ablation, Atrial Flutter, Typical;  Surgeon: Ramirez Polk MD;  Location: [facility] EP LAB;  Service: Cardiology;;    ANGIOGRAM, CORONARY, WITH LEFT HEART CATHETERIZATION      TONSILLECTOMY      TRANSESOPHAGEAL  ECHOCARDIOGRAPHY N/A 06/29/2023    Procedure: ECHOCARDIOGRAM, TRANSESOPHAGEAL;  Surgeon: Leola Benavides MD;  Location: [facility] EP LAB;  Service: Cardiology;  Laterality: N/A;    TRANSESOPHAGEAL ECHOCARDIOGRAPHY N/A 07/05/2023    Procedure: ECHOCARDIOGRAM, TRANSESOPHAGEAL;  Surgeon: Popeye Brito MD;  Location: [facility] EP LAB;  Service: Cardiology;  Laterality: N/A;    TRANSESOPHAGEAL ECHOCARDIOGRAPHY N/A 10/24/2023    Procedure: ECHOCARDIOGRAM, TRANSESOPHAGEAL;  Surgeon: Devante Torre MD;  Location: [facility] EP LAB;  Service: Cardiology;  Laterality: N/A;    TREATMENT OF CARDIAC ARRHYTHMIA N/A 06/29/2023    Procedure: Cardioversion or Defibrillation;  Surgeon: Devante Arevalo MD;  Location: [facility] EP LAB;  Service: Cardiology;  Laterality: N/A;  AF, JUNIOR/DCCV, ANES, DM, ED BED 11    TREATMENT OF CARDIAC ARRHYTHMIA  10/24/2023    Procedure: Cardioversion or Defibrillation;  Surgeon: Ramirez Polk MD;  Location: [facility] EP LAB;  Service: Cardiology;;       ALLERGIES AND MEDICATION:     Review of patient's allergies indicates:   Allergen Reactions    Amiodarone analogues Shortness Of Breath    Penicillins Shortness Of Breath        Medication List            Accurate as of November 7, 2023  1:56 PM. If you have any questions, ask your nurse or doctor.                CONTINUE taking these medications      amiodarone 400 MG tablet  Commonly known as: PACERONE  Take 1 tablet (400 mg) twice daily for 2 weeks, followed by 1/2 half tablet (200 mg) twice daily for 2 weeks, then continue with (200 mg) daily as maintenance dose     ENTRESTO 49-51 mg per tablet  Generic drug: sacubitriL-valsartan  Take 1 tablet by mouth 2 (two) times daily.     furosemide 20 MG tablet  Commonly known as: LASIX  Take 1 tablet (20 mg total) by mouth once daily.     metoprolol succinate 200 MG 24 hr tablet  Commonly known as: TOPROL-XL  Take 1 tablet (200 mg total) by mouth once daily.     pantoprazole 40 MG tablet  Commonly known as:  PROTONIX  Take 1 tablet (40 mg total) by mouth once daily.     rivaroxaban 20 mg Tab  Commonly known as: XARELTO  Take 1 tablet (20 mg total) by mouth daily with dinner or evening meal.              SOCIAL HISTORY:     Social History     Socioeconomic History    Marital status: Single   Tobacco Use    Smoking status: Former    Smokeless tobacco: Former    Tobacco comments:     Pt states she quit more than 30 years ago   Substance and Sexual Activity    Alcohol use: No    Drug use: No    Sexual activity: Not Currently     Social Determinants of Health     Financial Resource Strain: Low Risk  (11/1/2023)    Overall Financial Resource Strain (CARDIA)     Difficulty of Paying Living Expenses: Not hard at all   Food Insecurity: No Food Insecurity (11/1/2023)    Hunger Vital Sign     Worried About Running Out of Food in the Last Year: Never true     Ran Out of Food in the Last Year: Never true   Transportation Needs: No Transportation Needs (11/1/2023)    PRAPARE - Transportation     Lack of Transportation (Medical): No     Lack of Transportation (Non-Medical): No   Physical Activity: Sufficiently Active (11/1/2023)    Exercise Vital Sign     Days of Exercise per Week: 2 days     Minutes of Exercise per Session: 120 min   Stress: No Stress Concern Present (11/1/2023)    Citizen of [place] of Occupational Health - Occupational Stress Questionnaire     Feeling of Stress : Not at all   Social Connections: Moderately Integrated (11/1/2023)    Social Connection and Isolation Panel [NHANES]     Frequency of Communication with Friends and Family: More than three times a week     Frequency of Social Gatherings with Friends and Family: Once a week     Attends Quaker Services: More than 4 times per year     Active Member of Clubs or Organizations: Yes     Attends Club or Organization Meetings: More than 4 times per year     Marital Status:    Housing Stability: Low Risk  (11/1/2023)    Housing Stability Vital Sign      "Unable to Pay for Housing in the Last Year: No     Number of Places Lived in the Last Year: 1     Unstable Housing in the Last Year: No       FAMILY HISTORY:     Family History   Problem Relation Age of Onset    No Known Problems Mother     No Known Problems Father        REVIEW OF SYSTEMS:   Review of Systems   Constitutional:  Negative for chills, diaphoresis and fever.   HENT:  Negative for nosebleeds.    Eyes:  Negative for blurred vision, double vision and photophobia.   Respiratory:  Negative for hemoptysis, shortness of breath and wheezing.    Cardiovascular:  Negative for chest pain, palpitations, orthopnea, claudication, leg swelling and PND.   Gastrointestinal:  Negative for abdominal pain, blood in stool, heartburn, melena, nausea and vomiting.   Genitourinary:  Negative for flank pain and hematuria.   Musculoskeletal:  Negative for falls, myalgias and neck pain.   Skin:  Negative for rash.   Neurological:  Negative for dizziness, seizures, loss of consciousness, weakness and headaches.   Endo/Heme/Allergies:  Negative for polydipsia. Does not bruise/bleed easily.   Psychiatric/Behavioral:  Negative for depression and memory loss. The patient is not nervous/anxious.        PHYSICAL EXAM:     Vitals:    11/07/23 1344   BP: (!) 141/87   Pulse: (!) 46   Resp: 17    Body mass index is 31.82 kg/m².  Weight: 92.1 kg (203 lb 2.5 oz)   Height: 5' 7" (170.2 cm)     Physical Exam  Vitals reviewed.   Constitutional:       General: She is not in acute distress.     Appearance: She is well-developed. She is obese. She is not ill-appearing, toxic-appearing or diaphoretic.   HENT:      Head: Normocephalic and atraumatic.   Eyes:      General: No scleral icterus.     Extraocular Movements: Extraocular movements intact.      Conjunctiva/sclera: Conjunctivae normal.      Pupils: Pupils are equal, round, and reactive to light.   Neck:      Thyroid: No thyromegaly.      Vascular: Normal carotid pulses. No carotid bruit or " JVD.      Trachea: Trachea normal.   Cardiovascular:      Rate and Rhythm: Regular rhythm. Bradycardia present.      Pulses:           Carotid pulses are 2+ on the right side and 2+ on the left side.     Heart sounds: S1 normal and S2 normal. No murmur heard.     No friction rub. No gallop.   Pulmonary:      Effort: Pulmonary effort is normal. No respiratory distress.      Breath sounds: Normal breath sounds. No stridor. No wheezing, rhonchi or rales.   Chest:      Chest wall: No tenderness.   Abdominal:      General: There is no distension.      Palpations: Abdomen is soft.   Musculoskeletal:         General: No swelling or tenderness. Normal range of motion.      Cervical back: Normal range of motion and neck supple. No edema or rigidity.      Right lower leg: No edema.      Left lower leg: No edema.   Feet:      Right foot:      Skin integrity: No ulcer.      Left foot:      Skin integrity: No ulcer.   Skin:     General: Skin is warm and dry.      Coloration: Skin is not jaundiced.   Neurological:      General: No focal deficit present.      Mental Status: She is alert and oriented to person, place, and time.      Cranial Nerves: No cranial nerve deficit.   Psychiatric:         Mood and Affect: Mood normal.         Speech: Speech normal.         Behavior: Behavior normal. Behavior is cooperative.         DATA:   EKG: (personally reviewed tracing(s))  11/7/23 SR 44, PRWP, QTc 436    Laboratory:  CBC:  Recent Labs   Lab 07/05/23  0330 07/06/23  0359 10/13/23  0744   WBC 5.15 5.11 5.14   Hemoglobin 15.8 15.5 17.0 H   Hematocrit 52.1 H 51.6 H 55.8 H   Platelets 164 142 L 215         CHEMISTRIES:  Recent Labs   Lab 06/16/23  0948 06/28/23  1059 07/04/23  0449 07/05/23  0330 07/06/23  0756 10/13/23  0744 10/25/23  0445   Glucose  --    < > 105 95 104 123 H  --    Sodium 139   < > 139 140 138 143  --    Potassium 4.5   < > 3.9  3.9 4.5  4.5 4.5 4.4  --    BUN 16.4   < > 8 11 11 12  --    Creatinine 1.18 H   < > 0.9  0.8 0.9 1.0 0.9   eGFR  51 L  --   --   --   --   --   --    Calcium 9.5   < > 9.3 9.4 9.3 10.0  --    Magnesium  --    < > 1.9 1.8 1.8  --   --     < > = values in this interval not displayed.         CARDIAC BIOMARKERS:  Recent Labs   Lab 06/28/23  1059   Troponin I 0.009         COAGS:  Recent Labs   Lab 07/19/22  1119 06/29/23  1845 10/13/23  0744   INR 1.0 1.5 H 1.5 H         LIPIDS/LFTS:  Recent Labs   Lab 07/04/23  0449 07/05/23  0330 07/06/23  0756   AST 26 23 25   ALT 28 27 24       POC labs 07/19/2022   INR 1.0   Troponin 0.01   AST 33   ALT 19   Creatinine 0.9   Potassium 3.8   Hemoglobin 15.3   Platelets 200    Lab Results   Component Value Date    TSH 1.481 07/02/2023         Cardiovascular Testing:  JUNIOR 10/24/23    JUNIOR prior to ablation in the EP lab.  No evidence of intracardiac thrombus.    Left Ventricle: The left ventricle is normal in size. Normal wall thickness. Normal wall motion. There is moderately reduced systolic function. Ejection fraction by visual approximation is 35%.    Right Ventricle: Normal right ventricular cavity size. Wall thickness is normal. Right ventricle wall motion  is normal. Systolic function is borderline low.    Left Atrium: The left atrial appendage appears normal. The left atrial appendage has a windsock morphology. Appendage velocity is reduced at less than 40 cm/sec. There is no thrombus in the left atrial cavity.    Echo 6/28/23  The left ventricle is normal in size with severely decreased systolic function.  The estimated ejection fraction is 25%.  There is left ventricular global hypokinesis.  Normal right ventricular size with mildly reduced right ventricular systolic function.  Severe biatrial enlargement.  Severe mitral regurgitation.  Moderate to severe tricuspid regurgitation.  The estimated PA systolic pressure is 51 mmHg.  There is pulmonary hypertension.  Intermediate central venous pressure (8 mmHg).  Atrial fibrillation observed.    Echo  9/14/22 (EF 25% on JUNIOR 9/6/22)  The left ventricle is normal in size with normal systolic function. The estimated ejection fraction is 55%.  Mild right ventricular enlargement with normal right ventricular systolic function.  Grade II left ventricular diastolic dysfunction.  Biatrial enlargement.  Mild-to-moderate mitral regurgitation.  The estimated PA systolic pressure is 35 mmHg.  Intermediate central venous pressure (8 mmHg).    JUNIOR/DCCV 9/6/22  Severe biventricular dysfxn, LVEF 25%  Global hypokinesis  Biatrial enlargement  No LA/ABBIE thrombus  Normal valves  Mod MR/TR  Successful DCCV AF->SR 65 BPM 200J x1  Plan:  Cont med rx  Cont Xarelto 20mg qhs  Start entresto 24/26mg bid  Home today  Follow up with Dr. Issa as planned  Repeat echo in 3 months for reassessment of LVEF    Cath 2/26/18  B. Summary/Post-Operative Diagnosis     Normal coronary arteries.     Systolic dysfunction.     Severe mitral regurgitation.     Mildly elevated right and left Filling Pressures.     Mild Pulmonary Hypertension.   D. Hemodynamic Results   Ejection Fraction: 25%   Global LV Function: severely depressed   PA: 45   PW:  (26)   CO_THERM: 3.92   RV: 44   RA:  (18)   LVEDP: 13   E. Angiographic Results      - Left Main Coronary Artery:              The LM is normal. There is YULIYA 3 flow.      - Left Anterior Descending Artery:              The LAD is normal. There is YULIYA 3 flow.      - Left Circumflex Artery:              The LCX is normal. There is YULIYA 3 flow.      - Right Coronary Artery:              The RCA is normal. There is YULIYA 3 flow.      - Common Femoral Artery:              The right CFA is normal.     ASSESSMENT:   # PAF s/p JUNIOR/DCCV 2/23/18 and 9/6/22. ?intol of amio/tikosyn/flecainide.  S/p PVI 10/24/23.  Following with Dr. Polk.  In SR on Xarelto.    # PAFl s/p ablation 6/2023 ([facility])  # NICM, cath 2/2018 with normal cors.  EF back down to 35% on echo 6/2023, ?tachymyopathy, appears euvolemic.    # HTN,  uncontrolled  # ?REZA, prev referred to sleep med.  Has appt with Dr. Ceballos 1/16/24.  # BMI 32, stable vs last OV  # Asc ao dil, 4.3cm (CTA Chest 2/22/18)    PLAN:   Cont med rx  Cont Xarelto 20mg qd  Inc entresto 97/103mg bid  Check BMP 1 week  RN BP check 2 weeks (Dr. Le)  Sleep med appt pending 1/2024  RTC 3 months (Feb 2024), eventual addition of aldactone      Devante Issa MD, FACC

## 2023-11-10 ENCOUNTER — TELEPHONE (OUTPATIENT)
Dept: CARDIOLOGY | Facility: CLINIC | Age: 68
End: 2023-11-10
Payer: COMMERCIAL

## 2023-11-14 ENCOUNTER — LAB VISIT (OUTPATIENT)
Dept: LAB | Facility: HOSPITAL | Age: 68
End: 2023-11-14
Attending: INTERNAL MEDICINE
Payer: COMMERCIAL

## 2023-11-14 DIAGNOSIS — I42.8 NONISCHEMIC CARDIOMYOPATHY: ICD-10-CM

## 2023-11-14 LAB
ANION GAP SERPL CALC-SCNC: 7 MMOL/L (ref 8–16)
BUN SERPL-MCNC: 9 MG/DL (ref 8–23)
CALCIUM SERPL-MCNC: 10.1 MG/DL (ref 8.7–10.5)
CHLORIDE SERPL-SCNC: 104 MMOL/L (ref 95–110)
CO2 SERPL-SCNC: 31 MMOL/L (ref 23–29)
CREAT SERPL-MCNC: 1 MG/DL (ref 0.5–1.4)
EST. GFR  (NO RACE VARIABLE): >60 ML/MIN/1.73 M^2
GLUCOSE SERPL-MCNC: 90 MG/DL (ref 70–110)
POTASSIUM SERPL-SCNC: 5.2 MMOL/L (ref 3.5–5.1)
SODIUM SERPL-SCNC: 142 MMOL/L (ref 136–145)

## 2023-11-14 PROCEDURE — 80048 BASIC METABOLIC PNL TOTAL CA: CPT | Performed by: INTERNAL MEDICINE

## 2023-11-14 PROCEDURE — 36415 COLL VENOUS BLD VENIPUNCTURE: CPT | Mod: PN | Performed by: INTERNAL MEDICINE

## 2023-11-24 ENCOUNTER — OFFICE VISIT (OUTPATIENT)
Dept: FAMILY MEDICINE | Facility: CLINIC | Age: 68
End: 2023-11-24
Payer: MEDICARE

## 2023-11-24 VITALS
HEIGHT: 67 IN | DIASTOLIC BLOOD PRESSURE: 86 MMHG | HEART RATE: 67 BPM | WEIGHT: 207.44 LBS | BODY MASS INDEX: 32.56 KG/M2 | SYSTOLIC BLOOD PRESSURE: 168 MMHG | OXYGEN SATURATION: 99 % | TEMPERATURE: 99 F

## 2023-11-24 DIAGNOSIS — Z12.11 SCREENING FOR COLORECTAL CANCER: ICD-10-CM

## 2023-11-24 DIAGNOSIS — I10 ESSENTIAL (PRIMARY) HYPERTENSION: ICD-10-CM

## 2023-11-24 DIAGNOSIS — R73.03 PREDIABETES: ICD-10-CM

## 2023-11-24 DIAGNOSIS — Z00.00 ROUTINE MEDICAL EXAM: Primary | ICD-10-CM

## 2023-11-24 DIAGNOSIS — Z12.31 ENCOUNTER FOR SCREENING MAMMOGRAM FOR BREAST CANCER: ICD-10-CM

## 2023-11-24 DIAGNOSIS — D75.1 POLYCYTHEMIA: ICD-10-CM

## 2023-11-24 DIAGNOSIS — D69.6 THROMBOCYTOPENIA: ICD-10-CM

## 2023-11-24 DIAGNOSIS — R73.9 HYPERGLYCEMIA: ICD-10-CM

## 2023-11-24 DIAGNOSIS — Z12.12 SCREENING FOR COLORECTAL CANCER: ICD-10-CM

## 2023-11-24 DIAGNOSIS — I50.43 ACUTE ON CHRONIC COMBINED SYSTOLIC AND DIASTOLIC CONGESTIVE HEART FAILURE: ICD-10-CM

## 2023-11-24 DIAGNOSIS — I48.0 PAROXYSMAL ATRIAL FIBRILLATION: Chronic | ICD-10-CM

## 2023-11-24 PROCEDURE — 1126F AMNT PAIN NOTED NONE PRSNT: CPT | Mod: CPTII,S$GLB,, | Performed by: INTERNAL MEDICINE

## 2023-11-24 PROCEDURE — 1126F PR PAIN SEVERITY QUANTIFIED, NO PAIN PRESENT: ICD-10-PCS | Mod: CPTII,S$GLB,, | Performed by: INTERNAL MEDICINE

## 2023-11-24 PROCEDURE — 99387 INIT PM E/M NEW PAT 65+ YRS: CPT | Mod: GZ,S$GLB,, | Performed by: INTERNAL MEDICINE

## 2023-11-24 PROCEDURE — 3288F PR FALLS RISK ASSESSMENT DOCUMENTED: ICD-10-PCS | Mod: CPTII,S$GLB,, | Performed by: INTERNAL MEDICINE

## 2023-11-24 PROCEDURE — 3288F FALL RISK ASSESSMENT DOCD: CPT | Mod: CPTII,S$GLB,, | Performed by: INTERNAL MEDICINE

## 2023-11-24 PROCEDURE — 99387 PR PREVENTIVE VISIT,NEW,65 & OVER: ICD-10-PCS | Mod: GZ,S$GLB,, | Performed by: INTERNAL MEDICINE

## 2023-11-24 PROCEDURE — 4010F ACE/ARB THERAPY RXD/TAKEN: CPT | Mod: CPTII,S$GLB,, | Performed by: INTERNAL MEDICINE

## 2023-11-24 PROCEDURE — 3008F PR BODY MASS INDEX (BMI) DOCUMENTED: ICD-10-PCS | Mod: CPTII,S$GLB,, | Performed by: INTERNAL MEDICINE

## 2023-11-24 PROCEDURE — 3044F PR MOST RECENT HEMOGLOBIN A1C LEVEL <7.0%: ICD-10-PCS | Mod: CPTII,S$GLB,, | Performed by: INTERNAL MEDICINE

## 2023-11-24 PROCEDURE — 1101F PT FALLS ASSESS-DOCD LE1/YR: CPT | Mod: CPTII,S$GLB,, | Performed by: INTERNAL MEDICINE

## 2023-11-24 PROCEDURE — 3044F HG A1C LEVEL LT 7.0%: CPT | Mod: CPTII,S$GLB,, | Performed by: INTERNAL MEDICINE

## 2023-11-24 PROCEDURE — 3077F SYST BP >= 140 MM HG: CPT | Mod: CPTII,S$GLB,, | Performed by: INTERNAL MEDICINE

## 2023-11-24 PROCEDURE — 3079F PR MOST RECENT DIASTOLIC BLOOD PRESSURE 80-89 MM HG: ICD-10-PCS | Mod: CPTII,S$GLB,, | Performed by: INTERNAL MEDICINE

## 2023-11-24 PROCEDURE — 1101F PR PT FALLS ASSESS DOC 0-1 FALLS W/OUT INJ PAST YR: ICD-10-PCS | Mod: CPTII,S$GLB,, | Performed by: INTERNAL MEDICINE

## 2023-11-24 PROCEDURE — 4010F PR ACE/ARB THEARPY RXD/TAKEN: ICD-10-PCS | Mod: CPTII,S$GLB,, | Performed by: INTERNAL MEDICINE

## 2023-11-24 PROCEDURE — 3077F PR MOST RECENT SYSTOLIC BLOOD PRESSURE >= 140 MM HG: ICD-10-PCS | Mod: CPTII,S$GLB,, | Performed by: INTERNAL MEDICINE

## 2023-11-24 PROCEDURE — 99999 PR PBB SHADOW E&M-EST. PATIENT-LVL III: ICD-10-PCS | Mod: PBBFAC,,, | Performed by: INTERNAL MEDICINE

## 2023-11-24 PROCEDURE — 3008F BODY MASS INDEX DOCD: CPT | Mod: CPTII,S$GLB,, | Performed by: INTERNAL MEDICINE

## 2023-11-24 PROCEDURE — 99999 PR PBB SHADOW E&M-EST. PATIENT-LVL III: CPT | Mod: PBBFAC,,, | Performed by: INTERNAL MEDICINE

## 2023-11-24 PROCEDURE — 3079F DIAST BP 80-89 MM HG: CPT | Mod: CPTII,S$GLB,, | Performed by: INTERNAL MEDICINE

## 2023-11-24 RX ORDER — HYDROCHLOROTHIAZIDE 12.5 MG/1
12.5 TABLET ORAL DAILY
Qty: 90 TABLET | Refills: 11 | Status: SHIPPED | OUTPATIENT
Start: 2023-11-24 | End: 2024-02-05

## 2023-11-24 NOTE — PROGRESS NOTES
Chief complaint:  Establish care, new patient physical, hypertension     Patient is a 68-year-old black female new to primary care at [facility].  I was able to review some outside records.  Regarding health maintenance been many years since she had a mammogram in his never yet had any colon screening.  She is not too interested in colonoscopy but willing to do Cologuard.      Chronic hypertension which is not been running well.  It looks like cardiology recently maximized her Entresto over she still only taking a half a pill twice a day for no particular reason.  She takes full-dose metoprolol for rate control.  She has a Lasix 20 mg but only takes a half per day and does not have problems with fluid and only has edema on occasion.  We discussed possibly adding HCTZ and holding the Lasix and saving that for p.r.n. and she will switch those out when either is needed.  At the end of the visit she advised me have only taking half of the Entresto pill so the 1st step will be for her to take the full maximum dose Entresto twice daily and monitor.  Recent lab work did have a slight rise in potassium so will double-check that in one week after increasing the Entresto dose and may need to do so if we add HCTZ.  Cardiology plan was to eventually had Aldactone and she may well need labs after that as well.    She knows she is prediabetic and last A1c was 6.2     Last blood work showed some polycythemia and she is not a smoker.      BNP level was elevated back in June significantly will repeat to establish baseline    Apparently not in atrial fibrillation and had a procedure but is not having problems on anticoagulation and she is willing to continue that for stroke prevention        ROS:   CONST: weight stable. EYES: no vision change. ENT: no sore throat. CV: no chest pain w/ exertion. RESP: no shortness of breath. GI: no nausea, vomiting, diarrhea. No dysphagia. : no urinary issues. MUSCULOSKELETAL: no new myalgias or  arthralgias. SKIN: no new changes. NEURO: no focal deficits. PSYCH: no new issues. ENDOCRINE: no polyuria. HEME: no lymph nodes. ALLERGY: no general pruritis.        Past Medical History:   Diagnosis Date    Abnormal LFTs (liver function tests) 06/28/2023    Atrial flutter 06/28/2023    Chronic systolic heart failure     Essential (primary) hypertension     Leukopenia 07/02/2023    Non-rheumatic mitral regurgitation 02/24/2018    Non-rheumatic tricuspid valve insufficiency 02/24/2018    Pancreatic mass 06/29/2023    Paroxysmal atrial fibrillation 07/19/2022    she underwent pulmonary vein isolation for her atrial fibrillation in October 2023 with Dr. Polk.     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    Thrombocytopenia 07/02/2023     Past Surgical History:   Procedure Laterality Date    ABLATION, ATRIAL FIBRILLATION, CRYO N/A 10/24/2023    Procedure: ABLATION, ARRHYTHMOGENIC FOCUS, FOR ATRIAL FIBRILLATION AFTER PULMONARY VEIN ISOLATION;  Surgeon: Ramirez Polk MD;  Location: [facility] EP LAB;  Service: Cardiology;  Laterality: N/A;  AF, PVI, CRYO, JUNIOR, RICHARD, GEN, DM, 3prep    ABLATION, ATRIAL FLUTTER, TYPICAL Bilateral 07/05/2023    Procedure: Ablation, Atrial Flutter, Typical;  Surgeon: Devante Arevalo MD;  Location: [facility] EP LAB;  Service: Cardiology;  Laterality: Bilateral;  AF, RFA CTI/PVI, ANES, RICHARD, JUNIOR (CX IF SR), MB, 307    ABLATION, ATRIAL FLUTTER, TYPICAL  10/24/2023    Procedure: Ablation, Atrial Flutter, Typical;  Surgeon: Ramirez Polk MD;  Location: [facility] EP LAB;  Service: Cardiology;;    ANGIOGRAM, CORONARY, WITH LEFT HEART CATHETERIZATION      TONSILLECTOMY      TRANSESOPHAGEAL ECHOCARDIOGRAPHY N/A 06/29/2023    Procedure: ECHOCARDIOGRAM, TRANSESOPHAGEAL;  Surgeon: Leola Benavides MD;  Location: [facility] EP LAB;  Service: Cardiology;  Laterality: N/A;    TRANSESOPHAGEAL ECHOCARDIOGRAPHY N/A 07/05/2023    Procedure: ECHOCARDIOGRAM, TRANSESOPHAGEAL;  Surgeon:  Popeye Brito MD;  Location: [facility] EP LAB;  Service: Cardiology;  Laterality: N/A;    TRANSESOPHAGEAL ECHOCARDIOGRAPHY N/A 10/24/2023    Procedure: ECHOCARDIOGRAM, TRANSESOPHAGEAL;  Surgeon: Devante Torre MD;  Location: [facility] EP LAB;  Service: Cardiology;  Laterality: N/A;    TREATMENT OF CARDIAC ARRHYTHMIA N/A 06/29/2023    Procedure: Cardioversion or Defibrillation;  Surgeon: Devante Arevalo MD;  Location: [facility] EP LAB;  Service: Cardiology;  Laterality: N/A;  AF, JUNIOR/DCCV, ANES, DM, ED BED 11    TREATMENT OF CARDIAC ARRHYTHMIA  10/24/2023    Procedure: Cardioversion or Defibrillation;  Surgeon: Ramirez Polk MD;  Location: [facility] EP LAB;  Service: Cardiology;;     Social History     Socioeconomic History    Marital status: Single   Tobacco Use    Smoking status: Former    Smokeless tobacco: Former    Tobacco comments:     Pt states she quit more than 30 years ago   Substance and Sexual Activity    Alcohol use: No    Drug use: No    Sexual activity: Not Currently     Occupation:  Works as a  so travels around    family history includes No Known Problems in her father and mother.      Gen: no distress  EYES: conjunctiva clear, non-icteric, PERRL  ENT: nose clear, nasal mucosa normal, oropharynx clear and moist, teeth good  NECK:supple, thyroid non-palpable  RESP: effort is good, lungs clear  CV: heart RRR w/o murmur, gallops or rubs; no carotid bruits, no edema  GI: abdomen soft, non-distended, non-tender, no hepatosplenomegaly  MS: gait normal, no clubbing or cyanosis of the digits  SKIN: no rashes, warm to touch      Diagnoses and all orders for this visit:    Routine medical exam, new to the clinic, overdue on breast and colon cancer screening.  She is willing to schedule a mammogram and willing to do Cologuard.  Will update blood work in about a week  -     Hemoglobin A1C; Future  -     Comprehensive Metabolic Panel; Future  -     CBC Auto Differential; Future  -     TSH; Future  -      T4, Free; Future  -     BNP; Future    Screening for colorectal cancer  -     Cologuard Screening (Multitarget Stool DNA); Future  -     Cologuard Screening (Multitarget Stool DNA)    Encounter for screening mammogram for breast cancer  -     Mammo Digital Screening Bilat; Future    Acute on chronic combined systolic and diastolic congestive heart failure, no symptoms of overload, may need to stop Lasix and use only as needed especially if we add HCTZ  -     BNP; Future    Essential (primary) hypertension, chronically uncontrolled, maximize Entresto, continue Toprol and might add HCTZ  -     Hemoglobin A1C; Future  -     Comprehensive Metabolic Panel; Future  -     CBC Auto Differential; Future  -     TSH; Future  -     T4, Free; Future  -     BNP; Future    Paroxysmal atrial fibrillation, continue beta-blocker and anticoagulation    Thrombocytopenia  -     CBC Auto Differential; Future    Hyperglycemia  -     Hemoglobin A1C; Future    Prediabetes  -     Hemoglobin A1C; Future    Polycythemia, reassess  -     CBC Auto Differential; Future    Other orders  -     hydroCHLOROthiazide (HYDRODIURIL) 12.5 MG Tab; Take 1 tablet (12.5 mg total) by mouth once daily.

## 2023-12-01 ENCOUNTER — PATIENT MESSAGE (OUTPATIENT)
Dept: ADMINISTRATIVE | Facility: HOSPITAL | Age: 68
End: 2023-12-01
Payer: COMMERCIAL

## 2023-12-01 ENCOUNTER — PATIENT OUTREACH (OUTPATIENT)
Dept: ADMINISTRATIVE | Facility: HOSPITAL | Age: 68
End: 2023-12-01
Payer: COMMERCIAL

## 2023-12-02 ENCOUNTER — LAB VISIT (OUTPATIENT)
Dept: LAB | Facility: HOSPITAL | Age: 68
End: 2023-12-02
Attending: INTERNAL MEDICINE
Payer: COMMERCIAL

## 2023-12-02 DIAGNOSIS — D75.1 POLYCYTHEMIA: ICD-10-CM

## 2023-12-02 DIAGNOSIS — I50.43 ACUTE ON CHRONIC COMBINED SYSTOLIC AND DIASTOLIC CONGESTIVE HEART FAILURE: ICD-10-CM

## 2023-12-02 DIAGNOSIS — R73.03 PREDIABETES: ICD-10-CM

## 2023-12-02 DIAGNOSIS — Z00.00 ROUTINE MEDICAL EXAM: ICD-10-CM

## 2023-12-02 DIAGNOSIS — R73.9 HYPERGLYCEMIA: ICD-10-CM

## 2023-12-02 DIAGNOSIS — I10 ESSENTIAL (PRIMARY) HYPERTENSION: ICD-10-CM

## 2023-12-02 DIAGNOSIS — D69.6 THROMBOCYTOPENIA: ICD-10-CM

## 2023-12-02 LAB
ALBUMIN SERPL BCP-MCNC: 3.7 G/DL (ref 3.5–5.2)
ALP SERPL-CCNC: 94 U/L (ref 55–135)
ALT SERPL W/O P-5'-P-CCNC: 15 U/L (ref 10–44)
ANION GAP SERPL CALC-SCNC: 9 MMOL/L (ref 8–16)
AST SERPL-CCNC: 23 U/L (ref 10–40)
BASOPHILS # BLD AUTO: 0.03 K/UL (ref 0–0.2)
BASOPHILS NFR BLD: 0.8 % (ref 0–1.9)
BILIRUB SERPL-MCNC: 0.9 MG/DL (ref 0.1–1)
BNP SERPL-MCNC: 102 PG/ML (ref 0–99)
BUN SERPL-MCNC: 13 MG/DL (ref 8–23)
CALCIUM SERPL-MCNC: 10.2 MG/DL (ref 8.7–10.5)
CHLORIDE SERPL-SCNC: 103 MMOL/L (ref 95–110)
CO2 SERPL-SCNC: 30 MMOL/L (ref 23–29)
CREAT SERPL-MCNC: 0.9 MG/DL (ref 0.5–1.4)
DIFFERENTIAL METHOD: ABNORMAL
EOSINOPHIL # BLD AUTO: 0 K/UL (ref 0–0.5)
EOSINOPHIL NFR BLD: 0.8 % (ref 0–8)
ERYTHROCYTE [DISTWIDTH] IN BLOOD BY AUTOMATED COUNT: 12.3 % (ref 11.5–14.5)
EST. GFR  (NO RACE VARIABLE): >60 ML/MIN/1.73 M^2
ESTIMATED AVG GLUCOSE: 114 MG/DL (ref 68–131)
GLUCOSE SERPL-MCNC: 94 MG/DL (ref 70–110)
HBA1C MFR BLD: 5.6 % (ref 4–5.6)
HCT VFR BLD AUTO: 50.1 % (ref 37–48.5)
HGB BLD-MCNC: 15.3 G/DL (ref 12–16)
IMM GRANULOCYTES # BLD AUTO: 0.01 K/UL (ref 0–0.04)
IMM GRANULOCYTES NFR BLD AUTO: 0.3 % (ref 0–0.5)
LYMPHOCYTES # BLD AUTO: 2 K/UL (ref 1–4.8)
LYMPHOCYTES NFR BLD: 49.7 % (ref 18–48)
MCH RBC QN AUTO: 26.2 PG (ref 27–31)
MCHC RBC AUTO-ENTMCNC: 30.5 G/DL (ref 32–36)
MCV RBC AUTO: 86 FL (ref 82–98)
MONOCYTES # BLD AUTO: 0.2 K/UL (ref 0.3–1)
MONOCYTES NFR BLD: 6 % (ref 4–15)
NEUTROPHILS # BLD AUTO: 1.7 K/UL (ref 1.8–7.7)
NEUTROPHILS NFR BLD: 42.4 % (ref 38–73)
NRBC BLD-RTO: 0 /100 WBC
PLATELET # BLD AUTO: 204 K/UL (ref 150–450)
PMV BLD AUTO: 11.4 FL (ref 9.2–12.9)
POTASSIUM SERPL-SCNC: 4.3 MMOL/L (ref 3.5–5.1)
PROT SERPL-MCNC: 7.6 G/DL (ref 6–8.4)
RBC # BLD AUTO: 5.84 M/UL (ref 4–5.4)
SODIUM SERPL-SCNC: 142 MMOL/L (ref 136–145)
T4 FREE SERPL-MCNC: 1.1 NG/DL (ref 0.71–1.51)
TSH SERPL DL<=0.005 MIU/L-ACNC: 1.39 UIU/ML (ref 0.4–4)
WBC # BLD AUTO: 3.98 K/UL (ref 3.9–12.7)

## 2023-12-02 PROCEDURE — 84439 ASSAY OF FREE THYROXINE: CPT | Performed by: INTERNAL MEDICINE

## 2023-12-02 PROCEDURE — 83880 ASSAY OF NATRIURETIC PEPTIDE: CPT | Performed by: INTERNAL MEDICINE

## 2023-12-02 PROCEDURE — 85025 COMPLETE CBC W/AUTO DIFF WBC: CPT | Performed by: INTERNAL MEDICINE

## 2023-12-02 PROCEDURE — 83036 HEMOGLOBIN GLYCOSYLATED A1C: CPT | Performed by: INTERNAL MEDICINE

## 2023-12-02 PROCEDURE — 84443 ASSAY THYROID STIM HORMONE: CPT | Performed by: INTERNAL MEDICINE

## 2023-12-02 PROCEDURE — 36415 COLL VENOUS BLD VENIPUNCTURE: CPT | Mod: PO | Performed by: INTERNAL MEDICINE

## 2023-12-02 PROCEDURE — 80053 COMPREHEN METABOLIC PANEL: CPT | Performed by: INTERNAL MEDICINE

## 2023-12-10 LAB — NONINV COLON CA DNA+OCC BLD SCRN STL QL: NEGATIVE

## 2024-02-04 DIAGNOSIS — I48.0 PAROXYSMAL ATRIAL FIBRILLATION: Chronic | ICD-10-CM

## 2024-02-04 DIAGNOSIS — I48.91 ATRIAL FIBRILLATION WITH RAPID VENTRICULAR RESPONSE: ICD-10-CM

## 2024-02-05 RX ORDER — HYDROCHLOROTHIAZIDE 12.5 MG/1
12.5 TABLET ORAL
Qty: 90 TABLET | Refills: 0 | Status: SHIPPED | OUTPATIENT
Start: 2024-02-05 | End: 2024-02-22 | Stop reason: SDUPTHER

## 2024-02-05 RX ORDER — RIVAROXABAN 20 MG/1
TABLET, FILM COATED ORAL
Qty: 90 TABLET | Refills: 3 | Status: SHIPPED | OUTPATIENT
Start: 2024-02-05

## 2024-02-05 NOTE — TELEPHONE ENCOUNTER
Refill Routing Note   Medication(s) are not appropriate for processing by [facility] for the following reason(s):        Required vitals abnormal    [facility] action(s):  Defer               Appointments  past 12m or future 3m with PCP    Date Provider   Last Visit   11/24/2023 Farhad Villa MD   Next Visit   Visit date not found Farhad Villa MD   ED visits in past 90 days: 0        Note composed:3:49 AM 02/05/2024

## 2024-02-05 NOTE — TELEPHONE ENCOUNTER
No care due was identified.  [facility] Embedded Care Due Messages. Reference number: [id number].   2/05/2024 3:40:45 AM CST

## 2024-02-14 ENCOUNTER — HOSPITAL ENCOUNTER (OUTPATIENT)
Dept: CARDIOLOGY | Facility: CLINIC | Age: 69
Discharge: HOME | End: 2024-02-14
Payer: MEDICARE

## 2024-02-14 ENCOUNTER — OFFICE VISIT (OUTPATIENT)
Dept: ELECTROPHYSIOLOGY | Facility: CLINIC | Age: 69
End: 2024-02-14
Payer: MEDICARE

## 2024-02-14 VITALS
WEIGHT: 206.81 LBS | HEIGHT: 67 IN | SYSTOLIC BLOOD PRESSURE: 119 MMHG | HEART RATE: 55 BPM | BODY MASS INDEX: 32.46 KG/M2 | DIASTOLIC BLOOD PRESSURE: 80 MMHG

## 2024-02-14 DIAGNOSIS — I48.0 PAROXYSMAL ATRIAL FIBRILLATION: Chronic | ICD-10-CM

## 2024-02-14 DIAGNOSIS — R00.0 TACHYCARDIA INDUCED CARDIOMYOPATHY: ICD-10-CM

## 2024-02-14 DIAGNOSIS — E66.9 OBESITY (BMI 30-39.9): ICD-10-CM

## 2024-02-14 DIAGNOSIS — I50.43 ACUTE ON CHRONIC COMBINED SYSTOLIC AND DIASTOLIC CONGESTIVE HEART FAILURE: ICD-10-CM

## 2024-02-14 DIAGNOSIS — I48.3 TYPICAL ATRIAL FLUTTER: ICD-10-CM

## 2024-02-14 DIAGNOSIS — I77.810 AORTIC ROOT DILATATION: Primary | ICD-10-CM

## 2024-02-14 DIAGNOSIS — E66.01 SEVERE OBESITY (BMI 35.0-39.9) WITH COMORBIDITY: ICD-10-CM

## 2024-02-14 DIAGNOSIS — I48.0 PAROXYSMAL ATRIAL FIBRILLATION: ICD-10-CM

## 2024-02-14 DIAGNOSIS — I43 TACHYCARDIA INDUCED CARDIOMYOPATHY: ICD-10-CM

## 2024-02-14 LAB
OHS QRS DURATION: 90 MS
OHS QTC CALCULATION: 464 MS

## 2024-02-14 PROCEDURE — 93010 ELECTROCARDIOGRAM REPORT: CPT | Mod: S$GLB,,, | Performed by: INTERNAL MEDICINE

## 2024-02-14 PROCEDURE — 99999 PR PBB SHADOW E&M-EST. PATIENT-LVL III: CPT | Mod: PBBFAC,,, | Performed by: INTERNAL MEDICINE

## 2024-02-14 PROCEDURE — 99215 OFFICE O/P EST HI 40 MIN: CPT | Mod: S$GLB,,, | Performed by: INTERNAL MEDICINE

## 2024-02-14 PROCEDURE — 93005 ELECTROCARDIOGRAM TRACING: CPT | Mod: S$GLB,,, | Performed by: INTERNAL MEDICINE

## 2024-02-14 NOTE — PROGRESS NOTES
PCP - Farhad Villa MD  Subjective:   Ms. Steele is a 68 y.o. female with pAF, tachy-induced CM, insulin resistance here for follow up after PVI     Background:  pAF ([id number] = 4 [CHF, age, HTN, F])  Tachy-induced CHF with recovered EF (25% > 55%)  Insulin resistance  S/p cryo PVI 10/2023  S/p CTI RFA 7/2023     Patient originally diagnosed with atrial fibrillation in 2018. Patient was in RVR at the time presented to hospital. Found to have new onset HFrEF, LHC clean, cardioverted and EF improved. After that felt fine until a few months ago. Palpitations occurred again. EF dropped DCCV and referred to [facility] EP. On Xarelto 20mg entresto and coreg.      After JUNIOR/DCCV 9/2022, LVEF was better. Flecainide started 9/16/22.  Didn't start flecainide immediately; just started prior to seeing Dr. Issa, at which time she c/o LEs swelling. At his office Oct 6, likely AFL (typical vs. atypical?) with 2:1 conduction. . BB increased.  Since then, she feels better now. Apple Watch tells her HR is in 40s.     2/2024:  We did PVI (and redid CTI) 10/2023. Since, feeling great! Mowing lawn much longer without sx.  No sign of arrhythmia at all.    I personally reviewed the ECG/telemetry strip today which shows NSR    History:     Social History     Tobacco Use    Smoking status: Former     Types: Cigarettes    Smokeless tobacco: Former    Tobacco comments:     Pt states she quit more than 30 years ago.   Substance Use Topics    Alcohol use: No     Family History   Problem Relation Age of Onset    No Known Problems Mother     No Known Problems Father        Meds:     Review of patient's allergies indicates:   Allergen Reactions    Amiodarone analogues Shortness Of Breath    Penicillins Shortness Of Breath       Current Outpatient Medications:     amiodarone (PACERONE) 400 MG tablet, Take 1 tablet (400 mg) twice daily for 2 weeks, followed by 1/2 half tablet (200 mg) twice daily for 2 weeks, then continue with  "(200 mg) daily as maintenance dose, Disp: 84 tablet, Rfl: 11    furosemide (LASIX) 20 MG tablet, Take 1 tablet (20 mg total) by mouth once daily., Disp: 30 tablet, Rfl: 11    hydroCHLOROthiazide (HYDRODIURIL) 12.5 MG Tab, Take 1 tablet by mouth once daily, Disp: 90 tablet, Rfl: 0    metoprolol succinate (TOPROL-XL) 200 MG 24 hr tablet, Take 1 tablet (200 mg total) by mouth once daily., Disp: 90 tablet, Rfl: 3    pantoprazole (PROTONIX) 40 MG tablet, Take 1 tablet (40 mg total) by mouth once daily., Disp: 30 tablet, Rfl: 0    rivaroxaban (XARELTO) 20 mg Tab, TAKE 1 TABLET BY MOUTH ONCE DAILY WITH  DINNER/EVENING  MEAL, Disp: 90 tablet, Rfl: 3    sacubitriL-valsartan (ENTRESTO)  mg per tablet, Take 1 tablet by mouth 2 (two) times daily., Disp: 180 tablet, Rfl: 3    ROS:  Constitution: Negative for fever or chills. Negative for weight loss or gain.   HENT: Negative for sore throat or headaches. Negative for rhinorrhea.  Eyes: Negative for blurred or double vision.   Cardiovascular: See above  Pulmonary: Negative for SOB. Negative for cough.   Gastrointestinal: Negative for abdominal pain. Negative for nausea/ vomiting. Negative for diarrhea.   : Negative for dysuria.   Neurological: Negative for focal weakness or sensory changes.    Objective:   Pulse (!) 55   Ht 5' 7" (1.702 m)   Wt 93.8 kg (206 lb 12.7 oz)   BMI 32.39 kg/m²     General: NAD. AAO.  HENT: No scleral icterus. Extraocular movements intact.  Neck: No JVD  Cardiovascular: Regular heart rate and rhythm. S1/S2 appreciated.  Respiratory: CTAB. No increased work of breathing.  Extremities: Warm. No edema.  Skin: no ulceration or wounds present    Assessment:   Yaneli Steele is a 68 y.o. y.o. female who presents today for follow-up regarding atrial fibrillation.    Dx: PAF, typical AFL; both s/p ablation    Plan:     Doing great since PVI!  d/c amio today  continue DOAC    Update echo, to ensure that tachy-mediated CM has resolved. If not, " then workup.  Otherwise, f/u 1 year or earlier prn.

## 2024-02-22 ENCOUNTER — OFFICE VISIT (OUTPATIENT)
Dept: CARDIOLOGY | Facility: CLINIC | Age: 69
End: 2024-02-22
Payer: MEDICARE

## 2024-02-22 VITALS
SYSTOLIC BLOOD PRESSURE: 122 MMHG | BODY MASS INDEX: 30.87 KG/M2 | WEIGHT: 203.69 LBS | HEIGHT: 68 IN | RESPIRATION RATE: 18 BRPM | DIASTOLIC BLOOD PRESSURE: 80 MMHG | HEART RATE: 51 BPM

## 2024-02-22 DIAGNOSIS — I77.810 AORTIC ROOT DILATATION: ICD-10-CM

## 2024-02-22 DIAGNOSIS — I42.8 NONISCHEMIC CARDIOMYOPATHY: Primary | ICD-10-CM

## 2024-02-22 DIAGNOSIS — G47.33 OSA (OBSTRUCTIVE SLEEP APNEA): ICD-10-CM

## 2024-02-22 DIAGNOSIS — Z79.01 CURRENT USE OF ANTICOAGULANT THERAPY: ICD-10-CM

## 2024-02-22 DIAGNOSIS — I48.0 PAROXYSMAL ATRIAL FIBRILLATION: ICD-10-CM

## 2024-02-22 DIAGNOSIS — E66.9 NON MORBID OBESITY, UNSPECIFIED OBESITY TYPE: ICD-10-CM

## 2024-02-22 PROCEDURE — 99999 PR PBB SHADOW E&M-EST. PATIENT-LVL IV: CPT | Mod: PBBFAC,,, | Performed by: INTERNAL MEDICINE

## 2024-02-22 PROCEDURE — 93000 ELECTROCARDIOGRAM COMPLETE: CPT | Mod: S$GLB,,, | Performed by: INTERNAL MEDICINE

## 2024-02-22 PROCEDURE — 99214 OFFICE O/P EST MOD 30 MIN: CPT | Mod: S$GLB,,, | Performed by: INTERNAL MEDICINE

## 2024-02-22 RX ORDER — SPIRONOLACTONE 25 MG/1
25 TABLET ORAL DAILY
Qty: 90 TABLET | Refills: 3 | Status: SHIPPED | OUTPATIENT
Start: 2024-02-22

## 2024-02-22 RX ORDER — HYDROCHLOROTHIAZIDE 25 MG/1
25 TABLET ORAL DAILY
Qty: 90 TABLET | Refills: 3 | Status: SHIPPED | OUTPATIENT
Start: 2024-02-22

## 2024-02-22 NOTE — PROGRESS NOTES
CARDIOVASCULAR CONSULTATION    REASON FOR CONSULT:   Yaneli Steele is a 68 y.o. female who presents for PAF, CMP.    PCP: Allen  EP: Felicitas  HISTORY OF PRESENT ILLNESS:   The patient returns for follow up.  She denies intercurrent angina, dyspnea, palpitations, or syncope.  There has been no PND, orthopnea, melena, hematuria, or claudication symptoms.  She continues take her Xarelto without any issues.    CARDIOVASCULAR HISTORY:   NICM, normal cors (cath 2/2018)    PAF on Xarelto s/p JUNIOR/DCCV 2/23/18 and 9/6/22   PAFl s/p ablation 7/5/23 (Mickey)  PVI 10/24/23 (Felicitas)    Hx tachymyopathy with recovery of EF in SR.    Asc ao dil, 4.3cm (CTA Chest 2/22/18)    PAST MEDICAL HISTORY:     Past Medical History:   Diagnosis Date    Abnormal LFTs (liver function tests) 06/28/2023    Atrial flutter 06/28/2023    Chronic systolic heart failure     Essential (primary) hypertension     Leukopenia 07/02/2023    Non-rheumatic mitral regurgitation 02/24/2018    Non-rheumatic tricuspid valve insufficiency 02/24/2018    Pancreatic mass 06/29/2023    Paroxysmal atrial fibrillation 07/19/2022    she underwent pulmonary vein isolation for her atrial fibrillation in October 2023 with Dr. Polk.     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    Thrombocytopenia 07/02/2023       PAST SURGICAL HISTORY:     Past Surgical History:   Procedure Laterality Date    ABLATION, ATRIAL FIBRILLATION, CRYO N/A 10/24/2023    Procedure: ABLATION, ARRHYTHMOGENIC FOCUS, FOR ATRIAL FIBRILLATION AFTER PULMONARY VEIN ISOLATION;  Surgeon: Ramirez Polk MD;  Location: [facility] EP LAB;  Service: Cardiology;  Laterality: N/A;  AF, PVI, CRYO, JUNIOR, RICHARD, GEN, DM, 3prep    ABLATION, ATRIAL FLUTTER, TYPICAL Bilateral 07/05/2023    Procedure: Ablation, Atrial Flutter, Typical;  Surgeon: Devante Arevalo MD;  Location: [facility] EP LAB;  Service: Cardiology;  Laterality: Bilateral;  AF, RFA CTI/PVI, ANES, RICHARD, JUNIOR (CX IF SR), MB,  307    ABLATION, ATRIAL FLUTTER, TYPICAL  10/24/2023    Procedure: Ablation, Atrial Flutter, Typical;  Surgeon: Ramirez Polk MD;  Location: [facility] EP LAB;  Service: Cardiology;;    ANGIOGRAM, CORONARY, WITH LEFT HEART CATHETERIZATION      TONSILLECTOMY      TRANSESOPHAGEAL ECHOCARDIOGRAPHY N/A 06/29/2023    Procedure: ECHOCARDIOGRAM, TRANSESOPHAGEAL;  Surgeon: Leola Benavides MD;  Location: [facility] EP LAB;  Service: Cardiology;  Laterality: N/A;    TRANSESOPHAGEAL ECHOCARDIOGRAPHY N/A 07/05/2023    Procedure: ECHOCARDIOGRAM, TRANSESOPHAGEAL;  Surgeon: Popeye Brito MD;  Location: [facility] EP LAB;  Service: Cardiology;  Laterality: N/A;    TRANSESOPHAGEAL ECHOCARDIOGRAPHY N/A 10/24/2023    Procedure: ECHOCARDIOGRAM, TRANSESOPHAGEAL;  Surgeon: Devante Torre MD;  Location: [facility] EP LAB;  Service: Cardiology;  Laterality: N/A;    TREATMENT OF CARDIAC ARRHYTHMIA N/A 06/29/2023    Procedure: Cardioversion or Defibrillation;  Surgeon: Devante Arevalo MD;  Location: [facility] EP LAB;  Service: Cardiology;  Laterality: N/A;  AF, JUNIOR/DCCV, ANES, DM, ED BED 11    TREATMENT OF CARDIAC ARRHYTHMIA  10/24/2023    Procedure: Cardioversion or Defibrillation;  Surgeon: Ramirez Polk MD;  Location: [facility] EP LAB;  Service: Cardiology;;       ALLERGIES AND MEDICATION:     Review of patient's allergies indicates:   Allergen Reactions    Amiodarone analogues Shortness Of Breath    Penicillins Shortness Of Breath        Medication List            Accurate as of February 22, 2024  8:43 AM. If you have any questions, ask your nurse or doctor.                CONTINUE taking these medications      ENTRESTO  mg per tablet  Generic drug: sacubitriL-valsartan  Take 1 tablet by mouth 2 (two) times daily.     furosemide 20 MG tablet  Commonly known as: LASIX  Take 1 tablet (20 mg total) by mouth once daily.     hydroCHLOROthiazide 12.5 MG Tab  Commonly known as: HYDRODIURIL  Take 1 tablet by mouth once daily     metoprolol succinate  200 MG 24 hr tablet  Commonly known as: TOPROL-XL  Take 1 tablet (200 mg total) by mouth once daily.     pantoprazole 40 MG tablet  Commonly known as: PROTONIX  Take 1 tablet (40 mg total) by mouth once daily.     XARELTO 20 mg Tab  Generic drug: rivaroxaban  TAKE 1 TABLET BY MOUTH ONCE DAILY WITH  DINNER/EVENING  MEAL              SOCIAL HISTORY:     Social History     Socioeconomic History    Marital status: Single   Tobacco Use    Smoking status: Former     Types: Cigarettes    Smokeless tobacco: Former    Tobacco comments:     Pt states she quit more than 30 years ago.   Substance and Sexual Activity    Alcohol use: No    Drug use: No    Sexual activity: Not Currently     Social Determinants of Health     Financial Resource Strain: Low Risk  (1/14/2024)    Overall Financial Resource Strain (CARDIA)     Difficulty of Paying Living Expenses: Not hard at all   Food Insecurity: No Food Insecurity (1/14/2024)    Hunger Vital Sign     Worried About Running Out of Food in the Last Year: Never true     Ran Out of Food in the Last Year: Never true   Transportation Needs: No Transportation Needs (1/14/2024)    PRAPARE - Transportation     Lack of Transportation (Medical): No     Lack of Transportation (Non-Medical): No   Physical Activity: Sufficiently Active (1/14/2024)    Exercise Vital Sign     Days of Exercise per Week: 2 days     Minutes of Exercise per Session: 120 min   Stress: No Stress Concern Present (1/14/2024)    [employer] - Occupational Stress Questionnaire     Feeling of Stress : Not at all   Social Connections: Unknown (1/14/2024)    Social Connection and Isolation Panel [NHANES]     Frequency of Communication with Friends and Family: More than three times a week     Frequency of Social Gatherings with Friends and Family: Twice a week     Attends Gnosticist Services: More than 4 times per year     Active Member of Clubs or Organizations: Yes     Attends Club or Organization  "Meetings: More than 4 times per year     Marital Status: Patient declined   Housing Stability: Low Risk  (1/14/2024)    Housing Stability Vital Sign     Unable to Pay for Housing in the Last Year: No     Number of Places Lived in the Last Year: 1     Unstable Housing in the Last Year: No       FAMILY HISTORY:     Family History   Problem Relation Age of Onset    No Known Problems Mother     No Known Problems Father        REVIEW OF SYSTEMS:   Review of Systems   Constitutional:  Negative for chills, diaphoresis and fever.   HENT:  Negative for nosebleeds.    Eyes:  Negative for blurred vision, double vision and photophobia.   Respiratory:  Negative for hemoptysis, shortness of breath and wheezing.    Cardiovascular:  Negative for chest pain, palpitations, orthopnea, claudication, leg swelling and PND.   Gastrointestinal:  Negative for abdominal pain, blood in stool, heartburn, melena, nausea and vomiting.   Genitourinary:  Negative for flank pain and hematuria.   Musculoskeletal:  Negative for falls, myalgias and neck pain.   Skin:  Negative for rash.   Neurological:  Negative for dizziness, seizures, loss of consciousness, weakness and headaches.   Endo/Heme/Allergies:  Negative for polydipsia. Does not bruise/bleed easily.   Psychiatric/Behavioral:  Negative for depression and memory loss. The patient is not nervous/anxious.        PHYSICAL EXAM:     Vitals:    02/22/24 0839   BP: 122/80   Pulse: (!) 51   Resp: 18    Body mass index is 31.43 kg/m².  Weight: 92.4 kg (203 lb 11.3 oz)   Height: 5' 7.5" (171.5 cm)     Physical Exam  Vitals reviewed.   Constitutional:       General: She is not in acute distress.     Appearance: She is well-developed. She is obese. She is not ill-appearing, toxic-appearing or diaphoretic.   HENT:      Head: Normocephalic and atraumatic.   Eyes:      General: No scleral icterus.     Extraocular Movements: Extraocular movements intact.      Conjunctiva/sclera: Conjunctivae normal.      " Pupils: Pupils are equal, round, and reactive to light.   Neck:      Thyroid: No thyromegaly.      Vascular: Normal carotid pulses. No carotid bruit or JVD.      Trachea: Trachea normal.   Cardiovascular:      Rate and Rhythm: Regular rhythm. Bradycardia present.      Pulses:           Carotid pulses are 2+ on the right side and 2+ on the left side.     Heart sounds: S1 normal and S2 normal. No murmur heard.     No friction rub. No gallop.   Pulmonary:      Effort: Pulmonary effort is normal. No respiratory distress.      Breath sounds: Normal breath sounds. No stridor. No wheezing, rhonchi or rales.   Chest:      Chest wall: No tenderness.   Abdominal:      General: There is no distension.      Palpations: Abdomen is soft.   Musculoskeletal:         General: No swelling or tenderness. Normal range of motion.      Cervical back: Normal range of motion and neck supple. No edema or rigidity.      Right lower leg: No edema.      Left lower leg: No edema.   Feet:      Right foot:      Skin integrity: No ulcer.      Left foot:      Skin integrity: No ulcer.   Skin:     General: Skin is warm and dry.      Coloration: Skin is not jaundiced.   Neurological:      General: No focal deficit present.      Mental Status: She is alert and oriented to person, place, and time.      Cranial Nerves: No cranial nerve deficit.   Psychiatric:         Mood and Affect: Mood normal.         Speech: Speech normal.         Behavior: Behavior normal. Behavior is cooperative.         DATA:   EKG: (personally reviewed tracing(s))  2/21/24 SR 51    Laboratory:  CBC:  Recent Labs   Lab 07/06/23  0359 10/13/23  0744 12/02/23  1030   WBC 5.11 5.14 3.98   Hemoglobin 15.5 17.0 H 15.3   Hematocrit 51.6 H 55.8 H 50.1 H   Platelets 142 L 215 204         CHEMISTRIES:  Recent Labs   Lab 06/16/23  0948 06/28/23  1059 07/04/23  0449 07/05/23  0330 07/06/23  0756 10/13/23  0744 10/25/23  0445 11/14/23  0728 12/02/23  1030   Glucose  --    < > 105 95 104 123  H  --  90 94   Sodium 139   < > 139 140 138 143  --  142 142   Potassium 4.5   < > 3.9  3.9 4.5  4.5 4.5 4.4  --  5.2 H 4.3   BUN 16.4   < > 8 11 11 12  --  9 13   Creatinine 1.18 H   < > 0.9 0.8 0.9 1.0 0.9 1.0 0.9   eGFR  51 L  --   --   --   --   --   --   --   --    Calcium 9.5   < > 9.3 9.4 9.3 10.0  --  10.1 10.2   Magnesium  --    < > 1.9 1.8 1.8  --   --   --   --     < > = values in this interval not displayed.         CARDIAC BIOMARKERS:  Recent Labs   Lab 06/28/23  1059   Troponin I 0.009         COAGS:  Recent Labs   Lab 07/19/22  1119 06/29/23  1845 10/13/23  0744   INR 1.0 1.5 H 1.5 H         LIPIDS/LFTS:  Recent Labs   Lab 07/05/23  0330 07/06/23  0756 12/02/23  1030   AST 23 25 23   ALT 27 24 15         Lab Results   Component Value Date    TSH 1.386 12/02/2023         Cardiovascular Testing:  JUNIOR 10/24/23    JUNIOR prior to ablation in the EP lab.  No evidence of intracardiac thrombus.    Left Ventricle: The left ventricle is normal in size. Normal wall thickness. Normal wall motion. There is moderately reduced systolic function. Ejection fraction by visual approximation is 35%.    Right Ventricle: Normal right ventricular cavity size. Wall thickness is normal. Right ventricle wall motion  is normal. Systolic function is borderline low.    Left Atrium: The left atrial appendage appears normal. The left atrial appendage has a windsock morphology. Appendage velocity is reduced at less than 40 cm/sec. There is no thrombus in the left atrial cavity.    Echo 6/28/23  The left ventricle is normal in size with severely decreased systolic function.  The estimated ejection fraction is 25%.  There is left ventricular global hypokinesis.  Normal right ventricular size with mildly reduced right ventricular systolic function.  Severe biatrial enlargement.  Severe mitral regurgitation.  Moderate to severe tricuspid regurgitation.  The estimated PA systolic pressure is 51 mmHg.  There is pulmonary  hypertension.  Intermediate central venous pressure (8 mmHg).  Atrial fibrillation observed.    Echo 9/14/22 (EF 25% on JUNIOR 9/6/22)  The left ventricle is normal in size with normal systolic function. The estimated ejection fraction is 55%.  Mild right ventricular enlargement with normal right ventricular systolic function.  Grade II left ventricular diastolic dysfunction.  Biatrial enlargement.  Mild-to-moderate mitral regurgitation.  The estimated PA systolic pressure is 35 mmHg.  Intermediate central venous pressure (8 mmHg).    JUNIOR/DCCV 9/6/22  Severe biventricular dysfxn, LVEF 25%  Global hypokinesis  Biatrial enlargement  No LA/ABBIE thrombus  Normal valves  Mod MR/TR  Successful DCCV AF->SR 65 BPM 200J x1  Plan:  Cont med rx  Cont Xarelto 20mg qhs  Start entresto 24/26mg bid  Home today  Follow up with Dr. Issa as planned  Repeat echo in 3 months for reassessment of LVEF    Cath 2/26/18  B. Summary/Post-Operative Diagnosis     Normal coronary arteries.     Systolic dysfunction.     Severe mitral regurgitation.     Mildly elevated right and left Filling Pressures.     Mild Pulmonary Hypertension.   D. Hemodynamic Results   Ejection Fraction: 25%   Global LV Function: severely depressed   PA: 45   PW:  (26)   CO_THERM: 3.92   RV: 44   RA:  (18)   LVEDP: 13   E. Angiographic Results      - Left Main Coronary Artery:              The LM is normal. There is YULIYA 3 flow.      - Left Anterior Descending Artery:              The LAD is normal. There is YULIYA 3 flow.      - Left Circumflex Artery:              The LCX is normal. There is YULIYA 3 flow.      - Right Coronary Artery:              The RCA is normal. There is YULIYA 3 flow.      - Common Femoral Artery:              The right CFA is normal.     ASSESSMENT:   # PAF s/p JUNIOR/DCCV 2/23/18 and 9/6/22. ?intol of amio/tikosyn/flecainide.  S/p PVI 10/24/23.  Following with Dr. Polk.  In SR on Xarelto.    # PAFL s/p ablation 6/2023 and re-do 10/2023.  # NICM, cath  2/2018 with normal cors.  EF back down to 35% on echo 6/2023, ?tachymyopathy, appears euvolemic.    # HTN, controlled  # ?REZA, prev referred to sleep med.  Had appt with Dr. Ceballos 1/16/24.  # BMI 31, down 1 unit(s) vs last OV  # Asc ao dil, 4.3cm (CTA Chest 2/22/18)    PLAN:   Cont med rx  Cont Xarelto 20mg qd  Cont GDMT  Stop lasix  Inc HCTZ 25mg qd  Add aldact 25mg qd  Check BMP 1 week  Check echo for resolution of LV dysfxn  RN BP check 2 weeks (Dr. Villa)  Sleep med appt pending 5/2024  RTC 6 months (Aug 2024), sooner if persistent LV dysfxn.      Devante Issa MD, FACC

## 2024-02-27 LAB
OHS QRS DURATION: 84 MS
OHS QTC CALCULATION: 436 MS

## 2024-02-28 ENCOUNTER — HOSPITAL ENCOUNTER (OUTPATIENT)
Dept: CARDIOLOGY | Facility: HOSPITAL | Age: 69
Discharge: HOME | End: 2024-02-28
Attending: INTERNAL MEDICINE
Payer: MEDICARE

## 2024-02-28 DIAGNOSIS — I48.0 PAROXYSMAL ATRIAL FIBRILLATION: ICD-10-CM

## 2024-02-28 LAB
AORTIC ROOT ANNULUS: 3 CM
AORTIC VALVE CUSP SEPERATION: 1.78 CM
ASCENDING AORTA: 3.02 CM
AV INDEX (PROSTH): 0.61
AV MEAN GRADIENT: 5 MMHG
AV PEAK GRADIENT: 10 MMHG
AV VALVE AREA BY VELOCITY RATIO: 1.84 CM²
AV VALVE AREA: 1.85 CM²
AV VELOCITY RATIO: 0.61
CV ECHO LV RWT: 0.41 CM
DOP CALC AO PEAK VEL: 1.56 M/S
DOP CALC AO VTI: 42.3 CM
DOP CALC LVOT AREA: 3 CM2
DOP CALC LVOT DIAMETER: 1.96 CM
DOP CALC LVOT PEAK VEL: 0.95 M/S
DOP CALC LVOT STROKE VOLUME: 78.41 CM3
DOP CALCLVOT PEAK VEL VTI: 26 CM
E WAVE DECELERATION TIME: 328.62 MSEC
E/A RATIO: 1.42
E/E' RATIO: 25 M/S
ECHO LV POSTERIOR WALL: 0.96 CM (ref 0.6–1.1)
FRACTIONAL SHORTENING: 28 % (ref 28–44)
INTERVENTRICULAR SEPTUM: 1.23 CM (ref 0.6–1.1)
IVC DIAMETER: 2 CM
IVRT: 154.14 MSEC
LA MAJOR: 6.28 CM
LA MINOR: 5.84 CM
LA WIDTH: 5.5 CM
LEFT ATRIUM SIZE: 4.21 CM
LEFT ATRIUM VOLUME: 119.11 CM3
LEFT INTERNAL DIMENSION IN SYSTOLE: 3.39 CM (ref 2.1–4)
LEFT VENTRICLE DIASTOLIC VOLUME: 104.55 ML
LEFT VENTRICLE SYSTOLIC VOLUME: 47.09 ML
LEFT VENTRICULAR INTERNAL DIMENSION IN DIASTOLE: 4.74 CM (ref 3.5–6)
LEFT VENTRICULAR MASS: 188.9 G
LV LATERAL E/E' RATIO: 25 M/S
LV SEPTAL E/E' RATIO: 25 M/S
LVOT MG: 1.99 MMHG
LVOT MV: 0.66 CM/S
MV PEAK A VEL: 1.06 M/S
MV PEAK E VEL: 1.5 M/S
MV STENOSIS PRESSURE HALF TIME: 95.3 MS
MV VALVE AREA P 1/2 METHOD: 2.31 CM2
PISA TR MAX VEL: 2.79 M/S
PULM VEIN S/D RATIO: 1.15
PV PEAK D VEL: 0.6 M/S
PV PEAK GRADIENT: 4 MMHG
PV PEAK S VEL: 0.69 M/S
PV PEAK VELOCITY: 0.95 M/S
RA MAJOR: 5.71 CM
RA PRESSURE ESTIMATED: 8 MMHG
RA WIDTH: 4.4 CM
RIGHT VENTRICULAR END-DIASTOLIC DIMENSION: 3.94 CM
RV TB RVSP: 11 MMHG
RV TISSUE DOPPLER FREE WALL SYSTOLIC VELOCITY 1 (APICAL 4 CHAMBER VIEW): 12.39 CM/S
SINUS: 3.26 CM
STJ: 2.11 CM
TDI LATERAL: 0.06 M/S
TDI SEPTAL: 0.06 M/S
TDI: 0.06 M/S
TR MAX PG: 31 MMHG
TRICUSPID ANNULAR PLANE SYSTOLIC EXCURSION: 2.36 CM
TV REST PULMONARY ARTERY PRESSURE: 39 MMHG

## 2024-02-28 PROCEDURE — 93306 TTE W/DOPPLER COMPLETE: CPT

## 2024-02-28 PROCEDURE — 93306 TTE W/DOPPLER COMPLETE: CPT | Mod: 26,,, | Performed by: INTERNAL MEDICINE

## 2024-03-14 ENCOUNTER — TELEPHONE (OUTPATIENT)
Dept: ELECTROPHYSIOLOGY | Facility: CLINIC | Age: 69
End: 2024-03-14
Payer: MEDICARE

## 2024-03-25 ENCOUNTER — PATIENT OUTREACH (OUTPATIENT)
Dept: ADMINISTRATIVE | Facility: HOSPITAL | Age: 69
End: 2024-03-25
Payer: MEDICARE

## 2024-05-28 ENCOUNTER — OFFICE VISIT (OUTPATIENT)
Dept: PULMONOLOGY | Facility: CLINIC | Age: 69
End: 2024-05-28
Payer: MEDICARE

## 2024-05-28 VITALS
DIASTOLIC BLOOD PRESSURE: 88 MMHG | SYSTOLIC BLOOD PRESSURE: 139 MMHG | BODY MASS INDEX: 30.74 KG/M2 | HEART RATE: 63 BPM | HEIGHT: 68 IN | WEIGHT: 202.81 LBS

## 2024-05-28 DIAGNOSIS — I48.19 PERSISTENT ATRIAL FIBRILLATION: ICD-10-CM

## 2024-05-28 DIAGNOSIS — G47.33 OSA (OBSTRUCTIVE SLEEP APNEA): ICD-10-CM

## 2024-05-28 DIAGNOSIS — I48.3 TYPICAL ATRIAL FLUTTER: ICD-10-CM

## 2024-05-28 DIAGNOSIS — Z00.00 ENCOUNTER FOR MEDICARE ANNUAL WELLNESS EXAM: ICD-10-CM

## 2024-05-28 DIAGNOSIS — R06.83 PRIMARY SNORING: Primary | ICD-10-CM

## 2024-05-28 PROCEDURE — 1101F PT FALLS ASSESS-DOCD LE1/YR: CPT | Mod: CPTII,S$GLB,, | Performed by: INTERNAL MEDICINE

## 2024-05-28 PROCEDURE — 3288F FALL RISK ASSESSMENT DOCD: CPT | Mod: CPTII,S$GLB,, | Performed by: INTERNAL MEDICINE

## 2024-05-28 PROCEDURE — 4010F ACE/ARB THERAPY RXD/TAKEN: CPT | Mod: CPTII,S$GLB,, | Performed by: INTERNAL MEDICINE

## 2024-05-28 PROCEDURE — 1126F AMNT PAIN NOTED NONE PRSNT: CPT | Mod: CPTII,S$GLB,, | Performed by: INTERNAL MEDICINE

## 2024-05-28 PROCEDURE — 3079F DIAST BP 80-89 MM HG: CPT | Mod: CPTII,S$GLB,, | Performed by: INTERNAL MEDICINE

## 2024-05-28 PROCEDURE — 3075F SYST BP GE 130 - 139MM HG: CPT | Mod: CPTII,S$GLB,, | Performed by: INTERNAL MEDICINE

## 2024-05-28 PROCEDURE — 3008F BODY MASS INDEX DOCD: CPT | Mod: CPTII,S$GLB,, | Performed by: INTERNAL MEDICINE

## 2024-05-28 PROCEDURE — 1159F MED LIST DOCD IN RCRD: CPT | Mod: CPTII,S$GLB,, | Performed by: INTERNAL MEDICINE

## 2024-05-28 PROCEDURE — 99999 PR PBB SHADOW E&M-EST. PATIENT-LVL III: CPT | Mod: PBBFAC,,, | Performed by: INTERNAL MEDICINE

## 2024-05-28 PROCEDURE — 99203 OFFICE O/P NEW LOW 30 MIN: CPT | Mod: S$GLB,,, | Performed by: INTERNAL MEDICINE

## 2024-05-28 NOTE — PROGRESS NOTES
Yaneli Steele  was seen as a new patient at the request of  Devante Issa MD for the evaluation of  buck.    CHIEF COMPLAINT:    Chief Complaint   Patient presents with    Apnea       HISTORY OF PRESENT ILLNESS: Yaneli Steele is a 68 y.o. female is here for sleep evaluation.   Patient was seen by Dr. Issa for PAF s/p ablation.  Patient was referred for buck evaluation.      STOPBANG during initial visit:  Snore:  denied   Tire:  rested  Observed apnea:  denied  Pressure (HTN):  yes  BMI:  Body mass index is 31.3 kg/m².   Age:  68 y.o.  Neck (inch):  14  Gender:  female    Other sleep ROS:  no parasomnia. No cataplexy.  No rls symptoms.      [place] Sleepiness Scale score during initial sleep evaluation was 6.    SLEEP ROUTINE:  Activity the hour prior to sleep: watch tv     Bed partner:  dog   Time to bed:  8:30 pm   Lights off:  TV is on  Sleep onset latency:  watch tv ~10-45 minutes        Disruptions or awakenings:    once for bathroom for patient and dog (no difficulty going back to sleep)    Wakeup time:      4 am   Perceived sleep quality:  rested       Daytime naps:      20 minutes on weekend   Weekend sleep routine:      same  Caffeine use: occasional coffee  exercise habit:   walk 8000 steps per day      PAST MEDICAL HISTORY:    Active Ambulatory Problems     Diagnosis Date Noted    Non-rheumatic mitral regurgitation 02/24/2018    Non-rheumatic tricuspid valve insufficiency 02/24/2018    Paroxysmal atrial fibrillation 07/19/2022    Essential (primary) hypertension 07/19/2022    Severe obesity (BMI 35.0-39.9) with comorbidity 10/06/2022    Tachycardia induced cardiomyopathy 06/01/2023    Elevated brain natriuretic peptide (BNP) level 06/01/2023    Acute on chronic combined systolic and diastolic congestive heart failure 06/01/2023    Obesity (BMI 30-39.9) 06/03/2023    Atrial flutter 06/28/2023    Atrial flutter with rapid ventricular response 06/28/2023    Abnormal LFTs (liver function  tests) 06/28/2023    Pancreatic mass 06/29/2023    Hypotension 06/30/2023    Nasal congestion 06/30/2023    Leukopenia 07/02/2023    Thrombocytopenia 07/02/2023    Prediabetes 11/24/2023    Aortic root dilatation 02/14/2024    Primary snoring 05/28/2024     Resolved Ambulatory Problems     Diagnosis Date Noted    Atrial fibrillation with rapid ventricular response 02/22/2018    Chronic systolic heart failure 02/24/2018    Acute pulmonary edema 02/24/2018    Acute hepatitis, noninfective 02/24/2018    STU (acute kidney injury) 02/24/2018    Elevated INR 02/24/2018     No Additional Past Medical History                PAST SURGICAL HISTORY:    Past Surgical History:   Procedure Laterality Date    ABLATION, ATRIAL FIBRILLATION, CRYO N/A 10/24/2023    Procedure: ABLATION, ARRHYTHMOGENIC FOCUS, FOR ATRIAL FIBRILLATION AFTER PULMONARY VEIN ISOLATION;  Surgeon: Ramirez Polk MD;  Location: [facility] EP LAB;  Service: Cardiology;  Laterality: N/A;  AF, PVI, CRYO, JUNIOR, RICHARD, GEN, DM, 3prep    ABLATION, ATRIAL FLUTTER, TYPICAL Bilateral 07/05/2023    Procedure: Ablation, Atrial Flutter, Typical;  Surgeon: Devante Arevalo MD;  Location: [facility] EP LAB;  Service: Cardiology;  Laterality: Bilateral;  AF, RFA CTI/PVI, ANES, RICHARD, JUNIOR (CX IF SR), MB, 307    ABLATION, ATRIAL FLUTTER, TYPICAL  10/24/2023    Procedure: Ablation, Atrial Flutter, Typical;  Surgeon: Ramirez Polk MD;  Location: [facility] EP LAB;  Service: Cardiology;;    ANGIOGRAM, CORONARY, WITH LEFT HEART CATHETERIZATION      TONSILLECTOMY      TRANSESOPHAGEAL ECHOCARDIOGRAPHY N/A 06/29/2023    Procedure: ECHOCARDIOGRAM, TRANSESOPHAGEAL;  Surgeon: Leola Benavides MD;  Location: [facility] EP LAB;  Service: Cardiology;  Laterality: N/A;    TRANSESOPHAGEAL ECHOCARDIOGRAPHY N/A 07/05/2023    Procedure: ECHOCARDIOGRAM, TRANSESOPHAGEAL;  Surgeon: Popeye Brito MD;  Location: [facility] EP LAB;  Service: Cardiology;  Laterality: N/A;    TRANSESOPHAGEAL ECHOCARDIOGRAPHY N/A  10/24/2023    Procedure: ECHOCARDIOGRAM, TRANSESOPHAGEAL;  Surgeon: Devante Torre MD;  Location: [facility] EP LAB;  Service: Cardiology;  Laterality: N/A;    TREATMENT OF CARDIAC ARRHYTHMIA N/A 06/29/2023    Procedure: Cardioversion or Defibrillation;  Surgeon: Devante Arevalo MD;  Location: [facility] EP LAB;  Service: Cardiology;  Laterality: N/A;  AF, JUNIOR/DCCV, ANES, DM, ED BED 11    TREATMENT OF CARDIAC ARRHYTHMIA  10/24/2023    Procedure: Cardioversion or Defibrillation;  Surgeon: Ramirez Polk MD;  Location: [facility] EP LAB;  Service: Cardiology;;         FAMILY HISTORY:                Family History   Problem Relation Name Age of Onset    No Known Problems Mother      No Known Problems Father         SOCIAL HISTORY:          Tobacco:   Social History     Tobacco Use   Smoking Status Former    Types: Cigarettes   Smokeless Tobacco Former   Tobacco Comments    Pt states she quit more than 30 years ago.       alcohol use:    Social History     Substance and Sexual Activity   Alcohol Use No                 Occupation:      ALLERGIES:    Review of patient's allergies indicates:   Allergen Reactions    Amiodarone analogues Shortness Of Breath    Penicillins Shortness Of Breath       CURRENT MEDICATIONS:    Current Outpatient Medications   Medication Sig Dispense Refill    hydroCHLOROthiazide (HYDRODIURIL) 25 MG tablet Take 1 tablet (25 mg total) by mouth once daily. 90 tablet 3    metoprolol succinate (TOPROL-XL) 200 MG 24 hr tablet Take 1 tablet (200 mg total) by mouth once daily. 90 tablet 3    pantoprazole (PROTONIX) 40 MG tablet Take 1 tablet (40 mg total) by mouth once daily. 30 tablet 0    rivaroxaban (XARELTO) 20 mg Tab TAKE 1 TABLET BY MOUTH ONCE DAILY WITH  DINNER/EVENING  MEAL 90 tablet 3    sacubitriL-valsartan (ENTRESTO)  mg per tablet Take 1 tablet by mouth 2 (two) times daily. 180 tablet 3    spironolactone (ALDACTONE) 25 MG tablet Take 1 tablet (25 mg total) by mouth once daily.  "90 tablet 3     No current facility-administered medications for this visit.                  REVIEW OF SYSTEMS:     Sleep related symptoms as per HPI.  CONST:Denies weight gain    HEENT: Denies sinus congestion  PULM: Denies dyspnea  CARD:  Denies palpitations   GI:  Denies acid reflux  : Denies polyuria  NEURO: Denies headaches  PSYCH: Denies mood disturbance  HEME: Denies anemia   Otherwise, a balance of systems reviewed is negative.          PHYSICAL EXAM:  Vitals:    05/28/24 0919   BP: 139/88   Pulse: 63   Weight: 92 kg (202 lb 13.2 oz)   Height: 5' 7.5" (1.715 m)   PainSc: 0-No pain     Body mass index is 31.3 kg/m².     GENERAL: Normal development, well groomed  HEENT:  Conjunctivae are non-erythematous; Pupils equal, round, and reactive to light; Nose is symmetrical; Nasal mucosa is pink and moist; Septum is midline; Inferior turbinates are normal; Nasal airflow is normal; Posterior pharynx is pink; Modified Mallampati: 4; Posterior palate is normal; Tonsils +1; Uvula is normal and pink;Tongue is normal; Dentition is fair; No TMJ tenderness; Jaw opening and protrusion without click and without discomfort.  NECK: Supple. Neck circumference is 14 inches. No thyromegaly. No palpable nodes.     SKIN: On face and neck: No abrasions, no rashes, no lesions.  No subcutaneous nodules are palpable.  RESPIRATORY: Chest is clear to auscultation.  Normal chest expansion and non-labored breathing at rest.  CARDIOVASCULAR: Normal S1, S2.  No murmurs, gallops or rubs. No carotid bruits bilaterally.  EXTREMITIES: No edema. No clubbing. No cyanosis. Station normal. Gait normal.        NEURO/PSYCH: Oriented to time, place and person. Normal attention span and concentration. Affect is full. Mood is normal.                                              DATA no prior sleep study    Echo 2/14/24    Left Ventricle: The left ventricle is normal in size. Mildly increased wall thickness. There is mild concentric hypertrophy. There " is normal systolic function with a visually estimated ejection fraction of 55 - 60%. There is diastolic dysfunction but grade cannot be determined.    Right Ventricle: Normal right ventricular cavity size. Systolic function is normal.    Mitral Valve: There is mild regurgitation.    Tricuspid Valve: There is mild regurgitation.    Pulmonary Artery: The estimated pulmonary artery systolic pressure is 39 mmHg.    Lab Results   Component Value Date    TSH 1.386 12/02/2023       ASSESSMENT/PLAN    Problem List Items Addressed This Visit       Atrial flutter    Overview     S/p successful ablation  Currently nsr.  Noac per cardiology.           Primary snoring - Primary    Overview     Patient with low pretest probability for REZA per stop bang criteria.  Given low pretest probability, patient is not interested in undergoing sleep study.  Advise patient to contact our office should her sleep quality or snoring get worst.            Other Visit Diagnoses       REZA (obstructive sleep apnea)        Persistent atrial fibrillation                      Education: During our discussion today, we talked about the etiology of obstructive sleep apnea as well as the potential ramifications of untreated sleep apnea, which could include daytime sleepiness, hypertension, heart disease and/or stroke.     Precautions: The patient was advised to abstain from driving should they feel sleepy or drowsy.       Thank you for allowing me the opportunity to participate in the care of your patient.    Patient will No follow-ups on file.    Please cc note to  Devante Issa MD.    35 minutes of total time spent on the encounter, which includes face to face time and non-face to face time preparing to see the patient (eg, review of tests), Obtaining and/or reviewing separately obtained history, documenting clinical information in the electronic or other health record, independently interpreting results (not separately reported) and  communicating results to the patient/family/caregiver, or Care coordination (not separately reported).

## 2024-10-23 DIAGNOSIS — Z78.0 MENOPAUSE: ICD-10-CM

## 2024-11-05 ENCOUNTER — OFFICE VISIT (OUTPATIENT)
Dept: OPTOMETRY | Facility: CLINIC | Age: 69
End: 2024-11-05
Payer: MEDICARE

## 2024-11-05 DIAGNOSIS — I10 ESSENTIAL (PRIMARY) HYPERTENSION: ICD-10-CM

## 2024-11-05 DIAGNOSIS — H25.041 POSTERIOR SUBCAPSULAR AGE-RELATED CATARACT OF RIGHT EYE: Primary | ICD-10-CM

## 2024-11-05 DIAGNOSIS — H25.13 NUCLEAR SCLEROSIS OF BOTH EYES: ICD-10-CM

## 2024-11-05 PROCEDURE — 1126F AMNT PAIN NOTED NONE PRSNT: CPT | Mod: CPTII,S$GLB,, | Performed by: OPTOMETRIST

## 2024-11-05 PROCEDURE — 3288F FALL RISK ASSESSMENT DOCD: CPT | Mod: CPTII,S$GLB,, | Performed by: OPTOMETRIST

## 2024-11-05 PROCEDURE — 1160F RVW MEDS BY RX/DR IN RCRD: CPT | Mod: CPTII,S$GLB,, | Performed by: OPTOMETRIST

## 2024-11-05 PROCEDURE — 1159F MED LIST DOCD IN RCRD: CPT | Mod: CPTII,S$GLB,, | Performed by: OPTOMETRIST

## 2024-11-05 PROCEDURE — 4010F ACE/ARB THERAPY RXD/TAKEN: CPT | Mod: CPTII,S$GLB,, | Performed by: OPTOMETRIST

## 2024-11-05 PROCEDURE — 1101F PT FALLS ASSESS-DOCD LE1/YR: CPT | Mod: CPTII,S$GLB,, | Performed by: OPTOMETRIST

## 2024-11-05 PROCEDURE — 99999 PR PBB SHADOW E&M-EST. PATIENT-LVL II: CPT | Mod: PBBFAC,,, | Performed by: OPTOMETRIST

## 2024-11-05 PROCEDURE — 99204 OFFICE O/P NEW MOD 45 MIN: CPT | Mod: S$GLB,,, | Performed by: OPTOMETRIST

## 2024-11-05 NOTE — PROGRESS NOTES
Subjective:       Patient ID: Yaneli Steele is a 69 y.o. female      Chief Complaint   Patient presents with    Concerns About Ocular Health    Hypertensive Eye Exam     History of Present Illness  Dls:  5+ yrs     68 y/o female presents today for hypertensive eye exam.   Pt c/o blurry vision at distance and near od x 1 yr.  Pt wears otc readers.     No tearing  No itching  No burning  No pain  No ha's  + od x 2-3 months floaters  No flashes    Eye meds  Lumify     Pohx:   None    Fohx:   Cat- mother         Assessment/Plan:     1. Posterior subcapsular age-related cataract of  2. Nuclear sclerosis of both eyes  Visually significant cataract OD. Discussed diagnosis with patient and surgical process. Referral for cataract evaluation.     - Ambulatory referral/consult to Ophthalmology    3. Essential (primary) hypertension  No hypertensive retinopathy. Continue BP control. RTC 1 year for DFE.      Follow up for cataract consult.

## 2024-12-30 ENCOUNTER — OFFICE VISIT (OUTPATIENT)
Dept: CARDIOLOGY | Facility: CLINIC | Age: 69
End: 2024-12-30
Payer: COMMERCIAL

## 2024-12-30 VITALS
BODY MASS INDEX: 30.74 KG/M2 | SYSTOLIC BLOOD PRESSURE: 130 MMHG | WEIGHT: 202.81 LBS | OXYGEN SATURATION: 99 % | HEIGHT: 68 IN | DIASTOLIC BLOOD PRESSURE: 80 MMHG | HEART RATE: 52 BPM | RESPIRATION RATE: 18 BRPM

## 2024-12-30 DIAGNOSIS — I10 ESSENTIAL (PRIMARY) HYPERTENSION: ICD-10-CM

## 2024-12-30 DIAGNOSIS — E66.9 NON MORBID OBESITY, UNSPECIFIED OBESITY TYPE: ICD-10-CM

## 2024-12-30 DIAGNOSIS — I42.8 NONISCHEMIC CARDIOMYOPATHY: ICD-10-CM

## 2024-12-30 DIAGNOSIS — I48.0 PAROXYSMAL ATRIAL FIBRILLATION: Primary | ICD-10-CM

## 2024-12-30 DIAGNOSIS — I77.810 AORTIC ROOT DILATATION: ICD-10-CM

## 2024-12-30 DIAGNOSIS — Z79.01 CURRENT USE OF ANTICOAGULANT THERAPY: ICD-10-CM

## 2024-12-30 PROCEDURE — 3288F FALL RISK ASSESSMENT DOCD: CPT | Mod: CPTII,S$GLB,, | Performed by: INTERNAL MEDICINE

## 2024-12-30 PROCEDURE — G2211 COMPLEX E/M VISIT ADD ON: HCPCS | Mod: S$GLB,,, | Performed by: INTERNAL MEDICINE

## 2024-12-30 PROCEDURE — 1126F AMNT PAIN NOTED NONE PRSNT: CPT | Mod: CPTII,S$GLB,, | Performed by: INTERNAL MEDICINE

## 2024-12-30 PROCEDURE — 3079F DIAST BP 80-89 MM HG: CPT | Mod: CPTII,S$GLB,, | Performed by: INTERNAL MEDICINE

## 2024-12-30 PROCEDURE — 99999 PR PBB SHADOW E&M-EST. PATIENT-LVL IV: CPT | Mod: PBBFAC,,, | Performed by: INTERNAL MEDICINE

## 2024-12-30 PROCEDURE — 93000 ELECTROCARDIOGRAM COMPLETE: CPT | Mod: S$GLB,,, | Performed by: INTERNAL MEDICINE

## 2024-12-30 PROCEDURE — 1159F MED LIST DOCD IN RCRD: CPT | Mod: CPTII,S$GLB,, | Performed by: INTERNAL MEDICINE

## 2024-12-30 PROCEDURE — 99214 OFFICE O/P EST MOD 30 MIN: CPT | Mod: S$GLB,,, | Performed by: INTERNAL MEDICINE

## 2024-12-30 PROCEDURE — 3075F SYST BP GE 130 - 139MM HG: CPT | Mod: CPTII,S$GLB,, | Performed by: INTERNAL MEDICINE

## 2024-12-30 PROCEDURE — 3008F BODY MASS INDEX DOCD: CPT | Mod: CPTII,S$GLB,, | Performed by: INTERNAL MEDICINE

## 2024-12-30 PROCEDURE — 1160F RVW MEDS BY RX/DR IN RCRD: CPT | Mod: CPTII,S$GLB,, | Performed by: INTERNAL MEDICINE

## 2024-12-30 PROCEDURE — 1101F PT FALLS ASSESS-DOCD LE1/YR: CPT | Mod: CPTII,S$GLB,, | Performed by: INTERNAL MEDICINE

## 2024-12-30 PROCEDURE — 4010F ACE/ARB THERAPY RXD/TAKEN: CPT | Mod: CPTII,S$GLB,, | Performed by: INTERNAL MEDICINE

## 2024-12-30 NOTE — PROGRESS NOTES
CARDIOVASCULAR CONSULTATION    REASON FOR CONSULT:   Yaneli Steele is a 69 y.o. female who presents for PAF, CMP.    PCP: Allen  EP: Felicitas  HISTORY OF PRESENT ILLNESS:   The patient returns for follow up.  She denies intercurrent angina, dyspnea, palpitations, or syncope.  There has been no PND, orthopnea, melena, hematuria, or claudication symptoms.  She continues take her Xarelto without any issues.    CARDIOVASCULAR HISTORY:   NICM, normal cors (cath 2/2018)    PAF on Xarelto s/p JUNIOR/DCCV 2/23/18 and 9/6/22   PAFl s/p ablation 7/5/23 (Mickey)  PVI 10/24/23 (Felicitas)    Hx tachymyopathy with recovery of EF in SR.    Asc ao dil, 4.3->4.1cm (CTA Chest 10/2023)    PAST MEDICAL HISTORY:     Past Medical History:   Diagnosis Date    Abnormal LFTs (liver function tests) 06/28/2023    Atrial flutter 06/28/2023    Chronic systolic heart failure     Essential (primary) hypertension     Leukopenia 07/02/2023    Non-rheumatic mitral regurgitation 02/24/2018    Non-rheumatic tricuspid valve insufficiency 02/24/2018    Pancreatic mass 06/29/2023    Paroxysmal atrial fibrillation 07/19/2022    she underwent pulmonary vein isolation for her atrial fibrillation in October 2023 with Dr. Polk.     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    Thrombocytopenia 07/02/2023       PAST SURGICAL HISTORY:     Past Surgical History:   Procedure Laterality Date    ABLATION, ATRIAL FIBRILLATION, CRYO N/A 10/24/2023    Procedure: ABLATION, ARRHYTHMOGENIC FOCUS, FOR ATRIAL FIBRILLATION AFTER PULMONARY VEIN ISOLATION;  Surgeon: Ramirez Polk MD;  Location: [facility] EP LAB;  Service: Cardiology;  Laterality: N/A;  AF, PVI, CRYO, JUNIOR, RICHARD, GEN, DM, 3prep    ABLATION, ATRIAL FLUTTER, TYPICAL Bilateral 07/05/2023    Procedure: Ablation, Atrial Flutter, Typical;  Surgeon: Devante Arevalo MD;  Location: [facility] EP LAB;  Service: Cardiology;  Laterality: Bilateral;  AF, RFA CTI/PVI, ANES, RICHARD, JUNIOR (CX IF SR),  MB, 307    ABLATION, ATRIAL FLUTTER, TYPICAL  10/24/2023    Procedure: Ablation, Atrial Flutter, Typical;  Surgeon: Ramirez Polk MD;  Location: [facility] EP LAB;  Service: Cardiology;;    ANGIOGRAM, CORONARY, WITH LEFT HEART CATHETERIZATION      TONSILLECTOMY      TRANSESOPHAGEAL ECHOCARDIOGRAPHY N/A 06/29/2023    Procedure: ECHOCARDIOGRAM, TRANSESOPHAGEAL;  Surgeon: Leola Benavides MD;  Location: [facility] EP LAB;  Service: Cardiology;  Laterality: N/A;    TRANSESOPHAGEAL ECHOCARDIOGRAPHY N/A 07/05/2023    Procedure: ECHOCARDIOGRAM, TRANSESOPHAGEAL;  Surgeon: Popeye Brito MD;  Location: [facility] EP LAB;  Service: Cardiology;  Laterality: N/A;    TRANSESOPHAGEAL ECHOCARDIOGRAPHY N/A 10/24/2023    Procedure: ECHOCARDIOGRAM, TRANSESOPHAGEAL;  Surgeon: Devante Torre MD;  Location: [facility] EP LAB;  Service: Cardiology;  Laterality: N/A;    TREATMENT OF CARDIAC ARRHYTHMIA N/A 06/29/2023    Procedure: Cardioversion or Defibrillation;  Surgeon: Devante Arevalo MD;  Location: [facility] EP LAB;  Service: Cardiology;  Laterality: N/A;  AF, JUNIOR/DCCV, ANES, DM, ED BED 11    TREATMENT OF CARDIAC ARRHYTHMIA  10/24/2023    Procedure: Cardioversion or Defibrillation;  Surgeon: Ramirez Polk MD;  Location: [facility] EP LAB;  Service: Cardiology;;       ALLERGIES AND MEDICATION:     Review of patient's allergies indicates:   Allergen Reactions    Amiodarone analogues Shortness Of Breath    Penicillins Shortness Of Breath        Medication List            Accurate as of December 30, 2024  9:23 AM. If you have any questions, ask your nurse or doctor.                CONTINUE taking these medications      ENTRESTO  mg per tablet  Generic drug: sacubitriL-valsartan  Take 1 tablet by mouth 2 (two) times daily.     hydroCHLOROthiazide 25 MG tablet  Commonly known as: HYDRODIURIL  Take 1 tablet (25 mg total) by mouth once daily.     metoprolol succinate 200 MG 24 hr tablet  Commonly known as: TOPROL-XL  Take 1 tablet (200 mg total) by  mouth once daily.     pantoprazole 40 MG tablet  Commonly known as: PROTONIX  Take 1 tablet (40 mg total) by mouth once daily.     spironolactone 25 MG tablet  Commonly known as: ALDACTONE  Take 1 tablet (25 mg total) by mouth once daily.     XARELTO 20 mg Tab  Generic drug: rivaroxaban  TAKE 1 TABLET BY MOUTH ONCE DAILY WITH  DINNER/EVENING  MEAL              SOCIAL HISTORY:     Social History     Socioeconomic History    Marital status: Single   Tobacco Use    Smoking status: Former     Types: Cigarettes    Smokeless tobacco: Former    Tobacco comments:     Pt states she quit more than 30 years ago.   Substance and Sexual Activity    Alcohol use: No    Drug use: No    Sexual activity: Not Currently     Social Drivers of Health     Financial Resource Strain: Low Risk  (1/14/2024)    Overall Financial Resource Strain (CARDIA)     Difficulty of Paying Living Expenses: Not hard at all   Food Insecurity: No Food Insecurity (1/14/2024)    Hunger Vital Sign     Worried About Running Out of Food in the Last Year: Never true     Ran Out of Food in the Last Year: Never true   Transportation Needs: No Transportation Needs (1/14/2024)    PRAPARE - Transportation     Lack of Transportation (Medical): No     Lack of Transportation (Non-Medical): No   Physical Activity: Sufficiently Active (1/14/2024)    Exercise Vital Sign     Days of Exercise per Week: 2 days     Minutes of Exercise per Session: 120 min   Stress: No Stress Concern Present (1/14/2024)    [employer] - Occupational Stress Questionnaire     Feeling of Stress : Not at all   Housing Stability: Low Risk  (1/14/2024)    Housing Stability Vital Sign     Unable to Pay for Housing in the Last Year: No     Number of Places Lived in the Last Year: 1     Unstable Housing in the Last Year: No       FAMILY HISTORY:     Family History   Problem Relation Name Age of Onset    No Known Problems Mother      No Known Problems Father      No Known  "Problems Sister      No Known Problems Brother      No Known Problems Maternal Aunt      No Known Problems Maternal Uncle      No Known Problems Paternal Aunt      No Known Problems Paternal Uncle      No Known Problems Maternal Grandmother      No Known Problems Maternal Grandfather      No Known Problems Paternal Grandmother      No Known Problems Paternal Grandfather      No Known Problems Other         REVIEW OF SYSTEMS:   Review of Systems   Constitutional:  Negative for chills, diaphoresis and fever.   HENT:  Negative for nosebleeds.    Eyes:  Negative for blurred vision, double vision and photophobia.   Respiratory:  Negative for hemoptysis, shortness of breath and wheezing.    Cardiovascular:  Negative for chest pain, palpitations, orthopnea, claudication, leg swelling and PND.   Gastrointestinal:  Negative for abdominal pain, blood in stool, heartburn, melena, nausea and vomiting.   Genitourinary:  Negative for flank pain and hematuria.   Musculoskeletal:  Negative for falls, myalgias and neck pain.   Skin:  Negative for rash.   Neurological:  Negative for dizziness, seizures, loss of consciousness, weakness and headaches.   Endo/Heme/Allergies:  Negative for polydipsia. Does not bruise/bleed easily.   Psychiatric/Behavioral:  Negative for depression and memory loss. The patient is not nervous/anxious.        PHYSICAL EXAM:     Vitals:    12/30/24 0910   BP: 130/80   Pulse: (!) 52   Resp: 18    Body mass index is 31.3 kg/m².  Weight: 92 kg (202 lb 13.2 oz)   Height: 5' 7.5" (171.5 cm)     Physical Exam  Vitals reviewed.   Constitutional:       General: She is not in acute distress.     Appearance: She is well-developed. She is obese. She is not ill-appearing, toxic-appearing or diaphoretic.   HENT:      Head: Normocephalic and atraumatic.   Eyes:      General: No scleral icterus.     Extraocular Movements: Extraocular movements intact.      Conjunctiva/sclera: Conjunctivae normal.      Pupils: Pupils are " equal, round, and reactive to light.   Neck:      Thyroid: No thyromegaly.      Vascular: Normal carotid pulses. No carotid bruit or JVD.      Trachea: Trachea normal.   Cardiovascular:      Rate and Rhythm: Regular rhythm. Bradycardia present.      Pulses:           Carotid pulses are 2+ on the right side and 2+ on the left side.     Heart sounds: S1 normal and S2 normal. No murmur heard.     No friction rub. No gallop.   Pulmonary:      Effort: Pulmonary effort is normal. No respiratory distress.      Breath sounds: Normal breath sounds. No stridor. No wheezing, rhonchi or rales.   Chest:      Chest wall: No tenderness.   Abdominal:      General: There is no distension.      Palpations: Abdomen is soft.   Musculoskeletal:         General: No swelling or tenderness. Normal range of motion.      Cervical back: Normal range of motion and neck supple. No edema or rigidity.      Right lower leg: No edema.      Left lower leg: No edema.   Feet:      Right foot:      Skin integrity: No ulcer.      Left foot:      Skin integrity: No ulcer.   Skin:     General: Skin is warm and dry.      Coloration: Skin is not jaundiced.   Neurological:      General: No focal deficit present.      Mental Status: She is alert and oriented to person, place, and time.      Cranial Nerves: No cranial nerve deficit.   Psychiatric:         Mood and Affect: Mood normal.         Speech: Speech normal.         Behavior: Behavior normal. Behavior is cooperative.         DATA:   EKG: (personally reviewed tracing(s))  12/30/24 SR 52    Laboratory:  CBC:  Recent Labs   Lab 07/06/23  0359 10/13/23  0744 12/02/23  1030   WBC 5.11 5.14 3.98   Hemoglobin 15.5 17.0 H 15.3   Hematocrit 51.6 H 55.8 H 50.1 H   Platelets 142 L 215 204       CHEMISTRIES:  Recent Labs   Lab 06/16/23  0948 06/28/23  1059 07/04/23  0449 07/05/23  0330 07/06/23  0756 10/13/23  0744 10/25/23  0445 11/14/23  0728 12/02/23  1030   Glucose  --    < > 105 95 104 123 H  --  90 94    Sodium 139   < > 139 140 138 143  --  142 142   Potassium 4.5   < > 3.9  3.9 4.5  4.5 4.5 4.4  --  5.2 H 4.3   BUN 16.4   < > 8 11 11 12  --  9 13   Creatinine 1.18 H   < > 0.9 0.8 0.9 1.0 0.9 1.0 0.9   eGFR  51 L  --   --   --   --   --   --   --   --    Calcium 9.5   < > 9.3 9.4 9.3 10.0  --  10.1 10.2   Magnesium  --    < > 1.9 1.8 1.8  --   --   --   --     < > = values in this interval not displayed.       CARDIAC BIOMARKERS:  Recent Labs   Lab 06/28/23  1059   Troponin I 0.009       COAGS:  Recent Labs   Lab 07/19/22  1119 06/29/23  1845 10/13/23  0744   INR 1.0 1.5 H 1.5 H       LIPIDS/LFTS:  Recent Labs   Lab 07/05/23  0330 07/06/23  0756 12/02/23  1030   AST 23 25 23   ALT 27 24 15       Lab Results   Component Value Date    TSH 1.386 12/02/2023         Cardiovascular Testing:  Echo 2/28/24    Left Ventricle: The left ventricle is normal in size. Mildly increased wall thickness. There is mild concentric hypertrophy. There is normal systolic function with a visually estimated ejection fraction of 55 - 60%. There is diastolic dysfunction but grade cannot be determined.    Right Ventricle: Normal right ventricular cavity size. Systolic function is normal.    Mitral Valve: There is mild regurgitation.    Tricuspid Valve: There is mild regurgitation.    Pulmonary Artery: The estimated pulmonary artery systolic pressure is 39 mmHg.    JUNIOR 10/24/23    JUNIOR prior to ablation in the EP lab.  No evidence of intracardiac thrombus.    Left Ventricle: The left ventricle is normal in size. Normal wall thickness. Normal wall motion. There is moderately reduced systolic function. Ejection fraction by visual approximation is 35%.    Right Ventricle: Normal right ventricular cavity size. Wall thickness is normal. Right ventricle wall motion  is normal. Systolic function is borderline low.    Left Atrium: The left atrial appendage appears normal. The left atrial appendage has a windsock morphology.  Appendage velocity is reduced at less than 40 cm/sec. There is no thrombus in the left atrial cavity.    Cath 2/26/18  B. Summary/Post-Operative Diagnosis     Normal coronary arteries.     Systolic dysfunction.     Severe mitral regurgitation.     Mildly elevated right and left Filling Pressures.     Mild Pulmonary Hypertension.   D. Hemodynamic Results   Ejection Fraction: 25%   Global LV Function: severely depressed   PA: 45   PW:  (26)   CO_THERM: 3.92   RV: 44   RA:  (18)   LVEDP: 13   E. Angiographic Results      - Left Main Coronary Artery:              The LM is normal. There is YULIYA 3 flow.      - Left Anterior Descending Artery:              The LAD is normal. There is YULIYA 3 flow.      - Left Circumflex Artery:              The LCX is normal. There is YULIYA 3 flow.      - Right Coronary Artery:              The RCA is normal. There is YULIYA 3 flow.      - Common Femoral Artery:              The right CFA is normal.     ASSESSMENT:   # PAF s/p JUNIOR/DCCV 2/23/18 and 9/6/22. ?intol of amio/tikosyn/flecainide.  S/p PVI 10/24/23.  In SR on Xarelto.    # PAFL s/p ablation 6/2023 and re-do 10/2023.  # NICM, cath 2/2018 with normal cors.  ?tachymyopathy, appears euvolemic.  EF normalized on echo 2/2024.  # HTN, controlled  # ?REZA, prev referred to sleep med.  Seen by Dr. Ceballos without plans for sleep study.  # BMI 31, stable vs last OV  # Asc ao dil, 4.3->4.1cm (CTA Chest 10/2023)    PLAN:   Cont med rx  Cont Xarelto 20mg qd  Cont GDMT  Diet/exercise/weight loss  RTC 1 year with surveillance CT chest (Dec 2025)    The above documents medical care services that are part of ongoing care related to this patient's serious/complex condition (Code ). (PAF/NICM/Asc ao dil)        Devante Issa MD, FACC

## 2024-12-31 ENCOUNTER — OFFICE VISIT (OUTPATIENT)
Dept: FAMILY MEDICINE | Facility: CLINIC | Age: 69
End: 2024-12-31
Payer: COMMERCIAL

## 2024-12-31 VITALS
SYSTOLIC BLOOD PRESSURE: 138 MMHG | BODY MASS INDEX: 31.55 KG/M2 | DIASTOLIC BLOOD PRESSURE: 84 MMHG | TEMPERATURE: 98 F | WEIGHT: 204.5 LBS

## 2024-12-31 DIAGNOSIS — I10 ESSENTIAL (PRIMARY) HYPERTENSION: ICD-10-CM

## 2024-12-31 DIAGNOSIS — R79.89 ABNORMAL LFTS (LIVER FUNCTION TESTS): ICD-10-CM

## 2024-12-31 DIAGNOSIS — Z78.0 ASYMPTOMATIC POSTMENOPAUSAL STATE: ICD-10-CM

## 2024-12-31 DIAGNOSIS — Z00.00 ROUTINE GENERAL MEDICAL EXAMINATION AT A HEALTH CARE FACILITY: Primary | ICD-10-CM

## 2024-12-31 DIAGNOSIS — R73.03 PREDIABETES: ICD-10-CM

## 2024-12-31 DIAGNOSIS — D69.6 THROMBOCYTOPENIA: ICD-10-CM

## 2024-12-31 DIAGNOSIS — Z23 NEED FOR SHINGLES VACCINE: ICD-10-CM

## 2024-12-31 DIAGNOSIS — I50.43 ACUTE ON CHRONIC COMBINED SYSTOLIC AND DIASTOLIC CONGESTIVE HEART FAILURE: ICD-10-CM

## 2024-12-31 PROCEDURE — 1126F AMNT PAIN NOTED NONE PRSNT: CPT | Mod: CPTII,S$GLB,, | Performed by: NURSE PRACTITIONER

## 2024-12-31 PROCEDURE — 4010F ACE/ARB THERAPY RXD/TAKEN: CPT | Mod: CPTII,S$GLB,, | Performed by: NURSE PRACTITIONER

## 2024-12-31 PROCEDURE — G2211 COMPLEX E/M VISIT ADD ON: HCPCS | Mod: S$GLB,,, | Performed by: NURSE PRACTITIONER

## 2024-12-31 PROCEDURE — 3288F FALL RISK ASSESSMENT DOCD: CPT | Mod: CPTII,S$GLB,, | Performed by: NURSE PRACTITIONER

## 2024-12-31 PROCEDURE — 3079F DIAST BP 80-89 MM HG: CPT | Mod: CPTII,S$GLB,, | Performed by: NURSE PRACTITIONER

## 2024-12-31 PROCEDURE — 1101F PT FALLS ASSESS-DOCD LE1/YR: CPT | Mod: CPTII,S$GLB,, | Performed by: NURSE PRACTITIONER

## 2024-12-31 PROCEDURE — 3075F SYST BP GE 130 - 139MM HG: CPT | Mod: CPTII,S$GLB,, | Performed by: NURSE PRACTITIONER

## 2024-12-31 PROCEDURE — 99214 OFFICE O/P EST MOD 30 MIN: CPT | Mod: S$GLB,,, | Performed by: NURSE PRACTITIONER

## 2024-12-31 PROCEDURE — 1159F MED LIST DOCD IN RCRD: CPT | Mod: CPTII,S$GLB,, | Performed by: NURSE PRACTITIONER

## 2024-12-31 PROCEDURE — 3008F BODY MASS INDEX DOCD: CPT | Mod: CPTII,S$GLB,, | Performed by: NURSE PRACTITIONER

## 2024-12-31 PROCEDURE — 99999 PR PBB SHADOW E&M-EST. PATIENT-LVL III: CPT | Mod: PBBFAC,,, | Performed by: NURSE PRACTITIONER

## 2024-12-31 RX ORDER — FUROSEMIDE 20 MG/1
20 TABLET ORAL DAILY
Qty: 30 TABLET | Refills: 11 | Status: SHIPPED | OUTPATIENT
Start: 2024-12-31

## 2024-12-31 NOTE — PATIENT INSTRUCTIONS
Medical Fitness--[phone]  Imaging, Xray, CT, MRI, Ultrasound---[phone]  Bariatrics---[phone]  Breast Surgery---[phone]  Case Management---[phone]  Colonoscopy---[phone]  DME---[phone]  Infectious Disease---[phone]  Interventional Radiology---[phone]  Medical Records---[phone]  [facility]---[phone]  Optometry/Ophthalmology---[phone]  [facility]---[phone]  Physical Therapy---[phone]  Psychiatry---[phone] or [phone]  Plastic Surgery---[phone]  Recovery--[phone] option 2, or [phone].  Sleep Study---[phone]  Smoking Cessation---[phone]  Wound Care---[phone]  Referral Desk---[phone]

## 2025-01-01 LAB
OHS QRS DURATION: 82 MS
OHS QTC CALCULATION: 416 MS

## 2025-01-03 ENCOUNTER — PATIENT OUTREACH (OUTPATIENT)
Dept: ADMINISTRATIVE | Facility: HOSPITAL | Age: 70
End: 2025-01-03
Payer: COMMERCIAL

## 2025-01-04 ENCOUNTER — LAB VISIT (OUTPATIENT)
Dept: LAB | Facility: HOSPITAL | Age: 70
End: 2025-01-04
Attending: NURSE PRACTITIONER
Payer: COMMERCIAL

## 2025-01-04 DIAGNOSIS — R79.89 ABNORMAL LFTS (LIVER FUNCTION TESTS): ICD-10-CM

## 2025-01-04 DIAGNOSIS — I10 ESSENTIAL (PRIMARY) HYPERTENSION: ICD-10-CM

## 2025-01-04 DIAGNOSIS — R73.03 PREDIABETES: ICD-10-CM

## 2025-01-04 DIAGNOSIS — Z00.00 ROUTINE GENERAL MEDICAL EXAMINATION AT A HEALTH CARE FACILITY: ICD-10-CM

## 2025-01-04 LAB
ALBUMIN SERPL BCP-MCNC: 3.6 G/DL (ref 3.5–5.2)
ALP SERPL-CCNC: 114 U/L (ref 40–150)
ALT SERPL W/O P-5'-P-CCNC: 20 U/L (ref 10–44)
ANION GAP SERPL CALC-SCNC: 9 MMOL/L (ref 8–16)
AST SERPL-CCNC: 26 U/L (ref 10–40)
BASOPHILS # BLD AUTO: 0.03 K/UL (ref 0–0.2)
BASOPHILS NFR BLD: 0.6 % (ref 0–1.9)
BILIRUB SERPL-MCNC: 1 MG/DL (ref 0.1–1)
BUN SERPL-MCNC: 10 MG/DL (ref 8–23)
CALCIUM SERPL-MCNC: 9.7 MG/DL (ref 8.7–10.5)
CHLORIDE SERPL-SCNC: 106 MMOL/L (ref 95–110)
CHOLEST SERPL-MCNC: 181 MG/DL (ref 120–199)
CHOLEST/HDLC SERPL: 2.9 {RATIO} (ref 2–5)
CO2 SERPL-SCNC: 27 MMOL/L (ref 23–29)
CREAT SERPL-MCNC: 0.8 MG/DL (ref 0.5–1.4)
DIFFERENTIAL METHOD BLD: ABNORMAL
EOSINOPHIL # BLD AUTO: 0.1 K/UL (ref 0–0.5)
EOSINOPHIL NFR BLD: 1.3 % (ref 0–8)
ERYTHROCYTE [DISTWIDTH] IN BLOOD BY AUTOMATED COUNT: 11.9 % (ref 11.5–14.5)
EST. GFR  (NO RACE VARIABLE): >60 ML/MIN/1.73 M^2
ESTIMATED AVG GLUCOSE: 108 MG/DL (ref 68–131)
GLUCOSE SERPL-MCNC: 97 MG/DL (ref 70–110)
HBA1C MFR BLD: 5.4 % (ref 4–5.6)
HCT VFR BLD AUTO: 50.1 % (ref 37–48.5)
HDLC SERPL-MCNC: 62 MG/DL (ref 40–75)
HDLC SERPL: 34.3 % (ref 20–50)
HGB BLD-MCNC: 14.8 G/DL (ref 12–16)
IMM GRANULOCYTES # BLD AUTO: 0 K/UL (ref 0–0.04)
IMM GRANULOCYTES NFR BLD AUTO: 0 % (ref 0–0.5)
LDLC SERPL CALC-MCNC: 104.8 MG/DL (ref 63–159)
LYMPHOCYTES # BLD AUTO: 2.1 K/UL (ref 1–4.8)
LYMPHOCYTES NFR BLD: 44.3 % (ref 18–48)
MCH RBC QN AUTO: 26 PG (ref 27–31)
MCHC RBC AUTO-ENTMCNC: 29.5 G/DL (ref 32–36)
MCV RBC AUTO: 88 FL (ref 82–98)
MONOCYTES # BLD AUTO: 0.3 K/UL (ref 0.3–1)
MONOCYTES NFR BLD: 7.1 % (ref 4–15)
NEUTROPHILS # BLD AUTO: 2.2 K/UL (ref 1.8–7.7)
NEUTROPHILS NFR BLD: 46.7 % (ref 38–73)
NONHDLC SERPL-MCNC: 119 MG/DL
NRBC BLD-RTO: 0 /100 WBC
PLATELET # BLD AUTO: 194 K/UL (ref 150–450)
PMV BLD AUTO: 11.6 FL (ref 9.2–12.9)
POTASSIUM SERPL-SCNC: 4.1 MMOL/L (ref 3.5–5.1)
PROT SERPL-MCNC: 7.7 G/DL (ref 6–8.4)
RBC # BLD AUTO: 5.7 M/UL (ref 4–5.4)
SODIUM SERPL-SCNC: 142 MMOL/L (ref 136–145)
TRIGL SERPL-MCNC: 71 MG/DL (ref 30–150)
WBC # BLD AUTO: 4.76 K/UL (ref 3.9–12.7)

## 2025-01-04 PROCEDURE — 83036 HEMOGLOBIN GLYCOSYLATED A1C: CPT | Performed by: NURSE PRACTITIONER

## 2025-01-04 PROCEDURE — 36415 COLL VENOUS BLD VENIPUNCTURE: CPT | Mod: PO | Performed by: NURSE PRACTITIONER

## 2025-01-04 PROCEDURE — 80053 COMPREHEN METABOLIC PANEL: CPT | Performed by: NURSE PRACTITIONER

## 2025-01-04 PROCEDURE — 80061 LIPID PANEL: CPT | Performed by: NURSE PRACTITIONER

## 2025-01-04 PROCEDURE — 85025 COMPLETE CBC W/AUTO DIFF WBC: CPT | Performed by: NURSE PRACTITIONER

## 2025-01-24 ENCOUNTER — OFFICE VISIT (OUTPATIENT)
Dept: OPHTHALMOLOGY | Facility: CLINIC | Age: 70
End: 2025-01-24
Payer: MEDICARE

## 2025-01-24 DIAGNOSIS — I10 ESSENTIAL (PRIMARY) HYPERTENSION: ICD-10-CM

## 2025-01-24 DIAGNOSIS — H25.13 NUCLEAR SCLEROSIS OF BOTH EYES: Primary | ICD-10-CM

## 2025-01-24 DIAGNOSIS — H52.7 REFRACTIVE ERROR: ICD-10-CM

## 2025-01-24 DIAGNOSIS — H25.041 POSTERIOR SUBCAPSULAR AGE-RELATED CATARACT OF RIGHT EYE: ICD-10-CM

## 2025-01-24 PROCEDURE — 1159F MED LIST DOCD IN RCRD: CPT | Mod: CPTII,S$GLB,, | Performed by: OPHTHALMOLOGY

## 2025-01-24 PROCEDURE — 3044F HG A1C LEVEL LT 7.0%: CPT | Mod: CPTII,S$GLB,, | Performed by: OPHTHALMOLOGY

## 2025-01-24 PROCEDURE — 92004 COMPRE OPH EXAM NEW PT 1/>: CPT | Mod: S$GLB,,, | Performed by: OPHTHALMOLOGY

## 2025-01-24 PROCEDURE — 3288F FALL RISK ASSESSMENT DOCD: CPT | Mod: CPTII,S$GLB,, | Performed by: OPHTHALMOLOGY

## 2025-01-24 PROCEDURE — 1126F AMNT PAIN NOTED NONE PRSNT: CPT | Mod: CPTII,S$GLB,, | Performed by: OPHTHALMOLOGY

## 2025-01-24 PROCEDURE — 1101F PT FALLS ASSESS-DOCD LE1/YR: CPT | Mod: CPTII,S$GLB,, | Performed by: OPHTHALMOLOGY

## 2025-01-24 PROCEDURE — 1160F RVW MEDS BY RX/DR IN RCRD: CPT | Mod: CPTII,S$GLB,, | Performed by: OPHTHALMOLOGY

## 2025-01-24 PROCEDURE — 99999 PR PBB SHADOW E&M-EST. PATIENT-LVL III: CPT | Mod: PBBFAC,,, | Performed by: OPHTHALMOLOGY

## 2025-01-24 PROCEDURE — 92136 OPHTHALMIC BIOMETRY: CPT | Mod: RT,S$GLB,, | Performed by: OPHTHALMOLOGY

## 2025-01-30 ENCOUNTER — TELEPHONE (OUTPATIENT)
Dept: OPHTHALMOLOGY | Facility: CLINIC | Age: 70
End: 2025-01-30
Payer: COMMERCIAL

## 2025-01-30 DIAGNOSIS — H25.13 NUCLEAR SCLEROSIS OF BOTH EYES: Primary | ICD-10-CM

## 2025-01-30 DIAGNOSIS — H25.11 NS (NUCLEAR SCLEROSIS), RIGHT: Primary | ICD-10-CM

## 2025-01-30 RX ORDER — PREDNISOLONE/MOXIFLOX/BROMFEN 1 %-0.5 %
1 SUSPENSION, DROPS(FINAL DOSAGE FORM)(ML) OPHTHALMIC (EYE) 3 TIMES DAILY
Qty: 8 ML | Refills: 2 | Status: SHIPPED | OUTPATIENT
Start: 2025-03-17

## 2025-01-31 ENCOUNTER — TELEPHONE (OUTPATIENT)
Dept: OPHTHALMOLOGY | Facility: CLINIC | Age: 70
End: 2025-01-31
Payer: COMMERCIAL

## 2025-01-31 DIAGNOSIS — H25.12 NS (NUCLEAR SCLEROSIS), LEFT: Primary | ICD-10-CM

## 2025-02-15 DIAGNOSIS — I50.43 ACUTE ON CHRONIC COMBINED SYSTOLIC AND DIASTOLIC CONGESTIVE HEART FAILURE: ICD-10-CM

## 2025-02-15 DIAGNOSIS — I10 ESSENTIAL (PRIMARY) HYPERTENSION: ICD-10-CM

## 2025-02-16 NOTE — TELEPHONE ENCOUNTER
No care due was identified.  [facility] Embedded Care Due Messages. Reference number: [id number].   2/15/2025 7:55:20 PM CST

## 2025-02-18 RX ORDER — SACUBITRIL AND VALSARTAN 97; 103 MG/1; MG/1
1 TABLET, FILM COATED ORAL 2 TIMES DAILY
Qty: 180 TABLET | Refills: 3 | Status: SHIPPED | OUTPATIENT
Start: 2025-02-18

## 2025-02-18 RX ORDER — FUROSEMIDE 20 MG/1
20 TABLET ORAL DAILY
Qty: 30 TABLET | Refills: 11 | Status: SHIPPED | OUTPATIENT
Start: 2025-02-18

## 2025-02-18 RX ORDER — METOPROLOL SUCCINATE 200 MG/1
200 TABLET, EXTENDED RELEASE ORAL DAILY
Qty: 90 TABLET | Refills: 3 | Status: SHIPPED | OUTPATIENT
Start: 2025-02-18 | End: 2026-02-18

## 2025-02-18 RX ORDER — HYDROCHLOROTHIAZIDE 25 MG/1
25 TABLET ORAL DAILY
Qty: 90 TABLET | Refills: 3 | Status: SHIPPED | OUTPATIENT
Start: 2025-02-18

## 2025-03-12 ENCOUNTER — HOSPITAL ENCOUNTER (OUTPATIENT)
Dept: PREADMISSION TESTING | Facility: HOSPITAL | Age: 70
Discharge: HOME | End: 2025-03-12
Attending: OPHTHALMOLOGY
Payer: MEDICARE

## 2025-03-12 NOTE — PRE-PROCEDURE INSTRUCTIONS
Pre-operative instructions, medication directives and pain scales reviewed with patient. All questions the patient had were answered. Re-assurance about surgical procedure and day of surgery routine given as needed, patient verbalized understanding of the pre-op instructions.  Patient instructed to report to [facility] for surgery.    Phone preop done.  Arrival time 530 am

## 2025-03-16 RX ORDER — PHENYLEPHRINE HYDROCHLORIDE 25 MG/ML
1 SOLUTION/ DROPS OPHTHALMIC
OUTPATIENT
Start: 2025-03-20

## 2025-03-16 RX ORDER — TETRACAINE HYDROCHLORIDE 5 MG/ML
1 SOLUTION OPHTHALMIC
OUTPATIENT
Start: 2025-03-20 | End: 2025-03-20

## 2025-03-16 RX ORDER — CYCLOPENTOLATE HYDROCHLORIDE 10 MG/ML
1 SOLUTION/ DROPS OPHTHALMIC
OUTPATIENT
Start: 2025-03-20

## 2025-03-16 RX ORDER — TROPICAMIDE 10 MG/ML
1 SOLUTION/ DROPS OPHTHALMIC
OUTPATIENT
Start: 2025-03-20

## 2025-03-19 ENCOUNTER — ANESTHESIA EVENT (OUTPATIENT)
Dept: SURGERY | Facility: HOSPITAL | Age: 70
End: 2025-03-19
Payer: MEDICARE

## 2025-03-20 ENCOUNTER — HOSPITAL ENCOUNTER (OUTPATIENT)
Facility: HOSPITAL | Age: 70
Discharge: HOME | End: 2025-03-20
Attending: OPHTHALMOLOGY | Admitting: OPHTHALMOLOGY
Payer: MEDICARE

## 2025-03-20 ENCOUNTER — ANESTHESIA (OUTPATIENT)
Dept: SURGERY | Facility: HOSPITAL | Age: 70
End: 2025-03-20
Payer: MEDICARE

## 2025-03-20 VITALS
HEIGHT: 68 IN | BODY MASS INDEX: 31.22 KG/M2 | SYSTOLIC BLOOD PRESSURE: 174 MMHG | DIASTOLIC BLOOD PRESSURE: 80 MMHG | OXYGEN SATURATION: 97 % | HEART RATE: 57 BPM | TEMPERATURE: 98 F | WEIGHT: 206 LBS | RESPIRATION RATE: 19 BRPM

## 2025-03-20 DIAGNOSIS — H25.11 NUCLEAR SCLEROTIC CATARACT OF RIGHT EYE: Primary | ICD-10-CM

## 2025-03-20 PROCEDURE — 25000003 PHARM REV CODE 250: Performed by: NURSE ANESTHETIST, CERTIFIED REGISTERED

## 2025-03-20 PROCEDURE — 66984 XCAPSL CTRC RMVL W/O ECP: CPT | Mod: RT,,, | Performed by: OPHTHALMOLOGY

## 2025-03-20 PROCEDURE — 63600175 PHARM REV CODE 636 W HCPCS: Performed by: NURSE ANESTHETIST, CERTIFIED REGISTERED

## 2025-03-20 PROCEDURE — 71000015 HC POSTOP RECOV 1ST HR: Performed by: OPHTHALMOLOGY

## 2025-03-20 PROCEDURE — 37000008 HC ANESTHESIA 1ST 15 MINUTES: Performed by: OPHTHALMOLOGY

## 2025-03-20 PROCEDURE — 25000003 PHARM REV CODE 250: Performed by: OPHTHALMOLOGY

## 2025-03-20 PROCEDURE — 63600175 PHARM REV CODE 636 W HCPCS: Mod: JZ,TB | Performed by: OPHTHALMOLOGY

## 2025-03-20 PROCEDURE — 36000706: Performed by: OPHTHALMOLOGY

## 2025-03-20 PROCEDURE — 37000009 HC ANESTHESIA EA ADD 15 MINS: Performed by: OPHTHALMOLOGY

## 2025-03-20 PROCEDURE — V2632 POST CHMBR INTRAOCULAR LENS: HCPCS | Performed by: OPHTHALMOLOGY

## 2025-03-20 PROCEDURE — 36000707: Performed by: OPHTHALMOLOGY

## 2025-03-20 DEVICE — CLAREON ASPHERIC HYDROPHOBIC ACRYLIC IOL WITH THE AUTONOMEAUTOMATED PRE-LOADED DELIVERY SYSTEM
Type: IMPLANTABLE DEVICE | Site: EYE | Status: FUNCTIONAL
Brand: CLAREON™

## 2025-03-20 RX ORDER — FENTANYL CITRATE 50 UG/ML
INJECTION, SOLUTION INTRAMUSCULAR; INTRAVENOUS
Status: DISCONTINUED | OUTPATIENT
Start: 2025-03-20 | End: 2025-03-20

## 2025-03-20 RX ORDER — OFLOXACIN 3 MG/ML
1 SOLUTION/ DROPS OPHTHALMIC
Status: COMPLETED | OUTPATIENT
Start: 2025-03-20 | End: 2025-03-20

## 2025-03-20 RX ORDER — SODIUM CHLORIDE 9 MG/ML
INJECTION, SOLUTION INTRAVENOUS CONTINUOUS
Status: DISCONTINUED | OUTPATIENT
Start: 2025-03-20 | End: 2025-03-20 | Stop reason: HOSPADM

## 2025-03-20 RX ORDER — LIDOCAINE HYDROCHLORIDE 20 MG/ML
INJECTION, SOLUTION INFILTRATION; PERINEURAL
Status: DISCONTINUED | OUTPATIENT
Start: 2025-03-20 | End: 2025-03-20 | Stop reason: HOSPADM

## 2025-03-20 RX ORDER — LIDOCAINE HYDROCHLORIDE 10 MG/ML
1 INJECTION, SOLUTION EPIDURAL; INFILTRATION; INTRACAUDAL; PERINEURAL ONCE
Status: DISCONTINUED | OUTPATIENT
Start: 2025-03-20 | End: 2025-03-20 | Stop reason: HOSPADM

## 2025-03-20 RX ORDER — CYCLOP/TROP/PROPA/PHEN/KET/WAT 1-1-0.1%
1 DROPS (EA) OPHTHALMIC (EYE)
Status: COMPLETED | OUTPATIENT
Start: 2025-03-20 | End: 2025-03-20

## 2025-03-20 RX ORDER — PREDNISOLONE ACETATE 10 MG/ML
SUSPENSION/ DROPS OPHTHALMIC
Status: DISCONTINUED | OUTPATIENT
Start: 2025-03-20 | End: 2025-03-20 | Stop reason: HOSPADM

## 2025-03-20 RX ORDER — HYDROCODONE BITARTRATE AND ACETAMINOPHEN 5; 325 MG/1; MG/1
1 TABLET ORAL EVERY 4 HOURS PRN
Refills: 0 | Status: CANCELLED | OUTPATIENT
Start: 2025-03-20

## 2025-03-20 RX ORDER — ACETAMINOPHEN 325 MG/1
650 TABLET ORAL EVERY 4 HOURS PRN
Status: CANCELLED | OUTPATIENT
Start: 2025-03-20

## 2025-03-20 RX ORDER — OFLOXACIN 3 MG/ML
SOLUTION/ DROPS OPHTHALMIC
Status: DISCONTINUED | OUTPATIENT
Start: 2025-03-20 | End: 2025-03-20 | Stop reason: HOSPADM

## 2025-03-20 RX ORDER — POVIDONE-IODINE 5 %
SOLUTION, NON-ORAL OPHTHALMIC (EYE)
Status: DISCONTINUED | OUTPATIENT
Start: 2025-03-20 | End: 2025-03-20 | Stop reason: HOSPADM

## 2025-03-20 RX ADMIN — FENTANYL CITRATE 25 MCG: 50 INJECTION, SOLUTION INTRAMUSCULAR; INTRAVENOUS at 07:03

## 2025-03-20 RX ADMIN — Medication 1 DROP: at 06:03

## 2025-03-20 RX ADMIN — OFLOXACIN 1 DROP: 3 SOLUTION/ DROPS OPHTHALMIC at 06:03

## 2025-03-20 RX ADMIN — OFLOXACIN 1 DROP: 3 SOLUTION/ DROPS OPHTHALMIC at 05:03

## 2025-03-20 RX ADMIN — Medication 1 DROP: at 05:03

## 2025-03-20 RX ADMIN — SODIUM CHLORIDE: 0.9 INJECTION, SOLUTION INTRAVENOUS at 06:03

## 2025-03-20 RX ADMIN — FENTANYL CITRATE 50 MCG: 50 INJECTION, SOLUTION INTRAMUSCULAR; INTRAVENOUS at 07:03

## 2025-03-20 NOTE — DISCHARGE INSTRUCTIONS
Post Operative Instructions for   Cataract Surgery- [facility] [place]      TOAN BARNETT M.D.   OPHTHALMOLOGY          STARTING THE SAME DAY OF SURGERY:   Place one (1) drop of Prednisolone-Moxifloxacin-Bromfenac (shake bottle), into the operative eye three (3) time a day (Morning, Noon, and Nighttime).       You will use this drop for four (4) weeks following surgery.       1 DAY POST OPERATIVE APPOINTMENT:        Tomorrow at 930am.  Location:  [facility]- [address] [place] [address]         RESTRICTIONS FOR SEVEN (7) DAYS FOLLOWING SURGERY:   DO NOT lift anything over 10 pounds.   DO NOT bend at the waist, only at the knees (keep head in upright position).    DO NOT rub your eye.    DO NOT get any water into the eye.    DO NOT wear any makeup, lotions, or creams on/around the eye.   Wear the protective eye shield you were given after surgery anytime you go to sleep.  You may remove the shield while awake.           PLEASE NOTE:       You may take over the counter pain medication such as Tylenol or Ibuprofen as directed, if needed for pain.    ***If you experience severe pain, loss of vision, sudden onset of flashes and/or floaters,     IMMEDIATELY CALL Dr. Orellana Office: [phone], AFTER HOURs: [phone] OR proceed to the EMERGENCY ROOM***.    Fall Prevention  Millions of people fall every year and injure themselves. You may have had anesthesia or sedation which may increase your risk of falling. You may have health issues that put you at an increased risk of falling.     Here are ways to reduce your risk of falling.    Make your home safe by keeping walkways clear of objects you may trip over.  Use non-slip pads under rugs. Do not use area rugs or small throw rugs.  Use non-slip mats in bathtubs and showers.  Install handrails and lights on staircases.  Do not walk in poorly lit areas.  Do not stand on chairs or wobbly ladders.  Use caution when reaching overhead or  looking upward. This position can cause a loss of balance.  Be sure your shoes fit properly, have non-slip bottoms and are in good condition.   Wear shoes both inside and out. Avoid going barefoot or wearing slippers.  Be cautious when going up and down stairs, curbs, and when walking on uneven sidewalks.  If your balance is poor, consider using a cane or walker.  If your fall was related to alcohol use, stop or limit alcohol intake.   If your fall was related to use of sleeping medicines, talk to your doctor about this. You may need to reduce your dosage at bedtime if you awaken during the night to go to the bathroom.    To reduce the need for nighttime bathroom trips:  Avoid drinking fluids for several hours before going to bed  Empty your bladder before going to bed  Men can keep a urinal at the bedside  Stay as active as you can. Balance, flexibility, strength, and endurance all come from exercise. They all play a role in preventing falls. Ask your healthcare provider which types of activity are right for you.  Get your vision checked on a regular basis.  If you have pets, know where they are before you stand up or walk so you don't trip over them.  Use night lights.

## 2025-03-20 NOTE — BRIEF OP NOTE
[facility] - Surgery  Brief Operative Note    Surgery Date: 3/20/2025     Surgeons and Role:     * Tristan Weller MD - Primary    Assisting Surgeon: None    Pre-op Diagnosis:  NS (nuclear sclerosis), right [H25.11]    Post-op Diagnosis:  Post-Op Diagnosis Codes:     * NS (nuclear sclerosis), right [H25.11]    Procedure(s) (LRB):  PHACOEMULSIFICATION, CATARACT (Right)  INSERTION, IOL PROSTHESIS (Right)    Anesthesia: Local MAC    Operative Findings:     Estimated Blood Loss: * No values recorded between 3/20/2025  6:54 AM and 3/20/2025  7:58 AM *         Specimens:   Specimen (24h ago, onward)      None            * No specimens in log *        Discharge Note    OUTCOME: Patient tolerated treatment/procedure well without complication and is now ready for discharge.    DISPOSITION: Home or Self Care    FINAL DIAGNOSIS:  Nuclear sclerotic cataract of right eye    FOLLOWUP: In clinic    DISCHARGE INSTRUCTIONS:    Discharge Procedure Orders   Other restrictions (specify):   Order Comments: No heavy lifting or bending for 1 week.

## 2025-03-20 NOTE — OP NOTE
Operative Date:  03/20/2025    Discharge Date:  03/20/2025    Discharge Patient Home    Report Title: Operative Note      SURGEON: Tristan Weller MD     ASSISTANT:     PREOPERATIVE DIAGNOSIS: Visually significant NSC cataract,  Right Eye.    POSTOPERATIVE DIAGNOSIS: Visually-significant NSC cataract,  Right Eye.    PROCEDURE PERFORMED: Phacoemulsification of the cataract with posterior chamber intraocular lens Right Eye.    ANESTHESIA: Topical with MAC     COMPLICATIONS: None    ESTIMATED BLOOD LOSS: Minimal    INDICATIONS FOR PROCEDURE:   The patient is a pleasant 69 year old woman with increasing difficulties with activities of daily living secondary to a dense visually significant cataract in the Right Eye.  Discussions have been carried out with this patient concerning the options to surgery, risks, benefits and expectations.  The patient voiced good understanding and wished to proceed with the above procedure.    PROCEDURE IN DETAIL: The patient was brought to the operating room and received topical anesthetic to the eye and then was prepped and draped in the usual sterile fashion.  Using the Hart ring and the guarded any blade set at 0.37 mm, a partial thickness clear cornea incision was made temporally.  The paracentesis site was made at the twelve o'clock position.  Omidria was injected into the anterior chamber through the paracentesis.  Viscoat was then injected into the anterior chamber.  The eye was then reentered at the primary surgical site with a 2.4 mm keratome followed by continuous capsulotomy, hydrodissection, hydrodelineation and phacoemulsification of the cataract.  Residual cortical material was removed using automated irrigation-aspiration technique.  Healon was injected into the posterior chamber and a CNAOTO 20.5 diopter lens was placed in the bag without difficulty. Residual viscoelastic was removed using automated irrigation-aspiration technique. The eye was re-pressurized  using BSS solution and both the paracentesis site and the primary surgical site were demonstrated to be watertight at the end of the case with Weck--Cuca manipulation.  One drop of Ofloxacin and one drop of Pred acetate 1% was applied to the Right Eye .The eye was closed, patched and a Avelar shield placed.  The patient was taken to the recovery room in good and stable condition.  The patient tolerated the procedure well.  The patient was instructed to refrain from any heavy lifting, bending, stooping or straining activities, discharged home  and to follow-up in the morning for routine postoperative care with Tristan Weller MD.

## 2025-03-20 NOTE — ANESTHESIA PREPROCEDURE EVALUATION
03/20/2025  Yaneli Steele is a 69 y.o., female.      Pre-op Assessment    I have reviewed the Patient Summary Reports.     I have reviewed the Nursing Notes. I have reviewed the NPO Status.   I have reviewed the Medications.     Review of Systems  Anesthesia Hx:  No problems with previous Anesthesia             Denies Family Hx of Anesthesia complications.    Denies Personal Hx of Anesthesia complications.                    Social:  Former Smoker, No Alcohol Use       Hematology/Oncology:  Hematology Normal   Oncology Normal                                   EENT/Dental:  EENT/Dental Normal           Cardiovascular:     Hypertension    Dysrhythmias atrial fibrillation          Echo (2/14/24) EF 55-60%    Hx of A flutter, S/P ablation                           Pulmonary:  Pulmonary Normal                       Hepatic/GI:  Hepatic/GI Normal                    Neurological:  Neurology Normal                                      Endocrine:        Obesity / BMI > 30  Psych:  Psychiatric Normal                    Physical Exam  General: Oriented, Alert and Cooperative    Airway:  Mallampati: II   Mouth Opening: Normal  TM Distance: Normal  Tongue: Normal  Neck ROM: Normal ROM    Dental:  Dentures, Partial Dentures  Complete upper  Partial lower      Anesthesia Plan  Type of Anesthesia, risks & benefits discussed:    Anesthesia Type: MAC  Intra-op Monitoring Plan: Standard ASA Monitors  Induction:  IV  Informed Consent: Informed consent signed with the Patient and all parties understand the risks and agree with anesthesia plan.  All questions answered. Patient consented to blood products? No  ASA Score: 3    Ready For Surgery From Anesthesia Perspective.     .

## 2025-03-20 NOTE — ANESTHESIA POSTPROCEDURE EVALUATION
Anesthesia Post Evaluation    Patient: Yaneli Steele    Procedure(s) Performed: Procedure(s) (LRB):  PHACOEMULSIFICATION, CATARACT (Right)  INSERTION, IOL PROSTHESIS (Right)    Final Anesthesia Type: MAC      Patient location during evaluation: [facility]  Patient participation: Yes- Able to Participate  Level of consciousness: awake and alert  Post-procedure vital signs: reviewed and stable  Pain management: adequate  Airway patency: patent    PONV status at discharge: No PONV  Anesthetic complications: no      Cardiovascular status: hemodynamically stable and blood pressure returned to baseline  Respiratory status: room air, spontaneous ventilation and unassisted  Hydration status: euvolemic  Follow-up not needed.              Vitals Value Taken Time   /80 03/20/25 07:56   Temp 36.8 °C (98.3 °F) 03/20/25 07:56   Pulse 57 03/20/25 07:56   Resp 19 03/20/25 07:56   SpO2 97 % 03/20/25 07:56         No case tracking events are documented in the log.      Pain/Brigette Score: Brigette Score: 10 (3/20/2025  8:19 AM)  Modified Brigette Score: 20 (3/20/2025  8:19 AM)

## 2025-03-20 NOTE — TRANSFER OF CARE
"Anesthesia Transfer of Care Note    Patient: Yaneli Steele    Procedure(s) Performed: Procedure(s) (LRB):  PHACOEMULSIFICATION, CATARACT (Right)  INSERTION, IOL PROSTHESIS (Right)    Patient location: [facility]    Anesthesia Type: MAC    Transport from OR: Transported from OR on room air with adequate spontaneous ventilation    Post pain: adequate analgesia    Post assessment: no apparent anesthetic complications and tolerated procedure well    Post vital signs: stable    Level of consciousness: awake, alert and oriented    Nausea/Vomiting: no nausea/vomiting    Complications: none    Transfer of care protocol was followed      Last vitals: Visit Vitals  BP (!) 174/80 (BP Location: Right arm, Patient Position: Lying)   Pulse (!) 57   Temp 36.8 °C (98.3 °F) (Oral)   Resp 19   Ht 5' 7.5" (1.715 m)   Wt 93.4 kg (206 lb)   SpO2 97%   Breastfeeding No   BMI 31.79 kg/m²     "

## 2025-03-21 ENCOUNTER — TELEPHONE (OUTPATIENT)
Dept: OPHTHALMOLOGY | Facility: CLINIC | Age: 70
End: 2025-03-21
Payer: MEDICARE

## 2025-03-21 ENCOUNTER — OFFICE VISIT (OUTPATIENT)
Dept: OPHTHALMOLOGY | Facility: CLINIC | Age: 70
End: 2025-03-21
Payer: MEDICARE

## 2025-03-21 DIAGNOSIS — Z98.890 POST-OPERATIVE STATE: Primary | ICD-10-CM

## 2025-03-21 PROCEDURE — 1160F RVW MEDS BY RX/DR IN RCRD: CPT | Mod: CPTII,S$GLB,, | Performed by: OPHTHALMOLOGY

## 2025-03-21 PROCEDURE — 3044F HG A1C LEVEL LT 7.0%: CPT | Mod: CPTII,S$GLB,, | Performed by: OPHTHALMOLOGY

## 2025-03-21 PROCEDURE — 4010F ACE/ARB THERAPY RXD/TAKEN: CPT | Mod: CPTII,S$GLB,, | Performed by: OPHTHALMOLOGY

## 2025-03-21 PROCEDURE — 3288F FALL RISK ASSESSMENT DOCD: CPT | Mod: CPTII,S$GLB,, | Performed by: OPHTHALMOLOGY

## 2025-03-21 PROCEDURE — 1159F MED LIST DOCD IN RCRD: CPT | Mod: CPTII,S$GLB,, | Performed by: OPHTHALMOLOGY

## 2025-03-21 PROCEDURE — 1101F PT FALLS ASSESS-DOCD LE1/YR: CPT | Mod: CPTII,S$GLB,, | Performed by: OPHTHALMOLOGY

## 2025-03-21 PROCEDURE — 99024 POSTOP FOLLOW-UP VISIT: CPT | Mod: S$GLB,,, | Performed by: OPHTHALMOLOGY

## 2025-03-21 PROCEDURE — 99999 PR PBB SHADOW E&M-EST. PATIENT-LVL II: CPT | Mod: PBBFAC,,, | Performed by: OPHTHALMOLOGY

## 2025-03-21 NOTE — TELEPHONE ENCOUNTER
----- Message from Funmilayo sent at 3/21/2025 10:33 AM CDT -----  Regarding: FW: Post op r/s    ----- Message -----  From: Tiara Dodd  Sent: 3/21/2025  10:04 AM CDT  To: Janee RODRÍGUEZ Staff  Subject: Post op r/s                                      Reschedule Existing Appointment Current appt date:3/27 Type of appt :Post op Physician: Tristan Weller MD Reason for rescheduling:Patient asking to r/s post op time to later time 3pm/ Caller:Yaneli Steele Contact Preference: [phone]

## 2025-03-23 NOTE — PROGRESS NOTES
Subjective:       Patient ID: Yaneli Steele is a 69 y.o. female.    Chief Complaint: Post-op Evaluation    HPI    Here for 1 day S/p Phaco w/IOL OD 03-20-25    Eye meds: PMB OD TID     69 year old female had patch removed and area cleaned. Denies ocular pain.   States vision is already more clear   Last edited by Liz Layton on 3/21/2025  9:18 AM.             Assessment:       1. Post-operative state        Plan:       S/p CE OD- Doing well.          CPM OD.  RTC 1 wk.

## 2025-03-24 DIAGNOSIS — Z00.00 ENCOUNTER FOR MEDICARE ANNUAL WELLNESS EXAM: ICD-10-CM

## 2025-03-25 ENCOUNTER — HOSPITAL ENCOUNTER (OUTPATIENT)
Dept: PREADMISSION TESTING | Facility: HOSPITAL | Age: 70
Discharge: HOME | End: 2025-03-25
Attending: OPHTHALMOLOGY
Payer: MEDICARE

## 2025-03-27 ENCOUNTER — OFFICE VISIT (OUTPATIENT)
Dept: OPHTHALMOLOGY | Facility: CLINIC | Age: 70
End: 2025-03-27
Payer: MEDICARE

## 2025-03-27 DIAGNOSIS — H25.12 NS (NUCLEAR SCLEROSIS), LEFT: ICD-10-CM

## 2025-03-27 DIAGNOSIS — Z98.890 POST-OPERATIVE STATE: Primary | ICD-10-CM

## 2025-03-27 PROCEDURE — 99999 PR PBB SHADOW E&M-EST. PATIENT-LVL III: CPT | Mod: PBBFAC,,, | Performed by: OPHTHALMOLOGY

## 2025-03-27 NOTE — PROGRESS NOTES
Subjective:       Patient ID: Yaneli Steele is a 69 y.o. female.    Chief Complaint: Post-op Evaluation    HPI    S/p Phaco w/IOL OD 03-20-25    PMB TD OD     Pt here for 1 week post op OD.  Pt states doing well. Pt denies eye pain   OD.   Last edited by Sara Turk MA on 3/27/2025  3:10 PM.             Assessment:       1. Post-operative state    2. NS (nuclear sclerosis), left        Plan:       S/p CE OD- Doing well.    Visually significant cataract OS -Pt. Wants Sx.      CPM OD.  Cataract Surgery Consent: Patient with a visually significant cataract with difficulties of ADLs, reading, driving, night vision, glare (any and all).  Discussed with Patient/Family/Caregiver: options, risks and benefits, expectations of cataract surgery, utilized an eye model with questions and answers to facilitate discussion.  Discussed lens options and patient understands that glasses may be required for optimal vision for distance and/or near vision after cataract surgery.  The Patient/Family/Caregiver  voice good understanding and patient wishes to proceed with surgery.  The patient will likely benefit from surgery and patient signed consent for Left Eye.  CE OS 4/3/25.

## 2025-03-30 RX ORDER — TETRACAINE HYDROCHLORIDE 5 MG/ML
1 SOLUTION OPHTHALMIC
OUTPATIENT
Start: 2025-04-03 | End: 2025-04-03

## 2025-03-30 NOTE — H&P
[facility]  History & Physical    Subjective:      Chief Complaint/Reason for Admission:     Yaneli Steele is a 69 y.o. female.    Past Medical History:   Diagnosis Date    Abnormal LFTs (liver function tests) 06/28/2023    Atrial flutter 06/28/2023    Chronic systolic heart failure     Essential (primary) hypertension     Leukopenia 07/02/2023    Non-rheumatic mitral regurgitation 02/24/2018    Non-rheumatic tricuspid valve insufficiency 02/24/2018    Pancreatic mass 06/29/2023    Paroxysmal atrial fibrillation 07/19/2022    she underwent pulmonary vein isolation for her atrial fibrillation in October 2023 with Dr. Polk.     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    Thrombocytopenia 07/02/2023     Past Surgical History:   Procedure Laterality Date    ABLATION, ATRIAL FIBRILLATION, CRYO N/A 10/24/2023    Procedure: ABLATION, ARRHYTHMOGENIC FOCUS, FOR ATRIAL FIBRILLATION AFTER PULMONARY VEIN ISOLATION;  Surgeon: Ramirez Polk MD;  Location: [facility] EP LAB;  Service: Cardiology;  Laterality: N/A;  AF, PVI, CRYO, JUNIOR, RICHARD, GEN, DM, 3prep    ABLATION, ATRIAL FLUTTER, TYPICAL Bilateral 07/05/2023    Procedure: Ablation, Atrial Flutter, Typical;  Surgeon: Devante Arevalo MD;  Location: [facility] EP LAB;  Service: Cardiology;  Laterality: Bilateral;  AF, RFA CTI/PVI, ANES, RICHARD, JUNIOR (CX IF SR), MB, 307    ABLATION, ATRIAL FLUTTER, TYPICAL  10/24/2023    Procedure: Ablation, Atrial Flutter, Typical;  Surgeon: Ramirez Polk MD;  Location: [facility] EP LAB;  Service: Cardiology;;    ANGIOGRAM, CORONARY, WITH LEFT HEART CATHETERIZATION      INTRAOCULAR PROSTHESES INSERTION Right 3/20/2025    Procedure: INSERTION, IOL PROSTHESIS;  Surgeon: Tristan Weller MD;  Location: [facility] OR;  Service: Ophthalmology;  Laterality: Right;    PHACOEMULSIFICATION OF CATARACT Right 3/20/2025    Procedure: PHACOEMULSIFICATION, CATARACT;  Surgeon: Tristan Weller  MD MELVIN;  Location: [facility] OR;  Service: Ophthalmology;  Laterality: Right;  RN PHONE PREOP 3/12/2025   ARRIVAL 530 AM    TONSILLECTOMY      TRANSESOPHAGEAL ECHOCARDIOGRAPHY N/A 06/29/2023    Procedure: ECHOCARDIOGRAM, TRANSESOPHAGEAL;  Surgeon: Leola Benavides MD;  Location: [facility] EP LAB;  Service: Cardiology;  Laterality: N/A;    TRANSESOPHAGEAL ECHOCARDIOGRAPHY N/A 07/05/2023    Procedure: ECHOCARDIOGRAM, TRANSESOPHAGEAL;  Surgeon: Popeye Brito MD;  Location: [facility] EP LAB;  Service: Cardiology;  Laterality: N/A;    TRANSESOPHAGEAL ECHOCARDIOGRAPHY N/A 10/24/2023    Procedure: ECHOCARDIOGRAM, TRANSESOPHAGEAL;  Surgeon: Devante Torre MD;  Location: [facility] EP LAB;  Service: Cardiology;  Laterality: N/A;    TREATMENT OF CARDIAC ARRHYTHMIA N/A 06/29/2023    Procedure: Cardioversion or Defibrillation;  Surgeon: Devante Arevalo MD;  Location: [facility] EP LAB;  Service: Cardiology;  Laterality: N/A;  AF, JUNIOR/DCCV, ANES, DM, ED BED 11    TREATMENT OF CARDIAC ARRHYTHMIA  10/24/2023    Procedure: Cardioversion or Defibrillation;  Surgeon: Ramirez Polk MD;  Location: [facility] EP LAB;  Service: Cardiology;;     Social History[1]    No medications prior to admission.     Review of patient's allergies indicates:   Allergen Reactions    Amiodarone analogues Shortness Of Breath    Penicillins Shortness Of Breath        Review of Systems   Eyes:  Positive for blurred vision.   All other systems reviewed and are negative.        Objective:      Vital Signs (Most Recent)       Vital Signs Range (Last 24H):       Physical Exam  Constitutional:       Appearance: She is well-developed.   HENT:      Head: Normocephalic.   Eyes:      Conjunctiva/sclera: Conjunctivae normal.      Pupils: Pupils are equal, round, and reactive to light.   Cardiovascular:      Rate and Rhythm: Normal rate and regular rhythm.      Heart sounds: Normal heart sounds.   Pulmonary:      Effort: Pulmonary effort is normal.      Breath sounds: Normal breath  sounds.   Abdominal:      General: Bowel sounds are normal.      Palpations: Abdomen is soft.   Musculoskeletal:         General: Normal range of motion.      Cervical back: Normal range of motion and neck supple.   Skin:     General: Skin is warm.   Neurological:      Mental Status: She is alert and oriented to person, place, and time.         ASA Score: II    Mallampati Score: II    Plan for Sedation: Moderate    Patient or Family History of Anesthesia problems : No    Any changes affecting the anesthesia assessment which may have changed since the initial assessment and H and P:  No       Data Review:    ECG:     Assessment:      Cataract OS.    Plan:    CE OS.         [1]   Social History  Tobacco Use    Smoking status: Former     Types: Cigarettes    Smokeless tobacco: Former    Tobacco comments:     Pt states she quit more than 30 years ago.   Substance Use Topics    Alcohol use: No    Drug use: No

## 2025-04-02 ENCOUNTER — ANESTHESIA EVENT (OUTPATIENT)
Dept: SURGERY | Facility: HOSPITAL | Age: 70
End: 2025-04-02
Payer: MEDICARE

## 2025-04-02 NOTE — DISCHARGE INSTRUCTIONS
Post Operative Instructions for   Cataract Surgery- [facility]      TOAN BARNETT M.D.   OPHTHALMOLOGY               STARTING THE SAME DAY OF SURGERY:   Place one (1) drop of Prednisolone-Moxifloxacin-Bromfenac (shake bottle), into the operative eye three (3) time a day (Morning, Noon, and Nighttime).   12:00 pm, 4:00 pm, and 8:00 pm      You will use this drop for four (4) weeks following surgery.       1 DAY POST OPERATIVE APPOINTMENT:        Tomorrow at 930am     Location:  [facility]- [address] [place] [address]      RESTRICTIONS FOR SEVEN (7) DAYS FOLLOWING SURGERY:   DO NOT lift anything over 10 pounds.   DO NOT bend at the waist, only at the knees (keep head in upright position).    DO NOT rub your eye.    DO NOT get any water into the eye.    DO NOT wear any makeup, lotions, or creams on/around the eye.   Wear the protective eye shield you were given after surgery anytime you go to sleep.  You may remove the shield while awake.           PLEASE NOTE:       You may take over the counter pain medication such as Tylenol or Ibuprofen as directed, if needed for pain.    ***If you experience severe pain, loss of vision, sudden onset of flashes and/or floaters,     IMMEDIATELY CALL Dr. Orellana Office: [phone], AFTER HOURs: [phone] OR proceed to the EMERGENCY ROOM***.    .    No driving, drinking alcohol, or signing legal documents for the next 24 hours     Fall Prevention  Millions of people fall every year and injure themselves. You may have had anesthesia or sedation which may increase your risk of falling. You may have health issues that put you at an increased risk of falling.     Here are ways to reduce your risk of falling.    Make your home safe by keeping walkways clear of objects you may trip over.  Use non-slip pads under rugs. Do not use area rugs or small throw rugs.  Use non-slip mats in bathtubs and showers.  Install handrails and lights on  staircases.  Do not walk in poorly lit areas.  Do not stand on chairs or wobbly ladders.  Use caution when reaching overhead or looking upward. This position can cause a loss of balance.  Be sure your shoes fit properly, have non-slip bottoms and are in good condition.   Wear shoes both inside and out. Avoid going barefoot or wearing slippers.  Be cautious when going up and down stairs, curbs, and when walking on uneven sidewalks.  If your balance is poor, consider using a cane or walker.  If your fall was related to alcohol use, stop or limit alcohol intake.   If your fall was related to use of sleeping medicines, talk to your doctor about this. You may need to reduce your dosage at bedtime if you awaken during the night to go to the bathroom.    To reduce the need for nighttime bathroom trips:  Avoid drinking fluids for several hours before going to bed  Empty your bladder before going to bed  Men can keep a urinal at the bedside  Stay as active as you can. Balance, flexibility, strength, and endurance all come from exercise. They all play a role in preventing falls. Ask your healthcare provider which types of activity are right for you.  Get your vision checked on a regular basis.  If you have pets, know where they are before you stand up or walk so you don't trip over them.  Use night lights.

## 2025-04-03 ENCOUNTER — ANESTHESIA (OUTPATIENT)
Dept: SURGERY | Facility: HOSPITAL | Age: 70
End: 2025-04-03
Payer: MEDICARE

## 2025-04-03 ENCOUNTER — HOSPITAL ENCOUNTER (OUTPATIENT)
Facility: HOSPITAL | Age: 70
Discharge: HOME | End: 2025-04-03
Attending: OPHTHALMOLOGY | Admitting: OPHTHALMOLOGY
Payer: MEDICARE

## 2025-04-03 VITALS
DIASTOLIC BLOOD PRESSURE: 73 MMHG | SYSTOLIC BLOOD PRESSURE: 119 MMHG | RESPIRATION RATE: 16 BRPM | OXYGEN SATURATION: 96 % | BODY MASS INDEX: 31.79 KG/M2 | WEIGHT: 206 LBS | TEMPERATURE: 98 F | HEART RATE: 50 BPM

## 2025-04-03 DIAGNOSIS — H25.12 NUCLEAR SCLEROTIC CATARACT OF LEFT EYE: Primary | ICD-10-CM

## 2025-04-03 PROCEDURE — 37000008 HC ANESTHESIA 1ST 15 MINUTES: Performed by: OPHTHALMOLOGY

## 2025-04-03 PROCEDURE — 66984 XCAPSL CTRC RMVL W/O ECP: CPT | Mod: 79,LT,, | Performed by: OPHTHALMOLOGY

## 2025-04-03 PROCEDURE — 36000707: Performed by: OPHTHALMOLOGY

## 2025-04-03 PROCEDURE — 63600175 PHARM REV CODE 636 W HCPCS: Performed by: OPHTHALMOLOGY

## 2025-04-03 PROCEDURE — 37000009 HC ANESTHESIA EA ADD 15 MINS: Performed by: OPHTHALMOLOGY

## 2025-04-03 PROCEDURE — 71000015 HC POSTOP RECOV 1ST HR: Performed by: OPHTHALMOLOGY

## 2025-04-03 PROCEDURE — 63600175 PHARM REV CODE 636 W HCPCS: Performed by: NURSE ANESTHETIST, CERTIFIED REGISTERED

## 2025-04-03 PROCEDURE — 25000003 PHARM REV CODE 250: Performed by: OPHTHALMOLOGY

## 2025-04-03 PROCEDURE — 36000706: Performed by: OPHTHALMOLOGY

## 2025-04-03 PROCEDURE — V2632 POST CHMBR INTRAOCULAR LENS: HCPCS | Performed by: OPHTHALMOLOGY

## 2025-04-03 PROCEDURE — 25000003 PHARM REV CODE 250

## 2025-04-03 DEVICE — CLAREON ASPHERIC HYDROPHOBIC ACRYLIC IOL WITH THE AUTONOMEAUTOMATED PRE-LOADED DELIVERY SYSTEM
Type: IMPLANTABLE DEVICE | Site: EYE | Status: FUNCTIONAL
Brand: CLAREON™

## 2025-04-03 RX ORDER — OFLOXACIN 3 MG/ML
SOLUTION/ DROPS OPHTHALMIC
Status: DISPENSED
Start: 2025-04-03 | End: 2025-04-03

## 2025-04-03 RX ORDER — PREDNISOLONE ACETATE 10 MG/ML
SUSPENSION/ DROPS OPHTHALMIC
Status: DISCONTINUED | OUTPATIENT
Start: 2025-04-03 | End: 2025-04-03 | Stop reason: HOSPADM

## 2025-04-03 RX ORDER — CYCLOP/TROP/PROPA/PHEN/KET/WAT 1-1-0.1%
DROPS (EA) OPHTHALMIC (EYE)
Status: DISPENSED
Start: 2025-04-03 | End: 2025-04-03

## 2025-04-03 RX ORDER — OFLOXACIN 3 MG/ML
SOLUTION/ DROPS OPHTHALMIC
Status: DISCONTINUED | OUTPATIENT
Start: 2025-04-03 | End: 2025-04-03 | Stop reason: HOSPADM

## 2025-04-03 RX ORDER — HYDROCODONE BITARTRATE AND ACETAMINOPHEN 5; 325 MG/1; MG/1
1 TABLET ORAL EVERY 4 HOURS PRN
Status: DISCONTINUED | OUTPATIENT
Start: 2025-04-03 | End: 2025-04-03 | Stop reason: HOSPADM

## 2025-04-03 RX ORDER — SODIUM CHLORIDE 9 MG/ML
INJECTION, SOLUTION INTRAVENOUS CONTINUOUS
Status: DISCONTINUED | OUTPATIENT
Start: 2025-04-03 | End: 2025-04-03 | Stop reason: HOSPADM

## 2025-04-03 RX ORDER — FENTANYL CITRATE 50 UG/ML
INJECTION, SOLUTION INTRAMUSCULAR; INTRAVENOUS
Status: DISCONTINUED | OUTPATIENT
Start: 2025-04-03 | End: 2025-04-03

## 2025-04-03 RX ORDER — MIDAZOLAM HYDROCHLORIDE 1 MG/ML
INJECTION INTRAMUSCULAR; INTRAVENOUS
Status: DISCONTINUED | OUTPATIENT
Start: 2025-04-03 | End: 2025-04-03

## 2025-04-03 RX ORDER — LIDOCAINE HYDROCHLORIDE 20 MG/ML
INJECTION, SOLUTION INFILTRATION; PERINEURAL
Status: DISCONTINUED | OUTPATIENT
Start: 2025-04-03 | End: 2025-04-03 | Stop reason: HOSPADM

## 2025-04-03 RX ORDER — POVIDONE-IODINE 5 %
SOLUTION, NON-ORAL OPHTHALMIC (EYE)
Status: DISCONTINUED | OUTPATIENT
Start: 2025-04-03 | End: 2025-04-03 | Stop reason: HOSPADM

## 2025-04-03 RX ORDER — CYCLOP/TROP/PROPA/PHEN/KET/WAT 1-1-0.1%
1 DROPS (EA) OPHTHALMIC (EYE)
Status: COMPLETED | OUTPATIENT
Start: 2025-04-03 | End: 2025-04-03

## 2025-04-03 RX ORDER — LIDOCAINE HYDROCHLORIDE 10 MG/ML
1 INJECTION, SOLUTION EPIDURAL; INFILTRATION; INTRACAUDAL; PERINEURAL ONCE
Status: DISCONTINUED | OUTPATIENT
Start: 2025-04-03 | End: 2025-04-03 | Stop reason: HOSPADM

## 2025-04-03 RX ORDER — ONDANSETRON HYDROCHLORIDE 2 MG/ML
INJECTION, SOLUTION INTRAVENOUS
Status: DISCONTINUED | OUTPATIENT
Start: 2025-04-03 | End: 2025-04-03

## 2025-04-03 RX ORDER — OFLOXACIN 3 MG/ML
1 SOLUTION/ DROPS OPHTHALMIC
Status: COMPLETED | OUTPATIENT
Start: 2025-04-03 | End: 2025-04-03

## 2025-04-03 RX ORDER — ACETAMINOPHEN 325 MG/1
650 TABLET ORAL EVERY 4 HOURS PRN
Status: DISCONTINUED | OUTPATIENT
Start: 2025-04-03 | End: 2025-04-03 | Stop reason: HOSPADM

## 2025-04-03 RX ADMIN — Medication 1 DROP: at 06:04

## 2025-04-03 RX ADMIN — FENTANYL CITRATE 25 MCG: 50 INJECTION, SOLUTION INTRAMUSCULAR; INTRAVENOUS at 07:04

## 2025-04-03 RX ADMIN — OFLOXACIN 1 DROP: 3 SOLUTION/ DROPS OPHTHALMIC at 06:04

## 2025-04-03 RX ADMIN — MIDAZOLAM HYDROCHLORIDE 1 MG: 1 INJECTION INTRAMUSCULAR; INTRAVENOUS at 07:04

## 2025-04-03 RX ADMIN — ONDANSETRON 4 MG: 2 INJECTION, SOLUTION INTRAMUSCULAR; INTRAVENOUS at 07:04

## 2025-04-03 RX ADMIN — SODIUM CHLORIDE, SODIUM LACTATE, POTASSIUM CHLORIDE, AND CALCIUM CHLORIDE: .6; .31; .03; .02 INJECTION, SOLUTION INTRAVENOUS at 06:04

## 2025-04-03 NOTE — BRIEF OP NOTE
[facility]  Brief Operative Note    Surgery Date: 4/3/2025     Surgeons and Role:     * Tristan Weller MD - Primary    Assisting Surgeon: None    Pre-op Diagnosis:  NS (nuclear sclerosis), left [H25.12]    Post-op Diagnosis:  Post-Op Diagnosis Codes:     * NS (nuclear sclerosis), left [H25.12]    Procedure(s) (LRB):  PHACOEMULSIFICATION, CATARACT (Left)  INSERTION, IOL PROSTHESIS (Left)    Anesthesia: Local MAC    Operative Findings:     Estimated Blood Loss: * No values recorded between 4/3/2025  6:58 AM and 4/3/2025  7:47 AM *         Specimens:   Specimen (24h ago, onward)      None            * No specimens in log *        Discharge Note    OUTCOME: Patient tolerated treatment/procedure well without complication and is now ready for discharge.    DISPOSITION: Home or Self Care    FINAL DIAGNOSIS:  Nuclear sclerotic cataract of left eye    FOLLOWUP: In clinic    DISCHARGE INSTRUCTIONS:    Discharge Procedure Orders   Other restrictions (specify):   Order Comments: No heavy lifting or bending for 1 week.

## 2025-04-03 NOTE — TRANSFER OF CARE
Anesthesia Transfer of Care Note    Patient: Yaneli Steele    Procedure(s) Performed: Procedure(s) (LRB):  PHACOEMULSIFICATION, CATARACT (Left)  INSERTION, IOL PROSTHESIS (Left)    Patient location: [facility]    Anesthesia Type: MAC    Transport from OR: Transported from OR on room air with adequate spontaneous ventilation    Post pain: adequate analgesia    Post assessment: no apparent anesthetic complications and tolerated procedure well    Post vital signs: stable    Level of consciousness: awake, alert and oriented    Nausea/Vomiting: no nausea/vomiting    Complications: none    Transfer of care protocol was followed    Last vitals: Visit Vitals  BP (!) 171/79 (BP Location: Right arm, Patient Position: Lying)   Pulse (!) 51   Temp 36.4 °C (97.5 °F) (Oral)   Resp 16   Wt 93.4 kg (206 lb)   SpO2 96%   Breastfeeding No   BMI 31.79 kg/m²

## 2025-04-03 NOTE — ANESTHESIA POSTPROCEDURE EVALUATION
Anesthesia Post Evaluation    Patient: Yaneli Steele    Procedure(s) Performed: Procedure(s) (LRB):  PHACOEMULSIFICATION, CATARACT (Left)  INSERTION, IOL PROSTHESIS (Left)    Final Anesthesia Type: MAC      Patient location during evaluation: [facility]  Patient participation: Yes- Able to Participate  Level of consciousness: awake and alert  Post-procedure vital signs: reviewed and stable  Pain management: adequate  Airway patency: patent    PONV status at discharge: No PONV  Anesthetic complications: no      Cardiovascular status: hemodynamically stable  Respiratory status: unassisted and spontaneous ventilation  Hydration status: euvolemic  Follow-up not needed.              Vitals Value Taken Time   /73 04/03/25 08:07   Temp 36.4 °C (97.5 °F) 04/03/25 07:45   Pulse 50 04/03/25 08:07   Resp 16 04/03/25 07:45   SpO2 96 % 04/03/25 07:45         No case tracking events are documented in the log.      Pain/Brigette Score: Brigette Score: 10 (4/3/2025  7:45 AM)  Modified Brigette Score: 20 (4/3/2025  8:09 AM)

## 2025-04-03 NOTE — ANESTHESIA PREPROCEDURE EVALUATION
04/03/2025  Yaneli Steele is a 69 y.o., female.      Pre-op Assessment    I have reviewed the Patient Summary Reports.     I have reviewed the Nursing Notes.       Review of Systems  Anesthesia Hx:  No problems with previous Anesthesia             Denies Family Hx of Anesthesia complications.    Denies Personal Hx of Anesthesia complications.                    Social:  Former Smoker       Hematology/Oncology:                   Hematology Comments: Xarelto-last dose yesterday                    EENT/Dental:   cataracts          Cardiovascular:     Hypertension Valvular problems/Murmurs, MR   Dysrhythmias atrial fibrillation  CHF        02/24 Echo: EF wnl                           Pulmonary:  Pulmonary Normal                       Renal/:  Renal/ Normal                 Hepatic/GI:  Hepatic/GI Normal                    Neurological:  Neurology Normal                                      Endocrine:  Endocrine Normal                Physical Exam  General: Well nourished, Cooperative, Alert and Oriented    Airway:  Mallampati: II   Mouth Opening: Normal  TM Distance: 4 - 6 cm  Tongue: Normal  Neck ROM: Normal ROM    Dental:  Dentures    Chest/Lungs:  Normal Respiratory Rate, Clear to auscultation    Heart:  Rate: Normal  Rhythm: Regular Rhythm      Wt Readings from Last 3 Encounters:   04/02/25 93.4 kg (206 lb)   03/18/25 93.4 kg (206 lb)   12/31/24 92.8 kg (204 lb 7.6 oz)     Temp Readings from Last 3 Encounters:   04/03/25 36.3 °C (97.4 °F) (Oral)   03/20/25 36.8 °C (98.3 °F) (Oral)   12/31/24 36.6 °C (97.8 °F) (Oral)     BP Readings from Last 3 Encounters:   04/03/25 131/78   03/20/25 (!) 174/80   12/31/24 138/84     Pulse Readings from Last 3 Encounters:   04/03/25 (!) 54   03/20/25 (!) 57   12/30/24 (!) 52         Anesthesia Plan  Type of Anesthesia, risks & benefits discussed:    Anesthesia Type:  MAC, Gen Natural Airway, Gen ETT  Intra-op Monitoring Plan: Standard ASA Monitors  Post Op Pain Control Plan: multimodal analgesia and IV/PO Opioids PRN  Induction:  IV  Informed Consent: Informed consent signed with the Patient and all parties understand the risks and agree with anesthesia plan.  All questions answered.   ASA Score: 3  Day of Surgery Review of History & Physical: H&P Update referred to the surgeon/provider.    Ready For Surgery From Anesthesia Perspective.     .

## 2025-04-03 NOTE — OP NOTE
Operative Date:  04/03/2025    Discharge Date:  04/03/2025    Discharge Patient Home    Report Title: Operative Note      SURGEON: Tristan Weller MD     ASSISTANT:     PREOPERATIVE DIAGNOSIS: Visually significant NSC cataract,  Left Eye.    POSTOPERATIVE DIAGNOSIS: Visually-significant NSC cataract,  Left Eye.    PROCEDURE PERFORMED: Phacoemulsification of the cataract with posterior chamber intraocular lens Left Eye.    ANESTHESIA: Topical with MAC     COMPLICATIONS: None    ESTIMATED BLOOD LOSS: Minimal    INDICATIONS FOR PROCEDURE:   The patient is a pleasant 69 year old woman with increasing difficulties with activities of daily living secondary to a dense visually significant cataract in the Left Eye.  Discussions have been carried out with this patient concerning the options to surgery, risks, benefits and expectations.  The patient voiced good understanding and wished to proceed with the above procedure.    PROCEDURE IN DETAIL: The patient was brought to the operating room and received topical anesthetic to the eye and then was prepped and draped in the usual sterile fashion.  Using the Hart ring and the guarded any blade set at 0.37 mm, a partial thickness clear cornea incision was made temporally.  The paracentesis site was made at the six o'clock position.  Omidria was injected into the anterior chamber through the paracentesis.  Viscoat was then injected into the anterior chamber.  The eye was then reentered at the primary surgical site with a 2.4 mm keratome followed by continuous capsulotomy, hydrodissection, hydrodelineation and phacoemulsification of the cataract.  Residual cortical material was removed using automated irrigation-aspiration technique.  Healon was injected into the posterior chamber and a CNAOTO 20.5 diopter lens was placed in the bag without difficulty. Residual viscoelastic was removed using automated irrigation-aspiration technique. The eye was re-pressurized using  BSS solution and both the paracentesis site and the primary surgical site were demonstrated to be watertight at the end of the case with Weck--Cuca manipulation.  One drop of Ofloxacin and one drop of Pred acetate 1% was applied to the Left Eye .The eye was closed, patched and a Avelar shield placed.  The patient was taken to the recovery room in good and stable condition.  The patient tolerated the procedure well.  The patient was instructed to refrain from any heavy lifting, bending, stooping or straining activities, discharged home  and to follow-up in the morning for routine postoperative care with Tristan Weller MD.

## 2025-04-03 NOTE — PLAN OF CARE
Pt verbalized a readiness to go home. VSS. Written and verbal discharge instructions given. Pt aware of follow-up appt. Pt provided with gray pouch, sunglasses, paper tape and implant ID card.

## 2025-04-04 ENCOUNTER — OFFICE VISIT (OUTPATIENT)
Dept: OPHTHALMOLOGY | Facility: CLINIC | Age: 70
End: 2025-04-04
Payer: MEDICARE

## 2025-04-04 DIAGNOSIS — Z98.890 POST-OPERATIVE STATE: Primary | ICD-10-CM

## 2025-04-04 PROCEDURE — 99024 POSTOP FOLLOW-UP VISIT: CPT | Mod: S$GLB,,, | Performed by: OPHTHALMOLOGY

## 2025-04-04 PROCEDURE — 1126F AMNT PAIN NOTED NONE PRSNT: CPT | Mod: CPTII,S$GLB,, | Performed by: OPHTHALMOLOGY

## 2025-04-04 PROCEDURE — 1160F RVW MEDS BY RX/DR IN RCRD: CPT | Mod: CPTII,S$GLB,, | Performed by: OPHTHALMOLOGY

## 2025-04-04 PROCEDURE — 3288F FALL RISK ASSESSMENT DOCD: CPT | Mod: CPTII,S$GLB,, | Performed by: OPHTHALMOLOGY

## 2025-04-04 PROCEDURE — 1101F PT FALLS ASSESS-DOCD LE1/YR: CPT | Mod: CPTII,S$GLB,, | Performed by: OPHTHALMOLOGY

## 2025-04-04 PROCEDURE — 3044F HG A1C LEVEL LT 7.0%: CPT | Mod: CPTII,S$GLB,, | Performed by: OPHTHALMOLOGY

## 2025-04-04 PROCEDURE — 4010F ACE/ARB THERAPY RXD/TAKEN: CPT | Mod: CPTII,S$GLB,, | Performed by: OPHTHALMOLOGY

## 2025-04-04 PROCEDURE — 1159F MED LIST DOCD IN RCRD: CPT | Mod: CPTII,S$GLB,, | Performed by: OPHTHALMOLOGY

## 2025-04-04 PROCEDURE — 99999 PR PBB SHADOW E&M-EST. PATIENT-LVL III: CPT | Mod: PBBFAC,,, | Performed by: OPHTHALMOLOGY

## 2025-04-05 NOTE — PROGRESS NOTES
Subjective:       Patient ID: Yaneli Steele is a 69 y.o. female.    Chief Complaint: Post-op Evaluation    HPI    S/p phaco w/IOL OS 4/3/25    PMB TID OS    Pt here for 1 day post op OS. Pt states doing well. Pt denies eye pain OS.     Last edited by Sara Turk MA on 4/4/2025  9:51 AM.             Assessment:       1. Post-operative state        Plan:       S/p CE OU- Doing well.      CPM OU.  RTC 1 wk.

## 2025-04-10 ENCOUNTER — OFFICE VISIT (OUTPATIENT)
Dept: OPHTHALMOLOGY | Facility: CLINIC | Age: 70
End: 2025-04-10
Payer: MEDICARE

## 2025-04-10 DIAGNOSIS — Z98.890 POST-OPERATIVE STATE: Primary | ICD-10-CM

## 2025-04-10 PROCEDURE — 99999 PR PBB SHADOW E&M-EST. PATIENT-LVL II: CPT | Mod: PBBFAC,,, | Performed by: OPHTHALMOLOGY

## 2025-04-10 NOTE — PROGRESS NOTES
Subjective:       Patient ID: Yaneli Steele is a 69 y.o. female.    Chief Complaint: Post-op Evaluation    HPI    Here for 1 week S/p phaco w/IOL OS 4/3/25    PMB TID OS    69 year old female states vision is clear OU. Denies eye pain. Voices   concerns  Last edited by Liz Layton on 4/10/2025  1:43 PM.             Assessment:       1. Post-operative state        Plan:       S/p CE OU- Doing well.      CPM OU.  RTC 3 wks.

## 2025-05-01 ENCOUNTER — OFFICE VISIT (OUTPATIENT)
Dept: OPHTHALMOLOGY | Facility: CLINIC | Age: 70
End: 2025-05-01
Payer: COMMERCIAL

## 2025-05-01 DIAGNOSIS — Z98.890 POST-OPERATIVE STATE: Primary | ICD-10-CM

## 2025-05-01 DIAGNOSIS — H52.7 REFRACTIVE ERROR: ICD-10-CM

## 2025-05-01 PROCEDURE — 99999 PR PBB SHADOW E&M-EST. PATIENT-LVL II: CPT | Mod: PBBFAC,,, | Performed by: OPHTHALMOLOGY

## 2025-05-01 NOTE — PROGRESS NOTES
Subjective:       Patient ID: Yaneli Steele is a 69 y.o. female.    Chief Complaint: No chief complaint on file.    HPI    Here for 1 week S/p Phaco w/IOL OS 04-03-25                            S/p Phaco w/IOL OD 03-20-25    Eye meds: none    69 year old female states vision is clear OU. Denies needing glasses.   Denies eye pain. States she noticed red vein on temporal side OS, wants to   know if it will go away  Last edited by Liz Layton on 5/1/2025  2:07 PM.             Assessment:       1. Post-operative state    2. Refractive error        Plan:       S/p CE OU- Doing well.  RE-Pt does not want MRx.      Readers.  RTC Dr Bhakta in 6 mos.

## 2025-05-05 ENCOUNTER — PATIENT OUTREACH (OUTPATIENT)
Dept: ADMINISTRATIVE | Facility: HOSPITAL | Age: 70
End: 2025-05-05
Payer: MEDICARE

## (undated) DEVICE — KIT EYE PIC PACK WB

## (undated) DEVICE — SPIKE SHORT LG BORE 1-WAY 2IN

## (undated) DEVICE — R CATH BIDIRECTIONL DF CRV 7FR

## (undated) DEVICE — INTRODUCER HEMOSTASIS 7.5F

## (undated) DEVICE — KIT CUSTOM BASIC EYE ST / MEA

## (undated) DEVICE — LINE PRESSURE MONITORING 96IN

## (undated) DEVICE — GOWN NONREINF SET-IN SLV XL

## (undated) DEVICE — SHEATH HEMOSTASIS 8.5FR

## (undated) DEVICE — CABLE ELECTRICAL UMBILICAL

## (undated) DEVICE — SHEATH RAMP FAST CATH 8.5F60CM

## (undated) DEVICE — SYR 3CC LUER LOC

## (undated) DEVICE — VISIPAQUE CONTRAST 320MG/100ML

## (undated) DEVICE — SHEATH FLEXCATH STEERABLE 12F

## (undated) DEVICE — PAD DEFIB CADENCE ADULT R2

## (undated) DEVICE — Device

## (undated) DEVICE — INTRO FAST-CATH SL1 8.5FR 63CM

## (undated) DEVICE — KIT ENSITE ELECTRODE SURFACE

## (undated) DEVICE — SOL WATER STERILE IRR 500ML

## (undated) DEVICE — NDL HYPO STD REG BVL 18GX1.5IN

## (undated) DEVICE — PAD GROUND UNIV STYLE CORD 9IN

## (undated) DEVICE — SYR 10CC LUER LOCK

## (undated) DEVICE — SHEILD & GARTERS FOX METAL EYE

## (undated) DEVICE — COVER DRAPE ACUSON STERILE

## (undated) DEVICE — TOWEL OR DISP STRL BLUE 4/PK

## (undated) DEVICE — R CATH HIS OCTAPOLAR 7FRX115CM

## (undated) DEVICE — CATH ARCTIC FRONT ADVANCE 28MM

## (undated) DEVICE — KIT PROBE COVER WITH GEL

## (undated) DEVICE — VALVE HEMOSTASIS HONOR

## (undated) DEVICE — KIT CUSTOM BASIC EYE ST/MEA

## (undated) DEVICE — SYR LUER LOCK 1CC

## (undated) DEVICE — R CATH ACUSON ACUNAV 8FR

## (undated) DEVICE — PACK EP DRAPE OMC

## (undated) DEVICE — GLOVE SENSICARE PI GRN 8

## (undated) DEVICE — NDL TRNSSPTL BRK-1 18GA 71CM

## (undated) DEVICE — INTRO AGILIS MED CRL 8.5F 71CM

## (undated) DEVICE — CATH ACHIEVE ADVANCE 20MM

## (undated) DEVICE — GLOVE SENSICARE PI MICRO 7.5

## (undated) DEVICE — CATH SAFIRE BI-DIR 7FR LRG

## (undated) DEVICE — GUIDEWIRE TORAY INOUE

## (undated) DEVICE — CATH MAP BI-D HD SENSOR ENABLE

## (undated) DEVICE — CABLE COAXIAL UMBILICAL

## (undated) DEVICE — PAD RADI FEMORAL

## (undated) DEVICE — CARTRIDGE LENS D

## (undated) DEVICE — R CATH TRICSPD HALO XP 7FRX110

## (undated) DEVICE — COVER PRB TRNSDUC 7.6X183CM

## (undated) DEVICE — KIT CUSTOM MANIFOLD

## (undated) DEVICE — BOWL FLUID - BACK STOP

## (undated) DEVICE — DIALATOR COONS TAPER 12F 20CM